# Patient Record
Sex: MALE | Race: WHITE | Employment: UNEMPLOYED | URBAN - METROPOLITAN AREA
[De-identification: names, ages, dates, MRNs, and addresses within clinical notes are randomized per-mention and may not be internally consistent; named-entity substitution may affect disease eponyms.]

---

## 2017-03-29 ENCOUNTER — HOSPITAL ENCOUNTER (OUTPATIENT)
Dept: LAB | Age: 52
Discharge: HOME | End: 2017-03-29
Payer: MEDICAID

## 2017-03-29 ENCOUNTER — OFFICE VISIT (OUTPATIENT)
Dept: HEMATOLOGY | Age: 52
End: 2017-03-29

## 2017-03-29 VITALS
BODY MASS INDEX: 29.93 KG/M2 | RESPIRATION RATE: 16 BRPM | DIASTOLIC BLOOD PRESSURE: 90 MMHG | HEART RATE: 61 BPM | OXYGEN SATURATION: 97 % | WEIGHT: 221 LBS | HEIGHT: 72 IN | TEMPERATURE: 96.9 F | SYSTOLIC BLOOD PRESSURE: 135 MMHG

## 2017-03-29 DIAGNOSIS — B18.2 CHRONIC HEPATITIS C WITHOUT HEPATIC COMA (HCC): ICD-10-CM

## 2017-03-29 DIAGNOSIS — B18.2 CHRONIC HEPATITIS C WITHOUT HEPATIC COMA (HCC): Primary | ICD-10-CM

## 2017-03-29 PROBLEM — D69.6 THROMBOCYTOPENIA (HCC): Status: ACTIVE | Noted: 2017-03-29

## 2017-03-29 LAB
ALBUMIN SERPL BCP-MCNC: 3.5 G/DL (ref 3.4–5)
ALBUMIN/GLOB SERPL: 0.9 {RATIO} (ref 0.8–1.7)
ALP SERPL-CCNC: 113 U/L (ref 45–117)
ALT SERPL-CCNC: 147 U/L (ref 16–61)
ANION GAP BLD CALC-SCNC: 5 MMOL/L (ref 3–18)
AST SERPL W P-5'-P-CCNC: 107 U/L (ref 15–37)
BASOPHILS # BLD AUTO: 0 K/UL (ref 0–0.06)
BASOPHILS # BLD: 1 % (ref 0–2)
BILIRUB DIRECT SERPL-MCNC: 0.1 MG/DL (ref 0–0.2)
BILIRUB SERPL-MCNC: 0.3 MG/DL (ref 0.2–1)
BUN SERPL-MCNC: 18 MG/DL (ref 7–18)
BUN/CREAT SERPL: 22 (ref 12–20)
CALCIUM SERPL-MCNC: 8.4 MG/DL (ref 8.5–10.1)
CHLORIDE SERPL-SCNC: 106 MMOL/L (ref 100–108)
CO2 SERPL-SCNC: 31 MMOL/L (ref 21–32)
CREAT SERPL-MCNC: 0.82 MG/DL (ref 0.6–1.3)
DIFFERENTIAL METHOD BLD: ABNORMAL
EOSINOPHIL # BLD: 0.1 K/UL (ref 0–0.4)
EOSINOPHIL NFR BLD: 2 % (ref 0–5)
ERYTHROCYTE [DISTWIDTH] IN BLOOD BY AUTOMATED COUNT: 13.9 % (ref 11.6–14.5)
GLOBULIN SER CALC-MCNC: 3.8 G/DL (ref 2–4)
GLUCOSE SERPL-MCNC: 89 MG/DL (ref 74–99)
HCT VFR BLD AUTO: 39 % (ref 36–48)
HGB BLD-MCNC: 12.8 G/DL (ref 13–16)
LYMPHOCYTES # BLD AUTO: 40 % (ref 21–52)
LYMPHOCYTES # BLD: 1.5 K/UL (ref 0.9–3.6)
MCH RBC QN AUTO: 31.2 PG (ref 24–34)
MCHC RBC AUTO-ENTMCNC: 32.8 G/DL (ref 31–37)
MCV RBC AUTO: 95.1 FL (ref 74–97)
MONOCYTES # BLD: 0.4 K/UL (ref 0.05–1.2)
MONOCYTES NFR BLD AUTO: 10 % (ref 3–10)
NEUTS SEG # BLD: 1.8 K/UL (ref 1.8–8)
NEUTS SEG NFR BLD AUTO: 47 % (ref 40–73)
PLATELET # BLD AUTO: 166 K/UL (ref 135–420)
PMV BLD AUTO: 11.2 FL (ref 9.2–11.8)
POTASSIUM SERPL-SCNC: 4.6 MMOL/L (ref 3.5–5.5)
PROT SERPL-MCNC: 7.3 G/DL (ref 6.4–8.2)
RBC # BLD AUTO: 4.1 M/UL (ref 4.7–5.5)
SODIUM SERPL-SCNC: 142 MMOL/L (ref 136–145)
WBC # BLD AUTO: 3.8 K/UL (ref 4.6–13.2)

## 2017-03-29 PROCEDURE — 80048 BASIC METABOLIC PNL TOTAL CA: CPT | Performed by: INTERNAL MEDICINE

## 2017-03-29 PROCEDURE — 87902 NFCT AGT GNTYP ALYS HEP C: CPT | Performed by: INTERNAL MEDICINE

## 2017-03-29 PROCEDURE — 86706 HEP B SURFACE ANTIBODY: CPT | Performed by: INTERNAL MEDICINE

## 2017-03-29 PROCEDURE — 87900 PHENOTYPE INFECT AGENT DRUG: CPT | Performed by: INTERNAL MEDICINE

## 2017-03-29 PROCEDURE — 87521 HEPATITIS C PROBE&RVRS TRNSC: CPT | Performed by: INTERNAL MEDICINE

## 2017-03-29 PROCEDURE — 87522 HEPATITIS C REVRS TRNSCRPJ: CPT | Performed by: INTERNAL MEDICINE

## 2017-03-29 PROCEDURE — 80076 HEPATIC FUNCTION PANEL: CPT | Performed by: INTERNAL MEDICINE

## 2017-03-29 PROCEDURE — 85025 COMPLETE CBC W/AUTO DIFF WBC: CPT | Performed by: INTERNAL MEDICINE

## 2017-03-29 PROCEDURE — 36415 COLL VENOUS BLD VENIPUNCTURE: CPT | Performed by: INTERNAL MEDICINE

## 2017-03-29 PROCEDURE — 86704 HEP B CORE ANTIBODY TOTAL: CPT | Performed by: INTERNAL MEDICINE

## 2017-03-29 PROCEDURE — 86708 HEPATITIS A ANTIBODY: CPT | Performed by: INTERNAL MEDICINE

## 2017-03-29 PROCEDURE — 87340 HEPATITIS B SURFACE AG IA: CPT | Performed by: INTERNAL MEDICINE

## 2017-03-29 RX ORDER — LISINOPRIL 20 MG/1
TABLET ORAL DAILY
COMMUNITY

## 2017-03-29 RX ORDER — ALBUTEROL SULFATE 90 UG/1
AEROSOL, METERED RESPIRATORY (INHALATION)
COMMUNITY

## 2017-03-29 RX ORDER — BUDESONIDE AND FORMOTEROL FUMARATE DIHYDRATE 160; 4.5 UG/1; UG/1
2 AEROSOL RESPIRATORY (INHALATION) 2 TIMES DAILY
COMMUNITY

## 2017-03-30 LAB
HAV AB SER QL IA: POSITIVE
HBV & HDV AB SER-IMP: NEGATIVE
HBV CORE AB SERPL QL IA: NEGATIVE
HBV SURFACE AB SERPL IA-ACNC: <3.1 MIU/ML
HBV SURFACE AG SER QL: <0.1 INDEX
HBV SURFACE AG SER QL: NEGATIVE
HEP BS AB COMMENT,HBSAC: ABNORMAL

## 2017-04-03 LAB
HCV RNA SERPL NAA+PROBE-ACNC: NORMAL IU/ML
HCV RNA SERPL NAA+PROBE-LOG IU: 6.64 LOG10 IU/ML
HCV RNA SERPL QL NAA+PROBE: POSITIVE
TEST INFORMATION:, 550031: NORMAL

## 2017-04-04 LAB
HCV GENTYP SERPL NAA+PROBE: NORMAL
PLEASE NOTE, 550474: NORMAL

## 2017-04-05 ENCOUNTER — HOSPITAL ENCOUNTER (OUTPATIENT)
Dept: ULTRASOUND IMAGING | Age: 52
Discharge: HOME | End: 2017-04-05
Attending: INTERNAL MEDICINE
Payer: MEDICAID

## 2017-04-05 DIAGNOSIS — B18.2 CHRONIC HEPATITIS C WITHOUT HEPATIC COMA (HCC): ICD-10-CM

## 2017-04-05 PROCEDURE — 0346T US ABD LTD W ELASTOGRAPHY: CPT

## 2017-04-10 LAB
HCV NS5 MUT DET ISLT GENOTYP: NORMAL
HCV RESIS PANELL ISLT GENOTYP: NORMAL
REF LAB TEST METHOD: NORMAL

## 2017-05-10 ENCOUNTER — OFFICE VISIT (OUTPATIENT)
Dept: HEMATOLOGY | Age: 52
End: 2017-05-10

## 2017-05-10 VITALS
SYSTOLIC BLOOD PRESSURE: 141 MMHG | HEIGHT: 72 IN | BODY MASS INDEX: 29.66 KG/M2 | WEIGHT: 219 LBS | DIASTOLIC BLOOD PRESSURE: 98 MMHG | TEMPERATURE: 97 F | RESPIRATION RATE: 12 BRPM | OXYGEN SATURATION: 97 % | HEART RATE: 78 BPM

## 2017-05-10 DIAGNOSIS — K74.60 CIRRHOSIS OF LIVER WITHOUT ASCITES, UNSPECIFIED HEPATIC CIRRHOSIS TYPE (HCC): Primary | ICD-10-CM

## 2017-05-10 RX ORDER — RIBAVIRIN 200 MG/1
600 CAPSULE ORAL 2 TIMES DAILY
Qty: 168 CAP | Refills: 3 | Status: SHIPPED | OUTPATIENT
Start: 2017-05-10 | End: 2017-05-17

## 2017-05-10 NOTE — MR AVS SNAPSHOT
Visit Information Date & Time Provider Department Dept. Phone Encounter #  
 5/10/2017  2:30 PM Sylvia Julien NP [facility] of [address] [place] [address] [id number] Follow-up Instructions Return in about 3 months (around 8/10/2017). Upcoming Health Maintenance Date Due Pneumococcal 19-64 Medium Risk (1 of 1 - PPSV23) 8/24/1984 DTaP/Tdap/Td series (1 - Tdap) 8/24/1986 FOBT Q 1 YEAR AGE 50-75 8/24/2015 INFLUENZA AGE 9 TO ADULT 8/1/2017 Allergies as of 5/10/2017  Review Complete On: 5/10/2017 By: Teresa Carlos Severity Noted Reaction Type Reactions Codeine  12/15/2012    Nausea and Vomiting Vicodin [Hydrocodone-acetaminophen]  12/15/2012    Other (comments) Heart Races Current Immunizations  Never Reviewed No immunizations on file. Not reviewed this visit You Were Diagnosed With   
  
 Codes Comments Cirrhosis of liver without ascites, unspecified hepatic cirrhosis type ([facility]    -  Primary ICD-10-CM: K74.60 ICD-9-CM: 571.5 Vitals BP Pulse Temp Resp Height(growth percentile) Weight(growth percentile) (!) 141/98 (BP 1 Location: Right arm, BP Patient Position: Sitting) 78 97 °F (36.1 °C) (Tympanic) 12 6' (1.829 m) 219 lb (99.3 kg) SpO2 BMI Smoking Status 97% 29.7 kg/m2 Current Every Day Smoker BMI and BSA Data Body Mass Index Body Surface Area  
 29.7 kg/m 2 2.25 m 2 Preferred Pharmacy Pharmacy Name Phone [facility] @ [address] [phone] Your Updated Medication List  
  
   
This list is accurate as of: 5/10/17  3:29 PM.  Always use your most recent med list.  
  
  
  
  
 citalopram 20 mg tablet Commonly known as:  Cristy Kamila Take 1 Tab by mouth daily. cyclobenzaprine 10 mg tablet Commonly known as:  FLEXERIL  
TAKE 1 TABLET BY MOUTH TWO TIMES A DAY AS NEEDED  
  
 elbasvir-grazoprevir  mg Tab Commonly known as:  Jerilyn Ronak Take 1 Tab by mouth daily for 112 days. Indications: Zep+RBV X 16 weeks  
  
 lisinopril 20 mg tablet Commonly known as:  Roxianne Daughters Take  by mouth daily. PROVENTIL HFA 90 mcg/actuation inhaler Generic drug:  albuterol Take  by inhalation. ribavirin 200 mg capsule Commonly known as:  REBETOL Take 3 Caps by mouth two (2) times a day for 112 days. Indications: CHRONIC HEPATITIS C - GENOTYPE 1, ZEP+RBV X 12 weeks. SYMBICORT 160-4.5 mcg/actuation HFA inhaler Generic drug:  budesonide-formoterol Take 2 Puffs by inhalation two (2) times a day. Prescriptions Sent to Pharmacy Refills  
 elbasvir-grazoprevir (ZEPATIER)  mg tab 3 Sig: Take 1 Tab by mouth daily for 112 days. Indications: Zep+RBV X 16 weeks Class: Normal  
 Pharmacy: [facility] @ [address] [place] [address] Ph #: [phone] Route: Oral  
 ribavirin (REBETOL) 200 mg capsule 3 Sig: Take 3 Caps by mouth two (2) times a day for 112 days. Indications: CHRONIC HEPATITIS C - GENOTYPE 1, ZEP+RBV X 12 weeks. Class: Normal  
 Pharmacy: [facility] @ [address] [facility] Ph #: [phone] Route: Oral  
  
Follow-up Instructions Return in about 3 months (around 8/10/2017). To-Do List   
 06/09/2017 GI:  EGD Introducing [facility]! Osman Reagan introduces [employer] patient portal. Now you can access parts of your medical record, email your doctor's office, and request medication refills online. 1. In your internet browser, go to [email or web address] 2. Click on the First Time User? Click Here link in the Sign In box. You will see the New Member Sign Up page. 3. Enter your [employer] Access Code exactly as it appears below. You will not need to use this code after youve completed the sign-up process.  If you do not sign up before the expiration date, you must request a new code. · [employer] Access Code: [email or web address] Expires: 6/27/2017  7:33 AM 
 
4. Enter the last four digits of your Social Security Number (xxxx) and Date of Birth (mm/dd/yyyy) as indicated and click Submit. You will be taken to the next sign-up page. 5. Create a [employer] ID. This will be your [employer] login ID and cannot be changed, so think of one that is secure and easy to remember. 6. Create a [employer] password. You can change your password at any time. 7. Enter your Password Reset Question and Answer. This can be used at a later time if you forget your password. 8. Enter your e-mail address. You will receive e-mail notification when new information is available in [address] 9. Click Sign Up. You can now view and download portions of your medical record. 10. Click the Download Summary menu link to download a portable copy of your medical information. If you have questions, please visit the Frequently Asked Questions section of the [employer] website. Remember, [employer] is NOT to be used for urgent needs. For medical emergencies, dial 911. Now available from your iPhone and Android! Please provide this summary of care documentation to your next provider. Your primary care clinician is listed as Kelly Perdomo. If you have any questions after today's visit, please call [phone].

## 2017-05-10 NOTE — PROGRESS NOTES
[address], MD, FACP, [facility]     April IMANI Montalvo PA-C Lucrecia Primmer, MD, [address] [facility] [place], MD Radha Dumont NP Rip Snuffer, NP        [address]     [address] [facility], [address] [facility], [address]     [phone]     FAX: [address] [facility], [address] [facility], [address]     [phone]     FAX: [phone]       Patient Care Team:  Ugo Bowen MD as PCP - General (Internal Medicine)      Problem List  Date Reviewed: 5/10/2017          Codes Class Noted    Thrombocytopenia ([facility] ICD-10-CM: D69.6  ICD-9-CM: 287.5  3/29/2017        Tobacco abuse ICD-10-CM: Z72.0  ICD-9-CM: 305.1  4/2/2015        Back pain ICD-10-CM: M54.9  ICD-9-CM: 724.5  1/29/2015        COPD (chronic obstructive pulmonary disease) ([facility] ICD-10-CM: J44.9  ICD-9-CM: 496  1/29/2015        Chronic hepatitis C ([facility] ICD-10-CM: B18.2  ICD-9-CM: 070.54  1/29/2015        Depression ICD-10-CM: F32.9  ICD-9-CM: 726  1/29/2015        HTN (hypertension) ICD-10-CM: I10  ICD-9-CM: 401.9  1/29/2015              Zion Curry returns to the [place] [address] today for education and management of chronic hepatitis C. The active problem list, all pertinent past medical history, medications, liver histology, endoscopic studies, radiologic findings and laboratory findings related to the liver disorder were reviewed with the patient. The patient is a 46 y.o.  male who was noted to have abnormalities in liver chemistries and subsequently tested positive for chronic HCV in 1990s. Risk factors for acquiring HCV are IV drug use in 1980s. There was no history of acute incteric hepatitis at the time of these risk factors. Imaging of the liver was recently performed and suggests cirrhosis.      An assessment of liver fibrosis with elastography was recently performed and suggests cirrhosis and fatty liver. The patient has never received treatment for chronic HCV. The most recent laboratory studies indicate that the liver transaminases are elevated, alkaline phosphatase is normal, tests of hepatic synthetic and metabolic function are normal, and the platelet count is normal.       The patient notes fatigue. The patient completes all daily activities without any functional limitations. The patient has not experienced  fevers, chills, shortness of breath, chest pain, pain in the right side over the liver, diffuse abdominal pain, nausea, vomiting, constipation, diarrhrea, dry eyes, dry mouth, arthralgias, myalgias, yellowing of the eyes or skin, itching, dark urine, problems concentrating, swelling of the abdomen, swelling of the lower extremities, hematemesis, or hematochezia. ALLERGIES  Allergies   Allergen Reactions    Codeine Nausea and Vomiting    Vicodin [Hydrocodone-Acetaminophen] Other (comments)     Heart Races       MEDICATIONS  Current Outpatient Prescriptions   Medication Sig    lisinopril (PRINIVIL, ZESTRIL) 20 mg tablet Take  by mouth daily.  budesonide-formoterol (SYMBICORT) 160-4.5 mcg/actuation HFA inhaler Take 2 Puffs by inhalation two (2) times a day.  albuterol (PROVENTIL HFA) 90 mcg/actuation inhaler Take  by inhalation.  cyclobenzaprine (FLEXERIL) 10 mg tablet TAKE 1 TABLET BY MOUTH TWO TIMES A DAY AS NEEDED    citalopram (CELEXA) 20 mg tablet Take 1 Tab by mouth daily. No current facility-administered medications for this visit. SYSTEM REVIEW NOT RELATED TO LIVER DISEASE OR REVIEWED ABOVE:  Constitution systems: Negative for fever, chills, weight gain, weight loss. Eyes: Negative for visual changes. ENT: Negative for sore throat, painful swallowing. Respiratory: Negative for cough, hemoptysis, SOB. Cardiology: Negative for chest pain, palpitations.   GI:  Negative for constipation or diarrhea. : Negative for urinary frequency, dysuria, hematuria, nocturia. Skin: Negative for rash. Hematology: Negative for easy bruising, blood clots. Musculo-skelatal: Negative for back pain, muscle pain, weakness. Neurologic: Negative for headaches, dizziness, vertigo, memory problems not related to HE. Psychology: Negative for anxiety, depression. FAMILY HISTORY:  The father  of MI. The mother is alive and healthy. There is no family history of liver disease. SOCIAL HISTORY:  The patient has never been . The patient has 1 child. The patient currently smokes 4 cigarettes daily. The patient has previously consumed alcohol socially never in excess. He currently consumes 2-6 alcohol beverages on weekends. The patient currently works full time on trees. He is not currently working. PHYSICAL EXAMINATION:  BP (!) 141/98 (BP 1 Location: Right arm, BP Patient Position: Sitting)  Pulse 78  Temp 97 °F (36.1 °C) (Tympanic)   Resp 12  Ht 6' (1.829 m)  Wt 219 lb (99.3 kg)  SpO2 97%  BMI 29.7 kg/m2  General: No acute distress. Eyes: Sclera anicteric. ENT: No oral lesions. Thyroid normal.  Nodes: No adenopathy. Skin: No spider angiomata. No jaundice. No palmar erythema. Respiratory: Lungs clear to auscultation. Cardiovascular: Regular heart rate. No murmurs. No JVD. Abdomen: Soft non-tender. Liver size normal to percussion/palpation. Spleen not palpable. No obvious ascites. Extremities: No edema. No muscle wasting. No gross arthritic changes. Neurologic: Alert and oriented. Cranial nerves grossly intact. No asterixis.     LABORATORY STUDIES:  [facility] of [address] [place] [address] 3/29/2017 2015   WBC 4.6 - 13.2 K/uL 3.8 (L) 4.2 (L)   ANC 1.8 - 8.0 K/UL 1.8 1.7 (L)   HGB 13.0 - 16.0 g/dL 12.8 (L) 12.6 (L)    - 420 K/uL 166 141   AST 15 - 37 U/L 107 (H) 95 (H)   ALT 16 - 61 U/L 147 (H) 137 (H)   Alk Phos 45 - 117 U/L 113 89   Bili, Total 0.2 - 1.0 MG/DL 0.3 0.6   Bili, Direct 0.0 - 0.2 MG/DL 0.1    Albumin 3.4 - 5.0 g/dL 3.5 3.7   BUN 7.0 - 18 MG/DL 18 16   Creat 0.6 - 1.3 MG/DL 0.82 0.92   Na 136 - 145 mmol/L 142 141   K 3.5 - 5.5 mmol/L 4.6 4.1   Cl 100 - 108 mmol/L 106 106   CO2 21 - 32 mmol/L 31 29   Glucose 74 - 99 mg/dL 89 127 (H)     SEROLOGIES:  Serologies Latest Ref Rng & Units 3/29/2017   Hep A Ab, Total NEGATIVE   Positive (A)   Hep B Surface Ag <1.00 Index <0.10   Hep B Surface Ag Interp NEG   NEGATIVE   Hep B Core Ab, Total NEGATIVE   NEGATIVE   Hep B Surface Ab >10.0 mIU/mL <3.10 (L)   Hep B Surface Ab Interp POS   NEGATIVE (A)   Hep C Genotype  1a   HCV RT-PCR, Quant IU/mL [id number]       LIVER HISTOLOGY:  04/2017. Shear wave elastography. E Median is 12.6 kPa, suggestive of cirrhosis, F4.  E Std is 1.7 kPa, suggests fatty liver. ENDOSCOPIC PROCEDURES:  Not available or performed    RADIOLOGY:  04/2017. Ultrasound of the liver, performed with elastography. Markedly echogenic echotexture with a subtle nodular contour. No focal mass. Suggestive of underlying cirrhosis. OTHER TESTING:  Not available or performed    ASSESSMENT AND PLAN:  Chronic HCV with cirrhosis. The most recent laboratory studies indicate that the liver transaminases are elevated, alkaline phosphatase is normal, tests of hepatic synthetic and metabolic function are normal, and the platelet count is normal.     Complications of cirrhosis were discussed in detail. We discussed thrombocytopenia, portal hypertension, varices, GI bleeding, peripheral edema, ascites, hepatic encephalopathy, and hepatocellular carcinoma. We discussed the need for follow ups on a regular basis, at 3 month intervals to monitor for complications. We discussed the need for every 6 month liver imaging studies. EGD was ordered today to assess for esophageal varices and GI blood lose. HCV. The patient has genotype 1A and is treatment naive.   He is cirrhotic. He has NS5A resistance. We discussed treatment options in detail. The patient's insurance carrier's preferred treatment regime is Zepatier, a combination medication utilizing elbasvir (an NS5A inhibitor) and grazoprevir (an NS3/4A protease inhibitor). [place] Loach will be used along with weight based ribavirin for 16 weeks. This was ordered today through [facility]. An HCV practice aggrement was signed. All side effects of both treatment medications was discussed in detail. I have explained to the patient that I have ordered the treatment medications through our specialty pharmacy and they will be delivered to his  home. He may begin taking the treatment medications as soon as they arrive. He is to call and make an appointment for treatment week #2 once he begins therapy. He voiced understanding of this plan. The patient was directed to continue all current medications at the current dosages. There are no contraindications for the patient to take any medications that are necessary for treatment of other medical issues. The patient was counseled regarding alcohol consumption. Vaccination for viral hepatitis A is not required. The patient has serologic evidence of prior exposure or vaccination with immunity. Vaccination for viral hepatitis B is recommended. The patient does not have serologic evidence of prior exposure or vaccination with immunity. Anemia may be secondary to low iron stores. Will check ferritin and iron panel. If iron deficient will supplement with oral iron and evaluate for GI blood loss. All of the above issues were discussed with the patient. All questions were answered. The patient expressed a clear understanding of the above. 30 minutes total time spent with this patient with more than 50% of this time spent counseling and coordinating care as described above. [address] [place] [address] in 3 months.   He will make a treatment week 2 appointment if he begins HCV treatment before then.      Kervin Pham NP   [facility] [place] of [address] [place] [address], [address] [facility], [address] [place] [address]   [phone]

## 2017-05-17 RX ORDER — LEDIPASVIR AND SOFOSBUVIR 90; 400 MG/1; MG/1
1 TABLET, FILM COATED ORAL DAILY
Qty: 28 TAB | Refills: 2 | Status: SHIPPED | OUTPATIENT
Start: 2017-05-17 | End: 2017-08-09

## 2017-05-25 ENCOUNTER — TELEPHONE (OUTPATIENT)
Dept: HEMATOLOGY | Age: 52
End: 2017-05-25

## 2017-05-25 NOTE — TELEPHONE ENCOUNTER
MrHomer Marie Jolly has received his Harvoni.  He stated that he was suppose to be getting zepatier and ribavirin

## 2017-07-10 ENCOUNTER — TELEPHONE (OUTPATIENT)
Dept: HEMATOLOGY | Age: 52
End: 2017-07-10

## 2017-07-10 DIAGNOSIS — B18.2 CHRONIC HEPATITIS C WITHOUT HEPATIC COMA (HCC): Primary | ICD-10-CM

## 2022-03-20 PROBLEM — D69.6 THROMBOCYTOPENIA (HCC): Status: ACTIVE | Noted: 2017-03-29

## 2022-06-23 ENCOUNTER — DOCUMENTATION ONLY (OUTPATIENT)
Dept: HEMATOLOGY | Age: 57
End: 2022-06-23

## 2023-06-29 ENCOUNTER — HOSPITAL ENCOUNTER (EMERGENCY)
Facility: HOSPITAL | Age: 58
Discharge: HOME | End: 2023-06-29
Attending: EMERGENCY MEDICINE
Payer: MEDICARE

## 2023-06-29 ENCOUNTER — APPOINTMENT (OUTPATIENT)
Facility: HOSPITAL | Age: 58
End: 2023-06-29
Payer: MEDICARE

## 2023-06-29 VITALS
BODY MASS INDEX: 33.18 KG/M2 | HEIGHT: 72 IN | SYSTOLIC BLOOD PRESSURE: 95 MMHG | WEIGHT: 245 LBS | DIASTOLIC BLOOD PRESSURE: 73 MMHG | OXYGEN SATURATION: 99 % | TEMPERATURE: 98.2 F | HEART RATE: 63 BPM | RESPIRATION RATE: 18 BRPM

## 2023-06-29 DIAGNOSIS — R55 SYNCOPE AND COLLAPSE: Primary | ICD-10-CM

## 2023-06-29 DIAGNOSIS — F10.929 ACUTE ALCOHOLIC INTOXICATION WITH COMPLICATION (HCC): ICD-10-CM

## 2023-06-29 DIAGNOSIS — S20.212A RIB CONTUSION, LEFT, INITIAL ENCOUNTER: ICD-10-CM

## 2023-06-29 DIAGNOSIS — F19.10 POLYSUBSTANCE ABUSE (HCC): ICD-10-CM

## 2023-06-29 LAB
ALBUMIN SERPL-MCNC: 3.8 G/DL (ref 3.4–5)
ALBUMIN/GLOB SERPL: 1.1 (ref 0.8–1.7)
ALP SERPL-CCNC: 84 U/L (ref 45–117)
ALT SERPL-CCNC: 28 U/L (ref 16–61)
AMMONIA PLAS-SCNC: 12 UMOL/L (ref 11–32)
AMPHET UR QL SCN: NEGATIVE
ANION GAP SERPL CALC-SCNC: 6 MMOL/L (ref 3–18)
APPEARANCE UR: CLEAR
AST SERPL-CCNC: 27 U/L (ref 10–38)
BACTERIA URNS QL MICRO: NEGATIVE /HPF
BARBITURATES UR QL SCN: NEGATIVE
BASOPHILS # BLD: 0 K/UL (ref 0–0.1)
BASOPHILS NFR BLD: 0 % (ref 0–2)
BENZODIAZ UR QL: NEGATIVE
BILIRUB SERPL-MCNC: 0.6 MG/DL (ref 0.2–1)
BILIRUB UR QL: NEGATIVE
BUN SERPL-MCNC: 13 MG/DL (ref 7–18)
BUN/CREAT SERPL: 14 (ref 12–20)
CALCIUM SERPL-MCNC: 8.5 MG/DL (ref 8.5–10.1)
CANNABINOIDS UR QL SCN: POSITIVE
CHLORIDE SERPL-SCNC: 101 MMOL/L (ref 100–111)
CO2 SERPL-SCNC: 26 MMOL/L (ref 21–32)
COCAINE UR QL SCN: POSITIVE
COLOR UR: YELLOW
CREAT SERPL-MCNC: 0.93 MG/DL (ref 0.6–1.3)
DIFFERENTIAL METHOD BLD: ABNORMAL
EKG ATRIAL RATE: 75 BPM
EKG DIAGNOSIS: NORMAL
EKG P AXIS: 62 DEGREES
EKG P-R INTERVAL: 174 MS
EKG Q-T INTERVAL: 380 MS
EKG QRS DURATION: 96 MS
EKG QTC CALCULATION (BAZETT): 424 MS
EKG R AXIS: 48 DEGREES
EKG T AXIS: 44 DEGREES
EKG VENTRICULAR RATE: 75 BPM
EOSINOPHIL # BLD: 0.1 K/UL (ref 0–0.4)
EOSINOPHIL NFR BLD: 1 % (ref 0–5)
EPITH CASTS URNS QL MICRO: NORMAL /LPF (ref 0–5)
ERYTHROCYTE [DISTWIDTH] IN BLOOD BY AUTOMATED COUNT: 13.2 % (ref 11.6–14.5)
ETHANOL SERPL-MCNC: 144 MG/DL (ref 0–3)
GLOBULIN SER CALC-MCNC: 3.5 G/DL (ref 2–4)
GLUCOSE SERPL-MCNC: 121 MG/DL (ref 74–99)
GLUCOSE UR STRIP.AUTO-MCNC: NEGATIVE MG/DL
HCT VFR BLD AUTO: 45 % (ref 36–48)
HGB BLD-MCNC: 15.4 G/DL (ref 13–16)
HGB UR QL STRIP: NEGATIVE
IMM GRANULOCYTES # BLD AUTO: 0 K/UL (ref 0–0.04)
IMM GRANULOCYTES NFR BLD AUTO: 0 % (ref 0–0.5)
KETONES UR QL STRIP.AUTO: NEGATIVE MG/DL
LACTATE SERPL-SCNC: 1.8 MMOL/L (ref 0.4–2)
LEUKOCYTE ESTERASE UR QL STRIP.AUTO: NEGATIVE
LYMPHOCYTES # BLD: 1.1 K/UL (ref 0.9–3.6)
LYMPHOCYTES NFR BLD: 11 % (ref 21–52)
Lab: ABNORMAL
MAGNESIUM SERPL-MCNC: 2.1 MG/DL (ref 1.6–2.6)
MCH RBC QN AUTO: 31.1 PG (ref 24–34)
MCHC RBC AUTO-ENTMCNC: 34.2 G/DL (ref 31–37)
MCV RBC AUTO: 90.9 FL (ref 78–100)
METHADONE UR QL: NEGATIVE
MONOCYTES # BLD: 0.5 K/UL (ref 0.05–1.2)
MONOCYTES NFR BLD: 5 % (ref 3–10)
NEUTS SEG # BLD: 7.9 K/UL (ref 1.8–8)
NEUTS SEG NFR BLD: 83 % (ref 40–73)
NITRITE UR QL STRIP.AUTO: NEGATIVE
NRBC # BLD: 0 K/UL (ref 0–0.01)
NRBC BLD-RTO: 0 PER 100 WBC
OPIATES UR QL: NEGATIVE
PCP UR QL: NEGATIVE
PH UR STRIP: 6 (ref 5–8)
PLATELET # BLD AUTO: 184 K/UL (ref 135–420)
PMV BLD AUTO: 10.2 FL (ref 9.2–11.8)
POTASSIUM SERPL-SCNC: 4 MMOL/L (ref 3.5–5.5)
PROT SERPL-MCNC: 7.3 G/DL (ref 6.4–8.2)
PROT UR STRIP-MCNC: ABNORMAL MG/DL
RBC # BLD AUTO: 4.95 M/UL (ref 4.35–5.65)
RBC #/AREA URNS HPF: NEGATIVE /HPF (ref 0–5)
SODIUM SERPL-SCNC: 133 MMOL/L (ref 136–145)
SP GR UR REFRACTOMETRY: 1.01 (ref 1–1.03)
TROPONIN I SERPL HS-MCNC: 6 NG/L (ref 0–78)
TROPONIN I SERPL HS-MCNC: 6 NG/L (ref 0–78)
UROBILINOGEN UR QL STRIP.AUTO: 1 EU/DL (ref 0.2–1)
WBC # BLD AUTO: 9.6 K/UL (ref 4.6–13.2)
WBC URNS QL MICRO: NEGATIVE /HPF (ref 0–5)

## 2023-06-29 PROCEDURE — 71045 X-RAY EXAM CHEST 1 VIEW: CPT

## 2023-06-29 PROCEDURE — 96361 HYDRATE IV INFUSION ADD-ON: CPT

## 2023-06-29 PROCEDURE — 83605 ASSAY OF LACTIC ACID: CPT

## 2023-06-29 PROCEDURE — 70450 CT HEAD/BRAIN W/O DYE: CPT

## 2023-06-29 PROCEDURE — 96360 HYDRATION IV INFUSION INIT: CPT

## 2023-06-29 PROCEDURE — 82077 ASSAY SPEC XCP UR&BREATH IA: CPT

## 2023-06-29 PROCEDURE — 71100 X-RAY EXAM RIBS UNI 2 VIEWS: CPT

## 2023-06-29 PROCEDURE — 6370000000 HC RX 637 (ALT 250 FOR IP): Performed by: EMERGENCY MEDICINE

## 2023-06-29 PROCEDURE — 80053 COMPREHEN METABOLIC PANEL: CPT

## 2023-06-29 PROCEDURE — 80307 DRUG TEST PRSMV CHEM ANLYZR: CPT

## 2023-06-29 PROCEDURE — 81001 URINALYSIS AUTO W/SCOPE: CPT

## 2023-06-29 PROCEDURE — 83735 ASSAY OF MAGNESIUM: CPT

## 2023-06-29 PROCEDURE — 82140 ASSAY OF AMMONIA: CPT

## 2023-06-29 PROCEDURE — 2580000003 HC RX 258: Performed by: EMERGENCY MEDICINE

## 2023-06-29 PROCEDURE — 85025 COMPLETE CBC W/AUTO DIFF WBC: CPT

## 2023-06-29 PROCEDURE — 93005 ELECTROCARDIOGRAM TRACING: CPT | Performed by: EMERGENCY MEDICINE

## 2023-06-29 PROCEDURE — 84484 ASSAY OF TROPONIN QUANT: CPT

## 2023-06-29 PROCEDURE — 99285 EMERGENCY DEPT VISIT HI MDM: CPT

## 2023-06-29 RX ORDER — IPRATROPIUM BROMIDE AND ALBUTEROL SULFATE 2.5; .5 MG/3ML; MG/3ML
1 SOLUTION RESPIRATORY (INHALATION)
Status: COMPLETED | OUTPATIENT
Start: 2023-06-29 | End: 2023-06-29

## 2023-06-29 RX ORDER — 0.9 % SODIUM CHLORIDE 0.9 %
1000 INTRAVENOUS SOLUTION INTRAVENOUS ONCE
Status: COMPLETED | OUTPATIENT
Start: 2023-06-29 | End: 2023-06-29

## 2023-06-29 RX ADMIN — SODIUM CHLORIDE 1000 ML: 900 INJECTION, SOLUTION INTRAVENOUS at 02:19

## 2023-06-29 RX ADMIN — IPRATROPIUM BROMIDE AND ALBUTEROL SULFATE 1 DOSE: .5; 3 SOLUTION RESPIRATORY (INHALATION) at 02:19

## 2023-06-29 ASSESSMENT — PAIN - FUNCTIONAL ASSESSMENT: PAIN_FUNCTIONAL_ASSESSMENT: NONE - DENIES PAIN

## 2025-06-14 ENCOUNTER — HOSPITAL ENCOUNTER (INPATIENT)
Facility: HOSPITAL | Age: 60
LOS: 38 days | Discharge: NURSING, REHAB OR LONG-TERM CARE | DRG: 368 | End: 2025-07-22
Attending: EMERGENCY MEDICINE | Admitting: INTERNAL MEDICINE
Payer: MEDICARE

## 2025-06-14 ENCOUNTER — APPOINTMENT (OUTPATIENT)
Facility: HOSPITAL | Age: 60
DRG: 368 | End: 2025-06-14
Payer: MEDICARE

## 2025-06-14 ENCOUNTER — APPOINTMENT (OUTPATIENT)
Facility: HOSPITAL | Age: 60
DRG: 368 | End: 2025-06-14
Attending: INTERNAL MEDICINE
Payer: MEDICARE

## 2025-06-14 DIAGNOSIS — I24.9 ACUTE CORONARY SYNDROME (HCC): ICD-10-CM

## 2025-06-14 DIAGNOSIS — B37.89 CANDIDA LARYNGITIS: ICD-10-CM

## 2025-06-14 DIAGNOSIS — J38.4 LARYNGEAL EDEMA: ICD-10-CM

## 2025-06-14 DIAGNOSIS — R94.31 ABNORMAL EKG: Primary | ICD-10-CM

## 2025-06-14 DIAGNOSIS — E87.1 HYPONATREMIA: ICD-10-CM

## 2025-06-14 PROBLEM — F19.10 POLYSUBSTANCE ABUSE (HCC): Status: ACTIVE | Noted: 2025-06-14

## 2025-06-14 PROBLEM — F10.10 ETOH ABUSE: Status: ACTIVE | Noted: 2025-06-14

## 2025-06-14 PROBLEM — J96.02 ACUTE RESPIRATORY FAILURE WITH HYPOXIA AND HYPERCAPNIA (HCC): Status: ACTIVE | Noted: 2025-06-14

## 2025-06-14 PROBLEM — J96.01 ACUTE RESPIRATORY FAILURE WITH HYPOXIA AND HYPERCAPNIA (HCC): Status: ACTIVE | Noted: 2025-06-14

## 2025-06-14 PROBLEM — J38.6: Status: ACTIVE | Noted: 2025-06-14

## 2025-06-14 PROBLEM — R06.1 STRIDOR: Status: ACTIVE | Noted: 2025-06-14

## 2025-06-14 PROBLEM — B37.81 CANDIDIASIS OF ESOPHAGUS (HCC): Status: ACTIVE | Noted: 2025-06-14

## 2025-06-14 LAB
ALBUMIN SERPL-MCNC: 3.5 G/DL (ref 3.4–5)
ALBUMIN/GLOB SERPL: 1.1 (ref 0.8–1.7)
ALP SERPL-CCNC: 75 U/L (ref 45–117)
ALT SERPL-CCNC: 52 U/L (ref 10–50)
AMPHET UR QL SCN: NEGATIVE
ANION GAP BLD CALC-SCNC: ABNORMAL MMOL/L (ref 10–20)
ANION GAP SERPL CALC-SCNC: 10 MMOL/L (ref 3–18)
ANION GAP SERPL CALC-SCNC: 10 MMOL/L (ref 3–18)
ANION GAP SERPL CALC-SCNC: 13 MMOL/L (ref 3–18)
ANION GAP SERPL CALC-SCNC: 15 MMOL/L (ref 3–18)
ANION GAP SERPL CALC-SCNC: 8 MMOL/L (ref 3–18)
ANION GAP SERPL CALC-SCNC: 9 MMOL/L (ref 3–18)
APPEARANCE UR: CLEAR
ARTERIAL PATENCY WRIST A: POSITIVE
AST SERPL-CCNC: 77 U/L (ref 10–38)
BACTERIA URNS QL MICRO: ABNORMAL /HPF
BARBITURATES UR QL SCN: NEGATIVE
BASE EXCESS BLD CALC-SCNC: 1.6 MMOL/L
BASOPHILS # BLD: 0.03 K/UL (ref 0–0.1)
BASOPHILS NFR BLD: 0.4 % (ref 0–2)
BDY SITE: ABNORMAL
BENZODIAZ UR QL: POSITIVE
BILIRUB SERPL-MCNC: 0.9 MG/DL (ref 0.2–1)
BILIRUB UR QL: NEGATIVE
BUN SERPL-MCNC: 10 MG/DL (ref 6–23)
BUN SERPL-MCNC: 10 MG/DL (ref 6–23)
BUN SERPL-MCNC: 8 MG/DL (ref 6–23)
BUN SERPL-MCNC: 8 MG/DL (ref 6–23)
BUN SERPL-MCNC: 9 MG/DL (ref 6–23)
BUN SERPL-MCNC: 9 MG/DL (ref 6–23)
BUN/CREAT SERPL: 14 (ref 12–20)
BUN/CREAT SERPL: 15 (ref 12–20)
BUN/CREAT SERPL: 15 (ref 12–20)
BUN/CREAT SERPL: 16 (ref 12–20)
CA-I BLD-MCNC: 1.15 MMOL/L (ref 1.15–1.33)
CA-I SERPL-SCNC: 1.12 MMOL/L (ref 1.12–1.32)
CALCIUM SERPL-MCNC: 8.5 MG/DL (ref 8.5–10.1)
CALCIUM SERPL-MCNC: 8.6 MG/DL (ref 8.5–10.1)
CALCIUM SERPL-MCNC: 8.8 MG/DL (ref 8.5–10.1)
CALCIUM SERPL-MCNC: 8.8 MG/DL (ref 8.5–10.1)
CALCIUM SERPL-MCNC: 8.9 MG/DL (ref 8.5–10.1)
CALCIUM SERPL-MCNC: 8.9 MG/DL (ref 8.5–10.1)
CANNABINOIDS UR QL SCN: POSITIVE
CHLORIDE BLD-SCNC: 77 MMOL/L (ref 98–107)
CHLORIDE SERPL-SCNC: 78 MMOL/L (ref 98–107)
CHLORIDE SERPL-SCNC: 78 MMOL/L (ref 98–107)
CHLORIDE SERPL-SCNC: 80 MMOL/L (ref 98–107)
CHLORIDE SERPL-SCNC: 86 MMOL/L (ref 98–107)
CHLORIDE SERPL-SCNC: 90 MMOL/L (ref 98–107)
CHLORIDE SERPL-SCNC: 92 MMOL/L (ref 98–107)
CO2 BLD-SCNC: 27 MMOL/L (ref 22–29)
CO2 SERPL-SCNC: 24 MMOL/L (ref 21–32)
CO2 SERPL-SCNC: 24 MMOL/L (ref 21–32)
CO2 SERPL-SCNC: 27 MMOL/L (ref 21–32)
CO2 SERPL-SCNC: 27 MMOL/L (ref 21–32)
CO2 SERPL-SCNC: 28 MMOL/L (ref 21–32)
CO2 SERPL-SCNC: 29 MMOL/L (ref 21–32)
COCAINE UR QL SCN: POSITIVE
COLOR UR: YELLOW
CREAT BLD-MCNC: 0.58 MG/DL (ref 0.6–1.3)
CREAT SERPL-MCNC: 0.55 MG/DL (ref 0.6–1.3)
CREAT SERPL-MCNC: 0.55 MG/DL (ref 0.6–1.3)
CREAT SERPL-MCNC: 0.56 MG/DL (ref 0.6–1.3)
CREAT SERPL-MCNC: 0.58 MG/DL (ref 0.6–1.3)
CREAT SERPL-MCNC: 0.6 MG/DL (ref 0.6–1.3)
CREAT SERPL-MCNC: 0.61 MG/DL (ref 0.6–1.3)
CREAT UR-MCNC: 65.9 MG/DL (ref 30–125)
DIFFERENTIAL METHOD BLD: ABNORMAL
ECHO AO ROOT DIAM: 3.2 CM
ECHO AO ROOT INDEX: 1.37 CM/M2
ECHO AV AREA PEAK VELOCITY: 3.5 CM2
ECHO AV AREA VTI: 3.8 CM2
ECHO AV AREA/BSA PEAK VELOCITY: 1.5 CM2/M2
ECHO AV AREA/BSA VTI: 1.6 CM2/M2
ECHO AV MEAN GRADIENT: 4 MMHG
ECHO AV MEAN VELOCITY: 1 M/S
ECHO AV PEAK GRADIENT: 7 MMHG
ECHO AV PEAK VELOCITY: 1.3 M/S
ECHO AV VELOCITY RATIO: 0.85
ECHO AV VTI: 24.8 CM
ECHO BSA: 2.39 M2
ECHO LA DIAMETER INDEX: 1.59 CM/M2
ECHO LA DIAMETER: 3.7 CM
ECHO LA TO AORTIC ROOT RATIO: 1.16
ECHO LA VOL A-L A2C: 50 ML (ref 18–58)
ECHO LA VOL A-L A4C: 56 ML (ref 18–58)
ECHO LA VOL BP: 52 ML (ref 18–58)
ECHO LA VOL MOD A2C: 47 ML (ref 18–58)
ECHO LA VOL MOD A4C: 52 ML (ref 18–58)
ECHO LA VOL/BSA BIPLANE: 22 ML/M2 (ref 16–34)
ECHO LA VOLUME AREA LENGTH: 56 ML
ECHO LA VOLUME INDEX A-L A2C: 21 ML/M2 (ref 16–34)
ECHO LA VOLUME INDEX A-L A4C: 24 ML/M2 (ref 16–34)
ECHO LA VOLUME INDEX AREA LENGTH: 24 ML/M2 (ref 16–34)
ECHO LA VOLUME INDEX MOD A2C: 20 ML/M2 (ref 16–34)
ECHO LA VOLUME INDEX MOD A4C: 22 ML/M2 (ref 16–34)
ECHO LV E' LATERAL VELOCITY: 11.18 CM/S
ECHO LV E' SEPTAL VELOCITY: 9.79 CM/S
ECHO LV EDV A2C: 64 ML
ECHO LV EDV A4C: 84 ML
ECHO LV EDV BP: 73 ML (ref 67–155)
ECHO LV EDV INDEX A4C: 36 ML/M2
ECHO LV EDV INDEX BP: 31 ML/M2
ECHO LV EDV NDEX A2C: 27 ML/M2
ECHO LV EF PHYSICIAN: 61 %
ECHO LV EJECTION FRACTION A2C: 59 %
ECHO LV EJECTION FRACTION A4C: 61 %
ECHO LV EJECTION FRACTION BIPLANE: 57 % (ref 55–100)
ECHO LV ESV A2C: 27 ML
ECHO LV ESV A4C: 33 ML
ECHO LV ESV BP: 31 ML (ref 22–58)
ECHO LV ESV INDEX A2C: 12 ML/M2
ECHO LV ESV INDEX A4C: 14 ML/M2
ECHO LV ESV INDEX BP: 13 ML/M2
ECHO LV FRACTIONAL SHORTENING: 33 % (ref 28–44)
ECHO LV INTERNAL DIMENSION DIASTOLE INDEX: 2.06 CM/M2
ECHO LV INTERNAL DIMENSION DIASTOLIC: 4.8 CM (ref 4.2–5.9)
ECHO LV INTERNAL DIMENSION SYSTOLIC INDEX: 1.37 CM/M2
ECHO LV INTERNAL DIMENSION SYSTOLIC: 3.2 CM
ECHO LV IVSD: 0.8 CM (ref 0.6–1)
ECHO LV MASS 2D: 126.7 G (ref 88–224)
ECHO LV MASS INDEX 2D: 54.4 G/M2 (ref 49–115)
ECHO LV POSTERIOR WALL DIASTOLIC: 0.8 CM (ref 0.6–1)
ECHO LV RELATIVE WALL THICKNESS RATIO: 0.33
ECHO LVOT AREA: 4.5 CM2
ECHO LVOT AV VTI INDEX: 0.86
ECHO LVOT DIAM: 2.4 CM
ECHO LVOT MEAN GRADIENT: 2 MMHG
ECHO LVOT PEAK GRADIENT: 4 MMHG
ECHO LVOT PEAK VELOCITY: 1.1 M/S
ECHO LVOT STROKE VOLUME INDEX: 41.3 ML/M2
ECHO LVOT SV: 96.3 ML
ECHO LVOT VTI: 21.3 CM
ECHO MV A VELOCITY: 0.84 M/S
ECHO MV E DECELERATION TIME (DT): 245.3 MS
ECHO MV E VELOCITY: 0.7 M/S
ECHO MV E/A RATIO: 0.83
ECHO MV E/E' LATERAL: 6.26
ECHO MV E/E' RATIO (AVERAGED): 6.71
ECHO MV E/E' SEPTAL: 7.15
ECHO RV FREE WALL PEAK S': 21.9 CM/S
ECHO RV TAPSE: 2.8 CM (ref 1.7–?)
EOSINOPHIL # BLD: 0.07 K/UL (ref 0–0.4)
EOSINOPHIL NFR BLD: 1 % (ref 0–5)
EPITH CASTS URNS QL MICRO: ABNORMAL /LPF (ref 0–5)
ERYTHROCYTE [DISTWIDTH] IN BLOOD BY AUTOMATED COUNT: 12.6 % (ref 11.6–14.5)
ETHANOL SERPL-MCNC: <11 MG/DL (ref 0–0.08)
FENTANYL: POSITIVE
FERRITIN SERPL-MCNC: 238 NG/ML (ref 13–400)
FIO2 ON VENT: 100 %
GAS FLOW.O2 O2 DELIVERY SYS: ABNORMAL
GLOBULIN SER CALC-MCNC: 3.2 G/DL (ref 2–4)
GLUCOSE BLD STRIP.AUTO-MCNC: 112 MG/DL (ref 70–110)
GLUCOSE BLD STRIP.AUTO-MCNC: 134 MG/DL (ref 70–110)
GLUCOSE BLD STRIP.AUTO-MCNC: 155 MG/DL (ref 70–110)
GLUCOSE BLD-MCNC: 115 MG/DL (ref 74–99)
GLUCOSE SERPL-MCNC: 108 MG/DL (ref 74–108)
GLUCOSE SERPL-MCNC: 113 MG/DL (ref 74–108)
GLUCOSE SERPL-MCNC: 114 MG/DL (ref 74–108)
GLUCOSE SERPL-MCNC: 123 MG/DL (ref 74–108)
GLUCOSE SERPL-MCNC: 143 MG/DL (ref 74–108)
GLUCOSE SERPL-MCNC: 93 MG/DL (ref 74–108)
GLUCOSE UR STRIP.AUTO-MCNC: NEGATIVE MG/DL
HCO3 BLD-SCNC: 28.3 MMOL/L (ref 21–28)
HCT VFR BLD AUTO: 33.4 % (ref 36–48)
HGB BLD-MCNC: 11.7 G/DL (ref 13–16)
HGB UR QL STRIP: ABNORMAL
IMM GRANULOCYTES # BLD AUTO: 0.03 K/UL (ref 0–0.04)
IMM GRANULOCYTES NFR BLD AUTO: 0.4 % (ref 0–0.5)
INR PPP: 1.1 (ref 0.9–1.1)
IRON SATN MFR SERPL: 21 %
IRON SERPL-MCNC: 59 UG/DL (ref 50–175)
KETONES UR QL STRIP.AUTO: NEGATIVE MG/DL
L PNEUMO AG UR QL: NEGATIVE
LACTATE BLD-SCNC: <0.4 MMOL/L (ref 0.4–2)
LACTATE SERPL-SCNC: 0.7 MMOL/L (ref 0.4–2)
LEUKOCYTE ESTERASE UR QL STRIP.AUTO: NEGATIVE
LYMPHOCYTES # BLD: 1.73 K/UL (ref 0.9–3.3)
LYMPHOCYTES NFR BLD: 23.5 % (ref 21–52)
Lab: ABNORMAL
MAGNESIUM SERPL-MCNC: 1.7 MG/DL (ref 1.6–2.6)
MAGNESIUM SERPL-MCNC: 2 MG/DL (ref 1.6–2.6)
MCH RBC QN AUTO: 29.5 PG (ref 24–34)
MCHC RBC AUTO-ENTMCNC: 35 G/DL (ref 31–37)
MCV RBC AUTO: 84.3 FL (ref 78–100)
METHADONE UR QL: NEGATIVE
MONOCYTES # BLD: 1.06 K/UL (ref 0.05–1.2)
MONOCYTES NFR BLD: 14.4 % (ref 3–10)
NEGATIVE CONTROL: NEGATIVE
NEUTS SEG # BLD: 4.44 K/UL (ref 1.8–8)
NEUTS SEG NFR BLD: 60.3 % (ref 40–73)
NITRITE UR QL STRIP.AUTO: NEGATIVE
NRBC # BLD: 0 K/UL (ref 0–0.01)
NRBC BLD-RTO: 0 PER 100 WBC
NT PRO BNP: 93 PG/ML (ref 36–900)
OPIATES UR QL: NEGATIVE
OSMOLALITY SERPL: 243 MOSM/KG H2O
OSMOLALITY UR: 85 MOSM/KG H2O
OXYCODONE UR QL SCN: NEGATIVE
PCO2 BLD: 53.2 MMHG (ref 35–48)
PEEP RESPIRATORY: 5
PH BLD: 7.33 (ref 7.35–7.45)
PH UR STRIP: 7 (ref 5–8)
PH, URINE: 5 (ref 4.6–8)
PHOSPHATE SERPL-MCNC: 4.1 MG/DL (ref 2.5–4.9)
PLATELET # BLD AUTO: 245 K/UL (ref 135–420)
PMV BLD AUTO: 9.6 FL (ref 9.2–11.8)
PO2 BLD: 477 MMHG (ref 83–108)
POSITIVE CONTROL: POSITIVE
POTASSIUM BLD-SCNC: 3.3 MMOL/L (ref 3.5–5.1)
POTASSIUM SERPL-SCNC: 3.3 MMOL/L (ref 3.5–5.5)
POTASSIUM SERPL-SCNC: 3.7 MMOL/L (ref 3.5–5.5)
POTASSIUM SERPL-SCNC: 3.8 MMOL/L (ref 3.5–5.5)
POTASSIUM SERPL-SCNC: 3.8 MMOL/L (ref 3.5–5.5)
POTASSIUM SERPL-SCNC: 4.1 MMOL/L (ref 3.5–5.5)
POTASSIUM SERPL-SCNC: 4.4 MMOL/L (ref 3.5–5.5)
PROCALCITONIN SERPL-MCNC: 0.03 NG/ML
PROT SERPL-MCNC: 6.7 G/DL (ref 6.4–8.2)
PROT UR STRIP-MCNC: NEGATIVE MG/DL
PROTHROMBIN TIME: 13.9 SEC (ref 11.9–14.9)
RBC # BLD AUTO: 3.96 M/UL (ref 4.35–5.65)
RBC #/AREA URNS HPF: ABNORMAL /HPF (ref 0–5)
RETICS/RBC NFR AUTO: 2.3 % (ref 0.5–2.5)
S PNEUM AG UR QL IA.RAPID: NEGATIVE
SAO2 % BLD: 100 % (ref 94–98)
SERVICE CMNT-IMP: ABNORMAL
SODIUM BLD-SCNC: 118 MMOL/L (ref 136–145)
SODIUM SERPL-SCNC: 115 MMOL/L (ref 136–145)
SODIUM SERPL-SCNC: 117 MMOL/L (ref 136–145)
SODIUM SERPL-SCNC: 118 MMOL/L (ref 136–145)
SODIUM SERPL-SCNC: 120 MMOL/L (ref 136–145)
SODIUM SERPL-SCNC: 123 MMOL/L (ref 136–145)
SODIUM SERPL-SCNC: 127 MMOL/L (ref 136–145)
SODIUM SERPL-SCNC: 130 MMOL/L (ref 136–145)
SODIUM UR-SCNC: 53 MMOL/L (ref 40–220)
SP GR UR REFRACTOMETRY: <1.005 (ref 1–1.03)
SPECIMEN SITE: ABNORMAL
TIBC SERPL-MCNC: 277 UG/DL (ref 250–450)
TROPONIN T SERPL HS-MCNC: 12.5 NG/L (ref 0–22)
UIBC SERPL-MCNC: 218 UG/DL (ref 112–347)
UROBILINOGEN UR QL STRIP.AUTO: 1 EU/DL (ref 0.2–1)
VENTILATION MODE VENT: ABNORMAL
VT SETTING VENT: 500
WBC # BLD AUTO: 7.4 K/UL (ref 4.6–13.2)
WBC URNS QL MICRO: ABNORMAL /HPF (ref 0–5)

## 2025-06-14 PROCEDURE — 83550 IRON BINDING TEST: CPT

## 2025-06-14 PROCEDURE — 6360000004 HC RX CONTRAST MEDICATION: Performed by: INTERNAL MEDICINE

## 2025-06-14 PROCEDURE — 96375 TX/PRO/DX INJ NEW DRUG ADDON: CPT

## 2025-06-14 PROCEDURE — 87305 ASPERGILLUS AG IA: CPT

## 2025-06-14 PROCEDURE — 87205 SMEAR GRAM STAIN: CPT

## 2025-06-14 PROCEDURE — 6360000002 HC RX W HCPCS: Performed by: EMERGENCY MEDICINE

## 2025-06-14 PROCEDURE — 51702 INSERT TEMP BLADDER CATH: CPT

## 2025-06-14 PROCEDURE — 71045 X-RAY EXAM CHEST 1 VIEW: CPT

## 2025-06-14 PROCEDURE — 82803 BLOOD GASES ANY COMBINATION: CPT

## 2025-06-14 PROCEDURE — 86361 T CELL ABSOLUTE COUNT: CPT

## 2025-06-14 PROCEDURE — 85610 PROTHROMBIN TIME: CPT

## 2025-06-14 PROCEDURE — 83930 ASSAY OF BLOOD OSMOLALITY: CPT

## 2025-06-14 PROCEDURE — 6360000002 HC RX W HCPCS: Performed by: INTERNAL MEDICINE

## 2025-06-14 PROCEDURE — 82962 GLUCOSE BLOOD TEST: CPT

## 2025-06-14 PROCEDURE — 86803 HEPATITIS C AB TEST: CPT

## 2025-06-14 PROCEDURE — 2580000003 HC RX 258: Performed by: INTERNAL MEDICINE

## 2025-06-14 PROCEDURE — 2500000003 HC RX 250 WO HCPCS: Performed by: INTERNAL MEDICINE

## 2025-06-14 PROCEDURE — 2700000000 HC OXYGEN THERAPY PER DAY

## 2025-06-14 PROCEDURE — 87522 HEPATITIS C REVRS TRNSCRPJ: CPT

## 2025-06-14 PROCEDURE — 87070 CULTURE OTHR SPECIMN AEROBIC: CPT

## 2025-06-14 PROCEDURE — 85045 AUTOMATED RETICULOCYTE COUNT: CPT

## 2025-06-14 PROCEDURE — 36556 INSERT NON-TUNNEL CV CATH: CPT

## 2025-06-14 PROCEDURE — 70360 X-RAY EXAM OF NECK: CPT

## 2025-06-14 PROCEDURE — 94640 AIRWAY INHALATION TREATMENT: CPT

## 2025-06-14 PROCEDURE — 82570 ASSAY OF URINE CREATININE: CPT

## 2025-06-14 PROCEDURE — 84300 ASSAY OF URINE SODIUM: CPT

## 2025-06-14 PROCEDURE — 82947 ASSAY GLUCOSE BLOOD QUANT: CPT

## 2025-06-14 PROCEDURE — 0BH17EZ INSERTION OF ENDOTRACHEAL AIRWAY INTO TRACHEA, VIA NATURAL OR ARTIFICIAL OPENING: ICD-10-PCS | Performed by: INTERNAL MEDICINE

## 2025-06-14 PROCEDURE — 82330 ASSAY OF CALCIUM: CPT

## 2025-06-14 PROCEDURE — 6370000000 HC RX 637 (ALT 250 FOR IP): Performed by: INTERNAL MEDICINE

## 2025-06-14 PROCEDURE — 84100 ASSAY OF PHOSPHORUS: CPT

## 2025-06-14 PROCEDURE — 70491 CT SOFT TISSUE NECK W/DYE: CPT

## 2025-06-14 PROCEDURE — 99292 CRITICAL CARE ADDL 30 MIN: CPT

## 2025-06-14 PROCEDURE — 2580000003 HC RX 258: Performed by: EMERGENCY MEDICINE

## 2025-06-14 PROCEDURE — 84295 ASSAY OF SERUM SODIUM: CPT

## 2025-06-14 PROCEDURE — 82607 VITAMIN B-12: CPT

## 2025-06-14 PROCEDURE — 80307 DRUG TEST PRSMV CHEM ANLYZR: CPT

## 2025-06-14 PROCEDURE — 94002 VENT MGMT INPAT INIT DAY: CPT

## 2025-06-14 PROCEDURE — 31500 INSERT EMERGENCY AIRWAY: CPT

## 2025-06-14 PROCEDURE — 84132 ASSAY OF SERUM POTASSIUM: CPT

## 2025-06-14 PROCEDURE — 87040 BLOOD CULTURE FOR BACTERIA: CPT

## 2025-06-14 PROCEDURE — 87449 NOS EACH ORGANISM AG IA: CPT

## 2025-06-14 PROCEDURE — 74177 CT ABD & PELVIS W/CONTRAST: CPT

## 2025-06-14 PROCEDURE — 87086 URINE CULTURE/COLONY COUNT: CPT

## 2025-06-14 PROCEDURE — 71260 CT THORAX DX C+: CPT

## 2025-06-14 PROCEDURE — 82077 ASSAY SPEC XCP UR&BREATH IA: CPT

## 2025-06-14 PROCEDURE — 85025 COMPLETE CBC W/AUTO DIFF WBC: CPT

## 2025-06-14 PROCEDURE — 87899 AGENT NOS ASSAY W/OPTIC: CPT

## 2025-06-14 PROCEDURE — 83880 ASSAY OF NATRIURETIC PEPTIDE: CPT

## 2025-06-14 PROCEDURE — 84484 ASSAY OF TROPONIN QUANT: CPT

## 2025-06-14 PROCEDURE — 93306 TTE W/DOPPLER COMPLETE: CPT | Performed by: INTERNAL MEDICINE

## 2025-06-14 PROCEDURE — 80053 COMPREHEN METABOLIC PANEL: CPT

## 2025-06-14 PROCEDURE — 83605 ASSAY OF LACTIC ACID: CPT

## 2025-06-14 PROCEDURE — 99291 CRITICAL CARE FIRST HOUR: CPT

## 2025-06-14 PROCEDURE — 36600 WITHDRAWAL OF ARTERIAL BLOOD: CPT

## 2025-06-14 PROCEDURE — 87389 HIV-1 AG W/HIV-1&-2 AB AG IA: CPT

## 2025-06-14 PROCEDURE — 2500000003 HC RX 250 WO HCPCS: Performed by: EMERGENCY MEDICINE

## 2025-06-14 PROCEDURE — 80048 BASIC METABOLIC PNL TOTAL CA: CPT

## 2025-06-14 PROCEDURE — 5A1955Z RESPIRATORY VENTILATION, GREATER THAN 96 CONSECUTIVE HOURS: ICD-10-PCS | Performed by: INTERNAL MEDICINE

## 2025-06-14 PROCEDURE — 93306 TTE W/DOPPLER COMPLETE: CPT

## 2025-06-14 PROCEDURE — 70450 CT HEAD/BRAIN W/O DYE: CPT

## 2025-06-14 PROCEDURE — 81001 URINALYSIS AUTO W/SCOPE: CPT

## 2025-06-14 PROCEDURE — 74018 RADEX ABDOMEN 1 VIEW: CPT

## 2025-06-14 PROCEDURE — 83735 ASSAY OF MAGNESIUM: CPT

## 2025-06-14 PROCEDURE — 84145 PROCALCITONIN (PCT): CPT

## 2025-06-14 PROCEDURE — 83540 ASSAY OF IRON: CPT

## 2025-06-14 PROCEDURE — 85014 HEMATOCRIT: CPT

## 2025-06-14 PROCEDURE — 96365 THER/PROPH/DIAG IV INF INIT: CPT

## 2025-06-14 PROCEDURE — 82728 ASSAY OF FERRITIN: CPT

## 2025-06-14 PROCEDURE — 83935 ASSAY OF URINE OSMOLALITY: CPT

## 2025-06-14 PROCEDURE — 2000000000 HC ICU R&B

## 2025-06-14 RX ORDER — ROCURONIUM BROMIDE 10 MG/ML
100 INJECTION, SOLUTION INTRAVENOUS
Status: COMPLETED | OUTPATIENT
Start: 2025-06-14 | End: 2025-06-14

## 2025-06-14 RX ORDER — DEXTROSE MONOHYDRATE 50 MG/ML
INJECTION, SOLUTION INTRAVENOUS CONTINUOUS
Status: DISCONTINUED | OUTPATIENT
Start: 2025-06-14 | End: 2025-06-16

## 2025-06-14 RX ORDER — GLUCAGON 1 MG/ML
1 KIT INJECTION PRN
Status: DISCONTINUED | OUTPATIENT
Start: 2025-06-14 | End: 2025-07-16

## 2025-06-14 RX ORDER — SODIUM CHLORIDE 9 MG/ML
INJECTION, SOLUTION INTRAVENOUS PRN
Status: DISCONTINUED | OUTPATIENT
Start: 2025-06-14 | End: 2025-07-16

## 2025-06-14 RX ORDER — FLUCONAZOLE 2 MG/ML
200 INJECTION, SOLUTION INTRAVENOUS EVERY 24 HOURS
Status: DISCONTINUED | OUTPATIENT
Start: 2025-06-14 | End: 2025-06-14

## 2025-06-14 RX ORDER — MIDAZOLAM HYDROCHLORIDE 2 MG/2ML
4 INJECTION, SOLUTION INTRAMUSCULAR; INTRAVENOUS
Status: COMPLETED | OUTPATIENT
Start: 2025-06-14 | End: 2025-06-14

## 2025-06-14 RX ORDER — SODIUM CHLORIDE 9 MG/ML
INJECTION, SOLUTION INTRAVENOUS CONTINUOUS
Status: DISCONTINUED | OUTPATIENT
Start: 2025-06-14 | End: 2025-06-15

## 2025-06-14 RX ORDER — ACETAMINOPHEN 325 MG/1
650 TABLET ORAL EVERY 6 HOURS PRN
Status: DISCONTINUED | OUTPATIENT
Start: 2025-06-14 | End: 2025-07-08

## 2025-06-14 RX ORDER — ONDANSETRON 2 MG/ML
4 INJECTION INTRAMUSCULAR; INTRAVENOUS EVERY 6 HOURS PRN
Status: DISCONTINUED | OUTPATIENT
Start: 2025-06-14 | End: 2025-07-22 | Stop reason: HOSPADM

## 2025-06-14 RX ORDER — POLYVINYL ALCOHOL 14 MG/ML
1 SOLUTION/ DROPS OPHTHALMIC EVERY 4 HOURS
Status: DISCONTINUED | OUTPATIENT
Start: 2025-06-14 | End: 2025-06-14

## 2025-06-14 RX ORDER — IPRATROPIUM BROMIDE AND ALBUTEROL SULFATE 2.5; .5 MG/3ML; MG/3ML
1 SOLUTION RESPIRATORY (INHALATION) 3 TIMES DAILY
Status: DISCONTINUED | OUTPATIENT
Start: 2025-06-14 | End: 2025-06-14

## 2025-06-14 RX ORDER — SODIUM CHLORIDE 0.9 % (FLUSH) 0.9 %
5-40 SYRINGE (ML) INJECTION PRN
Status: DISCONTINUED | OUTPATIENT
Start: 2025-06-14 | End: 2025-07-22 | Stop reason: HOSPADM

## 2025-06-14 RX ORDER — FLUCONAZOLE 2 MG/ML
400 INJECTION, SOLUTION INTRAVENOUS EVERY 24 HOURS
Status: DISCONTINUED | OUTPATIENT
Start: 2025-06-15 | End: 2025-06-16

## 2025-06-14 RX ORDER — MAGNESIUM SULFATE IN WATER 40 MG/ML
2000 INJECTION, SOLUTION INTRAVENOUS PRN
Status: DISCONTINUED | OUTPATIENT
Start: 2025-06-14 | End: 2025-07-16

## 2025-06-14 RX ORDER — POLYETHYLENE GLYCOL 3350 17 G/17G
17 POWDER, FOR SOLUTION ORAL DAILY
Status: DISCONTINUED | OUTPATIENT
Start: 2025-06-14 | End: 2025-06-15

## 2025-06-14 RX ORDER — ARFORMOTEROL TARTRATE 15 UG/2ML
15 SOLUTION RESPIRATORY (INHALATION)
Status: DISCONTINUED | OUTPATIENT
Start: 2025-06-14 | End: 2025-07-22 | Stop reason: HOSPADM

## 2025-06-14 RX ORDER — ETOMIDATE 2 MG/ML
20 INJECTION INTRAVENOUS
Status: COMPLETED | OUTPATIENT
Start: 2025-06-14 | End: 2025-06-14

## 2025-06-14 RX ORDER — FENTANYL CITRATE-0.9 % NACL/PF 10 MCG/ML
25-200 PLASTIC BAG, INJECTION (ML) INTRAVENOUS CONTINUOUS
Refills: 0 | Status: DISCONTINUED | OUTPATIENT
Start: 2025-06-14 | End: 2025-06-16

## 2025-06-14 RX ORDER — POLYVINYL ALCOHOL 14 MG/ML
1 SOLUTION/ DROPS OPHTHALMIC EVERY 4 HOURS
Status: DISCONTINUED | OUTPATIENT
Start: 2025-06-14 | End: 2025-06-14 | Stop reason: RX

## 2025-06-14 RX ORDER — MIDAZOLAM HYDROCHLORIDE 2 MG/2ML
2 INJECTION, SOLUTION INTRAMUSCULAR; INTRAVENOUS EVERY 4 HOURS PRN
Status: DISCONTINUED | OUTPATIENT
Start: 2025-06-14 | End: 2025-07-05

## 2025-06-14 RX ORDER — IOPAMIDOL 755 MG/ML
100 INJECTION, SOLUTION INTRAVASCULAR
Status: COMPLETED | OUTPATIENT
Start: 2025-06-14 | End: 2025-06-14

## 2025-06-14 RX ORDER — CHLORHEXIDINE GLUCONATE ORAL RINSE 1.2 MG/ML
15 SOLUTION DENTAL 2 TIMES DAILY
Status: DISCONTINUED | OUTPATIENT
Start: 2025-06-14 | End: 2025-06-14

## 2025-06-14 RX ORDER — POTASSIUM CHLORIDE 29.8 MG/ML
20 INJECTION INTRAVENOUS PRN
Status: DISCONTINUED | OUTPATIENT
Start: 2025-06-14 | End: 2025-07-01

## 2025-06-14 RX ORDER — ENOXAPARIN SODIUM 100 MG/ML
30 INJECTION SUBCUTANEOUS 2 TIMES DAILY
Status: DISCONTINUED | OUTPATIENT
Start: 2025-06-14 | End: 2025-07-07

## 2025-06-14 RX ORDER — FLUCONAZOLE 2 MG/ML
400 INJECTION, SOLUTION INTRAVENOUS ONCE
Status: DISCONTINUED | OUTPATIENT
Start: 2025-06-14 | End: 2025-06-14 | Stop reason: DRUGHIGH

## 2025-06-14 RX ORDER — POLYETHYLENE GLYCOL 3350 17 G/17G
17 POWDER, FOR SOLUTION ORAL DAILY PRN
Status: DISCONTINUED | OUTPATIENT
Start: 2025-06-14 | End: 2025-07-16

## 2025-06-14 RX ORDER — INSULIN LISPRO 100 [IU]/ML
0-8 INJECTION, SOLUTION INTRAVENOUS; SUBCUTANEOUS EVERY 6 HOURS
Status: DISCONTINUED | OUTPATIENT
Start: 2025-06-14 | End: 2025-07-09

## 2025-06-14 RX ORDER — DEXTROSE MONOHYDRATE 100 MG/ML
INJECTION, SOLUTION INTRAVENOUS CONTINUOUS PRN
Status: DISCONTINUED | OUTPATIENT
Start: 2025-06-14 | End: 2025-07-16

## 2025-06-14 RX ORDER — FOLIC ACID 1 MG/1
1 TABLET ORAL DAILY
Status: DISCONTINUED | OUTPATIENT
Start: 2025-06-14 | End: 2025-06-27

## 2025-06-14 RX ORDER — POTASSIUM CHLORIDE 7.45 MG/ML
10 INJECTION INTRAVENOUS PRN
Status: DISCONTINUED | OUTPATIENT
Start: 2025-06-14 | End: 2025-07-15 | Stop reason: SDUPTHER

## 2025-06-14 RX ORDER — FLUCONAZOLE 2 MG/ML
200 INJECTION, SOLUTION INTRAVENOUS
Status: COMPLETED | OUTPATIENT
Start: 2025-06-14 | End: 2025-06-14

## 2025-06-14 RX ORDER — CHLORHEXIDINE GLUCONATE ORAL RINSE 1.2 MG/ML
15 SOLUTION DENTAL 2 TIMES DAILY
Status: DISCONTINUED | OUTPATIENT
Start: 2025-06-14 | End: 2025-06-27

## 2025-06-14 RX ORDER — IPRATROPIUM BROMIDE AND ALBUTEROL SULFATE 2.5; .5 MG/3ML; MG/3ML
1 SOLUTION RESPIRATORY (INHALATION)
Status: DISCONTINUED | OUTPATIENT
Start: 2025-06-14 | End: 2025-06-27

## 2025-06-14 RX ORDER — ONDANSETRON 2 MG/ML
4 INJECTION INTRAMUSCULAR; INTRAVENOUS
Status: COMPLETED | OUTPATIENT
Start: 2025-06-14 | End: 2025-06-14

## 2025-06-14 RX ORDER — BISACODYL 5 MG/1
5 TABLET, DELAYED RELEASE ORAL DAILY
Status: DISCONTINUED | OUTPATIENT
Start: 2025-06-14 | End: 2025-06-15

## 2025-06-14 RX ORDER — MINERAL OIL AND WHITE PETROLATUM 150; 830 MG/G; MG/G
OINTMENT OPHTHALMIC EVERY 4 HOURS
Status: DISCONTINUED | OUTPATIENT
Start: 2025-06-14 | End: 2025-06-14 | Stop reason: SDUPTHER

## 2025-06-14 RX ORDER — NOREPINEPHRINE BITARTRATE 0.06 MG/ML
1-100 INJECTION, SOLUTION INTRAVENOUS CONTINUOUS
Status: DISCONTINUED | OUTPATIENT
Start: 2025-06-14 | End: 2025-06-17

## 2025-06-14 RX ORDER — ACETAMINOPHEN 650 MG/1
650 SUPPOSITORY RECTAL EVERY 6 HOURS PRN
Status: DISCONTINUED | OUTPATIENT
Start: 2025-06-14 | End: 2025-07-08

## 2025-06-14 RX ORDER — SODIUM CHLORIDE 0.9 % (FLUSH) 0.9 %
5-40 SYRINGE (ML) INJECTION EVERY 12 HOURS SCHEDULED
Status: DISCONTINUED | OUTPATIENT
Start: 2025-06-14 | End: 2025-07-16

## 2025-06-14 RX ORDER — INSULIN LISPRO 100 [IU]/ML
0-8 INJECTION, SOLUTION INTRAVENOUS; SUBCUTANEOUS
Status: DISCONTINUED | OUTPATIENT
Start: 2025-06-14 | End: 2025-06-14

## 2025-06-14 RX ORDER — BUDESONIDE 0.5 MG/2ML
0.5 INHALANT ORAL
Status: DISCONTINUED | OUTPATIENT
Start: 2025-06-14 | End: 2025-07-22 | Stop reason: HOSPADM

## 2025-06-14 RX ORDER — MIDAZOLAM HYDROCHLORIDE 1 MG/ML
1-10 INJECTION, SOLUTION INTRAVENOUS CONTINUOUS
Status: DISCONTINUED | OUTPATIENT
Start: 2025-06-14 | End: 2025-06-17

## 2025-06-14 RX ORDER — BISACODYL 10 MG
10 SUPPOSITORY, RECTAL RECTAL DAILY PRN
Status: DISCONTINUED | OUTPATIENT
Start: 2025-06-14 | End: 2025-06-29 | Stop reason: SDUPTHER

## 2025-06-14 RX ORDER — FLUCONAZOLE 2 MG/ML
200 INJECTION, SOLUTION INTRAVENOUS EVERY 24 HOURS
Status: DISCONTINUED | OUTPATIENT
Start: 2025-06-15 | End: 2025-06-14 | Stop reason: DRUGHIGH

## 2025-06-14 RX ORDER — GAUZE BANDAGE 2" X 2"
100 BANDAGE TOPICAL DAILY
Status: DISCONTINUED | OUTPATIENT
Start: 2025-06-14 | End: 2025-06-27

## 2025-06-14 RX ORDER — MINERAL OIL AND WHITE PETROLATUM 150; 830 MG/G; MG/G
OINTMENT OPHTHALMIC EVERY 4 HOURS
Status: DISCONTINUED | OUTPATIENT
Start: 2025-06-14 | End: 2025-07-07

## 2025-06-14 RX ADMIN — IPRATROPIUM BROMIDE AND ALBUTEROL SULFATE 1 DOSE: .5; 3 SOLUTION RESPIRATORY (INHALATION) at 16:17

## 2025-06-14 RX ADMIN — FLUCONAZOLE 200 MG: 2 INJECTION, SOLUTION INTRAVENOUS at 05:55

## 2025-06-14 RX ADMIN — ONDANSETRON 4 MG: 2 INJECTION, SOLUTION INTRAMUSCULAR; INTRAVENOUS at 02:32

## 2025-06-14 RX ADMIN — FENTANYL CITRATE 100 MCG/HR: 0.05 INJECTION, SOLUTION INTRAMUSCULAR; INTRAVENOUS at 14:19

## 2025-06-14 RX ADMIN — CEFTRIAXONE 1000 MG: 1 INJECTION, POWDER, FOR SOLUTION INTRAMUSCULAR; INTRAVENOUS at 15:00

## 2025-06-14 RX ADMIN — POTASSIUM CHLORIDE 20 MEQ: 29.8 INJECTION, SOLUTION INTRAVENOUS at 10:01

## 2025-06-14 RX ADMIN — BUDESONIDE 500 MCG: 0.5 INHALANT RESPIRATORY (INHALATION) at 12:27

## 2025-06-14 RX ADMIN — ARFORMOTEROL TARTRATE 15 MCG: 15 SOLUTION RESPIRATORY (INHALATION) at 12:27

## 2025-06-14 RX ADMIN — FAMOTIDINE 10 MG: 10 INJECTION, SOLUTION INTRAVENOUS at 09:31

## 2025-06-14 RX ADMIN — FLUCONAZOLE 200 MG: 2 INJECTION, SOLUTION INTRAVENOUS at 11:22

## 2025-06-14 RX ADMIN — MIDAZOLAM HYDROCHLORIDE 4 MG: 1 INJECTION, SOLUTION INTRAMUSCULAR; INTRAVENOUS at 04:40

## 2025-06-14 RX ADMIN — SODIUM CHLORIDE, PRESERVATIVE FREE 10 ML: 5 INJECTION INTRAVENOUS at 21:15

## 2025-06-14 RX ADMIN — FENTANYL CITRATE 50 MCG/HR: 0.05 INJECTION, SOLUTION INTRAMUSCULAR; INTRAVENOUS at 04:38

## 2025-06-14 RX ADMIN — IPRATROPIUM BROMIDE AND ALBUTEROL SULFATE 1 DOSE: .5; 3 SOLUTION RESPIRATORY (INHALATION) at 19:05

## 2025-06-14 RX ADMIN — MINERAL OIL, PETROLATUM: 425; 568 OINTMENT OPHTHALMIC at 21:19

## 2025-06-14 RX ADMIN — IPRATROPIUM BROMIDE AND ALBUTEROL SULFATE 1 DOSE: .5; 3 SOLUTION RESPIRATORY (INHALATION) at 12:27

## 2025-06-14 RX ADMIN — BUDESONIDE 500 MCG: 0.5 INHALANT RESPIRATORY (INHALATION) at 19:05

## 2025-06-14 RX ADMIN — MINERAL OIL, PETROLATUM: 425; 568 OINTMENT OPHTHALMIC at 09:32

## 2025-06-14 RX ADMIN — FLUCONAZOLE 200 MG: 2 INJECTION, SOLUTION INTRAVENOUS at 12:44

## 2025-06-14 RX ADMIN — FLUCONAZOLE 200 MG: 2 INJECTION, SOLUTION INTRAVENOUS at 10:16

## 2025-06-14 RX ADMIN — ROCURONIUM BROMIDE 100 MG: 10 INJECTION INTRAVENOUS at 04:31

## 2025-06-14 RX ADMIN — DEXTROSE: 5 SOLUTION INTRAVENOUS at 16:29

## 2025-06-14 RX ADMIN — MINERAL OIL, PETROLATUM: 425; 568 OINTMENT OPHTHALMIC at 16:31

## 2025-06-14 RX ADMIN — POTASSIUM CHLORIDE 20 MEQ: 29.8 INJECTION, SOLUTION INTRAVENOUS at 11:21

## 2025-06-14 RX ADMIN — IOPAMIDOL 100 ML: 755 INJECTION, SOLUTION INTRAVENOUS at 13:29

## 2025-06-14 RX ADMIN — SODIUM CHLORIDE: 0.9 INJECTION, SOLUTION INTRAVENOUS at 03:57

## 2025-06-14 RX ADMIN — ETOMIDATE 20 MG: 2 INJECTION, SOLUTION INTRAVENOUS at 04:31

## 2025-06-14 RX ADMIN — CHLORHEXIDINE GLUCONATE 15 ML: 1.2 RINSE ORAL at 14:00

## 2025-06-14 RX ADMIN — SODIUM CHLORIDE, PRESERVATIVE FREE 10 ML: 5 INJECTION INTRAVENOUS at 09:34

## 2025-06-14 RX ADMIN — ENOXAPARIN SODIUM 30 MG: 100 INJECTION SUBCUTANEOUS at 21:14

## 2025-06-14 RX ADMIN — CHLORHEXIDINE GLUCONATE ORAL RINSE 15 ML: 1.2 SOLUTION DENTAL at 09:31

## 2025-06-14 RX ADMIN — ENOXAPARIN SODIUM 30 MG: 100 INJECTION SUBCUTANEOUS at 09:31

## 2025-06-14 RX ADMIN — FAMOTIDINE 10 MG: 10 INJECTION, SOLUTION INTRAVENOUS at 21:12

## 2025-06-14 RX ADMIN — ARFORMOTEROL TARTRATE 15 MCG: 15 SOLUTION RESPIRATORY (INHALATION) at 19:05

## 2025-06-14 RX ADMIN — MIDAZOLAM HYDROCHLORIDE 4 MG: 1 INJECTION, SOLUTION INTRAMUSCULAR; INTRAVENOUS at 06:20

## 2025-06-14 RX ADMIN — CHLORHEXIDINE GLUCONATE 15 ML: 1.2 RINSE ORAL at 21:12

## 2025-06-14 RX ADMIN — SODIUM CHLORIDE 2 MG/HR: 900 INJECTION, SOLUTION INTRAVENOUS at 04:39

## 2025-06-14 RX ADMIN — WATER 20 MG: 1 INJECTION INTRAMUSCULAR; INTRAVENOUS; SUBCUTANEOUS at 15:00

## 2025-06-14 RX ADMIN — WATER 20 MG: 1 INJECTION INTRAMUSCULAR; INTRAVENOUS; SUBCUTANEOUS at 21:14

## 2025-06-14 ASSESSMENT — PULMONARY FUNCTION TESTS
PIF_VALUE: 19
PIF_VALUE: 20
PIF_VALUE: 18
PIF_VALUE: 29
PIF_VALUE: 22
PIF_VALUE: 20
PIF_VALUE: 20
PIF_VALUE: 19

## 2025-06-14 ASSESSMENT — LIFESTYLE VARIABLES
HOW MANY STANDARD DRINKS CONTAINING ALCOHOL DO YOU HAVE ON A TYPICAL DAY: 3 OR 4
HOW OFTEN DO YOU HAVE A DRINK CONTAINING ALCOHOL: MONTHLY OR LESS

## 2025-06-14 NOTE — CONSULTS
Consult Note  Consult requested by:Britney  Beny Knapp is a 59 y.o. male White (non-) who is being seen on consult for hyponatremia.  Chief Complaint   Patient presents with    Foreign Body     Patient states he had sandwich for lunch and since then he feels like it is stuck in his throat.  C/O SOB.     Admission diagnosis: Hyponatremia    HPI:  60 yo PMH HTN, COPD, hep C admitted for respiratory distress which reportedly started after choking on a sandwich at lunch. Pt seen by ENT, s/p flex fiberoptic nasolaryngoscopy found severe candidiasis. Has been having difficulty swallowing for a few months. Has been on prednisone for COPD. Pt emergently intubated.   Na 115 on admission-gently IV NS started and Na 118.   Sedated on vent   No past medical history on file.  No past surgical history on file.  Social History     Socioeconomic History    Marital status: Single     Spouse name: Not on file    Number of children: Not on file    Years of education: Not on file    Highest education level: Not on file   Occupational History    Not on file   Tobacco Use    Smoking status: Not on file    Smokeless tobacco: Not on file   Substance and Sexual Activity    Alcohol use: Not on file    Drug use: Not on file    Sexual activity: Not on file   Other Topics Concern    Not on file   Social History Narrative    Not on file     Social Drivers of Health     Financial Resource Strain: Not on file   Food Insecurity: Not on file   Transportation Needs: Not on file   Physical Activity: Not on file   Stress: Not on file   Social Connections: Not on file   Intimate Partner Violence: Unknown (7/23/2024)    Received from [facility]    Humiliation, Afraid, Rape, and Kick questionnaire     Fear of Current or Ex-Partner: Not on file     Emotionally Abused: Not on file     Physically Abused: No     Sexually Abused: Not on file   Housing Stability: Not on file       Family Hx:unknown hx kidney disease  No Known

## 2025-06-14 NOTE — CONSULTS
[facility]  Pulmonary, Critical Care, and Sleep Medicine    Name: Beny Knapp MRN: [id number]   : 1965 Hospital: [facility]    Date: 2025  Room: [address] [place] [address] Note                                              Consult requesting physician: Dr. Mark   Reason for Consult: Respiratory failure, hyponatremia, upper airway    IMPRESSION:     Acute respiratory failure with hypoxemia and hypercarbia   J96.01, J96.02  Stridor  Obstructed larynx  Polysubstance abuse candidiasis of esophagus   COPD  EtOH use  Hepatitis C    Active Hospital Problems    Diagnosis Date Noted    Hyponatremia [E87.1] 2025    Stridor [R06.1] 2025    Obstructed, larynx [J38.6] 2025    Acute respiratory failure with hypoxia and hypercapnia (HCC) [J96.01, J96.02] 2025    ETOH abuse [F10.10] 2025    Polysubstance abuse (HCC) [F19.10] 2025    Candidiasis of esophagus (HCC) [B37.81] 2025    Thrombocytopenia [D69.6] 2017    Tobacco abuse [Z72.0] 2015    HTN (hypertension) [I10] 2015    COPD (chronic obstructive pulmonary disease) (HCC) [J44.9] 2015    Chronic hepatitis C (HCC) [B18.2] 2015        Code status: Full Code       RECOMMENDATIONS:     Respiratory:   59-year-old male with COPD, smoker presents with acute upper airway distress, fullness in the laryngeal area, questionable foreign body,  Patient was intubated for airway protection and hypercarbic respiratory failure, hypoxemia was mild.  Endoscopy did not show any foreign body, but swollen airway and severe candidiasis  Patient is intubated  ABG 7.3 3/53/477/100.  AC /VC  14/460/40%/5  Ventilator settings adjust daily.   Bronchodilators including Brovana, Pulmicort, DuoNeb  Ear nose and throat scope will add steroids if swollen airway.  CT of the chest abdomen pelvis and neck pending.  CXR 2025  Tubes and lines as above. No pneumothorax. Increased

## 2025-06-14 NOTE — ED TRIAGE NOTES
Patient into ED via EMS.  Patient states he had sandwich for lunch and since then he feels like it is stuck in his throat.  C/O SOB.

## 2025-06-14 NOTE — PLAN OF CARE
Practitioner if interventions unsuccessful or patient reports new pain     Problem: Chronic Conditions and Co-morbidities  Goal: Patient's chronic conditions and co-morbidity symptoms are monitored and maintained or improved  Outcome: Progressing  Flowsheets (Taken 6/14/2025 0800)  Care Plan - Patient's Chronic Conditions and Co-Morbidity Symptoms are Monitored and Maintained or Improved:   Monitor and assess patient's chronic conditions and comorbid symptoms for stability, deterioration, or improvement   Collaborate with multidisciplinary team to address chronic and comorbid conditions and prevent exacerbation or deterioration   Update acute care plan with appropriate goals if chronic or comorbid symptoms are exacerbated and prevent overall improvement and discharge     Problem: Neurosensory - Adult  Goal: Achieves stable or improved neurological status  Outcome: Progressing  Flowsheets (Taken 6/14/2025 0800)  Achieves stable or improved neurological status:   Assess for and report changes in neurological status   Initiate measures to prevent increased intracranial pressure   Maintain blood pressure and fluid volume within ordered parameters to optimize cerebral perfusion and minimize risk of hemorrhage   Monitor temperature, glucose, and sodium. Initiate appropriate interventions as ordered     Problem: Respiratory - Adult  Goal: Achieves optimal ventilation and oxygenation  Outcome: Progressing  Flowsheets (Taken 6/14/2025 0800)  Achieves optimal ventilation and oxygenation:   Assess for changes in respiratory status   Assess for changes in mentation and behavior   Position to facilitate oxygenation and minimize respiratory effort   Oxygen supplementation based on oxygen saturation or arterial blood gases   Assess the need for suctioning and aspirate as needed   Assess and instruct to report shortness of breath or any respiratory difficulty   Respiratory therapy support as indicated   Encourage broncho-pulmonary  hygiene including cough, deep breathe, incentive spirometry     Problem: Cardiovascular - Adult  Goal: Maintains optimal cardiac output and hemodynamic stability  Outcome: Progressing  Flowsheets (Taken 6/14/2025 0800)  Maintains optimal cardiac output and hemodynamic stability:   Monitor blood pressure and heart rate   Monitor urine output and notify Licensed Independent Practitioner for values outside of normal range   Assess for signs of decreased cardiac output   Administer fluid and/or volume expanders as ordered     Problem: Skin/Tissue Integrity - Adult  Goal: Skin integrity remains intact  Outcome: Progressing  Flowsheets (Taken 6/14/2025 0800)  Skin Integrity Remains Intact:   Monitor for areas of redness and/or skin breakdown   Assess vascular access sites hourly   Every 4-6 hours minimum:  Change oxygen saturation probe site  Goal: Oral mucous membranes remain intact  Outcome: Progressing  Flowsheets (Taken 6/14/2025 0800)  Oral Mucous Membranes Remain Intact:   Assess oral mucosa and hygiene practices   Implement preventative oral hygiene regimen   Implement oral medicated treatments as ordered     Problem: Musculoskeletal - Adult  Goal: Return mobility to safest level of function  Outcome: Progressing  Flowsheets (Taken 6/14/2025 0800)  Return Mobility to Safest Level of Function: Assess patient stability and activity tolerance for standing, transferring and ambulating with or without assistive devices     Problem: Gastrointestinal - Adult  Goal: Maintains or returns to baseline bowel function  Outcome: Progressing  Flowsheets (Taken 6/14/2025 0800)  Maintains or returns to baseline bowel function:   Assess bowel function   Administer IV fluids as ordered to ensure adequate hydration   Administer ordered medications as needed  Goal: Maintains adequate nutritional intake  Outcome: Progressing  Flowsheets (Taken 6/14/2025 0800)  Maintains adequate nutritional intake:   Monitor percentage of each meal

## 2025-06-14 NOTE — CONSULTS
Otolaryngology head neck surgery    Asked to see this very pleasant 59-year-old male with acute difficulties with stridor  Patient relates that stridor began earlier today when eating a sandwich for lunch.  Patient states that he felt this may have been trapped in the nasopharynx and has had some difficulty breathing  Patient is a very poor historian states that he had similar difficulties in [place] whereby this was subsequently expelled  Frankly I do not feel that this is contributory to patient's history  Patient states that he has also had difficulties with breathing for which she was hospitalized for about 12 days  He is a very poor historian and his stridorous breathing makes it very difficult to obtain a history  In any event patient had a nasal trumpet placed without significant improvement    Past medical history significant for history of tobacco use history of alcohol intake hepatitis C depression thrombocytopenia hypertension back pain    Past surgical history not obtained    Allergies codeine    Physical exam reveals a very intermittently stridorous gentleman surprisingly in good spirits   Alert oriented x 3 however later in visit almost appears to have epileptic like short activity for 3 to 5 seconds  Intraoral exam reveals obvious thrush no obvious abnormality  Intranasal exam deviated nasal septum left side  Neck exam supple nontender no masses palpable    Flexible fiberoptic nasolaryngoscopy reveals significant thrush as well as petechia in the nasopharynx with the thrush extending incompletely encompassing the larynx making visualization very difficult.  There does appear to be some ball valving structure in the laryngeal inlet which I suspect may be polyposis however visualization exceedingly difficult due to the copious thrush fair amount of secretions noted as well airway impairment noted    Labs reviewed with a significant amount of hyponatremia noted at 117      Impression  1.Acute airway

## 2025-06-14 NOTE — ED NOTES
ED TO INPATIENT SBAR HANDOFF     Patient Name: Beny Knapp   Preferred Name: Beny  : 1965  59 y.o.             Family/Caregiver Present: no   Code Status Order: No Order  PO Status:[unfilled]  Telemetry Order:   C-SSRS: Risk of Suicide: No Risk  Sitter no   Restraints:    Sepsis Risk Score      Situation:  Chief Complaint   Patient presents with    Foreign Body     Patient states he had sandwich for lunch and since then he feels like it is stuck in his throat.  C/O SOB.     Brief Description of Patient's Condition: Patient has had candida laryngitis x 1 week.  Patient did not disclose this information when he first arrived.  Currently, candida visual noted in mouth, down throat past larynx and nasal with redness, swelling and irritation noted.  Patient currently intubated.    Mental Status: oriented, until intubated - medication induced sleep  Arrived from: Home  Abnormal labs:   Abnormal Labs Reviewed   CBC WITH AUTO DIFFERENTIAL - Abnormal; Notable for the following components:       Result Value    RBC 3.96 (*)     Hemoglobin 11.7 (*)     Hematocrit 33.4 (*)     Monocytes % 14.4 (*)     All other components within normal limits   COMPREHENSIVE METABOLIC PANEL - Abnormal; Notable for the following components:    Sodium 115 (*)     Chloride 78 (*)     ALT 52 (*)     AST 77 (*)     All other components within normal limits   BASIC METABOLIC PANEL - Abnormal; Notable for the following components:    Sodium 117 (*)     Chloride 78 (*)     Glucose 114 (*)     All other components within normal limits   POCT BLOOD GAS & ELECTROLYTES - Abnormal; Notable for the following components:    POC pH 7.33 (*)     POC pCO2 53.2 (*)     POC PO2 477 (*)     POC HCO3 28.3 (*)     POC O2  (*)     POC Sodium 118 (*)     POC Potassium 3.3 (*)     POC Glucose 115 (*)     POC Creatinine 0.58 (*)     POC Lactic Acid <0.40 (*)     POC Chloride 77 (*)     All other components within normal limits

## 2025-06-14 NOTE — ED PROVIDER NOTES
[facility] EMERGENCY DEPT  EMERGENCY DEPARTMENT ENCOUNTER    Patient Name: Beny Knapp  MRN: [id number]  YOB: 1965  Provider: Nick Garza MD  PCP: Emeli Duckworth MD   Time/Date of evaluation: 1:42 AM EDT on 6/14/25    History of Presenting Illness     Chief Complaint   Patient presents with    Foreign Body     Patient states he had sandwich for lunch and since then he feels like it is stuck in his throat.  C/O SOB.       History Provided by: EMS and Patient   History is limited by: Nothing    HISTORY (Narative):   Beny Knapp is a 59 y.o. male with a PMHX of hypertension, COPD, hepatitis C who presents to the emergency department (room 14 --> 1) by EMS C/O respiratory distress onset this afternoon. Associated sxs include foreign body sensation. Patient denies any other sxs or complaints.  Per EMS, patient ate a sandwich for lunch and since that time has had a foreign body sensation in the back of his nasopharynx.    Nursing Notes were all reviewed and agreed with or any disagreements were addressed in the HPI.    Past History     PAST MEDICAL HISTORY:  No past medical history on file.    PAST SURGICAL HISTORY:  No past surgical history on file.    FAMILY HISTORY:  No family history on file.    SOCIAL HISTORY:       MEDICATIONS:  Current Facility-Administered Medications   Medication Dose Route Frequency Provider Last Rate Last Admin    0.9 % sodium chloride infusion   IntraVENous Continuous Nick Garza MD 75 mL/hr at 06/14/25 0357 New Bag at 06/14/25 0357    midazolam (VERSED) 100mg/100mL in NS infusion  1-10 mg/hr IntraVENous Continuous Nick Garza MD 4 mL/hr at 06/14/25 0619 4 mg/hr at 06/14/25 0619    fentaNYL (SUBLIMAZE) 1,000 mcg in sodium chloride 0.9% 100 mL infusion   mcg/hr IntraVENous Continuous Nick Garza MD 7.5 mL/hr at 06/14/25 0618 75 mcg/hr at 06/14/25 0618    sodium chloride flush 0.9 % injection 5-40 mL  5-40 mL IntraVENous 2 times per  tests and considerations with the patient. They agree with the plan of admission.      ADDITIONAL CONSIDERATIONS:  None    SMOKING CESSATION:   None    Critical Care Time:     I have independently provided 125 minutes of non-concurrent critical care time involved in lab review, consultations with specialist, family decision-making, and documentation. This time does not include time spent on separately billable procedures.  During this entire length of time I was immediately available to the patient. This time is separate from any critical care time billed by other providers.     Critical Care:  The reason for providing this level of medical care for this critically ill patient was due a critical illness that impaired one or more vital organ systems such that there was a high probability of imminent or life threatening deterioration in the patients condition. This care involved high complexity decision making to assess, manipulate, and support vital system functions, to treat this degreee vital organ system failure and to prevent further life threatening deterioration of the patient’s condition.    Organ systems affected: Respiratory    Nick Garza MD    Diagnosis and Disposition     DISPOSITION Admitted 06/14/2025 07:19:34 AM         ADMISSION NOTE:    Critical Care Time: 125 minutes    Core Measures (for hospitalized patients):    1. Hospitalization Decision Time:  The decision to hospitalize the patient was made by Nick Garza MD @ at 3:06 AM on 6/14/2025    2. Aspirin: Aspirin was not given because the patient did not present with a stroke at the time of their Emergency Department evaluation    7:14 AM. Patient is being admitted to the hospital by Dr. Tan. The results of their tests and reasons for their admission have been discussed with them and/or available family. They convey agreement and understanding for the need to be admitted and for their admission diagnosis.      CONDITIONS ON

## 2025-06-14 NOTE — PROGRESS NOTES
Care manager has attempted to locate family - there is a mother listed in [place] and [facility] records but none of the listed phone numbers work.  Attempted several times to contact the  branch of [facility] detox and inpatient treatment center since he was there in Jan but unable to speak to anyone there. Patient unable to provide any info as he is intubated and sedated with fentanyl and versed.

## 2025-06-14 NOTE — ED NOTES
TRANSFER - OUT REPORT:    Verbal report given to Landy BARTHOLOMEW on Beny Knapp  being transferred to ICU for routine progression of patient care       Report consisted of patient's Situation, Background, Assessment and   Recommendations(SBAR).     Information from the following report(s) Nurse Handoff Report was reviewed with the receiving nurse.    [place] Fall Assessment:    Presents to emergency department  because of falls (Syncope, seizure, or loss of consciousness): No  Age > 70: No  Altered Mental Status, Intoxication with alcohol or substance confusion (Disorientation, impaired judgment, poor safety awaremess, or inability to follow instructions): No  Impaired Mobility: Ambulates or transfers with assistive devices or assistance; Unable to ambulate or transer.: No  Nursing Judgement: No          Lines:   CVC  06/14/25 Right Internal jugular (Active)       Peripheral IV 06/14/25 Distal;Right Cephalic (Active)       Peripheral IV 06/14/25 Distal;Left Cephalic (Active)        Opportunity for questions and clarification was provided.      Patient transported with:  Registered Nurse

## 2025-06-14 NOTE — H&P
History & Physical    Patient: Beny Knapp MRN: [id number]  CSN: [id number]    YOB: 1965  Age: 59 y.o.  Sex: male      DOA: 6/14/2025    Chief Complaint:   Chief Complaint   Patient presents with    Foreign Body     Patient states he had sandwich for lunch and since then he feels like it is stuck in his throat.  C/O SOB.       Active Hospital Problems    Diagnosis Date Noted    Hyponatremia [E87.1] 06/14/2025       HPI:     Beny Knapp is a 59 y.o.  male w/ PMH of COPD, ETOH-use and polysubstance abuse who was BIBA and presents with subjective difficulty breathing and swallowing with throat pain.    Mr. Knapp was seen in ED on 6/14 and presented via EMS for possible forign body (food) with bolus within region of velecula.  ENT saw pt and ED intubated for airway protection.     Social Hx: Per chart review pt is active smoker, hx of polysubstance abuse and THC use. Single and need to define surrogate.     FHx: unable to obtain.     In the ED: ENT was consulted and performed scope on pt with findings of extensive esophageal candidiasis. Nephrology was consulted for severe hyponatremia with sodium of 117 recommending NS at 75cc/hr. Intensivist was contacted as well d/t pt being intubated for airway protection. CXR, XR soft tissue neck were performed and CT c/a/p is ordered but pending at time of admission.       No past medical history on file.    No past surgical history on file.    No family history on file.    Social History     Socioeconomic History    Marital status: Single     Social Drivers of Health     Intimate Partner Violence: Unknown (7/23/2024)    Received from [facility]    Humiliation, Afraid, Rape, and Kick questionnaire     Physically Abused: No       Prior to Admission medications    Not on File       No Known Allergies    Review of Systems  [Uable d/t pt being intubated and sedated]      Physical Exam:     Physical Exam:  /68   Pulse 63   Temp 98.5

## 2025-06-14 NOTE — PROGRESS NOTES
TRANSFER - IN REPORT:    Verbal report received from Jojo BARTHOLOMEW on Beny Knapp  being received from ER    Report consisted of patient’s Situation, Background, Assessment and   Recommendations(SBAR).     Information from the following report(s) SBAR, Kardex, OR Summary, Intake/Output, MAR, and Recent Results was reviewed with the receiving nurse.    Opportunity for questions and clarification was provided.      Assessment will be completed upon patient’s arrival to unit and care assumed.

## 2025-06-14 NOTE — CONSULTS
Consult received from Dr Tan-- off for this weekend, ID coverage( Dr Muller for further calls during weekend)  Available chart reivwed- ED note/ENT and rencal consult. H and P pending    Came in to ED with f.body in throat complaint  Was in stridor during ENT exam, urgently intubated  Found with extensive oropharyngeal candidiasis  Much of his history unknown  Labs from 2023- showing cocaine/THC in drug screen- current pending    Blood cultures-Pending--need to rule out fungemia as well  FU drug screen  His WBC/ lymphocytes count normal.  Had history of steroid treatment, but not clear if he is on maintainace dose      Cont with fluconazole 800 mg X1 LD, followed by 400 daily. If no fungemia, can change to candidal esophagitis dose  Agree with checking HIV, will add HIV PCR, FU CD4 and other labs as already sent.    Dr Muller is covering over the weekend( [phone]) and Dr Perez on Monday () .  Please call their number OR via [employer] for questions/concerns until I resume care on Tuesday. Thanks.       Renee Stanford MD  [facility])  Office: [phone] -Option #8  Office fax:  [phone]

## 2025-06-15 ENCOUNTER — APPOINTMENT (OUTPATIENT)
Facility: HOSPITAL | Age: 60
DRG: 368 | End: 2025-06-15
Payer: MEDICARE

## 2025-06-15 LAB
ALBUMIN SERPL-MCNC: 2.9 G/DL (ref 3.4–5)
ALBUMIN/GLOB SERPL: 0.9 (ref 0.8–1.7)
ALP SERPL-CCNC: 69 U/L (ref 45–117)
ALT SERPL-CCNC: 44 U/L (ref 10–50)
ANION GAP SERPL CALC-SCNC: 10 MMOL/L (ref 3–18)
ANION GAP SERPL CALC-SCNC: 8 MMOL/L (ref 3–18)
ANION GAP SERPL CALC-SCNC: 9 MMOL/L (ref 3–18)
ANION GAP SERPL CALC-SCNC: 9 MMOL/L (ref 3–18)
AST SERPL-CCNC: 42 U/L (ref 10–38)
BACTERIA SPEC CULT: NORMAL
BASE EXCESS BLD CALC-SCNC: 6.3 MMOL/L
BDY SITE: ABNORMAL
BILIRUB SERPL-MCNC: 0.4 MG/DL (ref 0.2–1)
BUN SERPL-MCNC: 13 MG/DL (ref 6–23)
BUN SERPL-MCNC: 14 MG/DL (ref 6–23)
BUN SERPL-MCNC: 8 MG/DL (ref 6–23)
BUN SERPL-MCNC: 9 MG/DL (ref 6–23)
BUN/CREAT SERPL: 14 (ref 12–20)
BUN/CREAT SERPL: 16 (ref 12–20)
BUN/CREAT SERPL: 21 (ref 12–20)
BUN/CREAT SERPL: 22 (ref 12–20)
CALCIUM SERPL-MCNC: 9 MG/DL (ref 8.5–10.1)
CALCIUM SERPL-MCNC: 9.1 MG/DL (ref 8.5–10.1)
CALCIUM SERPL-MCNC: 9.2 MG/DL (ref 8.5–10.1)
CALCIUM SERPL-MCNC: 9.2 MG/DL (ref 8.5–10.1)
CHLORIDE SERPL-SCNC: 90 MMOL/L (ref 98–107)
CHLORIDE SERPL-SCNC: 91 MMOL/L (ref 98–107)
CO2 SERPL-SCNC: 28 MMOL/L (ref 21–32)
CO2 SERPL-SCNC: 28 MMOL/L (ref 21–32)
CO2 SERPL-SCNC: 29 MMOL/L (ref 21–32)
CO2 SERPL-SCNC: 29 MMOL/L (ref 21–32)
CREAT SERPL-MCNC: 0.55 MG/DL (ref 0.6–1.3)
CREAT SERPL-MCNC: 0.59 MG/DL (ref 0.6–1.3)
CREAT SERPL-MCNC: 0.6 MG/DL (ref 0.6–1.3)
CREAT SERPL-MCNC: 0.64 MG/DL (ref 0.6–1.3)
ERYTHROCYTE [DISTWIDTH] IN BLOOD BY AUTOMATED COUNT: 13.2 % (ref 11.6–14.5)
GAS FLOW.O2 O2 DELIVERY SYS: ABNORMAL
GAS FLOW.O2 SETTING OXYMISER: 14 BPM
GLOBULIN SER CALC-MCNC: 3.3 G/DL (ref 2–4)
GLUCOSE BLD STRIP.AUTO-MCNC: 163 MG/DL (ref 70–110)
GLUCOSE BLD STRIP.AUTO-MCNC: 185 MG/DL (ref 70–110)
GLUCOSE BLD STRIP.AUTO-MCNC: 195 MG/DL (ref 70–110)
GLUCOSE BLD STRIP.AUTO-MCNC: 197 MG/DL (ref 70–110)
GLUCOSE SERPL-MCNC: 153 MG/DL (ref 74–108)
GLUCOSE SERPL-MCNC: 156 MG/DL (ref 74–108)
GLUCOSE SERPL-MCNC: 162 MG/DL (ref 74–108)
GLUCOSE SERPL-MCNC: 172 MG/DL (ref 74–108)
HCO3 BLD-SCNC: 33.1 MMOL/L (ref 21–28)
HCT VFR BLD AUTO: 34.4 % (ref 36–48)
HGB BLD-MCNC: 11.6 G/DL (ref 13–16)
MAGNESIUM SERPL-MCNC: 2.3 MG/DL (ref 1.6–2.6)
MCH RBC QN AUTO: 29.5 PG (ref 24–34)
MCHC RBC AUTO-ENTMCNC: 33.7 G/DL (ref 31–37)
MCV RBC AUTO: 87.5 FL (ref 78–100)
NRBC # BLD: 0 K/UL (ref 0–0.01)
NRBC BLD-RTO: 0 PER 100 WBC
O2/TOTAL GAS SETTING VFR VENT: 40 %
PCO2 BLD: 57.6 MMHG (ref 35–48)
PEEP RESPIRATORY: 5 CMH2O
PH BLD: 7.37 (ref 7.35–7.45)
PHOSPHATE SERPL-MCNC: 4.4 MG/DL (ref 2.5–4.9)
PLATELET # BLD AUTO: 240 K/UL (ref 135–420)
PMV BLD AUTO: 9.7 FL (ref 9.2–11.8)
PO2 BLD: 68 MMHG (ref 83–108)
POTASSIUM SERPL-SCNC: 4.6 MMOL/L (ref 3.5–5.5)
POTASSIUM SERPL-SCNC: 4.8 MMOL/L (ref 3.5–5.5)
POTASSIUM SERPL-SCNC: 5 MMOL/L (ref 3.5–5.5)
POTASSIUM SERPL-SCNC: 5 MMOL/L (ref 3.5–5.5)
PROT SERPL-MCNC: 6.2 G/DL (ref 6.4–8.2)
RBC # BLD AUTO: 3.93 M/UL (ref 4.35–5.65)
SAO2 % BLD: 92 % (ref 92–97)
SERVICE CMNT-IMP: ABNORMAL
SERVICE CMNT-IMP: NORMAL
SODIUM SERPL-SCNC: 128 MMOL/L (ref 136–145)
SODIUM SERPL-SCNC: 129 MMOL/L (ref 136–145)
SPECIMEN TYPE: ABNORMAL
VENTILATION MODE VENT: ABNORMAL
VT SETTING VENT: 460 ML
WBC # BLD AUTO: 4.8 K/UL (ref 4.6–13.2)

## 2025-06-15 PROCEDURE — 2700000000 HC OXYGEN THERAPY PER DAY

## 2025-06-15 PROCEDURE — 2580000003 HC RX 258: Performed by: INTERNAL MEDICINE

## 2025-06-15 PROCEDURE — 80053 COMPREHEN METABOLIC PANEL: CPT

## 2025-06-15 PROCEDURE — 6370000000 HC RX 637 (ALT 250 FOR IP): Performed by: INTERNAL MEDICINE

## 2025-06-15 PROCEDURE — 2000000000 HC ICU R&B

## 2025-06-15 PROCEDURE — 6360000002 HC RX W HCPCS: Performed by: INTERNAL MEDICINE

## 2025-06-15 PROCEDURE — 2580000003 HC RX 258: Performed by: EMERGENCY MEDICINE

## 2025-06-15 PROCEDURE — 94003 VENT MGMT INPAT SUBQ DAY: CPT

## 2025-06-15 PROCEDURE — 36600 WITHDRAWAL OF ARTERIAL BLOOD: CPT

## 2025-06-15 PROCEDURE — 93005 ELECTROCARDIOGRAM TRACING: CPT | Performed by: INTERNAL MEDICINE

## 2025-06-15 PROCEDURE — 85027 COMPLETE CBC AUTOMATED: CPT

## 2025-06-15 PROCEDURE — 71045 X-RAY EXAM CHEST 1 VIEW: CPT

## 2025-06-15 PROCEDURE — 31500 INSERT EMERGENCY AIRWAY: CPT

## 2025-06-15 PROCEDURE — 84100 ASSAY OF PHOSPHORUS: CPT

## 2025-06-15 PROCEDURE — 2500000003 HC RX 250 WO HCPCS: Performed by: INTERNAL MEDICINE

## 2025-06-15 PROCEDURE — P9047 ALBUMIN (HUMAN), 25%, 50ML: HCPCS | Performed by: INTERNAL MEDICINE

## 2025-06-15 PROCEDURE — 82803 BLOOD GASES ANY COMBINATION: CPT

## 2025-06-15 PROCEDURE — 6360000002 HC RX W HCPCS: Performed by: EMERGENCY MEDICINE

## 2025-06-15 PROCEDURE — 83735 ASSAY OF MAGNESIUM: CPT

## 2025-06-15 PROCEDURE — 82962 GLUCOSE BLOOD TEST: CPT

## 2025-06-15 PROCEDURE — 80048 BASIC METABOLIC PNL TOTAL CA: CPT

## 2025-06-15 PROCEDURE — 94640 AIRWAY INHALATION TREATMENT: CPT

## 2025-06-15 PROCEDURE — 94667 MNPJ CHEST WALL 1ST: CPT

## 2025-06-15 RX ORDER — DEXMEDETOMIDINE HYDROCHLORIDE 4 UG/ML
.1-1.5 INJECTION, SOLUTION INTRAVENOUS CONTINUOUS
Status: DISCONTINUED | OUTPATIENT
Start: 2025-06-15 | End: 2025-06-16

## 2025-06-15 RX ORDER — POLYETHYLENE GLYCOL 3350 17 G/17G
17 POWDER, FOR SOLUTION ORAL DAILY
Status: DISCONTINUED | OUTPATIENT
Start: 2025-06-15 | End: 2025-07-16

## 2025-06-15 RX ORDER — SENNOSIDES 8.6 MG/1
1 TABLET ORAL 2 TIMES DAILY
Status: DISCONTINUED | OUTPATIENT
Start: 2025-06-15 | End: 2025-07-07

## 2025-06-15 RX ORDER — ALBUMIN (HUMAN) 12.5 G/50ML
12.5 SOLUTION INTRAVENOUS EVERY 6 HOURS
Status: COMPLETED | OUTPATIENT
Start: 2025-06-15 | End: 2025-06-15

## 2025-06-15 RX ADMIN — FLUCONAZOLE 400 MG: 2 INJECTION, SOLUTION INTRAVENOUS at 10:26

## 2025-06-15 RX ADMIN — DOXYCYCLINE 100 MG: 100 INJECTION, POWDER, LYOPHILIZED, FOR SOLUTION INTRAVENOUS at 13:07

## 2025-06-15 RX ADMIN — ALBUMIN (HUMAN) 12.5 G: 0.25 INJECTION, SOLUTION INTRAVENOUS at 14:38

## 2025-06-15 RX ADMIN — FENTANYL CITRATE 100 MCG/HR: 0.05 INJECTION, SOLUTION INTRAMUSCULAR; INTRAVENOUS at 00:00

## 2025-06-15 RX ADMIN — SENNOSIDES 8.6 MG: 8.6 TABLET, COATED ORAL at 09:51

## 2025-06-15 RX ADMIN — SODIUM CHLORIDE, PRESERVATIVE FREE 10 ML: 5 INJECTION INTRAVENOUS at 19:56

## 2025-06-15 RX ADMIN — INSULIN LISPRO 2 UNITS: 100 INJECTION, SOLUTION INTRAVENOUS; SUBCUTANEOUS at 18:19

## 2025-06-15 RX ADMIN — ENOXAPARIN SODIUM 30 MG: 100 INJECTION SUBCUTANEOUS at 10:52

## 2025-06-15 RX ADMIN — CHLORHEXIDINE GLUCONATE 15 ML: 1.2 RINSE ORAL at 10:55

## 2025-06-15 RX ADMIN — INSULIN LISPRO 2 UNITS: 100 INJECTION, SOLUTION INTRAVENOUS; SUBCUTANEOUS at 13:28

## 2025-06-15 RX ADMIN — MIDAZOLAM HYDROCHLORIDE 2 MG: 1 INJECTION, SOLUTION INTRAMUSCULAR; INTRAVENOUS at 12:28

## 2025-06-15 RX ADMIN — ALBUMIN (HUMAN) 12.5 G: 0.25 INJECTION, SOLUTION INTRAVENOUS at 19:56

## 2025-06-15 RX ADMIN — MINERAL OIL, PETROLATUM: 425; 568 OINTMENT OPHTHALMIC at 18:15

## 2025-06-15 RX ADMIN — DEXMEDETOMIDINE 0.6 MCG/KG/HR: 100 INJECTION, SOLUTION INTRAVENOUS at 22:03

## 2025-06-15 RX ADMIN — FAMOTIDINE 10 MG: 10 INJECTION, SOLUTION INTRAVENOUS at 19:56

## 2025-06-15 RX ADMIN — ARFORMOTEROL TARTRATE 15 MCG: 15 SOLUTION RESPIRATORY (INHALATION) at 07:47

## 2025-06-15 RX ADMIN — MIDAZOLAM HYDROCHLORIDE 2 MG: 1 INJECTION, SOLUTION INTRAMUSCULAR; INTRAVENOUS at 17:06

## 2025-06-15 RX ADMIN — ENOXAPARIN SODIUM 30 MG: 100 INJECTION SUBCUTANEOUS at 23:08

## 2025-06-15 RX ADMIN — BUDESONIDE 500 MCG: 0.5 INHALANT RESPIRATORY (INHALATION) at 07:47

## 2025-06-15 RX ADMIN — POLYETHYLENE GLYCOL 3350 17 G: 17 POWDER, FOR SOLUTION ORAL at 09:51

## 2025-06-15 RX ADMIN — DEXMEDETOMIDINE 0.6 MCG/KG/HR: 100 INJECTION, SOLUTION INTRAVENOUS at 15:14

## 2025-06-15 RX ADMIN — IPRATROPIUM BROMIDE AND ALBUTEROL SULFATE 1 DOSE: .5; 3 SOLUTION RESPIRATORY (INHALATION) at 19:18

## 2025-06-15 RX ADMIN — WATER 20 MG: 1 INJECTION INTRAMUSCULAR; INTRAVENOUS; SUBCUTANEOUS at 05:24

## 2025-06-15 RX ADMIN — WATER 40 MG: 1 INJECTION INTRAMUSCULAR; INTRAVENOUS; SUBCUTANEOUS at 14:40

## 2025-06-15 RX ADMIN — MINERAL OIL, PETROLATUM: 425; 568 OINTMENT OPHTHALMIC at 14:06

## 2025-06-15 RX ADMIN — MINERAL OIL, PETROLATUM: 425; 568 OINTMENT OPHTHALMIC at 00:08

## 2025-06-15 RX ADMIN — DEXTROSE: 5 SOLUTION INTRAVENOUS at 11:36

## 2025-06-15 RX ADMIN — FENTANYL CITRATE 75 MCG/HR: 0.05 INJECTION, SOLUTION INTRAMUSCULAR; INTRAVENOUS at 14:34

## 2025-06-15 RX ADMIN — IPRATROPIUM BROMIDE AND ALBUTEROL SULFATE 1 DOSE: .5; 3 SOLUTION RESPIRATORY (INHALATION) at 07:47

## 2025-06-15 RX ADMIN — SODIUM CHLORIDE 3 MG/HR: 900 INJECTION, SOLUTION INTRAVENOUS at 04:03

## 2025-06-15 RX ADMIN — BUDESONIDE 500 MCG: 0.5 INHALANT RESPIRATORY (INHALATION) at 19:18

## 2025-06-15 RX ADMIN — Medication 100 MG: at 09:51

## 2025-06-15 RX ADMIN — FOLIC ACID 1 MG: 1 TABLET ORAL at 09:51

## 2025-06-15 RX ADMIN — DEXMEDETOMIDINE 0.2 MCG/KG/HR: 100 INJECTION, SOLUTION INTRAVENOUS at 09:19

## 2025-06-15 RX ADMIN — SENNOSIDES 8.6 MG: 8.6 TABLET, COATED ORAL at 20:03

## 2025-06-15 RX ADMIN — ARFORMOTEROL TARTRATE 15 MCG: 15 SOLUTION RESPIRATORY (INHALATION) at 19:18

## 2025-06-15 RX ADMIN — MINERAL OIL, PETROLATUM: 425; 568 OINTMENT OPHTHALMIC at 05:20

## 2025-06-15 RX ADMIN — IPRATROPIUM BROMIDE AND ALBUTEROL SULFATE 1 DOSE: .5; 3 SOLUTION RESPIRATORY (INHALATION) at 14:43

## 2025-06-15 RX ADMIN — WATER 40 MG: 1 INJECTION INTRAMUSCULAR; INTRAVENOUS; SUBCUTANEOUS at 23:08

## 2025-06-15 RX ADMIN — MINERAL OIL, PETROLATUM: 425; 568 OINTMENT OPHTHALMIC at 19:57

## 2025-06-15 RX ADMIN — FAMOTIDINE 10 MG: 10 INJECTION, SOLUTION INTRAVENOUS at 10:54

## 2025-06-15 RX ADMIN — INSULIN LISPRO 2 UNITS: 100 INJECTION, SOLUTION INTRAVENOUS; SUBCUTANEOUS at 23:08

## 2025-06-15 RX ADMIN — CHLORHEXIDINE GLUCONATE 15 ML: 1.2 RINSE ORAL at 19:56

## 2025-06-15 RX ADMIN — SODIUM CHLORIDE, PRESERVATIVE FREE 10 ML: 5 INJECTION INTRAVENOUS at 10:56

## 2025-06-15 ASSESSMENT — PULMONARY FUNCTION TESTS
PIF_VALUE: 39
PIF_VALUE: 21
PIF_VALUE: 18
PIF_VALUE: 22
PIF_VALUE: 18
PIF_VALUE: 13
PIF_VALUE: 20
PIF_VALUE: 27

## 2025-06-15 NOTE — PROGRESS NOTES
[facility]  Pulmonary, Critical Care, and Sleep Medicine    Name: Beny Knapp MRN: [id number]   : 1965 Hospital: [facility]    Date: 6/15/2025  Room: [address] [place] [address] Note                                              Consult requesting physician: Dr. Mark   Reason for Consult: Respiratory failure, hyponatremia, upper airway    IMPRESSION:     Acute respiratory failure with hypoxemia and hypercarbia   J96.01, J96.02  Stridor  Obstructed larynx  Polysubstance abuse candidiasis of esophagus   COPD  EtOH use  Hepatitis C    Active Hospital Problems    Diagnosis Date Noted    Hyponatremia [E87.1] 2025    Stridor [R06.1] 2025    Obstructed, larynx [J38.6] 2025    Acute respiratory failure with hypoxia and hypercapnia (HCC) [J96.01, J96.02] 2025    ETOH abuse [F10.10] 2025    Polysubstance abuse (HCC) [F19.10] 2025    Candidiasis of esophagus (HCC) [B37.81] 2025    Thrombocytopenia [D69.6] 2017    Tobacco abuse [Z72.0] 2015    HTN (hypertension) [I10] 2015    COPD (chronic obstructive pulmonary disease) (HCC) [J44.9] 2015    Chronic hepatitis C (HCC) [B18.2] 2015        Code status: Full Code       RECOMMENDATIONS:     Respiratory:   59-year-old male with COPD, smoker presents with acute upper airway distress, fullness in the laryngeal area, questionable foreign body,  Patient was intubated for airway protection and hypercarbic respiratory failure, hypoxemia was mild.  Endoscopy did not show any foreign body, but swollen airway and severe candidiasis  Patient is intubated  AC 14/460/40/5  7.37/57/68/92  Ventilator settings adjust daily.   CXR 6/15/2025  Endotracheal tube terminates at the thoracic inlet. NG tube extends below the  hemidiaphragm. The right IJ line terminates in the SVC. There is no  pneumothorax. Small bilateral pleural effusions.  The lungs are clear. The visualized bones  Collection Time: 06/14/25  9:05 AM   Result Value Ref Range    Procalcitonin 0.03 ng/mL   Brain Natriuretic Peptide    Collection Time: 06/14/25  9:05 AM   Result Value Ref Range    NT Pro-BNP 93 36 - 900 PG/ML   Magnesium    Collection Time: 06/14/25  9:05 AM   Result Value Ref Range    Magnesium 2.0 1.6 - 2.6 mg/dL   Phosphorus    Collection Time: 06/14/25  9:05 AM   Result Value Ref Range    Phosphorus 4.1 2.5 - 4.9 MG/DL   Ferritin    Collection Time: 06/14/25  9:07 AM   Result Value Ref Range    Ferritin 238 13 - 400 NG/ML   Sodium    Collection Time: 06/14/25  9:07 AM   Result Value Ref Range    Sodium 120 (L) 136 - 145 mmol/L   Troponin    Collection Time: 06/14/25  9:07 AM   Result Value Ref Range    Troponin T 12.5 0.0 - 22.0 ng/L   Protime-INR    Collection Time: 06/14/25  9:07 AM   Result Value Ref Range    Protime 13.9 11.9 - 14.9 sec    INR 1.1 0.9 - 1.1     Basic Metabolic Panel    Collection Time: 06/14/25  1:42 PM   Result Value Ref Range    Sodium 123 (L) 136 - 145 mmol/L    Potassium 3.7 3.5 - 5.5 mmol/L    Chloride 86 (L) 98 - 107 mmol/L    CO2 27 21 - 32 mmol/L    Anion Gap 9 3 - 18 mmol/L    Glucose 93 74 - 108 mg/dL    BUN 9 6 - 23 MG/DL    Creatinine 0.56 (L) 0.60 - 1.30 MG/DL    BUN/Creatinine Ratio 15 12 - 20      Est, Glom Filt Rate >90 >60 ml/min/1.73m2    Calcium 8.5 8.5 - 10.1 MG/DL   POCT Glucose    Collection Time: 06/14/25  2:15 PM   Result Value Ref Range    POC Glucose 112 (H) 70 - 110 mg/dL   Echo (TTE) complete (PRN contrast/bubble/strain/3D)    Collection Time: 06/14/25  2:58 PM   Result Value Ref Range    Body Surface Area 2.39 m2    IVSd 0.8 0.6 - 1.0 cm    LVIDd 4.8 4.2 - 5.9 cm    LVIDs 3.2 cm    LVOT Diameter 2.4 cm    LVPWd 0.8 0.6 - 1.0 cm    EF BP 57 55 - 100 %    LV Ejection Fraction A2C 59 %    LV Ejection Fraction A4C 61 %    LV EDV A2C 64 mL    LV EDV A4C 84 mL    LV EDV BP 73 67 - 155 mL    LV ESV A2C 27 mL    LV ESV A4C 33 mL    LV ESV BP 31 22 - 58 mL    LVOT Peak

## 2025-06-15 NOTE — PROGRESS NOTES
Vent day 2. Tolerated wean trial this AM for about 45 minutes. No plan to extubated. Placed patient back on full support. NO cuff leak present. Patient attempt to self extubate this evening, nursing sedated patient. Patient SpO2 88-90% on 40%, increased FIO2 to 60% to maintain SpO2 of 92% or greater per SpO2 goal. Suctioning small to moderate white secretions from ETT. Strong cough when sedation is light.

## 2025-06-15 NOTE — PROGRESS NOTES
Otolaryngology head neck surgery    Patient seen and examined  Patient remains on vent  No airleak with deflation of ET tube cuff  CT scan obtained yesterday reviewed  Unfortunately CT scan will give only limited information due to the endotracheal tube which is passing through the laryngeal inlet which was the site of obstruction  At this point would continue with aggressive treatment of candidiasis and placed on steroids and IV antibiotic to reduce laryngeal edema for presumed infectious process  If swelling significantly diminishes may monitor cuff leak and extubate if good cuff leak noted  If no significant improvement from that standpoint, consideration may be given for tracheotomy  Unfortunately cannot give clear-cut etiology for laryngeal obstruction but I suspect that there was significant edematous changes of the larynx he as well as his superimposed candidiasis  Will follow

## 2025-06-15 NOTE — PLAN OF CARE
Problem: Safety - Medical Restraint  Goal: Remains free of injury from restraints (Restraint for Interference with Medical Device)  Description: INTERVENTIONS:  1. Determine that other, less restrictive measures have been tried or would not be effective before applying the restraint  2. Evaluate the patient's condition at the time of restraint application  3. Inform patient/family regarding the reason for restraint  4. Q2H: Monitor safety, psychosocial status, comfort, nutrition and hydration  6/14/2025 2133 by Hoda Diaz, RN  Outcome: Progressing  Flowsheets (Taken 6/14/2025 2000)  Remains free of injury from restraints (restraint for interference with medical device):   Determine that other, less restrictive measures have been tried or would not be effective before applying the restraint   Evaluate the patient's condition at the time of restraint application   Every 2 hours: Monitor safety, psychosocial status, comfort, nutrition and hydration     Problem: Discharge Planning  Goal: Discharge to home or other facility with appropriate resources  6/14/2025 2133 by Hoda Diaz RN  Outcome: Progressing  Flowsheets (Taken 6/14/2025 2000)  Discharge to home or other facility with appropriate resources:   Identify barriers to discharge with patient and caregiver   Arrange for needed discharge resources and transportation as appropriate   Identify discharge learning needs (meds, wound care, etc)   Refer to discharge planning if patient needs post-hospital services based on physician order or complex needs related to functional status, cognitive ability or social support system       Problem: Pain  Goal: Verbalizes/displays adequate comfort level or baseline comfort level  6/14/2025 2133 by Hoda Diaz, RN  Outcome: Progressing  Flowsheets (Taken 6/14/2025 1930)  Verbalizes/displays adequate comfort level or baseline comfort level:   Encourage patient to monitor pain and request

## 2025-06-15 NOTE — PROGRESS NOTES
Hospitalist Progress Note    Patient: Beny Knapp MRN: [id number]  CSN: [id number]    YOB: 1965  Age: 59 y.o.  Sex: male    DOA: 6/14/2025 LOS:  LOS: 1 day          Chief Complain :  Foreign body, shortness of breath.  59 y.o.  male w/ PMH of COPD, ETOH-use and polysubstance abuse who was BIBA and presents with subjective difficulty breathing and swallowing with throat pain. Brought in by EMS for possible forign body (food) with bolus within region of velecula. ENT saw pt and ED intubated for airway protection. ENT was consulted and performed scope on pt with findings of extensive esophageal candidiasis. Nephrology was consulted for severe hyponatremia with sodium of 117 recommending NS at 75cc/hr. Intensivist was contacted as well d/t pt being intubated, on vent for airway protection.   Subjective:   Patient orally intubated and sedated, per RN, he opens eyes and follows commands.    Assessment/Plan     Active Hospital Problems    Diagnosis     Hyponatremia [E87.1]     Stridor [R06.1]     Obstructed, larynx [J38.6]     Acute respiratory failure with hypoxia and hypercapnia (HCC) [J96.01, J96.02]     ETOH abuse [F10.10]     Polysubstance abuse (HCC) [F19.10]     Candidiasis of esophagus (HCC) [B37.81]     Thrombocytopenia [D69.6]     Tobacco abuse [Z72.0]     HTN (hypertension) [I10]     COPD (chronic obstructive pulmonary disease) (HCC) [J44.9]     Chronic hepatitis C (HCC) [B18.2]           CRITICAL CARE PLAN    Resp   Acute airway obstruction  Intubated to protect airway  See vent orders, VAP bundle. HOB>30 degrees.   Vent management and SBT per pulmonary  Question of foreign body, seen by ENT initially nasal

## 2025-06-15 NOTE — PLAN OF CARE
Problem: Safety - Medical Restraint  Goal: Remains free of injury from restraints (Restraint for Interference with Medical Device)  Description: INTERVENTIONS:  1. Determine that other, less restrictive measures have been tried or would not be effective before applying the restraint  2. Evaluate the patient's condition at the time of restraint application  3. Inform patient/family regarding the reason for restraint  4. Q2H: Monitor safety, psychosocial status, comfort, nutrition and hydration  Outcome: Progressing  Flowsheets (Taken 6/15/2025 0944)  Remains free of injury from restraints (restraint for interference with medical device):   Determine that other, less restrictive measures have been tried or would not be effective before applying the restraint   Inform patient/family regarding the reason for restraint   Evaluate the patient's condition at the time of restraint application   Every 2 hours: Monitor safety, psychosocial status, comfort, nutrition and hydration     Problem: Discharge Planning  Goal: Discharge to home or other facility with appropriate resources  Outcome: Progressing  Flowsheets (Taken 6/15/2025 0800)  Discharge to home or other facility with appropriate resources:   Identify barriers to discharge with patient and caregiver   Arrange for needed discharge resources and transportation as appropriate   Refer to discharge planning if patient needs post-hospital services based on physician order or complex needs related to functional status, cognitive ability or social support system   Identify discharge learning needs (meds, wound care, etc)     Problem: Pain  Goal: Verbalizes/displays adequate comfort level or baseline comfort level  Outcome: Progressing  Flowsheets (Taken 6/15/2025 0800)  Verbalizes/displays adequate comfort level or baseline comfort level:   Encourage patient to monitor pain and request assistance   Assess pain using appropriate pain scale   Administer analgesics based on  type and severity of pain and evaluate response   Implement non-pharmacological measures as appropriate and evaluate response   Consider cultural and social influences on pain and pain management   Notify Licensed Independent Practitioner if interventions unsuccessful or patient reports new pain     Problem: Chronic Conditions and Co-morbidities  Goal: Patient's chronic conditions and co-morbidity symptoms are monitored and maintained or improved  Outcome: Progressing  Flowsheets (Taken 6/15/2025 0800)  Care Plan - Patient's Chronic Conditions and Co-Morbidity Symptoms are Monitored and Maintained or Improved:   Monitor and assess patient's chronic conditions and comorbid symptoms for stability, deterioration, or improvement   Collaborate with multidisciplinary team to address chronic and comorbid conditions and prevent exacerbation or deterioration   Update acute care plan with appropriate goals if chronic or comorbid symptoms are exacerbated and prevent overall improvement and discharge     Problem: Neurosensory - Adult  Goal: Achieves stable or improved neurological status  Outcome: Progressing  Flowsheets (Taken 6/15/2025 0800)  Achieves stable or improved neurological status:   Assess for and report changes in neurological status   Initiate measures to prevent increased intracranial pressure   Maintain blood pressure and fluid volume within ordered parameters to optimize cerebral perfusion and minimize risk of hemorrhage   Monitor temperature, glucose, and sodium. Initiate appropriate interventions as ordered     Problem: Respiratory - Adult  Goal: Achieves optimal ventilation and oxygenation  Outcome: Progressing  Flowsheets (Taken 6/15/2025 0800)  Achieves optimal ventilation and oxygenation:   Assess for changes in respiratory status   Assess for changes in mentation and behavior   Position to facilitate oxygenation and minimize respiratory effort   Oxygen supplementation based on oxygen saturation or

## 2025-06-15 NOTE — PROGRESS NOTES
Nephrology Progress note    Subjective:     Beny Knapp is a 59 y.o. male with PMH HTN, COPD, hep C admitted for respiratory distress which reportedly started after choking on a sandwich at lunch. Pt seen by ENT, s/p flex fiberoptic nasolaryngoscopy found severe candidiasis. Has been having difficulty swallowing for a few months. Has been on prednisone for COPD. Pt emergently intubated.   Na 115 on admission-gently IV NS started and Na 118.   Sedated on vent   Na 129 this AM, has been started on D5W. >5L out yest, 683 last shift  Cr 0.5    Admit Date: 6/14/2025  [unfilled]  [unfilled]    Allergy:  No Known Allergies     Objective:     /65   Pulse 69   Temp 98.6 °F (37 °C) (Oral)   Resp 14   Ht 1.829 m (6')   Wt 110.6 kg (243 lb 13.3 oz)   SpO2 92%   BMI 33.07 kg/m²       Intake/Output Summary (Last 24 hours) at 6/15/2025 0938  Last data filed at 6/15/2025 0800  Gross per 24 hour   Intake 1934.2 ml   Output 4868 ml   Net -2933.8 ml       Physical Exam:       General: No acute distress   HENT: Atraumatic and normocephalic   Eyes: Normal conjunctiva   Neck: Supple    Cardiovascular: Normal S1 & S2, no m/r/g   Pulmonary/Chest Wall: Clear to auscultation bilaterally   Abdominal: Soft and non-tender   Musculoskeletal: no edema   Neurological: No focal deficits       Data Review:    @recentrenalfxn@  Lab Results   Component Value Date/Time    WBC 4.8 06/15/2025 03:07 AM    RBC 3.93 (L) 06/15/2025 03:07 AM    HCT 34.4 (L) 06/15/2025 03:07 AM    MCV 87.5 06/15/2025 03:07 AM    MCH 29.5 06/15/2025 03:07 AM    MCHC 33.7 06/15/2025 03:07 AM    RDW 13.2 06/15/2025 03:07 AM    MPV 9.7 06/15/2025 03:07 AM      Lab Results   Component Value Date    IRON 59 06/14/2025     Lab Results   Component Value Date    FERRITIN 238 06/14/2025     No results found for: \"PTHINT\"  Urinalysis  No results found for: \"SOURCEUR\"   [unfilled]   [unfilled]       Impression:     Hyponatremia-?prerenal- improving  Respiratory

## 2025-06-15 NOTE — CONSULTS
Please refer to full High Risk Nutrition Assessment/Note/Recs completed today 6/15/25.    Carlota Santacruz MS, RD  Clinical Dietitian  E: [email or web address]  O:  [phone]  C:  [phone]

## 2025-06-15 NOTE — CONSULTS
Comprehensive High Risk Nutrition Assessment Initial     Type and Reason for Visit:  Initial, Consult, Nutrition support (ICU vent)     Nutrition Recommendations/Plan:   Check A1c - if DM finish L of Jevity 1.5 then rec change to Diabetic / Glucerna 1.5  As hemodynamically stable cont TF Jevity 1.5 @ 20ml/hr + 2 ProSources BID for now  Defer FWF per MD Breaux to check Phos, Mg, K replete as needed  Cont to monitor Na levels improve slowly defer to MD  Saeid folate and thiamine and consider adding Centrum/certa-azucena w/min liq  Cont to monitor POC/NCP, wt trends, lytes, UOP, bowel fx, wound healing and adjust recs as needed      Malnutrition Assessment:  Malnutrition Status:  At risk for malnutrition (06/15/25 1441)       Nutrition Assessment:    58yo M in ICU resp distress, poss foreign body back of throat, esophageal laryngitis, s/p scope by ENT found ext esophageal candidiasis, severe hyponatremia Nephrology consulted, hypercarbia and hypoxemia, sedated on vent on levo; PMHx HTN, EtOH use, COPD, smoker, polysubstance abuse including cocaine, hep C per MD. sedated on vent with fentanyl versed, on levo. +OGT. Jevity 1.5 @ 20ml/hr + 2 ProSources BID ordered appears running and tolerating. good UOP.     Nutrition Related Findings:      Wound Type: None        Current Nutrition Intake & Therapies:    Average Meal Intake: NPO (vent +TF)  Average Supplements Intake: NPO  Diet NPO  ADULT TUBE FEEDING; Orogastric; Standard with Fiber; Continuous; 20; No; 30; Q 6 hours; Protein; 2 Doses; BID  Current Tube Feeding (TF) Orders:  See above     Anthropometric Measures:  Height: 182.9 cm (6' 0.01\")  Ideal Body Weight (IBW): 178 lbs (81 kg)    Admission Body Weight: 113 kg (249 lb 1.9 oz)  Current Body Weight: 111 kg (244 lb 11.4 oz), 137.5 % IBW. Weight Source: Bed scale  Current BMI (kg/m2): 33.2  Usual Body Weight: 98 kg (216 lb 0.8 oz)  % Weight Change (Calculated): 13.3  BMI Categories: Obese Class 1 (BMI 30.0-34.9)

## 2025-06-15 NOTE — CONSULTS
Comprehensive High Risk Nutrition Assessment Initial    Type and Reason for Visit:  Initial, Consult, Nutrition support (ICU vent)    Nutrition Recommendations/Plan:   Check A1c - if DM finish L of Jevity 1.5 then rec change to Diabetic / Glucerna 1.5  As hemodynamically stable cont TF Jevity 1.5 @ 20ml/hr + 2 ProSources BID for now  Defer FWF per MD Breaux to check Phos, Mg, K replete as needed  Cont to monitor Na levels improve slowly defer to MD  Saeid folate and thiamine and consider adding Centrum/certa-azucena w/min liq  Cont to monitor POC/NCP, wt trends, lytes, UOP, bowel fx, wound healing and adjust recs as needed     Malnutrition Assessment:  Malnutrition Status:  At risk for malnutrition (06/15/25 1441)      Nutrition Assessment:    60yo M in ICU resp distress, poss foreign body back of throat, esophageal laryngitis, s/p scope by ENT found ext esophageal candidiasis, severe hyponatremia Nephrology consulted, hypercarbia and hypoxemia, sedated on vent on levo; PMHx HTN, EtOH use, COPD, smoker, polysubstance abuse including cocaine, hep C per MD. sedated on vent with fentanyl versed, on levo. +OGT. Jevity 1.5 @ 20ml/hr + 2 ProSources BID ordered appears running and tolerating. good UOP.    Nutrition Related Findings:      Wound Type: None       Current Nutrition Intake & Therapies:    Average Meal Intake: NPO (vent +TF)  Average Supplements Intake: NPO  Diet NPO  ADULT TUBE FEEDING; Orogastric; Standard with Fiber; Continuous; 20; No; 30; Q 6 hours; Protein; 2 Doses; BID  Current Tube Feeding (TF) Orders:  See above    Anthropometric Measures:  Height: 182.9 cm (6' 0.01\")  Ideal Body Weight (IBW): 178 lbs (81 kg)    Admission Body Weight: 113 kg (249 lb 1.9 oz)  Current Body Weight: 111 kg (244 lb 11.4 oz), 137.5 % IBW. Weight Source: Bed scale  Current BMI (kg/m2): 33.2  Usual Body Weight: 98 kg (216 lb 0.8 oz)  % Weight Change (Calculated): 13.3  BMI Categories: Obese Class 1 (BMI 30.0-34.9)    Estimated Daily

## 2025-06-15 NOTE — PROGRESS NOTES
1341  Dr. Delgado notified of Na 128. Verbal telephone orders to decrease continuous D5 to 50 ml/hr. Per Dr. Delgado, RN to contact if Na goes above 130.

## 2025-06-16 ENCOUNTER — APPOINTMENT (OUTPATIENT)
Facility: HOSPITAL | Age: 60
DRG: 368 | End: 2025-06-16
Payer: MEDICARE

## 2025-06-16 LAB
ALBUMIN SERPL-MCNC: 3.4 G/DL (ref 3.4–5)
ALBUMIN/GLOB SERPL: 1 (ref 0.8–1.7)
ALP SERPL-CCNC: 66 U/L (ref 45–117)
ALT SERPL-CCNC: 42 U/L (ref 10–50)
ANION GAP SERPL CALC-SCNC: 11 MMOL/L (ref 3–18)
ANION GAP SERPL CALC-SCNC: 9 MMOL/L (ref 3–18)
AST SERPL-CCNC: 30 U/L (ref 10–38)
BASE EXCESS BLD CALC-SCNC: 6.1 MMOL/L
BASOPHILS # BLD: 0 K/UL (ref 0–0.1)
BASOPHILS NFR BLD: 0 % (ref 0–2)
BDY SITE: ABNORMAL
BILIRUB SERPL-MCNC: 0.3 MG/DL (ref 0.2–1)
BUN SERPL-MCNC: 17 MG/DL (ref 6–23)
BUN SERPL-MCNC: 18 MG/DL (ref 6–23)
BUN SERPL-MCNC: 20 MG/DL (ref 6–23)
BUN SERPL-MCNC: 22 MG/DL (ref 6–23)
BUN/CREAT SERPL: 24 (ref 12–20)
BUN/CREAT SERPL: 27 (ref 12–20)
BUN/CREAT SERPL: 30 (ref 12–20)
BUN/CREAT SERPL: 33 (ref 12–20)
CALCIUM SERPL-MCNC: 9.1 MG/DL (ref 8.5–10.1)
CALCIUM SERPL-MCNC: 9.1 MG/DL (ref 8.5–10.1)
CALCIUM SERPL-MCNC: 9.2 MG/DL (ref 8.5–10.1)
CALCIUM SERPL-MCNC: 9.5 MG/DL (ref 8.5–10.1)
CHLORIDE SERPL-SCNC: 90 MMOL/L (ref 98–107)
CHLORIDE SERPL-SCNC: 91 MMOL/L (ref 98–107)
CHLORIDE SERPL-SCNC: 92 MMOL/L (ref 98–107)
CHLORIDE SERPL-SCNC: 94 MMOL/L (ref 98–107)
CO2 SERPL-SCNC: 27 MMOL/L (ref 21–32)
CO2 SERPL-SCNC: 28 MMOL/L (ref 21–32)
CO2 SERPL-SCNC: 29 MMOL/L (ref 21–32)
CO2 SERPL-SCNC: 29 MMOL/L (ref 21–32)
CREAT SERPL-MCNC: 0.65 MG/DL (ref 0.6–1.3)
CREAT SERPL-MCNC: 0.66 MG/DL (ref 0.6–1.3)
CREAT SERPL-MCNC: 0.68 MG/DL (ref 0.6–1.3)
CREAT SERPL-MCNC: 0.69 MG/DL (ref 0.6–1.3)
DIFFERENTIAL METHOD BLD: ABNORMAL
EKG ATRIAL RATE: 69 BPM
EKG DIAGNOSIS: NORMAL
EKG P AXIS: 53 DEGREES
EKG P-R INTERVAL: 164 MS
EKG Q-T INTERVAL: 406 MS
EKG QRS DURATION: 92 MS
EKG QTC CALCULATION (BAZETT): 435 MS
EKG R AXIS: 36 DEGREES
EKG T AXIS: 59 DEGREES
EKG VENTRICULAR RATE: 69 BPM
EOSINOPHIL # BLD: 0 K/UL (ref 0–0.4)
EOSINOPHIL NFR BLD: 0 % (ref 0–5)
ERYTHROCYTE [DISTWIDTH] IN BLOOD BY AUTOMATED COUNT: 13.8 % (ref 11.6–14.5)
ERYTHROCYTE [DISTWIDTH] IN BLOOD BY AUTOMATED COUNT: 14 % (ref 11.6–14.5)
GAS FLOW.O2 O2 DELIVERY SYS: ABNORMAL
GAS FLOW.O2 SETTING OXYMISER: 14 BPM
GLOBULIN SER CALC-MCNC: 3.4 G/DL (ref 2–4)
GLUCOSE BLD STRIP.AUTO-MCNC: 172 MG/DL (ref 70–110)
GLUCOSE BLD STRIP.AUTO-MCNC: 182 MG/DL (ref 70–110)
GLUCOSE BLD STRIP.AUTO-MCNC: 193 MG/DL (ref 70–110)
GLUCOSE SERPL-MCNC: 170 MG/DL (ref 74–108)
GLUCOSE SERPL-MCNC: 172 MG/DL (ref 74–108)
GLUCOSE SERPL-MCNC: 177 MG/DL (ref 74–108)
GLUCOSE SERPL-MCNC: 186 MG/DL (ref 74–108)
HCO3 BLD-SCNC: 31.7 MMOL/L (ref 21–28)
HCT VFR BLD AUTO: 34.5 % (ref 36–48)
HCT VFR BLD AUTO: 34.5 % (ref 36–48)
HGB BLD-MCNC: 11.3 G/DL (ref 13–16)
HGB BLD-MCNC: 11.4 G/DL (ref 13–16)
HIV 1+2 AB+HIV1 P24 AG SERPL QL IA: NONREACTIVE
HIV 1/2 RESULT COMMENT: NORMAL
IMM GRANULOCYTES # BLD AUTO: 0.04 K/UL (ref 0–0.04)
IMM GRANULOCYTES NFR BLD AUTO: 0.4 % (ref 0–0.5)
KOH PREP SPEC: NORMAL
LYMPHOCYTES # BLD: 0.55 K/UL (ref 0.9–3.3)
LYMPHOCYTES NFR BLD: 5.8 % (ref 21–52)
MAGNESIUM SERPL-MCNC: 2.5 MG/DL (ref 1.6–2.6)
MCH RBC QN AUTO: 29.4 PG (ref 24–34)
MCH RBC QN AUTO: 30.2 PG (ref 24–34)
MCHC RBC AUTO-ENTMCNC: 32.8 G/DL (ref 31–37)
MCHC RBC AUTO-ENTMCNC: 33 G/DL (ref 31–37)
MCV RBC AUTO: 89.8 FL (ref 78–100)
MCV RBC AUTO: 91.5 FL (ref 78–100)
MONOCYTES # BLD: 0.55 K/UL (ref 0.05–1.2)
MONOCYTES NFR BLD: 5.8 % (ref 3–10)
NEUTS SEG # BLD: 8.29 K/UL (ref 1.8–8)
NEUTS SEG NFR BLD: 88 % (ref 40–73)
NRBC # BLD: 0 K/UL (ref 0–0.01)
NRBC # BLD: 0 K/UL (ref 0–0.01)
NRBC BLD-RTO: 0 PER 100 WBC
NRBC BLD-RTO: 0 PER 100 WBC
NT PRO BNP: 50 PG/ML (ref 36–900)
O2/TOTAL GAS SETTING VFR VENT: 85 %
PCO2 BLD: 49.1 MMHG (ref 35–48)
PEEP RESPIRATORY: 8 CMH2O
PH BLD: 7.42 (ref 7.35–7.45)
PHOSPHATE SERPL-MCNC: 4.4 MG/DL (ref 2.5–4.9)
PLATELET # BLD AUTO: 245 K/UL (ref 135–420)
PLATELET # BLD AUTO: 247 K/UL (ref 135–420)
PMV BLD AUTO: 9.9 FL (ref 9.2–11.8)
PMV BLD AUTO: 9.9 FL (ref 9.2–11.8)
PO2 BLD: 72 MMHG (ref 83–108)
POTASSIUM SERPL-SCNC: 5 MMOL/L (ref 3.5–5.5)
POTASSIUM SERPL-SCNC: 5.4 MMOL/L (ref 3.5–5.5)
POTASSIUM SERPL-SCNC: 5.4 MMOL/L (ref 3.5–5.5)
POTASSIUM SERPL-SCNC: 5.5 MMOL/L (ref 3.5–5.5)
PROT SERPL-MCNC: 6.8 G/DL (ref 6.4–8.2)
RBC # BLD AUTO: 3.77 M/UL (ref 4.35–5.65)
RBC # BLD AUTO: 3.84 M/UL (ref 4.35–5.65)
SAO2 % BLD: 94.3 % (ref 92–97)
SERVICE CMNT-IMP: ABNORMAL
SERVICE CMNT-IMP: NORMAL
SODIUM SERPL-SCNC: 128 MMOL/L (ref 136–145)
SODIUM SERPL-SCNC: 128 MMOL/L (ref 136–145)
SODIUM SERPL-SCNC: 130 MMOL/L (ref 136–145)
SODIUM SERPL-SCNC: 132 MMOL/L (ref 136–145)
SPECIMEN TYPE: ABNORMAL
VENTILATION MODE VENT: ABNORMAL
VT SETTING VENT: 460 ML
WBC # BLD AUTO: 8.8 K/UL (ref 4.6–13.2)
WBC # BLD AUTO: 9.4 K/UL (ref 4.6–13.2)

## 2025-06-16 PROCEDURE — 2500000003 HC RX 250 WO HCPCS: Performed by: INTERNAL MEDICINE

## 2025-06-16 PROCEDURE — 85027 COMPLETE CBC AUTOMATED: CPT

## 2025-06-16 PROCEDURE — 87070 CULTURE OTHR SPECIMN AEROBIC: CPT

## 2025-06-16 PROCEDURE — 2700000000 HC OXYGEN THERAPY PER DAY

## 2025-06-16 PROCEDURE — 80048 BASIC METABOLIC PNL TOTAL CA: CPT

## 2025-06-16 PROCEDURE — 6360000002 HC RX W HCPCS: Performed by: INTERNAL MEDICINE

## 2025-06-16 PROCEDURE — 94640 AIRWAY INHALATION TREATMENT: CPT

## 2025-06-16 PROCEDURE — 82803 BLOOD GASES ANY COMBINATION: CPT

## 2025-06-16 PROCEDURE — 51702 INSERT TEMP BLADDER CATH: CPT

## 2025-06-16 PROCEDURE — 6370000000 HC RX 637 (ALT 250 FOR IP): Performed by: INTERNAL MEDICINE

## 2025-06-16 PROCEDURE — 93010 ELECTROCARDIOGRAM REPORT: CPT | Performed by: INTERNAL MEDICINE

## 2025-06-16 PROCEDURE — 2580000003 HC RX 258: Performed by: INTERNAL MEDICINE

## 2025-06-16 PROCEDURE — 87102 FUNGUS ISOLATION CULTURE: CPT

## 2025-06-16 PROCEDURE — 71045 X-RAY EXAM CHEST 1 VIEW: CPT

## 2025-06-16 PROCEDURE — 94003 VENT MGMT INPAT SUBQ DAY: CPT

## 2025-06-16 PROCEDURE — 87077 CULTURE AEROBIC IDENTIFY: CPT

## 2025-06-16 PROCEDURE — 83735 ASSAY OF MAGNESIUM: CPT

## 2025-06-16 PROCEDURE — 80053 COMPREHEN METABOLIC PANEL: CPT

## 2025-06-16 PROCEDURE — 87210 SMEAR WET MOUNT SALINE/INK: CPT

## 2025-06-16 PROCEDURE — 84100 ASSAY OF PHOSPHORUS: CPT

## 2025-06-16 PROCEDURE — 6360000002 HC RX W HCPCS: Performed by: EMERGENCY MEDICINE

## 2025-06-16 PROCEDURE — 87186 SC STD MICRODIL/AGAR DIL: CPT

## 2025-06-16 PROCEDURE — 82962 GLUCOSE BLOOD TEST: CPT

## 2025-06-16 PROCEDURE — 83880 ASSAY OF NATRIURETIC PEPTIDE: CPT

## 2025-06-16 PROCEDURE — 36600 WITHDRAWAL OF ARTERIAL BLOOD: CPT

## 2025-06-16 PROCEDURE — 2000000000 HC ICU R&B

## 2025-06-16 PROCEDURE — 99223 1ST HOSP IP/OBS HIGH 75: CPT | Performed by: INTERNAL MEDICINE

## 2025-06-16 PROCEDURE — 85025 COMPLETE CBC W/AUTO DIFF WBC: CPT

## 2025-06-16 PROCEDURE — 87536 HIV-1 QUANT&REVRSE TRNSCRPJ: CPT

## 2025-06-16 PROCEDURE — 86361 T CELL ABSOLUTE COUNT: CPT

## 2025-06-16 PROCEDURE — 89220 SPUTUM SPECIMEN COLLECTION: CPT

## 2025-06-16 PROCEDURE — 87205 SMEAR GRAM STAIN: CPT

## 2025-06-16 PROCEDURE — 2580000003 HC RX 258: Performed by: EMERGENCY MEDICINE

## 2025-06-16 PROCEDURE — 87106 FUNGI IDENTIFICATION YEAST: CPT

## 2025-06-16 PROCEDURE — 74018 RADEX ABDOMEN 1 VIEW: CPT

## 2025-06-16 RX ORDER — FENTANYL CITRATE-0.9 % NACL/PF 10 MCG/ML
25-125 PLASTIC BAG, INJECTION (ML) INTRAVENOUS CONTINUOUS
Status: DISCONTINUED | OUTPATIENT
Start: 2025-06-16 | End: 2025-06-30

## 2025-06-16 RX ORDER — DEXMEDETOMIDINE HYDROCHLORIDE 4 UG/ML
.1-1.5 INJECTION, SOLUTION INTRAVENOUS CONTINUOUS
Status: DISCONTINUED | OUTPATIENT
Start: 2025-06-16 | End: 2025-06-30

## 2025-06-16 RX ADMIN — DEXTROSE: 5 SOLUTION INTRAVENOUS at 06:45

## 2025-06-16 RX ADMIN — SODIUM CHLORIDE 7 MG/HR: 900 INJECTION, SOLUTION INTRAVENOUS at 13:40

## 2025-06-16 RX ADMIN — MIDAZOLAM HYDROCHLORIDE 2 MG: 1 INJECTION, SOLUTION INTRAMUSCULAR; INTRAVENOUS at 02:48

## 2025-06-16 RX ADMIN — CHLORHEXIDINE GLUCONATE 15 ML: 1.2 RINSE ORAL at 20:37

## 2025-06-16 RX ADMIN — MINERAL OIL, PETROLATUM: 425; 568 OINTMENT OPHTHALMIC at 20:38

## 2025-06-16 RX ADMIN — AZITHROMYCIN MONOHYDRATE 500 MG: 500 INJECTION, POWDER, LYOPHILIZED, FOR SOLUTION INTRAVENOUS at 14:52

## 2025-06-16 RX ADMIN — BUDESONIDE 500 MCG: 0.5 INHALANT RESPIRATORY (INHALATION) at 20:13

## 2025-06-16 RX ADMIN — FLUCONAZOLE 400 MG: 2 INJECTION, SOLUTION INTRAVENOUS at 12:04

## 2025-06-16 RX ADMIN — WATER: 100 IRRIGANT IRRIGATION at 12:07

## 2025-06-16 RX ADMIN — DEXMEDETOMIDINE 0.8 MCG/KG/HR: 100 INJECTION, SOLUTION INTRAVENOUS at 04:16

## 2025-06-16 RX ADMIN — ENOXAPARIN SODIUM 30 MG: 100 INJECTION SUBCUTANEOUS at 23:32

## 2025-06-16 RX ADMIN — ARFORMOTEROL TARTRATE 15 MCG: 15 SOLUTION RESPIRATORY (INHALATION) at 20:13

## 2025-06-16 RX ADMIN — DEXMEDETOMIDINE 1 MCG/KG/HR: 100 INJECTION, SOLUTION INTRAVENOUS at 20:14

## 2025-06-16 RX ADMIN — BUDESONIDE 500 MCG: 0.5 INHALANT RESPIRATORY (INHALATION) at 07:20

## 2025-06-16 RX ADMIN — ENOXAPARIN SODIUM 30 MG: 100 INJECTION SUBCUTANEOUS at 12:02

## 2025-06-16 RX ADMIN — DOXYCYCLINE 100 MG: 100 INJECTION, POWDER, LYOPHILIZED, FOR SOLUTION INTRAVENOUS at 00:09

## 2025-06-16 RX ADMIN — DEXMEDETOMIDINE 1 MCG/KG/HR: 100 INJECTION, SOLUTION INTRAVENOUS at 23:58

## 2025-06-16 RX ADMIN — WATER 40 MG: 1 INJECTION INTRAMUSCULAR; INTRAVENOUS; SUBCUTANEOUS at 05:53

## 2025-06-16 RX ADMIN — ARFORMOTEROL TARTRATE 15 MCG: 15 SOLUTION RESPIRATORY (INHALATION) at 07:21

## 2025-06-16 RX ADMIN — IPRATROPIUM BROMIDE AND ALBUTEROL SULFATE 1 DOSE: .5; 3 SOLUTION RESPIRATORY (INHALATION) at 07:20

## 2025-06-16 RX ADMIN — WATER 40 MG: 1 INJECTION INTRAMUSCULAR; INTRAVENOUS; SUBCUTANEOUS at 15:39

## 2025-06-16 RX ADMIN — IPRATROPIUM BROMIDE AND ALBUTEROL SULFATE 1 DOSE: .5; 3 SOLUTION RESPIRATORY (INHALATION) at 20:13

## 2025-06-16 RX ADMIN — AMPICILLIN SODIUM AND SULBACTAM SODIUM 3000 MG: 2; 1 INJECTION, POWDER, FOR SOLUTION INTRAMUSCULAR; INTRAVENOUS at 08:49

## 2025-06-16 RX ADMIN — MINERAL OIL, PETROLATUM: 425; 568 OINTMENT OPHTHALMIC at 01:03

## 2025-06-16 RX ADMIN — SODIUM CHLORIDE, PRESERVATIVE FREE 10 ML: 5 INJECTION INTRAVENOUS at 20:38

## 2025-06-16 RX ADMIN — FENTANYL CITRATE 125 MCG/HR: 0.05 INJECTION, SOLUTION INTRAMUSCULAR; INTRAVENOUS at 17:42

## 2025-06-16 RX ADMIN — DEXMEDETOMIDINE 0.8 MCG/KG/HR: 100 INJECTION, SOLUTION INTRAVENOUS at 08:02

## 2025-06-16 RX ADMIN — INSULIN LISPRO 2 UNITS: 100 INJECTION, SOLUTION INTRAVENOUS; SUBCUTANEOUS at 12:15

## 2025-06-16 RX ADMIN — DEXMEDETOMIDINE 1 MCG/KG/HR: 100 INJECTION, SOLUTION INTRAVENOUS at 15:51

## 2025-06-16 RX ADMIN — FENTANYL CITRATE 100 MCG/HR: 0.05 INJECTION, SOLUTION INTRAMUSCULAR; INTRAVENOUS at 01:06

## 2025-06-16 RX ADMIN — FENTANYL CITRATE 125 MCG/HR: 0.05 INJECTION, SOLUTION INTRAMUSCULAR; INTRAVENOUS at 09:39

## 2025-06-16 RX ADMIN — WATER 40 MG: 1 INJECTION INTRAMUSCULAR; INTRAVENOUS; SUBCUTANEOUS at 22:04

## 2025-06-16 RX ADMIN — IPRATROPIUM BROMIDE AND ALBUTEROL SULFATE 1 DOSE: .5; 3 SOLUTION RESPIRATORY (INHALATION) at 14:49

## 2025-06-16 RX ADMIN — FOLIC ACID 1 MG: 1 TABLET ORAL at 08:45

## 2025-06-16 RX ADMIN — WATER 2000 MG: 1 INJECTION INTRAMUSCULAR; INTRAVENOUS; SUBCUTANEOUS at 14:47

## 2025-06-16 RX ADMIN — SENNOSIDES 8.6 MG: 8.6 TABLET, COATED ORAL at 20:38

## 2025-06-16 RX ADMIN — INSULIN LISPRO 2 UNITS: 100 INJECTION, SOLUTION INTRAVENOUS; SUBCUTANEOUS at 17:38

## 2025-06-16 RX ADMIN — FAMOTIDINE 10 MG: 10 INJECTION, SOLUTION INTRAVENOUS at 20:37

## 2025-06-16 RX ADMIN — SODIUM CHLORIDE, PRESERVATIVE FREE 10 ML: 5 INJECTION INTRAVENOUS at 13:40

## 2025-06-16 RX ADMIN — FAMOTIDINE 10 MG: 10 INJECTION, SOLUTION INTRAVENOUS at 08:45

## 2025-06-16 RX ADMIN — MINERAL OIL, PETROLATUM: 425; 568 OINTMENT OPHTHALMIC at 13:40

## 2025-06-16 RX ADMIN — CHLORHEXIDINE GLUCONATE 15 ML: 1.2 RINSE ORAL at 08:44

## 2025-06-16 RX ADMIN — POLYETHYLENE GLYCOL 3350 17 G: 17 POWDER, FOR SOLUTION ORAL at 08:44

## 2025-06-16 RX ADMIN — MICAFUNGIN SODIUM 150 MG: 50 INJECTION, POWDER, LYOPHILIZED, FOR SOLUTION INTRAVENOUS at 14:54

## 2025-06-16 RX ADMIN — SENNOSIDES 8.6 MG: 8.6 TABLET, COATED ORAL at 08:45

## 2025-06-16 RX ADMIN — MINERAL OIL, PETROLATUM: 425; 568 OINTMENT OPHTHALMIC at 05:46

## 2025-06-16 RX ADMIN — DEXMEDETOMIDINE 1 MCG/KG/HR: 100 INJECTION, SOLUTION INTRAVENOUS at 08:52

## 2025-06-16 RX ADMIN — Medication 100 MG: at 08:45

## 2025-06-16 RX ADMIN — DEXMEDETOMIDINE 1 MCG/KG/HR: 100 INJECTION, SOLUTION INTRAVENOUS at 12:10

## 2025-06-16 RX ADMIN — MINERAL OIL, PETROLATUM: 425; 568 OINTMENT OPHTHALMIC at 09:00

## 2025-06-16 RX ADMIN — DOXYCYCLINE 100 MG: 100 INJECTION, POWDER, LYOPHILIZED, FOR SOLUTION INTRAVENOUS at 13:44

## 2025-06-16 ASSESSMENT — PULMONARY FUNCTION TESTS
PIF_VALUE: 29
PIF_VALUE: 26
PIF_VALUE: 27
PIF_VALUE: 26
PIF_VALUE: 25
PIF_VALUE: 30

## 2025-06-16 NOTE — CONSULTS
Palliative Medicine Consult  [facility]: [phone] ([phone]  [facility]: [phone]  [facility]: [phone]     Patient Name: Beny Knapp  YOB: 1965    Date of Service: 6/16/2025  Provider: Charly Law MD  Hospital Day: 3  Admit Date: 6/14/2025  Referring Provider:      Jocelyne Richmond MD       Reason for Consult: goals of care discussion/ACP/symptoms management      SUMMARY:     Beny Knapp is a 59 y.o. with a past history of COPD, alcohol and polysubstance use disorder, chronic hepatitis C and hypertension, who was admitted on 6/14/2025 from home with a diagnosis of acute hypoxic and hypercapnic respiratory failure, laryngeal swelling.  Patient initially presented to emergency room with complaint of having respiratory distress and irritation in his throat.  Patient had stridor, intubated for airway protection and underwent laryngoscopy that showed severe candidiasis.  Patient was started on nebulizer treatment, IV steroid.  Patient was found to have hyponatremia being followed by nephrologist reportedly, patient has recurrent hospitalization at outside hospitals. Current medical issues leading to Palliative Medicine involvement include: To establish goals of care.    June 16, 2025: Patient was seen in presence of palliative care staff member and ICU staff.  Patient remains on FiO2 of 85% and PEEP of 10.  Patient remains on Precedex, fentanyl and Versed drips.  He also has bilateral soft wrist restraints without any wrist injury.  No family member at bedside currently     HISTORY:     History obtained from: Medical records    CHIEF COMPLAINT: Foreign body sensation in the throat    HPI/SUBJECTIVE:    The patient is:   [] Verbal and participatory  [x] Non-participatory due to:   Heavily sedated, currently intubated     PHYSICAL EXAM:     From RN flowsheet:  Wt Readings from Last 3 Encounters:   06/16/25 110.3 kg (243 lb 2.7 oz)

## 2025-06-16 NOTE — CONSULTS
[facility]  (A Division of [facility])    Consultation Note    Patient: Beny Knapp Age: 59 y.o. Sex: male    YOB: 1965 Admit Date: 6/14/2025 PCP: Emeli Duckworth MD   MRN: [id number]  CSN: [id number]       Date: June 16, 2025        Admitted to: [facility]     Reason for Referral: suspected candida esophagitis presenting as stridor    Current Antimicrobials:    Prior Antimicrobials:  micafungen 6/16  Ceftriazone 6/16  Azithro 6/16  Ampicillin  Doxy  Fluconazole 6/15       Assessment: Rec / Plan:   Esophagitis-? Candida  ? Underlying disease - reviewed Cts     No adenopathy/? PNA    Mri for better imaging or oropharynx-    ? Underlying mass vs all soft tissue swelling    Hiv 4th generation w/ An and pcr  Cd4  Fungitell  Culture - fungal of oral swab- for ID and ro resistance  Path review of smear    Micafungen 150 mg q 24 hrs for serious infection  If sensitive, can transition to fluconazole    anemia  Path to review blood smear   ATX vs pneumonia- @ base   per CT  Ceftriaxone/ azithro   Monitor culture results  Mrsa screen    Hyponatremia   ? Siadh   ? other    Transaminitis- resolved    Comorbidities:  hypertension tobacco and polysubstance abuse COPD chronic hepatitis         Microbiology:   Results       Procedure Component Value Units Date/Time    Culture, Respiratory [[id number]]     Order Status: Sent Specimen: Sputum, Suctioned     Culture, Respiratory [[id number]] Collected: 06/14/25 1342    Order Status: Completed Specimen: Sputum Induced Updated: 06/15/25 2137     Special Requests NO SPECIAL REQUESTS        Gram Stain       RARE EPITHELIAL CELLS SEEN            OCCASIONAL WBCS SEEN               FEW GRAM POS COCCI IN CLUSTERS                  RARE Gram positive cocci IN PAIRS AND CHAINS           Culture PENDING    Culture, Blood 1 [[id number]] Collected: 06/14/25 0902    Order Status: Completed Specimen: Blood

## 2025-06-16 NOTE — CARE COORDINATION
Writer completed a NOK search. Results are as follow:     Patients brother  Adolph Knapp  [address]  [place] [address]  [place]   [address]  [place] [address]  [phone]  Results obtained from  Gabriella Lew SR. [facility], LEGAL DEPARTMENT

## 2025-06-16 NOTE — PROGRESS NOTES
Nephrology Progress note    Beny Knapp  MRN: [id number]  : 1965    Admit Date: 2025      Impression:      Hyponatremia-?prerenal- improving  Acute Respiratory failure  Laryngeal candidiasis  Hypokalemia- replete  HTN  COPD  Chronic Hep C     Plan:      D/C IV D5W   Continue to monitor  Serum Na Q6H X 24hrs  I/Os       Subjective:     Beny Knapp is a 59 y.o. male  with PMH HTN, COPD, hep C admitted for respiratory distress which reportedly started after choking on a sandwich at lunch. Pt seen by ENT, s/p flex fiberoptic nasolaryngoscopy found severe candidiasis. Has been having difficulty swallowing for a few months. Has been on prednisone for COPD. Pt emergently intubated.   Na 115 on admission-gently IV NS started and Na 118.   Sedated on vent   Na 129 this AM, has been started on D5W. >5L out yest, 683 last shift  Cr 0.5      Remains on Mech Vent.  Serum Na 128 this morning. Peaked at 130 on  and received IV D5W  UOP 2,953ml      Principal Problem:    Hyponatremia  Active Problems:    HTN (hypertension)    Tobacco abuse    COPD (chronic obstructive pulmonary disease) (HCC)    Chronic hepatitis C (HCC)    Thrombocytopenia    Stridor    Obstructed, larynx    Acute respiratory failure with hypoxia and hypercapnia (HCC)    ETOH abuse    Polysubstance abuse (HCC)    Candidiasis of esophagus (HCC)  Resolved Problems:    * No resolved hospital problems. *    Current Facility-Administered Medications   Medication Dose Route Frequency    ampicillin-sulbactam (UNASYN) 3,000 mg in sodium chloride 0.9 % 100 mL IVPB (addEASE)  3,000 mg IntraVENous Q6H    lactulose enema   Rectal Once    dexmedeTOMIDine (PRECEDEX) 400 mcg in sodium chloride 0.9 % 100 mL infusion  0.1-1.5 mcg/kg/hr IntraVENous Continuous    fentaNYL (SUBLIMAZE) 1,000 mcg in sodium chloride 0.9% 100 mL infusion   mcg/hr IntraVENous Continuous    senna (SENOKOT) tablet 8.6 mg  1 tablet Oral BID    methylPREDNISolone sodium succ  solution 15 mL  15 mL Mouth/Throat BID    midazolam PF (VERSED) injection 2 mg  2 mg IntraVENous Q4H PRN    [Held by provider] norepinephrine (LEVOPHED) 16 mg in sodium chloride 0.9 % 250 mL infusion (premix)  1-100 mcg/min IntraVENous Continuous    sulfur hexafluoride microspheres (LUMASON) 60.7-25 MG injection 2 mL  2 mL IntraVENous ONCE PRN    glucose chewable tablet 16 g  4 tablet Oral PRN    dextrose bolus 10% 125 mL  125 mL IntraVENous PRN    Or    dextrose bolus 10% 250 mL  250 mL IntraVENous PRN    glucagon injection 1 mg  1 mg SubCUTAneous PRN    dextrose 10 % infusion   IntraVENous Continuous PRN    [Held by provider] dextrose 5 % solution   IntraVENous Continuous    insulin lispro (HUMALOG,ADMELOG) injection vial 0-8 Units  0-8 Units SubCUTAneous Q6H    ipratropium 0.5 mg-albuterol 2.5 mg (DUONEB) nebulizer solution 1 Dose  1 Dose Inhalation TID RT         Allergy:   No Known Allergies     Objective:     /81   Pulse 60   Temp 97.7 °F (36.5 °C) (Oral)   Resp 15   Ht 1.829 m (6' 0.01\")   Wt 110.3 kg (243 lb 2.7 oz)   SpO2 95%   BMI 32.97 kg/m²       Intake/Output Summary (Last 24 hours) at 6/16/2025 1030  Last data filed at 6/16/2025 0645  Gross per 24 hour   Intake 2953.54 ml   Output 1490 ml   Net 1463.54 ml       Physical Exam:       General: Intubated, on Vent   HENT: Atraumatic and normocephalic   Eyes: Sclera clear, normal conjunctiva   Neck: Supple, No JVD   Cardiovascular: Normal S1 & S2, RRR, no m/r/g   Pulmonary/Chest Wall: Clear to auscultation bilaterally   Abdominal: Soft and non-tender, Bowel sounds normal   Musculoskeletal: No edema   Neurological: Sedated on Vent     Data Review:  Lab Results   Component Value Date/Time     06/16/2025 05:45 AM    K 5.4 06/16/2025 05:45 AM    CL 90 06/16/2025 05:45 AM    CO2 27 06/16/2025 05:45 AM    BUN 18 06/16/2025 05:45 AM    CREATININE 0.68 06/16/2025 05:45 AM     Lab Results   Component Value Date/Time    WBC 8.8 06/16/2025 05:45 AM

## 2025-06-16 NOTE — CARE COORDINATION
This CM attempted to contact patients JACK Knapp. CM unable to reach brother at the numbers listed below. CM will continue to follow up.    [phone] - Phone number rings, unable to leave voicemail    [phone] - Not a working phone number

## 2025-06-16 NOTE — PROGRESS NOTES
Hospitalist Progress Note    Patient: Beny Knapp MRN: [id number]  CSN: [id number]    YOB: 1965  Age: 59 y.o.  Sex: male    DOA: 6/14/2025 LOS:  LOS: 2 days          Chief Complain :  Foreign body, shortness of breath.  59 y.o.  male w/ PMH of COPD, ETOH-use and polysubstance abuse who was BIBA and presents with subjective difficulty breathing and swallowing with throat pain. Brought in by EMS for possible forign body (food) with bolus within region of velecula. ENT saw pt and ED intubated for airway protection. ENT was consulted and performed scope on pt with findings of extensive esophageal candidiasis. Nephrology was consulted for severe hyponatremia with sodium of 117 recommending NS at 75cc/hr. Intensivist was contacted as well d/t pt being intubated, on vent for airway protection.   Subjective:   Patient orally intubated and sedated.    Assessment/Plan     Active Hospital Problems    Diagnosis     Hyponatremia [E87.1]     Stridor [R06.1]     Obstructed, larynx [J38.6]     Acute respiratory failure with hypoxia and hypercapnia (HCC) [J96.01, J96.02]     ETOH abuse [F10.10]     Polysubstance abuse (HCC) [F19.10]     Candidiasis of esophagus (HCC) [B37.81]     Thrombocytopenia [D69.6]     Tobacco abuse [Z72.0]     HTN (hypertension) [I10]     COPD (chronic obstructive pulmonary disease) (HCC) [J44.9]     Chronic hepatitis C (HCC) [B18.2]           CRITICAL CARE PLAN    Resp   Acute airway obstruction  Intubated to protect airway  See vent orders, VAP bundle. HOB>30 degrees.   Vent management and SBT per pulmonary  Question of foreign body, seen by ENT initially nasal trumpet placed without any significant

## 2025-06-16 NOTE — PLAN OF CARE
Problem: Safety - Medical Restraint  Goal: Remains free of injury from restraints (Restraint for Interference with Medical Device)  Description: INTERVENTIONS:  1. Determine that other, less restrictive measures have been tried or would not be effective before applying the restraint  2. Evaluate the patient's condition at the time of restraint application  3. Inform patient/family regarding the reason for restraint  4. Q2H: Monitor safety, psychosocial status, comfort, nutrition and hydration  6/15/2025 2118 by Hoda Diaz, RN  Outcome: Progressing  Flowsheets (Taken 6/15/2025 2000)  Remains free of injury from restraints (restraint for interference with medical device):   Determine that other, less restrictive measures have been tried or would not be effective before applying the restraint   Evaluate the patient's condition at the time of restraint application   Inform patient/family regarding the reason for restraint   Every 2 hours: Monitor safety, psychosocial status, comfort, nutrition and hydration       Problem: Discharge Planning  Goal: Discharge to home or other facility with appropriate resources  6/15/2025 2118 by Hoda Diaz, RN  Outcome: Progressing  Flowsheets (Taken 6/15/2025 1930)  Discharge to home or other facility with appropriate resources:   Identify barriers to discharge with patient and caregiver   Arrange for needed discharge resources and transportation as appropriate   Identify discharge learning needs (meds, wound care, etc)   Refer to discharge planning if patient needs post-hospital services based on physician order or complex needs related to functional status, cognitive ability or social support system       Problem: Pain  Goal: Verbalizes/displays adequate comfort level or baseline comfort level  6/15/2025 2118 by Hoda Diaz, RN  Outcome: Progressing  Flowsheets (Taken 6/15/2025 1930)  Verbalizes/displays adequate comfort level or baseline comfort

## 2025-06-16 NOTE — CARE COORDINATION
Patient discussed in ICU rounds today. Patient is currently intubated and sedated. NOK search has been initiated by SCOTT Lew. CM will continue to follow.

## 2025-06-16 NOTE — PROGRESS NOTES
Pt woke up extremely agitated, attempted to exit bed, and vomited small amount of thin yellow bile. Suctioned orally and via ET tube, pt maintained O2 sats. Sedation increased per MAR and tube feeds held until morning per night hospitalist Dr Tan.   Agree with plan as outlined above. Patient seen and examined at bedside. Patient history, laboratory data, and imaging personally reviewed.    Pt is a 59F with PMHx as above presenting to [facility] for hypoxemic respiratory failure with ARDS 2/2 COVID19 PNA further c/b superimposed MSSA cavitary PNA and ARF requiring HD. Pt with failure to wean from mechanical ventilation, s/p tracheostomy placement 12/18 and transfer to RCU 12/28.     Pt now with significantly improving encephalopathy, able to communicate effectively and answer questions/follow commands. Remains off all IV sedation since 12/24. CT Head (-) 12/12. EEG (-). No indication for further neurologic w/u at this time given clinical improvement. OOB to chair as tolerated. Moves all 4 extremities independently, but remains physically severely deconditioned and dependent of ADLs likely 2/2 critical illness polyneuropathy.     Pt with trach placement (CTSx, 12/18 Proximal 7XLT and then changed to 8Fr Bivona 12/31 for persistent air leak and downsized to #6 cuffed Portex 2/25), now with resolution of air leak and significant improvement in ventilator synchrony. Daily SBTs as tolerated. Pt tolerating trach collar QD, PSV qhs (pt with home NIV Bilevel 16/9 for chronic hypercapnia 2/2 OHS, but does not have AARON by PSG). Pt able to tolerate PMV most of the day. Will c/w airway clearance therapy and trach care as per RCU team. Will continue to wean as tolerated.     Pt initially p/w acute hypoxemic respiratory failure 2/2 COVID19 PNA but with hospital course c/b MSSA bacteremia with cavitary PNA s/p completion of Abx with Primaxin on 12/28 followed by course of Ceftazidime through 1/5 for Stenotrophomonas PNA followed by VAP 2/2 Stenotrophomonas and Pseudomonas 1/12 s/p completion of Flagyl+Ceftazidime+Vancomycin with short-term clinical improvement followed by recurrent of VAP 2/2 CRE Pseudomonas/Stenotrophomonas, s/p eradication therapy with IV+Nebulized Abx 1/28-2/6. Repeat CT Chest performed 12/4 showed significantly improved cavitary lesions from prior MSSA PNA without area requiring further source control. Hospital course then c/b CDiff colitis 2/1, now on PO Vancomycin with improvement in diarrhea but persistent loose stools. GI and ID recs appreciated, persistent diarrhea unlikely 2/2 CDiff at this time. Stool cx PCR NTD, loperamide and cholestyramine started 2/25. Will monitor stool output.    Pt with oropharyngeal dysphagia, IR guided PEG successfully placed 1/11. Pt tolerating PEG feeds. Pt also initially with ARF on CRRT, following by intermittent HD (last session 12/17) with renal recovery. NPH+HISS for glucose control. Wound consult and plastic sx recs appreciated.    Dispo pending medical optimization, likely ROJELIO. Agree with plan as outlined above. Patient seen and examined at bedside. Patient history, laboratory data, and imaging personally reviewed.    Pt is a 59F with PMHx as above presenting to [facility] for hypoxemic respiratory failure with ARDS 2/2 COVID19 PNA further c/b superimposed MSSA cavitary PNA and ARF requiring HD. Pt with failure to wean from mechanical ventilation, s/p tracheostomy placement 12/18 and transfer to RCU 12/28.     Pt now with significantly improving encephalopathy, able to communicate effectively and answer questions/follow commands. Remains off all IV sedation since 12/24. CT Head (-) 12/12. EEG (-). No indication for further neurologic w/u at this time given clinical improvement. OOB to chair as tolerated. Moves all 4 extremities independently, but remains physically severely deconditioned and dependent of ADLs likely 2/2 critical illness polyneuropathy.     Pt with trach placement (CTSx, 12/18 Proximal 7XLT and then changed to 8Fr Bivona 12/31 for persistent air leak and downsized to #6 cuffed Portex 2/25), now with resolution of air leak and significant improvement in ventilator synchrony. Daily SBTs as tolerated. Pt tolerating trach collar QD, still requiring PSV qhs (pt with home NIV Bilevel 16/9 for chronic hypercapnia 2/2 OHS, but does not have AARON by PSG). Pt able to tolerate PMV most of the day. Will c/w airway clearance therapy and trach care as per RCU team. Will continue to wean as tolerated.     Pt initially p/w acute hypoxemic respiratory failure 2/2 COVID19 PNA but with hospital course c/b MSSA bacteremia with cavitary PNA s/p completion of Abx with Primaxin on 12/28 followed by course of Ceftazidime through 1/5 for Stenotrophomonas PNA followed by VAP 2/2 Stenotrophomonas and Pseudomonas 1/12 s/p completion of Flagyl+Ceftazidime+Vancomycin with short-term clinical improvement followed by recurrent of VAP 2/2 CRE Pseudomonas/Stenotrophomonas, s/p eradication therapy with IV+Nebulized Abx 1/28-2/6. Repeat CT Chest performed 12/4 showed significantly improved cavitary lesions from prior MSSA PNA without area requiring further source control. Hospital course then c/b CDiff colitis 2/1, now on PO Vancomycin with improvement in diarrhea but persistent loose stools. GI and ID recs appreciated, persistent diarrhea unlikely 2/2 CDiff at this time. Stool cx PCR NTD, loperamide and cholestyramine started 2/25. Will monitor stool output.    Pt with oropharyngeal dysphagia, IR guided PEG successfully placed 1/11. Pt tolerating PEG feeds. Pt also initially with ARF on CRRT, following by intermittent HD (last session 12/17) with renal recovery. NPH+HISS for glucose control. Wound consult and plastic sx recs appreciated.    Dispo pending medical optimization, likely ROJELIO.

## 2025-06-16 NOTE — PROGRESS NOTES
met with nurse in ICU unit.     Nurse said the patient was intubated. He has a foreign body in his throat. He could be extubated in  few days. Staff members can't find any family members. Nurse thanked  for the visit.     provided presence and support staff member.     will provide follow-up care for patient, family, and staff as needed.    Spiritual Health History and Assessment/Progress Note  [facility]    Spiritual/Emotional Needs,  ,  ,      Name: Beny Knapp MRN: [id number]    Age: 59 y.o.     Sex: male   Language: English   Sabianist: Scientology   Hyponatremia     Date: 6/16/2025            Total Time Calculated: 5 min              Spiritual Assessment began in [facility] 1 INTENSIVE CARE            Encounter Overview/Reason: Spiritual/Emotional Needs  Service Provided For: Patient    Love, Belief, Meaning:   Patient identifies as spiritual, is connected with a love tradition or spiritual practice, and has beliefs or practices that help with coping during difficult times  Family/Friends No family/friends present      Importance and Influence:  Patient has spiritual/personal beliefs that influence decisions regarding their health  Family/Friends No family/friends present    Community:  Patient is connected with a spiritual community  Family/Friends No family/friends present    Assessment and Plan of Care:     Patient Interventions include: Other: Unable to assess.  Family/Friends Interventions include: No family/friends present    Patient Plan of Care: No spiritual needs identified for follow-up  Family/Friends Plan of Care: No family/friends present    Electronically signed by Chaplain Kevin on 6/16/2025 at 10:33 AM

## 2025-06-16 NOTE — PROGRESS NOTES
[facility]  Pulmonary, Critical Care, and Sleep Medicine    Name: Beny Knapp MRN: [id number]   : 1965 Hospital: [facility]    Date: 2025  Room: [address] [place] [address] Note                                              Consult requesting physician: Dr. Mark   Reason for Consult: Respiratory failure, hyponatremia, upper airway    IMPRESSION:     Acute respiratory failure with hypoxemia and hypercarbia   J96.01, J96.02  Stridor  Obstructed larynx  Polysubstance abuse candidiasis of esophagus   Aspiration pneumonia   COPD  EtOH use  Hepatitis C    Active Hospital Problems    Diagnosis Date Noted    Hyponatremia [E87.1] 2025    Stridor [R06.1] 2025    Obstructed, larynx [J38.6] 2025    Acute respiratory failure with hypoxia and hypercapnia (HCC) [J96.01, J96.02] 2025    ETOH abuse [F10.10] 2025    Polysubstance abuse (HCC) [F19.10] 2025    Candidiasis of esophagus (HCC) [B37.81] 2025    Thrombocytopenia [D69.6] 2017    Tobacco abuse [Z72.0] 2015    HTN (hypertension) [I10] 2015    COPD (chronic obstructive pulmonary disease) (HCC) [J44.9] 2015    Chronic hepatitis C (HCC) [B18.2] 2015        Code status: Full Code       RECOMMENDATIONS:     Respiratory:   59-year-old male with COPD, smoker presents with acute upper airway distress, fullness in the laryngeal area, questionable foreign body,  Patient was intubated for airway protection and hypercarbic and hypoxemic respiratory failure   Endoscopy did not show any foreign body, but swollen airway/larynx  and severe candidiasis  Patient is intubated and sedated     AC //85/8  Lower Fio2 2 increase PEEP to 10    Ventilator settings adjust daily.   CXR 2025  Line and tube in satisfactory position as noted.  Worsening right lower lung opacity.  CXR 6/15/2025  Endotracheal tube terminates at the thoracic inlet. NG tube extends below

## 2025-06-17 ENCOUNTER — APPOINTMENT (OUTPATIENT)
Facility: HOSPITAL | Age: 60
DRG: 368 | End: 2025-06-17
Payer: MEDICARE

## 2025-06-17 PROBLEM — F14.10 COCAINE ABUSE (HCC): Status: ACTIVE | Noted: 2025-06-17

## 2025-06-17 PROBLEM — J69.0 ASPIRATION PNEUMONIA (HCC): Status: ACTIVE | Noted: 2025-06-17

## 2025-06-17 LAB
ALBUMIN SERPL-MCNC: 3.2 G/DL (ref 3.4–5)
ALBUMIN/GLOB SERPL: 0.9 (ref 0.8–1.7)
ALP SERPL-CCNC: 65 U/L (ref 45–117)
ALT SERPL-CCNC: 38 U/L (ref 10–50)
ANION GAP BLD CALC-SCNC: ABNORMAL MMOL/L (ref 10–20)
ANION GAP SERPL CALC-SCNC: 10 MMOL/L (ref 3–18)
ANION GAP SERPL CALC-SCNC: 8 MMOL/L (ref 3–18)
APPEARANCE UR: ABNORMAL
ARTERIAL PATENCY WRIST A: NEGATIVE
AST SERPL-CCNC: 23 U/L (ref 10–38)
BACTERIA SPEC CULT: ABNORMAL
BACTERIA SPEC CULT: ABNORMAL
BACTERIA SPEC CULT: NORMAL
BACTERIA SPEC CULT: NORMAL
BACTERIA URNS QL MICRO: ABNORMAL /HPF
BASE EXCESS BLD CALC-SCNC: 7.3 MMOL/L
BASOPHILS # BLD AUTO: 0 X10E3/UL (ref 0–0.2)
BASOPHILS NFR BLD AUTO: 0 %
BDY SITE: ABNORMAL
BILIRUB SERPL-MCNC: 0.3 MG/DL (ref 0.2–1)
BILIRUB UR QL: NEGATIVE
BUN SERPL-MCNC: 22 MG/DL (ref 6–23)
BUN SERPL-MCNC: 22 MG/DL (ref 6–23)
BUN/CREAT SERPL: 33 (ref 12–20)
BUN/CREAT SERPL: 35 (ref 12–20)
CA-I BLD-MCNC: 1.25 MMOL/L (ref 1.15–1.33)
CALCIUM SERPL-MCNC: 9.1 MG/DL (ref 8.5–10.1)
CALCIUM SERPL-MCNC: 9.4 MG/DL (ref 8.5–10.1)
CD3+CD4+ CELLS # BLD: 132 /UL (ref 359–1519)
CD3+CD4+ CELLS NFR BLD: 33 % (ref 30.8–58.5)
CHLORIDE BLD-SCNC: 93 MMOL/L (ref 98–107)
CHLORIDE SERPL-SCNC: 93 MMOL/L (ref 98–107)
CHLORIDE SERPL-SCNC: 94 MMOL/L (ref 98–107)
CO2 BLD-SCNC: 33 MMOL/L (ref 22–29)
CO2 SERPL-SCNC: 28 MMOL/L (ref 21–32)
CO2 SERPL-SCNC: 31 MMOL/L (ref 21–32)
COLOR UR: YELLOW
CREAT BLD-MCNC: 0.64 MG/DL (ref 0.6–1.3)
CREAT SERPL-MCNC: 0.63 MG/DL (ref 0.6–1.3)
CREAT SERPL-MCNC: 0.66 MG/DL (ref 0.6–1.3)
EOSINOPHIL # BLD AUTO: 0 X10E3/UL (ref 0–0.4)
EOSINOPHIL NFR BLD AUTO: 0 %
EPITH CASTS URNS QL MICRO: ABNORMAL /LPF (ref 0–5)
ERYTHROCYTE [DISTWIDTH] IN BLOOD BY AUTOMATED COUNT: 14 % (ref 11.6–15.4)
ERYTHROCYTE [DISTWIDTH] IN BLOOD BY AUTOMATED COUNT: 14.3 % (ref 11.6–14.5)
FIO2 ON VENT: 80 %
GAS FLOW.O2 O2 DELIVERY SYS: ABNORMAL
GLOBULIN SER CALC-MCNC: 3.5 G/DL (ref 2–4)
GLUCOSE BLD STRIP.AUTO-MCNC: 156 MG/DL (ref 70–110)
GLUCOSE BLD STRIP.AUTO-MCNC: 162 MG/DL (ref 70–110)
GLUCOSE BLD STRIP.AUTO-MCNC: 165 MG/DL (ref 70–110)
GLUCOSE BLD STRIP.AUTO-MCNC: 169 MG/DL (ref 70–110)
GLUCOSE BLD STRIP.AUTO-MCNC: 170 MG/DL (ref 70–110)
GLUCOSE BLD-MCNC: 172 MG/DL (ref 74–99)
GLUCOSE SERPL-MCNC: 163 MG/DL (ref 74–108)
GLUCOSE SERPL-MCNC: 171 MG/DL (ref 74–108)
GLUCOSE UR STRIP.AUTO-MCNC: NEGATIVE MG/DL
GRAM STN SPEC: ABNORMAL
HCO3 BLD-SCNC: 34.2 MMOL/L (ref 21–28)
HCT VFR BLD AUTO: 35.8 % (ref 36–48)
HCT VFR BLD AUTO: 36.2 % (ref 37.5–51)
HCV IGG SERPL QL IA: REACTIVE S/CO RATIO
HCV RNA SERPL NAA+PROBE-ACNC: NORMAL IU/ML
HGB BLD-MCNC: 11.4 G/DL (ref 13–16)
HGB BLD-MCNC: 11.6 G/DL (ref 13–17.7)
HGB UR QL STRIP: ABNORMAL
HIV1 RNA # SERPL NAA+PROBE: <20 COPIES/ML
HIV1 RNA SERPL NAA+PROBE-LOG#: NORMAL LOG10COPY/ML
IMM GRANULOCYTES # BLD: 0 X10E3/UL (ref 0–0.1)
IMM GRANULOCYTES NFR BLD: 0 %
INTERPRETATION: NORMAL
INTERPRETATION: NORMAL
KETONES UR QL STRIP.AUTO: NEGATIVE MG/DL
LACTATE BLD-SCNC: 1.66 MMOL/L (ref 0.4–2)
LEUKOCYTE ESTERASE UR QL STRIP.AUTO: ABNORMAL
LYMPHOCYTES # BLD AUTO: 0.4 X10E3/UL (ref 0.7–3.1)
LYMPHOCYTES NFR BLD AUTO: 5 %
MAGNESIUM SERPL-MCNC: 2.5 MG/DL (ref 1.6–2.6)
MCH RBC QN AUTO: 29.2 PG (ref 24–34)
MCH RBC QN AUTO: 29.3 PG (ref 26.6–33)
MCHC RBC AUTO-ENTMCNC: 31.8 G/DL (ref 31–37)
MCHC RBC AUTO-ENTMCNC: 32 G/DL (ref 31.5–35.7)
MCV RBC AUTO: 91 FL (ref 79–97)
MCV RBC AUTO: 91.6 FL (ref 78–100)
MONOCYTES # BLD AUTO: 0.3 X10E3/UL (ref 0.1–0.9)
MONOCYTES NFR BLD AUTO: 4 %
NEUTROPHILS # BLD AUTO: 7.6 X10E3/UL (ref 1.4–7)
NEUTROPHILS NFR BLD AUTO: 91 %
NITRITE UR QL STRIP.AUTO: NEGATIVE
NRBC # BLD: 0 K/UL (ref 0–0.01)
NRBC BLD-RTO: 0 PER 100 WBC
PCO2 BLDV: 58.9 MMHG (ref 41–51)
PEEP RESPIRATORY: 10
PH BLDV: 7.37 (ref 7.32–7.42)
PH UR STRIP: 5.5 (ref 5–8)
PLATELET # BLD AUTO: 245 K/UL (ref 135–420)
PLATELET # BLD AUTO: 260 X10E3/UL (ref 150–450)
PMV BLD AUTO: 9.4 FL (ref 9.2–11.8)
PO2 BLDV: 75 MMHG (ref 25–40)
POTASSIUM BLD-SCNC: 4.9 MMOL/L (ref 3.5–5.1)
POTASSIUM SERPL-SCNC: 5.3 MMOL/L (ref 3.5–5.5)
POTASSIUM SERPL-SCNC: 5.4 MMOL/L (ref 3.5–5.5)
PROCALCITONIN SERPL-MCNC: <0.02 NG/ML
PROT SERPL-MCNC: 6.8 G/DL (ref 6.4–8.2)
PROT UR STRIP-MCNC: 30 MG/DL
RBC # BLD AUTO: 3.91 M/UL (ref 4.35–5.65)
RBC # BLD AUTO: 3.96 X10E6/UL (ref 4.14–5.8)
RBC #/AREA URNS HPF: ABNORMAL /HPF (ref 0–5)
REF LAB TEST REF RANGE: NORMAL
SAO2 % BLD: 94 % (ref 94–98)
SERVICE CMNT-IMP: ABNORMAL
SERVICE CMNT-IMP: ABNORMAL
SERVICE CMNT-IMP: NORMAL
SODIUM BLD-SCNC: 131 MMOL/L (ref 136–145)
SODIUM SERPL-SCNC: 131 MMOL/L (ref 136–145)
SODIUM SERPL-SCNC: 132 MMOL/L (ref 136–145)
SP GR UR REFRACTOMETRY: 1.03 (ref 1–1.03)
SPECIMEN SITE: ABNORMAL
SPERM URNS QL MICRO: ABNORMAL
UROBILINOGEN UR QL STRIP.AUTO: 1 EU/DL (ref 0.2–1)
VENTILATION MODE VENT: ABNORMAL
VIT B12 SERPL-MCNC: 443 PG/ML (ref 232–1245)
VT SETTING VENT: 460
WBC # BLD AUTO: 8 K/UL (ref 4.6–13.2)
WBC # BLD AUTO: 8.4 X10E3/UL (ref 3.4–10.8)
WBC URNS QL MICRO: ABNORMAL /HPF (ref 0–5)

## 2025-06-17 PROCEDURE — 94003 VENT MGMT INPAT SUBQ DAY: CPT

## 2025-06-17 PROCEDURE — 2580000003 HC RX 258: Performed by: INTERNAL MEDICINE

## 2025-06-17 PROCEDURE — 71045 X-RAY EXAM CHEST 1 VIEW: CPT

## 2025-06-17 PROCEDURE — 84295 ASSAY OF SERUM SODIUM: CPT

## 2025-06-17 PROCEDURE — 6360000002 HC RX W HCPCS: Performed by: INTERNAL MEDICINE

## 2025-06-17 PROCEDURE — 6370000000 HC RX 637 (ALT 250 FOR IP): Performed by: INTERNAL MEDICINE

## 2025-06-17 PROCEDURE — 2500000003 HC RX 250 WO HCPCS: Performed by: INTERNAL MEDICINE

## 2025-06-17 PROCEDURE — 83735 ASSAY OF MAGNESIUM: CPT

## 2025-06-17 PROCEDURE — 84145 PROCALCITONIN (PCT): CPT

## 2025-06-17 PROCEDURE — 82803 BLOOD GASES ANY COMBINATION: CPT

## 2025-06-17 PROCEDURE — 2000000000 HC ICU R&B

## 2025-06-17 PROCEDURE — 99231 SBSQ HOSP IP/OBS SF/LOW 25: CPT

## 2025-06-17 PROCEDURE — 85014 HEMATOCRIT: CPT

## 2025-06-17 PROCEDURE — 36600 WITHDRAWAL OF ARTERIAL BLOOD: CPT

## 2025-06-17 PROCEDURE — 82330 ASSAY OF CALCIUM: CPT

## 2025-06-17 PROCEDURE — 94640 AIRWAY INHALATION TREATMENT: CPT

## 2025-06-17 PROCEDURE — 85027 COMPLETE CBC AUTOMATED: CPT

## 2025-06-17 PROCEDURE — 83605 ASSAY OF LACTIC ACID: CPT

## 2025-06-17 PROCEDURE — 82962 GLUCOSE BLOOD TEST: CPT

## 2025-06-17 PROCEDURE — 82784 ASSAY IGA/IGD/IGG/IGM EACH: CPT

## 2025-06-17 PROCEDURE — 84132 ASSAY OF SERUM POTASSIUM: CPT

## 2025-06-17 PROCEDURE — 80053 COMPREHEN METABOLIC PANEL: CPT

## 2025-06-17 PROCEDURE — 82785 ASSAY OF IGE: CPT

## 2025-06-17 PROCEDURE — 81001 URINALYSIS AUTO W/SCOPE: CPT

## 2025-06-17 PROCEDURE — 82947 ASSAY GLUCOSE BLOOD QUANT: CPT

## 2025-06-17 PROCEDURE — 2700000000 HC OXYGEN THERAPY PER DAY

## 2025-06-17 RX ORDER — MIDAZOLAM HYDROCHLORIDE 1 MG/ML
1-10 INJECTION, SOLUTION INTRAVENOUS CONTINUOUS
Status: DISCONTINUED | OUTPATIENT
Start: 2025-06-17 | End: 2025-07-01

## 2025-06-17 RX ADMIN — AZITHROMYCIN MONOHYDRATE 500 MG: 500 INJECTION, POWDER, LYOPHILIZED, FOR SOLUTION INTRAVENOUS at 15:16

## 2025-06-17 RX ADMIN — FENTANYL CITRATE 125 MCG/HR: 0.05 INJECTION, SOLUTION INTRAMUSCULAR; INTRAVENOUS at 19:13

## 2025-06-17 RX ADMIN — SODIUM CHLORIDE 7 MG/HR: 900 INJECTION, SOLUTION INTRAVENOUS at 15:06

## 2025-06-17 RX ADMIN — FOLIC ACID 1 MG: 1 TABLET ORAL at 08:13

## 2025-06-17 RX ADMIN — POLYETHYLENE GLYCOL 3350 17 G: 17 POWDER, FOR SOLUTION ORAL at 08:13

## 2025-06-17 RX ADMIN — BUDESONIDE 500 MCG: 0.5 INHALANT RESPIRATORY (INHALATION) at 07:08

## 2025-06-17 RX ADMIN — IPRATROPIUM BROMIDE AND ALBUTEROL SULFATE 1 DOSE: .5; 3 SOLUTION RESPIRATORY (INHALATION) at 07:08

## 2025-06-17 RX ADMIN — CHLORHEXIDINE GLUCONATE 15 ML: 1.2 RINSE ORAL at 20:56

## 2025-06-17 RX ADMIN — FENTANYL CITRATE 125 MCG/HR: 0.05 INJECTION, SOLUTION INTRAMUSCULAR; INTRAVENOUS at 11:04

## 2025-06-17 RX ADMIN — MINERAL OIL, PETROLATUM: 425; 568 OINTMENT OPHTHALMIC at 08:14

## 2025-06-17 RX ADMIN — MINERAL OIL, PETROLATUM: 425; 568 OINTMENT OPHTHALMIC at 04:05

## 2025-06-17 RX ADMIN — WATER 2000 MG: 1 INJECTION INTRAMUSCULAR; INTRAVENOUS; SUBCUTANEOUS at 15:12

## 2025-06-17 RX ADMIN — FAMOTIDINE 10 MG: 10 INJECTION, SOLUTION INTRAVENOUS at 08:14

## 2025-06-17 RX ADMIN — IPRATROPIUM BROMIDE AND ALBUTEROL SULFATE 1 DOSE: .5; 3 SOLUTION RESPIRATORY (INHALATION) at 19:17

## 2025-06-17 RX ADMIN — FENTANYL CITRATE 125 MCG/HR: 0.05 INJECTION, SOLUTION INTRAMUSCULAR; INTRAVENOUS at 02:05

## 2025-06-17 RX ADMIN — SODIUM CHLORIDE 7 MG/HR: 900 INJECTION, SOLUTION INTRAVENOUS at 00:54

## 2025-06-17 RX ADMIN — CHLORHEXIDINE GLUCONATE 15 ML: 1.2 RINSE ORAL at 08:13

## 2025-06-17 RX ADMIN — IPRATROPIUM BROMIDE AND ALBUTEROL SULFATE 1 DOSE: .5; 3 SOLUTION RESPIRATORY (INHALATION) at 13:55

## 2025-06-17 RX ADMIN — DEXMEDETOMIDINE 1 MCG/KG/HR: 100 INJECTION, SOLUTION INTRAVENOUS at 12:29

## 2025-06-17 RX ADMIN — MINERAL OIL, PETROLATUM: 425; 568 OINTMENT OPHTHALMIC at 15:20

## 2025-06-17 RX ADMIN — MINERAL OIL, PETROLATUM: 425; 568 OINTMENT OPHTHALMIC at 20:57

## 2025-06-17 RX ADMIN — ARFORMOTEROL TARTRATE 15 MCG: 15 SOLUTION RESPIRATORY (INHALATION) at 07:08

## 2025-06-17 RX ADMIN — Medication 100 MG: at 08:13

## 2025-06-17 RX ADMIN — ENOXAPARIN SODIUM 30 MG: 100 INJECTION SUBCUTANEOUS at 23:46

## 2025-06-17 RX ADMIN — SODIUM CHLORIDE, PRESERVATIVE FREE 10 ML: 5 INJECTION INTRAVENOUS at 08:14

## 2025-06-17 RX ADMIN — WATER 40 MG: 1 INJECTION INTRAMUSCULAR; INTRAVENOUS; SUBCUTANEOUS at 06:22

## 2025-06-17 RX ADMIN — MIDAZOLAM HYDROCHLORIDE 2 MG: 1 INJECTION, SOLUTION INTRAMUSCULAR; INTRAVENOUS at 13:08

## 2025-06-17 RX ADMIN — ENOXAPARIN SODIUM 30 MG: 100 INJECTION SUBCUTANEOUS at 12:15

## 2025-06-17 RX ADMIN — SENNOSIDES 8.6 MG: 8.6 TABLET, COATED ORAL at 08:13

## 2025-06-17 RX ADMIN — SODIUM CHLORIDE, PRESERVATIVE FREE 10 ML: 5 INJECTION INTRAVENOUS at 21:16

## 2025-06-17 RX ADMIN — DEXMEDETOMIDINE 1 MCG/KG/HR: 100 INJECTION, SOLUTION INTRAVENOUS at 08:46

## 2025-06-17 RX ADMIN — MINERAL OIL, PETROLATUM: 425; 568 OINTMENT OPHTHALMIC at 18:22

## 2025-06-17 RX ADMIN — BUDESONIDE 500 MCG: 0.5 INHALANT RESPIRATORY (INHALATION) at 19:17

## 2025-06-17 RX ADMIN — MINERAL OIL, PETROLATUM: 425; 568 OINTMENT OPHTHALMIC at 00:16

## 2025-06-17 RX ADMIN — ARFORMOTEROL TARTRATE 15 MCG: 15 SOLUTION RESPIRATORY (INHALATION) at 19:17

## 2025-06-17 RX ADMIN — WATER 20 MG: 1 INJECTION INTRAMUSCULAR; INTRAVENOUS; SUBCUTANEOUS at 15:21

## 2025-06-17 RX ADMIN — MIDAZOLAM HYDROCHLORIDE 2 MG: 1 INJECTION, SOLUTION INTRAMUSCULAR; INTRAVENOUS at 18:32

## 2025-06-17 RX ADMIN — SENNOSIDES 8.6 MG: 8.6 TABLET, COATED ORAL at 20:57

## 2025-06-17 RX ADMIN — WATER 20 MG: 1 INJECTION INTRAMUSCULAR; INTRAVENOUS; SUBCUTANEOUS at 23:39

## 2025-06-17 RX ADMIN — FAMOTIDINE 10 MG: 10 INJECTION, SOLUTION INTRAVENOUS at 20:56

## 2025-06-17 RX ADMIN — MICAFUNGIN SODIUM 150 MG: 50 INJECTION, POWDER, LYOPHILIZED, FOR SOLUTION INTRAVENOUS at 10:25

## 2025-06-17 RX ADMIN — DEXMEDETOMIDINE 1 MCG/KG/HR: 100 INJECTION, SOLUTION INTRAVENOUS at 20:26

## 2025-06-17 RX ADMIN — DEXMEDETOMIDINE 1 MCG/KG/HR: 100 INJECTION, SOLUTION INTRAVENOUS at 16:30

## 2025-06-17 RX ADMIN — DEXMEDETOMIDINE 1 MCG/KG/HR: 100 INJECTION, SOLUTION INTRAVENOUS at 04:04

## 2025-06-17 ASSESSMENT — PULMONARY FUNCTION TESTS
PIF_VALUE: 22
PIF_VALUE: 25
PIF_VALUE: 24
PIF_VALUE: 22
PIF_VALUE: 23
PIF_VALUE: 25

## 2025-06-17 NOTE — CARE COORDINATION
This CM made a follow-up call to Mikala LABOY regarding the call to locate the patients brother Adolph. Mikala Dispatcher stated the patients brother has been  for over one year. CM will continue to follow.

## 2025-06-17 NOTE — PROGRESS NOTES
Nephrology Progress note    Beny Knapp  MRN: [id number]  : 1965    Admit Date: 2025    Subjective:     Beny Knapp is a 59 y.o. male  with PMH HTN, COPD, hep C admitted for respiratory distress which reportedly started after choking on a sandwich at lunch. Pt seen by ENT, s/p flex fiberoptic nasolaryngoscopy found severe candidiasis. Has been having difficulty swallowing for a few months. Has been on prednisone for COPD. Pt emergently intubated.   Na 115 on admission-gently IV NS started and Na 118.   Sedated on vent   Na 129 this AM, has been started on D5W. >5L out yest, 683 last shift  Cr 0.5      Remains on Mech Vent. S Na 128-132 for over 48hrs       Impression:      Hyponatremia-?prerenal- improved and stable 128-132  Acute Respiratory failure: on MV  Laryngeal candidiasis  Hypokalemia- repleted  HTN  COPD  Chronic Hep C     Plan:      Cont current care    Will sign off         Principal Problem:    Hyponatremia  Active Problems:    HTN (hypertension)    Tobacco abuse    COPD (chronic obstructive pulmonary disease) (HCC)    Chronic hepatitis C (HCC)    Thrombocytopenia    Stridor    Obstructed, larynx    Acute respiratory failure with hypoxia and hypercapnia (HCC)    ETOH abuse    Polysubstance abuse (HCC)    Candidiasis of esophagus (HCC)  Resolved Problems:    * No resolved hospital problems. *    Current Facility-Administered Medications   Medication Dose Route Frequency    dexmedeTOMIDine (PRECEDEX) 400 mcg in sodium chloride 0.9 % 100 mL infusion  0.1-1.5 mcg/kg/hr IntraVENous Continuous    fentaNYL (SUBLIMAZE) 1,000 mcg in sodium chloride 0.9% 100 mL infusion   mcg/hr IntraVENous Continuous    micafungin (MYCAMINE) 150 mg in sodium chloride 0.9 % 100 mL IVPB  150 mg IntraVENous Daily    azithromycin (ZITHROMAX) 500 mg in sodium chloride 0.9 % 250 mL (addEASE) IVPB  500 mg IntraVENous Q24H    cefTRIAXone (ROCEPHIN) 2,000 mg in sterile water 20 mL IV syringe  2,000 mg

## 2025-06-17 NOTE — PROGRESS NOTES
Comprehensive High Risk Nutrition Assessment Follow-Up    Type and Reason for Visit:  Reassess    Nutrition Recommendations/Plan:   Noted TF on hold monitor ability to re-start if cont w/elevated BG levels would change to Glucerna 1.5 @ 15ml/hr start trickle feeds vs need Vital 1.2 @ 15ml/hr as medically feasible   Defer FWF per MD  Cont to check Phos, Mg, K replete as needed  Cont to monitor Na levels improving slowly  Cont folate and thiamine and consider adding Centrum/certa-azucena w/min liq  Cont to monitor POC/NCP, wt trends, lytes, UOP, bowel fx, skin integrity/wound healing and adjust recs as needed     Malnutrition Assessment:  Malnutrition Status:  At risk for malnutrition (06/17/25 1100)      Nutrition Assessment:    58yo M remains in ICU sedated on vent w/precedex, vent ?fentanyl, no pressors; MD noted pt agitated last night, periods desaturation monitor for aspiration, TF hold, elevate bed, increase sedation for worsening hypoxemia vent synchrony, trying to find healthcare proxy per MD noted. Endoscopy no foreign body swollen airway/larynx severe candidiasis noted. h/o HTN, EtOH use, COPD, smoker, polysubstance abuse including cocaine, hep C.  OGT was on TF Jevity 1.5 @ 20ml/hr + 2 ProSources BID appeared tolerating. good UOP. wt trends up since admit. admit wt was up from 97-98kg July to Jan 2025 if correct. traumatic sternum wound per wound nurse.    Nutrition Related Findings:      Wound Type:  (traumatic sternum wound per wound nurse)       Current Nutrition Intake & Therapies:    Average Meal Intake: NPO (vent +TF)  Average Supplements Intake: NPO  Diet NPO  ADULT TUBE FEEDING; Orogastric; Standard with Fiber; Continuous; 20; No; 30; Q 6 hours; Protein; 2 Doses; BID    Anthropometric Measures:  Height: 182.9 cm (6' 0.01\")  Ideal Body Weight (IBW): 178 lbs (81 kg)    Admission Body Weight: 113 kg (249 lb 1.9 oz)  Current Body Weight: 117 kg (257 lb 15 oz), 137.5 % IBW. Weight Source: Bed scale  Current

## 2025-06-17 NOTE — CARE COORDINATION
This VALERIE contacted Mikala LABOY in an effort to locate patients JACK/Adolph Knapp (brother). VALERIE has been unable to reach the brother via telephone over the past two days. VALERIE spoke with Mikala LABOY Dispatch, Ray # [phone] - [phone]. They will send a  to the brothers last known address and ask him to contact VALERIE at [phone].

## 2025-06-17 NOTE — PROGRESS NOTES
Hospitalist Progress Note    Patient: Beny Knapp MRN: [id number]  CSN: [id number]    YOB: 1965  Age: 59 y.o.  Sex: male    DOA: 6/14/2025 LOS:  LOS: 3 days          Chief Complain :  Foreign body, shortness of breath.  59 y.o.  male w/ PMH of COPD, ETOH-use and polysubstance abuse who was BIBA and presents with subjective difficulty breathing and swallowing with throat pain. Brought in by EMS for possible forign body (food) with bolus within region of velecula. ENT saw pt and ED intubated for airway protection. ENT was consulted and performed scope on pt with findings of extensive esophageal candidiasis. Nephrology was consulted for severe hyponatremia with sodium of 117 recommending NS at 75cc/hr. Intensivist was contacted as well d/t pt being intubated, on vent for airway protection.   Subjective:   Patient orally intubated and sedated. He is now requiring 100% FiO2, PEEP of 10.    Assessment/Plan     Active Hospital Problems    Diagnosis     Aspiration pneumonia (HCC) [J69.0]     Cocaine abuse (HCC) [F14.10]     Hyponatremia [E87.1]     Stridor [R06.1]     Obstructed, larynx [J38.6]     Acute respiratory failure with hypoxia and hypercapnia (HCC) [J96.01, J96.02]     ETOH abuse [F10.10]     Polysubstance abuse (HCC) [F19.10]     Candidiasis of esophagus (HCC) [B37.81]     Tobacco abuse [Z72.0]     HTN (hypertension) [I10]     COPD (chronic obstructive pulmonary disease) (HCC) [J44.9]     Chronic hepatitis C (HCC) [B18.2]           CRITICAL CARE PLAN    Resp   Acute airway obstruction  Intubated to protect airway  See vent orders, VAP bundle. HOB>30 degrees.   Now requiring 100%FiO2, PEEP of 10  CXR with

## 2025-06-17 NOTE — PLAN OF CARE
Problem: Pain  Goal: Verbalizes/displays adequate comfort level or baseline comfort level  Outcome: Progressing  Flowsheets (Taken 6/17/2025 0159)  Verbalizes/displays adequate comfort level or baseline comfort level:   Assess pain using appropriate pain scale   Administer analgesics based on type and severity of pain and evaluate response   Implement non-pharmacological measures as appropriate and evaluate response   Notify Licensed Independent Practitioner if interventions unsuccessful or patient reports new pain     Problem: Respiratory - Adult  Goal: Achieves optimal ventilation and oxygenation  Outcome: Progressing  Flowsheets (Taken 6/16/2025 1950)  Achieves optimal ventilation and oxygenation:   Assess for changes in respiratory status   Position to facilitate oxygenation and minimize respiratory effort   Assess the need for suctioning and aspirate as needed   Respiratory therapy support as indicated     Problem: Metabolic/Fluid and Electrolytes - Adult  Goal: Electrolytes maintained within normal limits  Outcome: Progressing  Flowsheets (Taken 6/16/2025 1950)  Electrolytes maintained within normal limits: Monitor labs and assess patient for signs and symptoms of electrolyte imbalances     Problem: Skin/Tissue Integrity  Goal: Skin integrity remains intact  Description: 1.  Monitor for areas of redness and/or skin breakdown  2.  Assess vascular access sites hourly  3.  Every 4-6 hours minimum:  Change oxygen saturation probe site  4.  Every 4-6 hours:  If on nasal continuous positive airway pressure, respiratory therapy assess nares and determine need for appliance change or resting period  Outcome: Progressing  Flowsheets (Taken 6/16/2025 1950)  Skin Integrity Remains Intact:   Monitor for areas of redness and/or skin breakdown   Turn and reposition as indicated   Pressure redistribution bed/mattress (bed type)   Check visual cues for pain   Monitor skin under medical devices

## 2025-06-17 NOTE — PROGRESS NOTES
Otolaryngology head neck surgery    Events noted  Patient apparently now with significant respiratory failure.  Hyponatremia seems to be slowly improving patient however with right lower lung opacity  Uncertain whether patient may have aspirated from his significant upper airway obstruction  Patient however demonstrates small cuff leak  At this point do not feel patient is a candidate for extubation either from his pulmonary issues nor his upper airway issues  Will follow

## 2025-06-17 NOTE — PROGRESS NOTES
PALLIATIVE MEDICINE    NO AMD ON FILE      Rose Al (mom)  Attempted to call [phone] and [phone]. Neither number in service.   Case Management completed a Relative Search.   Adolph Knapp (brother) [phone], [phone].   Unable to leave voicemail for [phone]  Phone number not in service for [phone].    Case Management contacted [employer] LABOY in an effort to locate brother Adolph Knapp. A  will be sent to the brothers last known address and will ask brother to contact case management.     CODE STATUS: FULL CODE    Seen today in Rm 104, along with Jannet Maguire NP .   Patient lying in bed supine with head of bed raised.  Intubated (day 4) Settings: 75% FiO2/10 PEEP,   Sedated: Fentanyl, Versed, Precedex gtt.   Overnight aspiration event on tube feeding. Tube feedings on hold.       Palliative Medicine will continue to follow Beny VALDES Ra during his hospitalization.    Maura Kimball RN  Palliative Medicine  [phone]

## 2025-06-17 NOTE — WOUND CARE
Accidental Ingestion: Nontoxic (Adult)  You have been evaluated and treated for accidentally taking too much of a medicine or swallowing a chemical product. There is no sign of toxic effect at this time. It is not likely that any new symptoms will appear. To be safe, watch for symptoms during the next 24 hours (see below). The symptom will depend on what was swallowed.  Home care  · If liquid charcoal was given to neutralize what was swallowed, it will give a black color to the stools for 1 to 2 days. Usually, a laxative is given with charcoal. This speeds the removal of any toxins from the intestines. This may cause diarrhea for up to 24 hours.  · If you have been given charcoal but no laxative, you may become constipated. If this occurs, you may take an over-the-counter laxative.  Prevention  · Keep medicines, pesticides, and other household chemicals in their original containers.  · Clearly krystal all harmful products if a different bottle is used.  Note: In the future, if you or someone you know takes something possibly harmful, and you are not sure what to do, call the [employer] of Poison Control Centers. The phone number is [phone]. The phone line is staffed 24 hours a day. If you call, you will be connected to the poison control center closest to you.   Follow-up care  Follow up with your health care provider, or as advised.  Call [phone]  Contact your local emergency services right away if any of these occur.  · Trouble breathing or swallowing, wheezing  · Severe confusion  · Extreme drowsiness or trouble awakening  · Fainting or loss of consciousness  · Rapid heart rate   · Very slow heart rate  · Very low or very high blood pressure  · Vomiting blood, or large amounts of blood in stool  · Seizure  When to seek medical advice  Call your health care provider right away if any of these occur.  · Shakiness  · Fast breathing (over 25 breaths per minute) or slow breathing (less than 8 breaths per  Inpatient Wound Care Note:     Beny Knapp  MEDICAL RECORD NUMBER:  [id number]  AGE: 59 y.o.   GENDER: male  : 1965  TODAY'S DATE:  2025    [] Follow-up visit for   [x] New consult for scrapes on chest  Seen in  with assistance from none  Patient admitted from home    PAST MEDICAL HISTORY    No past medical history on file.     PAST SURGICAL HISTORY    No past surgical history on file.  SOCIAL HISTORY         ALLERGIES    No Known Allergies    MEDICATIONS    No current facility-administered medications on file prior to encounter.     No current outpatient medications on file prior to encounter.       Wt Readings from Last 3 Encounters:   25 117.1 kg (258 lb 2.5 oz)   23 111.1 kg (245 lb)        Lab Results   Component Value Date/Time    WBC 8.0 2025 06:21 AM    POCGLU 156 (H) 2025 06:16 AM    POCGLU 172 (H) 2025 05:14 AM    HGB 11.4 (L) 2025 06:21 AM    HCT 35.8 (L) 2025 06:21 AM     2025 06:21 AM       [x] No indication for wound culture      Diet: Diet NPO  ADULT TUBE FEEDING; Orogastric; Standard with Fiber; Continuous; 20; No; 30; Q 6 hours; Protein; 2 Doses; BID           Assessment:   Chauncey score: 14  Appropriately Communicative? No    Mobility: [] turn independently [] assists in repositioning   [x] unable to assist with mobility   patient repositioned to supine.    Continence:   []Continent  [x]Incontinentbladder and bowel  []Wearing briefs   [x]Rai  []External urinary device to suction.    Surface: [facility] mattress      Flowsheet:   Wound 25 Sternum Lower;Right (Active)   Wound Image   25 1002   Wound Etiology Traumatic 25 1002   Dressing Status Dry;Clean;Other (Comment) 25 1002   Wound Cleansed Not Cleansed 25 1002   Dressing/Treatment Open to air 25 1002   Offloading for Diabetic Foot Ulcers Offloading not required 25 1002   Wound Length (cm) 0.3 cm 25 1002   Wound Width (cm)  minute)  · Shortness of breath  · Fever of 100.4ºF (38ºC) or higher, or as directed by your healthcare provider  · Vomiting or diarrhea for more than 24 hours  · Abdominal pain  · Dizziness or weakness  © 6158-6387 [employer]. [address] [place] [address]. All rights reserved. This information is not intended as a substitute for professional medical care. Always follow your healthcare professional's instructions.

## 2025-06-17 NOTE — PLAN OF CARE
Problem: Safety - Medical Restraint  Goal: Remains free of injury from restraints (Restraint for Interference with Medical Device)  Description: INTERVENTIONS:  1. Determine that other, less restrictive measures have been tried or would not be effective before applying the restraint  2. Evaluate the patient's condition at the time of restraint application  3. Inform patient/family regarding the reason for restraint  4. Q2H: Monitor safety, psychosocial status, comfort, nutrition and hydration  6/17/2025 1404 by Landy Hernadez RN  Outcome: Progressing  Flowsheets  Taken 6/17/2025 1106 by Landy Hernadez RN  Remains free of injury from restraints (restraint for interference with medical device):   Determine that other, less restrictive measures have been tried or would not be effective before applying the restraint   Evaluate the patient's condition at the time of restraint application   Inform patient/family regarding the reason for restraint   Every 2 hours: Monitor safety, psychosocial status, comfort, nutrition and hydration  Taken 6/17/2025 0600 by Leidy Middleton RN  Remains free of injury from restraints (restraint for interference with medical device): Every 2 hours: Monitor safety, psychosocial status, comfort, nutrition and hydration  Taken 6/17/2025 0400 by Leidy Middleton RN  Remains free of injury from restraints (restraint for interference with medical device): Every 2 hours: Monitor safety, psychosocial status, comfort, nutrition and hydration  Taken 6/17/2025 0200 by eLidy Middleton RN  Remains free of injury from restraints (restraint for interference with medical device): Every 2 hours: Monitor safety, psychosocial status, comfort, nutrition and hydration  6/17/2025 0159 by Leidy Middleton RN  Outcome: Progressing  Flowsheets  Taken 6/17/2025 0159  Remains free of injury from restraints (restraint for interference with medical device):   Determine that other, less restrictive

## 2025-06-17 NOTE — PROGRESS NOTES
found for: \"IONCA\"  Invalid input(s): \"IONCA\"    Magnesium:   Recent Labs     06/15/25  0307 06/16/25  0545 06/17/25  0621   MG 2.3 2.5 2.5        Phosphorus:   Recent Labs     06/15/25  0635 06/16/25  0545   PHOS 4.4 4.4       Amylase, Lipase: No results found for: \"AMYLASE\", \"LIPASE\"  No results for input(s): \"AMYLASE\", \"LIPASE\" in the last 72 hours.    Lipase: No results found for: \"LIPASE\"  No results for input(s): \"LIPASE\" in the last 72 hours.    Ammonis:   Lab Results   Component Value Date/Time    AMMONIA 12 06/29/2023 02:11 AM      No results for input(s): \"AMMONIA\" in the last 72 hours.     Lactic Acid Lactic Acid, Plasma   Date Value Ref Range Status   06/29/2023 1.8 0.4 - 2.0 MMOL/L Final     POC Lactic Acid   Date Value Ref Range Status   06/17/2025 1.66 0.40 - 2.00 mmol/L Final   06/14/2025 <0.40 (L) 0.40 - 2.00 mmol/L Final       No results for input(s): \"LACACIDPL\" in the last 72 hours.     CBC w/Diff Recent Labs     06/16/25  0545 06/16/25  1047 06/16/25  1416 06/17/25  0621   WBC 8.8 8.4 9.4 8.0   RBC 3.84* 3.96* 3.77* 3.91*   HGB 11.3* 11.6* 11.4* 11.4*   HCT 34.5* 36.2* 34.5* 35.8*   MCV 89.8 91 91.5 91.6   MCH 29.4 29.3 30.2 29.2   MCHC 32.8 32.0 33.0 31.8   RDW 13.8 14.0 14.0 14.3    260 247 245   MPV 9.9  --  9.9 9.4         Cardiac Enzymes Troponin, High Sensitivity   Date/Time Value Ref Range Status   06/29/2023 04:16 AM 6 0 - 78 ng/L Final     Comment:     A HS troponin value change of (+ or -) 50% or more below the 99th percentile, in a 1/2/3 hr interval represents a significant change. Clinical correlation is recommended.  A HS troponin value change of (+ or -) 20% or above the 99th percentile, in a 1/2/3 hr interval represents a significant change. Clinical correlation is recommended.  99th Percentile:    Women:  0-54 ng/L                                                               Men:  0-78 ng/L     06/29/2023 02:11 AM 6 0 - 78 ng/L Final     Comment:     A HS troponin value  Electronically signed by MELITON GARRETT    XR CHEST 1 VIEW  Result Date: 6/14/2025  EXAM: XR CHEST 1 VIEW INDICATION: line placement COMPARISON: 6/14/2025 FINDINGS: A portable AP radiograph of the chest was obtained at 523 hours. Intubated. Endotracheal tube is approximately 6.3 cm above the justine. Right IJ catheter has been placed with the tip in the proximal SVC. There is no pneumothorax.. Bilateral pleural effusions and bibasilar volume loss with interval increase.. Mild pulmonary edema. Heart size is enlarged..  The bones and soft tissues are grossly within normal limits.     Tubes and lines as above. No pneumothorax. Increased bilateral effusions and bibasilar volume loss. Electronically signed by Eliseo Espinosa    XR CHEST PORTABLE  Result Date: 6/14/2025  EXAM: XR CHEST PORTABLE INDICATION: Chest Pain COMPARISON: 6/29/2023 FINDINGS: A portable AP radiograph of the chest was obtained at 244 hours. . Bibasilar scarring and atelectasis. Possible small left effusion.. Cardiomegaly..  The bones and soft tissues are grossly within normal limits.     Bibasilar scarring and atelectasis with a possible small left effusion. Electronically signed by Eliseo Espinosa    XR NECK SOFT TISSUE  Result Date: 6/14/2025  PROCEDURE: XR NECK SOFT TISSUE COMPARISON: None REASON FOR STUDY: foreign body sensation FINDINGS: 2 views of the neck soft tissues were obtained. The patient has a edentulous. Large bulky confluent anterior osteophytes are noted in the cervical spine. No radiopaque foreign body is present     No acute pathology Electronically signed by Eliseo Espinosa           Please note: Voice-recognition software may have been used to generate this report, which may have resulted in some phonetic-based errors in grammar and contents. Even though attempts were made to correct all the mistakes, some may have been missed, and remained in the body of the document.      Casey Car MD  6/17/2025

## 2025-06-17 NOTE — PROGRESS NOTES
[facility]  (A Division of [facility])  Renee Stanford MD   Office Tel:  [phone] Option #8                                                               Date of Admission: 6/14/2025       ID Consult for antimicrobial management suspected esophageal candidiasis   PCP: Emeli Duckworth MD    C/C:     Current Antimicrobials:    Prior Antimicrobials:  Zithro 6/16 to date  CAX 6/16 to date  Fluconazole 6/14-> Micafungin  150 mg daily        NA     Allergy to antibiotics- NA     Assessment:     Esophagitis- presumed candidal  Lung infiltrate-atelectasis Vs PNA on CT--resp cultures 6/14- normal kenia  Hyponatremia  Transaminitis- resolved    6/14 - blood cutlure X2: NGSF, resp culture- mixed GNR- normal resp kenia        -UA no pyuria-        -X-ray-bibasilar scarring/atelectasis, neck x-ray- no acute pathology        -CT CAP: Bibasilar atelectasis/pneumonia left greater than right.  Bullous emphysematous change.  Procal 0.03    6/16- KOH from mouth- no yeast         -6/16- respiratory culture- in progress    HIV 1/2- Non -reactive 6/14/25, HIV PCR -pending    Co-morbidities:    Hypertension  SIAD  COPD  Chronic hepatitis by history    Recommendation -- ID related:     Limited history prior to his admission    FU repeat resp culture- 6/16- normal kenia from admission  Repeat procal, check fungitell/  FU HIV PCR. Check Immunoglobulin level  Cont current antibiotics- CAX/Zithromax and micafungin. Keep ABX course brief  Steroid taper per ICU team  DW ICU RN/MD a  [facility] Calderon-- may need EGD and biopsy and culture from esophagitis         Subjective:     Patient seen and examined for follow-up in ICU   Intubated - non responsive- sedated withi three drugs, off pressor  Increasing FIO2 need  Afebrile  History from med staff  Currently on Steroid IV    Chart reviewed-notes/cultures/labs and imaging reports.  CXR--Persistent but improved bilateral lower lobe infiltrates.  for: \"CULT\"         Imaging:   All imaging reviewed from Admission to present as per radiology [facility] in [facility]    Radiology reports reviewed:    XR CHEST PORTABLE  Result Date: 6/17/2025  EXAM:  XR CHEST PORTABLE INDICATION: et tube Comparison to 6/16/2025. Portable exam obtained at 614 demonstrates persistent but improved bilateral lower lobe infiltrates. Small bilateral pleural effusions again noted. Stable life-support lines and tubes.     Persistent but improved bilateral lower lobe infiltrates. Persistent bilateral pleural effusions. Electronically signed by CARLOS SCHMIDT    XR ABDOMEN (KUB) (SINGLE AP VIEW)  Result Date: 6/16/2025  EXAM: XR ABDOMEN (KUB) (SINGLE AP VIEW) ACC#: [id number]  INDICATION: r/o ileus  COMPARISON: None. TECHNIQUE: Supine views of the abdomen. FINDINGS: Endotracheal tube tip is near the diaphragm in the midline, overlying the stomach. There are no significant dilated loops of bowel. No significant pathologic calcifications. No significant bony abnormalities. There are multilevel degenerative changes of the spine.     Nonspecific bowel gas patten. Electronically signed by ASHLIE Morton    XR CHEST PORTABLE  Result Date: 6/16/2025  EXAM:  XR CHEST PORTABLE INDICATION:   et tube COMPARISON: 6/15/2025 radiograph. TECHNIQUE: Portable frontal view radiograph of the chest was acquired. FINDINGS: Endotracheal tube projects 4.3 cm above the justine; orogastric tube descending below the diaphragm to terminate outside the field-of-view; and right jugular access central venous catheter projecting over the SVC, similar to prior study. External monitor leads project over the right lower chest and precordium/epigastric area. Lungs/Pleura: Stable bibasilar and has slightly increased right lower lung opacities. No pleural effusion or pneumothorax. Mediastinum: Cardiomediastinal silhouette is within normal limits. MSK: No acute osseous abnormalities. Upper abdomen: Unremarkable.     Line

## 2025-06-18 ENCOUNTER — APPOINTMENT (OUTPATIENT)
Facility: HOSPITAL | Age: 60
DRG: 368 | End: 2025-06-18
Payer: MEDICARE

## 2025-06-18 LAB
ALBUMIN SERPL-MCNC: 3 G/DL (ref 3.4–5)
ALBUMIN/GLOB SERPL: 0.9 (ref 0.8–1.7)
ALP SERPL-CCNC: 60 U/L (ref 45–117)
ALT SERPL-CCNC: 41 U/L (ref 10–50)
ANION GAP SERPL CALC-SCNC: 8 MMOL/L (ref 3–18)
AST SERPL-CCNC: 24 U/L (ref 10–38)
BASE EXCESS BLD CALC-SCNC: 12.1 MMOL/L
BDY SITE: ABNORMAL
BILIRUB SERPL-MCNC: 0.3 MG/DL (ref 0.2–1)
BUN SERPL-MCNC: 24 MG/DL (ref 6–23)
BUN/CREAT SERPL: 41 (ref 12–20)
CALCIUM SERPL-MCNC: 9 MG/DL (ref 8.5–10.1)
CHLORIDE SERPL-SCNC: 93 MMOL/L (ref 98–107)
CO2 SERPL-SCNC: 32 MMOL/L (ref 21–32)
CREAT SERPL-MCNC: 0.59 MG/DL (ref 0.6–1.3)
ERYTHROCYTE [DISTWIDTH] IN BLOOD BY AUTOMATED COUNT: 14.4 % (ref 11.6–14.5)
GALACTOMANNAN AG SPEC IA-ACNC: 0.03 INDEX (ref 0–0.49)
GAS FLOW.O2 O2 DELIVERY SYS: ABNORMAL
GAS FLOW.O2 SETTING OXYMISER: 14 BPM
GLOBULIN SER CALC-MCNC: 3.3 G/DL (ref 2–4)
GLUCOSE BLD STRIP.AUTO-MCNC: 148 MG/DL (ref 70–110)
GLUCOSE BLD STRIP.AUTO-MCNC: 152 MG/DL (ref 70–110)
GLUCOSE BLD STRIP.AUTO-MCNC: 169 MG/DL (ref 70–110)
GLUCOSE SERPL-MCNC: 159 MG/DL (ref 74–108)
HCO3 BLD-SCNC: 38.9 MMOL/L (ref 21–28)
HCT VFR BLD AUTO: 34.9 % (ref 36–48)
HGB BLD-MCNC: 11.2 G/DL (ref 13–16)
MAGNESIUM SERPL-MCNC: 2.4 MG/DL (ref 1.6–2.6)
MCH RBC QN AUTO: 29.6 PG (ref 24–34)
MCHC RBC AUTO-ENTMCNC: 32.1 G/DL (ref 31–37)
MCV RBC AUTO: 92.1 FL (ref 78–100)
NRBC # BLD: 0 K/UL (ref 0–0.01)
NRBC BLD-RTO: 0 PER 100 WBC
O2/TOTAL GAS SETTING VFR VENT: 100 %
PCO2 BLD: 60.6 MMHG (ref 35–48)
PEEP RESPIRATORY: 10 CMH2O
PERIPHERAL SMEAR, MD REVIEW: NORMAL
PH BLD: 7.42 (ref 7.35–7.45)
PLATELET # BLD AUTO: 259 K/UL (ref 135–420)
PMV BLD AUTO: 9.3 FL (ref 9.2–11.8)
PO2 BLD: 82 MMHG (ref 83–108)
POTASSIUM SERPL-SCNC: 5.3 MMOL/L (ref 3.5–5.5)
PROT SERPL-MCNC: 6.3 G/DL (ref 6.4–8.2)
RBC # BLD AUTO: 3.79 M/UL (ref 4.35–5.65)
SAO2 % BLD: 95.7 % (ref 92–97)
SERVICE CMNT-IMP: ABNORMAL
SODIUM SERPL-SCNC: 133 MMOL/L (ref 136–145)
SPECIMEN TYPE: ABNORMAL
VENTILATION MODE VENT: ABNORMAL
VT SETTING VENT: 460 ML
WBC # BLD AUTO: 5.2 K/UL (ref 4.6–13.2)

## 2025-06-18 PROCEDURE — 2580000003 HC RX 258: Performed by: INTERNAL MEDICINE

## 2025-06-18 PROCEDURE — 85027 COMPLETE CBC AUTOMATED: CPT

## 2025-06-18 PROCEDURE — 6360000002 HC RX W HCPCS: Performed by: INTERNAL MEDICINE

## 2025-06-18 PROCEDURE — 2500000003 HC RX 250 WO HCPCS: Performed by: INTERNAL MEDICINE

## 2025-06-18 PROCEDURE — 94003 VENT MGMT INPAT SUBQ DAY: CPT

## 2025-06-18 PROCEDURE — 36600 WITHDRAWAL OF ARTERIAL BLOOD: CPT

## 2025-06-18 PROCEDURE — 6370000000 HC RX 637 (ALT 250 FOR IP): Performed by: INTERNAL MEDICINE

## 2025-06-18 PROCEDURE — 82803 BLOOD GASES ANY COMBINATION: CPT

## 2025-06-18 PROCEDURE — 2700000000 HC OXYGEN THERAPY PER DAY

## 2025-06-18 PROCEDURE — 83735 ASSAY OF MAGNESIUM: CPT

## 2025-06-18 PROCEDURE — 2000000000 HC ICU R&B

## 2025-06-18 PROCEDURE — 71045 X-RAY EXAM CHEST 1 VIEW: CPT

## 2025-06-18 PROCEDURE — 80053 COMPREHEN METABOLIC PANEL: CPT

## 2025-06-18 PROCEDURE — 94640 AIRWAY INHALATION TREATMENT: CPT

## 2025-06-18 PROCEDURE — 82962 GLUCOSE BLOOD TEST: CPT

## 2025-06-18 RX ADMIN — MINERAL OIL, PETROLATUM: 425; 568 OINTMENT OPHTHALMIC at 05:07

## 2025-06-18 RX ADMIN — Medication 100 MG: at 08:36

## 2025-06-18 RX ADMIN — FAMOTIDINE 10 MG: 10 INJECTION, SOLUTION INTRAVENOUS at 08:43

## 2025-06-18 RX ADMIN — SODIUM CHLORIDE 8 MG/HR: 900 INJECTION, SOLUTION INTRAVENOUS at 03:26

## 2025-06-18 RX ADMIN — WATER 2000 MG: 1 INJECTION INTRAMUSCULAR; INTRAVENOUS; SUBCUTANEOUS at 16:45

## 2025-06-18 RX ADMIN — SODIUM CHLORIDE, PRESERVATIVE FREE 10 ML: 5 INJECTION INTRAVENOUS at 08:42

## 2025-06-18 RX ADMIN — IPRATROPIUM BROMIDE AND ALBUTEROL SULFATE 1 DOSE: .5; 3 SOLUTION RESPIRATORY (INHALATION) at 14:20

## 2025-06-18 RX ADMIN — DEXMEDETOMIDINE 1 MCG/KG/HR: 100 INJECTION, SOLUTION INTRAVENOUS at 21:34

## 2025-06-18 RX ADMIN — CHLORHEXIDINE GLUCONATE 15 ML: 1.2 RINSE ORAL at 21:50

## 2025-06-18 RX ADMIN — ENOXAPARIN SODIUM 30 MG: 100 INJECTION SUBCUTANEOUS at 13:14

## 2025-06-18 RX ADMIN — MINERAL OIL, PETROLATUM: 425; 568 OINTMENT OPHTHALMIC at 17:31

## 2025-06-18 RX ADMIN — DEXMEDETOMIDINE 1 MCG/KG/HR: 100 INJECTION, SOLUTION INTRAVENOUS at 16:14

## 2025-06-18 RX ADMIN — DEXMEDETOMIDINE 1 MCG/KG/HR: 100 INJECTION, SOLUTION INTRAVENOUS at 00:05

## 2025-06-18 RX ADMIN — IPRATROPIUM BROMIDE AND ALBUTEROL SULFATE 1 DOSE: .5; 3 SOLUTION RESPIRATORY (INHALATION) at 07:54

## 2025-06-18 RX ADMIN — FENTANYL CITRATE 125 MCG/HR: 0.05 INJECTION, SOLUTION INTRAMUSCULAR; INTRAVENOUS at 13:01

## 2025-06-18 RX ADMIN — ARFORMOTEROL TARTRATE 15 MCG: 15 SOLUTION RESPIRATORY (INHALATION) at 07:54

## 2025-06-18 RX ADMIN — SODIUM CHLORIDE, PRESERVATIVE FREE 10 ML: 5 INJECTION INTRAVENOUS at 21:54

## 2025-06-18 RX ADMIN — POLYETHYLENE GLYCOL 3350 17 G: 17 POWDER, FOR SOLUTION ORAL at 08:36

## 2025-06-18 RX ADMIN — WATER 20 MG: 1 INJECTION INTRAMUSCULAR; INTRAVENOUS; SUBCUTANEOUS at 05:47

## 2025-06-18 RX ADMIN — FAMOTIDINE 10 MG: 10 INJECTION, SOLUTION INTRAVENOUS at 21:45

## 2025-06-18 RX ADMIN — MINERAL OIL, PETROLATUM: 425; 568 OINTMENT OPHTHALMIC at 00:54

## 2025-06-18 RX ADMIN — FENTANYL CITRATE 125 MCG/HR: 0.05 INJECTION, SOLUTION INTRAMUSCULAR; INTRAVENOUS at 03:25

## 2025-06-18 RX ADMIN — SENNOSIDES 8.6 MG: 8.6 TABLET, COATED ORAL at 21:54

## 2025-06-18 RX ADMIN — WATER 20 MG: 1 INJECTION INTRAMUSCULAR; INTRAVENOUS; SUBCUTANEOUS at 16:46

## 2025-06-18 RX ADMIN — SODIUM CHLORIDE 8 MG/HR: 900 INJECTION, SOLUTION INTRAVENOUS at 17:50

## 2025-06-18 RX ADMIN — MINERAL OIL, PETROLATUM: 425; 568 OINTMENT OPHTHALMIC at 08:42

## 2025-06-18 RX ADMIN — SENNOSIDES 8.6 MG: 8.6 TABLET, COATED ORAL at 08:36

## 2025-06-18 RX ADMIN — BUDESONIDE 500 MCG: 0.5 INHALANT RESPIRATORY (INHALATION) at 07:54

## 2025-06-18 RX ADMIN — DEXMEDETOMIDINE 1 MCG/KG/HR: 100 INJECTION, SOLUTION INTRAVENOUS at 04:22

## 2025-06-18 RX ADMIN — DEXMEDETOMIDINE 1 MCG/KG/HR: 100 INJECTION, SOLUTION INTRAVENOUS at 00:39

## 2025-06-18 RX ADMIN — MINERAL OIL, PETROLATUM: 425; 568 OINTMENT OPHTHALMIC at 13:15

## 2025-06-18 RX ADMIN — MICAFUNGIN SODIUM 150 MG: 50 INJECTION, POWDER, LYOPHILIZED, FOR SOLUTION INTRAVENOUS at 10:27

## 2025-06-18 RX ADMIN — AZITHROMYCIN MONOHYDRATE 500 MG: 500 INJECTION, POWDER, LYOPHILIZED, FOR SOLUTION INTRAVENOUS at 17:31

## 2025-06-18 RX ADMIN — WATER 20 MG: 1 INJECTION INTRAMUSCULAR; INTRAVENOUS; SUBCUTANEOUS at 21:43

## 2025-06-18 RX ADMIN — MINERAL OIL, PETROLATUM: 425; 568 OINTMENT OPHTHALMIC at 21:50

## 2025-06-18 RX ADMIN — IPRATROPIUM BROMIDE AND ALBUTEROL SULFATE 1 DOSE: .5; 3 SOLUTION RESPIRATORY (INHALATION) at 19:26

## 2025-06-18 RX ADMIN — DEXMEDETOMIDINE 1 MCG/KG/HR: 100 INJECTION, SOLUTION INTRAVENOUS at 13:02

## 2025-06-18 RX ADMIN — FOLIC ACID 1 MG: 1 TABLET ORAL at 08:37

## 2025-06-18 RX ADMIN — CHLORHEXIDINE GLUCONATE 15 ML: 1.2 RINSE ORAL at 08:43

## 2025-06-18 RX ADMIN — BUDESONIDE 500 MCG: 0.5 INHALANT RESPIRATORY (INHALATION) at 19:28

## 2025-06-18 RX ADMIN — DEXMEDETOMIDINE 1 MCG/KG/HR: 100 INJECTION, SOLUTION INTRAVENOUS at 08:42

## 2025-06-18 RX ADMIN — ARFORMOTEROL TARTRATE 15 MCG: 15 SOLUTION RESPIRATORY (INHALATION) at 19:27

## 2025-06-18 RX ADMIN — FENTANYL CITRATE 125 MCG/HR: 0.05 INJECTION, SOLUTION INTRAMUSCULAR; INTRAVENOUS at 19:24

## 2025-06-18 ASSESSMENT — PULMONARY FUNCTION TESTS
PIF_VALUE: 19
PIF_VALUE: 22
PIF_VALUE: 19
PIF_VALUE: 22

## 2025-06-18 NOTE — PROGRESS NOTES
1 VIEW  Result Date: 6/14/2025  EXAM: XR CHEST 1 VIEW INDICATION: line placement COMPARISON: 6/14/2025 FINDINGS: A portable AP radiograph of the chest was obtained at 523 hours. Intubated. Endotracheal tube is approximately 6.3 cm above the justine. Right IJ catheter has been placed with the tip in the proximal SVC. There is no pneumothorax.. Bilateral pleural effusions and bibasilar volume loss with interval increase.. Mild pulmonary edema. Heart size is enlarged..  The bones and soft tissues are grossly within normal limits.     Tubes and lines as above. No pneumothorax. Increased bilateral effusions and bibasilar volume loss. Electronically signed by Eliseo Espinosa    XR CHEST PORTABLE  Result Date: 6/14/2025  EXAM: XR CHEST PORTABLE INDICATION: Chest Pain COMPARISON: 6/29/2023 FINDINGS: A portable AP radiograph of the chest was obtained at 244 hours. . Bibasilar scarring and atelectasis. Possible small left effusion.. Cardiomegaly..  The bones and soft tissues are grossly within normal limits.     Bibasilar scarring and atelectasis with a possible small left effusion. Electronically signed by Eliseo Espinosa    XR NECK SOFT TISSUE  Result Date: 6/14/2025  PROCEDURE: XR NECK SOFT TISSUE COMPARISON: None REASON FOR STUDY: foreign body sensation FINDINGS: 2 views of the neck soft tissues were obtained. The patient has a edentulous. Large bulky confluent anterior osteophytes are noted in the cervical spine. No radiopaque foreign body is present     No acute pathology Electronically signed by Eliseo Espinosa           Please note: Voice-recognition software may have been used to generate this report, which may have resulted in some phonetic-based errors in grammar and contents. Even though attempts were made to correct all the mistakes, some may have been missed, and remained in the body of the document.      Casey Car MD  6/18/2025

## 2025-06-18 NOTE — PROGRESS NOTES
met patient's nurse in the ICU.    Patient was intubated at the time of the visit. Nurse said patient was doing a little better. Medical staff members have not been able to contact patient's family. Nurse thanked  for the visit.     provided presence and support for staff.    Chaplains will provide follow-up care for patient, family, and staff as needed.    Spiritual Health History and Assessment/Progress Note  [facility]    Spiritual/Emotional Needs,  ,  ,      Name: Beny Knapp MRN: [id number]    Age: 59 y.o.     Sex: male   Language: English   Congregation: [employer]   Hyponatremia     Date: 6/18/2025            Total Time Calculated: 5 min              Spiritual Assessment began in [facility] 1 INTENSIVE CARE            Encounter Overview/Reason: Spiritual/Emotional Needs  Service Provided For: Patient    Love, Belief, Meaning:   Patient identifies as spiritual, is connected with a love tradition or spiritual practice, and has beliefs or practices that help with coping during difficult times  Family/Friends No family/friends present      Importance and Influence:  Patient unable to assess at this time  Family/Friends No family/friends present    Community:  Patient expresses feelings of isolation: disconnected from family/friends  Family/Friends No family/friends present    Assessment and Plan of Care:     Patient Interventions include: Other: Unable to assess.  Family/Friends Interventions include: No family/friends present    Patient Plan of Care: No spiritual needs identified for follow-up  Family/Friends Plan of Care: No family/friends present    Electronically signed by Chaplain Kevin on 6/18/2025 at 2:09 PM

## 2025-06-18 NOTE — PLAN OF CARE
Problem: Pain  Goal: Verbalizes/displays adequate comfort level or baseline comfort level  6/18/2025 0341 by Leidy Middleton RN  Outcome: Progressing  Flowsheets (Taken 6/18/2025 0341)  Verbalizes/displays adequate comfort level or baseline comfort level:   Assess pain using appropriate pain scale   Administer analgesics based on type and severity of pain and evaluate response   Implement non-pharmacological measures as appropriate and evaluate response   Notify Licensed Independent Practitioner if interventions unsuccessful or patient reports new pain  6/17/2025 1404 by Landy Hernadez RN  Outcome: Progressing  Flowsheets (Taken 6/17/2025 0800)  Verbalizes/displays adequate comfort level or baseline comfort level:   Assess pain using appropriate pain scale   Encourage patient to monitor pain and request assistance   Administer analgesics based on type and severity of pain and evaluate response   Implement non-pharmacological measures as appropriate and evaluate response   Consider cultural and social influences on pain and pain management   Notify Licensed Independent Practitioner if interventions unsuccessful or patient reports new pain     Problem: Respiratory - Adult  Goal: Achieves optimal ventilation and oxygenation  6/18/2025 0341 by Leidy Middleton RN  Outcome: Progressing  Flowsheets (Taken 6/17/2025 2000)  Achieves optimal ventilation and oxygenation:   Assess for changes in respiratory status   Position to facilitate oxygenation and minimize respiratory effort   Oxygen supplementation based on oxygen saturation or arterial blood gases   Assess the need for suctioning and aspirate as needed   Respiratory therapy support as indicated  6/17/2025 1404 by Landy Hernadez RN  Outcome: Progressing  Flowsheets (Taken 6/17/2025 0800)  Achieves optimal ventilation and oxygenation:   Assess for changes in respiratory status   Position to facilitate oxygenation and minimize respiratory effort   Assess

## 2025-06-18 NOTE — PROGRESS NOTES
Hospitalist Progress Note    Patient: Beny Knapp MRN: [id number]  CSN: [id number]    YOB: 1965  Age: 59 y.o.  Sex: male    DOA: 6/14/2025 LOS:  LOS: 4 days          Chief Complain :  Foreign body, shortness of breath.  59 y.o.  male w/ PMH of COPD, ETOH-use and polysubstance abuse who was BIBA and presents with subjective difficulty breathing and swallowing with throat pain. Brought in by EMS for possible forign body (food) with bolus within region of velecula. ENT saw pt and ED intubated for airway protection. ENT was consulted and performed scope on pt with findings of extensive esophageal candidiasis. Nephrology was consulted for severe hyponatremia with sodium of 117 recommending NS at 75cc/hr. Intensivist was contacted as well d/t pt being intubated, on vent for airway protection.   Subjective:   Patient orally intubated and sedated.     Assessment/Plan     Active Hospital Problems    Diagnosis     Aspiration pneumonia (HCC) [J69.0]     Cocaine abuse (HCC) [F14.10]     Hyponatremia [E87.1]     Stridor [R06.1]     Obstructed, larynx [J38.6]     Acute respiratory failure with hypoxia and hypercapnia (HCC) [J96.01, J96.02]     ETOH abuse [F10.10]     Polysubstance abuse (HCC) [F19.10]     Candidiasis of esophagus (HCC) [B37.81]     Tobacco abuse [Z72.0]     HTN (hypertension) [I10]     COPD (chronic obstructive pulmonary disease) (HCC) [J44.9]     Chronic hepatitis C (HCC) [B18.2]           CRITICAL CARE PLAN    Resp   Acute airway obstruction  Intubated to protect airway  See vent orders, VAP bundle. HOB>30 degrees.   Now requiring 85%FiO2, PEEP of 8.  CXR with persistent but improved bilateral lower lobe

## 2025-06-18 NOTE — PROGRESS NOTES
1357  Line change completed. 9 ml of fentanyl and 17 ml of precedex wasted with Milton BARTHOLOMEW.    4803  12 ml of versed wasted with Gladis BARTHOLOMEW.

## 2025-06-18 NOTE — PROGRESS NOTES
[facility]  (A Division of [facility])  Renee Stanford MD   Office Tel:  [phone] Option #8                                                               Date of Admission: 6/14/2025       ID Consult for antimicrobial management suspected esophageal candidiasis   PCP: Emeli Duckworth MD    C/C:     Current Antimicrobials:    Prior Antimicrobials:  Zithro 6/16 to date  CAX 6/16 to date  Fluconazole 6/14-> Micafungin  150 mg daily        NA     Allergy to antibiotics- NA     Assessment:     Esophagitis- presumed candidal  Lung infiltrate-atelectasis Vs PNA on CT--resp cultures 6/14- normal kenia  Acute hypoxic resp failure- Intubated on arrival. High FIO2/PEEP requirement  Hyponatremia  Transaminitis- resolved    6/14 - blood cutlure X2: NGSF, resp culture- mixed GNR- normal resp kenia        -UA no pyuria-        -X-ray-bibasilar scarring/atelectasis, neck x-ray- no acute pathology        -CT CAP: Bibasilar atelectasis/pneumonia left greater than right.  Bullous emphysematous change.  Procal 0.03    6/16- KOH from mouth- no yeast         -6/16- respiratory culture- in progress    HIV 1/2- Non -reactive 6/14/25, HIV PCR -<20    Co-morbidities:    Hypertension  SIAD  COPD  Chronic hepatitis by history    Recommendation -- ID related:         FU repeat resp culture- 6/16-until final  FU fungitell/ galactomannan, Immunoglobulin level  Cont current antibiotics- CAX/Zithromax and micafungin. Keep ABX course brief  Steroid taper per ICU team  DW ICU RN/MD a    [facility] ICU MD-Dr Alexander    Subjective:     Patient seen and examined for follow-up in ICU   Intubated - non responsive- sedated withi three drugs  Increasing FIO2 need--slowly coming down  Afebrile      Chart reviewed-notes/cultures/labs and imaging reports.         HPI per Dr Perez:     Beny Knapp is a 59 y.o. male White (non-) pmh hypertension tobacco and polysubstance abuse COPD chronic  > = values in this interval not displayed.      CBC w/Diff Recent Labs     06/16/25  1416 06/17/25  0621 06/18/25  0356   WBC 9.4 8.0 5.2   RBC 3.77* 3.91* 3.79*   HGB 11.4* 11.4* 11.2*   HCT 34.5* 35.8* 34.9*    245 259            No results found for: \"SDES\" No components found for: \"CULT\"         Imaging:   All imaging reviewed from Admission to present as per radiology interpretation in [facility]    Radiology reports reviewed:    XR CHEST PORTABLE  Result Date: 6/17/2025  EXAM:  XR CHEST PORTABLE INDICATION: et tube Comparison to 6/16/2025. Portable exam obtained at 614 demonstrates persistent but improved bilateral lower lobe infiltrates. Small bilateral pleural effusions again noted. Stable life-support lines and tubes.     Persistent but improved bilateral lower lobe infiltrates. Persistent bilateral pleural effusions. Electronically signed by CARLOS SCHMIDT    XR ABDOMEN (KUB) (SINGLE AP VIEW)  Result Date: 6/16/2025  EXAM: XR ABDOMEN (KUB) (SINGLE AP VIEW) ACC#: [id number]  INDICATION: r/o ileus  COMPARISON: None. TECHNIQUE: Supine views of the abdomen. FINDINGS: Endotracheal tube tip is near the diaphragm in the midline, overlying the stomach. There are no significant dilated loops of bowel. No significant pathologic calcifications. No significant bony abnormalities. There are multilevel degenerative changes of the spine.     Nonspecific bowel gas patten. Electronically signed by ASHLIE Morton    XR CHEST PORTABLE  Result Date: 6/16/2025  EXAM:  XR CHEST PORTABLE INDICATION:   et tube COMPARISON: 6/15/2025 radiograph. TECHNIQUE: Portable frontal view radiograph of the chest was acquired. FINDINGS: Endotracheal tube projects 4.3 cm above the justine; orogastric tube descending below the diaphragm to terminate outside the field-of-view; and right jugular access central venous catheter projecting over the SVC, similar to prior study. External monitor leads project over the right lower chest and

## 2025-06-19 ENCOUNTER — APPOINTMENT (OUTPATIENT)
Facility: HOSPITAL | Age: 60
DRG: 368 | End: 2025-06-19
Payer: MEDICARE

## 2025-06-19 LAB
ALBUMIN SERPL-MCNC: 2.9 G/DL (ref 3.4–5)
ALBUMIN/GLOB SERPL: 0.9 (ref 0.8–1.7)
ALP SERPL-CCNC: 59 U/L (ref 45–117)
ALT SERPL-CCNC: 53 U/L (ref 10–50)
ANION GAP SERPL CALC-SCNC: 8 MMOL/L (ref 3–18)
ARTERIAL PATENCY WRIST A: POSITIVE
AST SERPL-CCNC: 29 U/L (ref 10–38)
BASE EXCESS BLD CALC-SCNC: 13.1 MMOL/L
BDY SITE: ABNORMAL
BILIRUB SERPL-MCNC: 0.3 MG/DL (ref 0.2–1)
BUN SERPL-MCNC: 27 MG/DL (ref 6–23)
BUN/CREAT SERPL: 48 (ref 12–20)
CALCIUM SERPL-MCNC: 9 MG/DL (ref 8.5–10.1)
CHLORIDE SERPL-SCNC: 92 MMOL/L (ref 98–107)
CO2 SERPL-SCNC: 33 MMOL/L (ref 21–32)
CREAT SERPL-MCNC: 0.56 MG/DL (ref 0.6–1.3)
ERYTHROCYTE [DISTWIDTH] IN BLOOD BY AUTOMATED COUNT: 14.3 % (ref 11.6–14.5)
FUNGITELL INTERPRETATION: NORMAL
FUNGITELL: <31.25 PG/ML
GAS FLOW.O2 O2 DELIVERY SYS: ABNORMAL
GAS FLOW.O2 SETTING OXYMISER: 14 BPM
GLOBULIN SER CALC-MCNC: 3.1 G/DL (ref 2–4)
GLUCOSE BLD STRIP.AUTO-MCNC: 152 MG/DL (ref 70–110)
GLUCOSE BLD STRIP.AUTO-MCNC: 153 MG/DL (ref 70–110)
GLUCOSE BLD STRIP.AUTO-MCNC: 154 MG/DL (ref 70–110)
GLUCOSE BLD STRIP.AUTO-MCNC: 154 MG/DL (ref 70–110)
GLUCOSE SERPL-MCNC: 151 MG/DL (ref 74–108)
HCO3 BLD-SCNC: 39.5 MMOL/L (ref 21–28)
HCT VFR BLD AUTO: 35.5 % (ref 36–48)
HGB BLD-MCNC: 11.3 G/DL (ref 13–16)
IGA SERPL-MCNC: 446 MG/DL (ref 90–386)
IGE SERPL-ACNC: 284 IU/ML (ref 6–495)
IGG SERPL-MCNC: 854 MG/DL (ref 603–1613)
IGM SERPL-MCNC: 120 MG/DL (ref 20–172)
Lab: NORMAL
MAGNESIUM SERPL-MCNC: 2.4 MG/DL (ref 1.6–2.6)
MCH RBC QN AUTO: 29.3 PG (ref 24–34)
MCHC RBC AUTO-ENTMCNC: 31.8 G/DL (ref 31–37)
MCV RBC AUTO: 92 FL (ref 78–100)
NRBC # BLD: 0 K/UL (ref 0–0.01)
NRBC BLD-RTO: 0 PER 100 WBC
O2/TOTAL GAS SETTING VFR VENT: 70 %
PCO2 BLD: 59 MMHG (ref 35–48)
PEEP RESPIRATORY: 8 CMH2O
PH BLD: 7.43 (ref 7.35–7.45)
PLATELET # BLD AUTO: 236 K/UL (ref 135–420)
PMV BLD AUTO: 9.1 FL (ref 9.2–11.8)
PO2 BLD: 68 MMHG (ref 83–108)
POTASSIUM SERPL-SCNC: 5.1 MMOL/L (ref 3.5–5.5)
PROT SERPL-MCNC: 5.9 G/DL (ref 6.4–8.2)
RBC # BLD AUTO: 3.86 M/UL (ref 4.35–5.65)
REFERENCE VALUE: NORMAL
RESPIRATORY RATE, POC: 14 (ref 5–40)
RESULT: NEGATIVE
SAO2 % BLD: 93.2 % (ref 92–97)
SERVICE CMNT-IMP: ABNORMAL
SODIUM SERPL-SCNC: 133 MMOL/L (ref 136–145)
SPECIMEN TYPE: ABNORMAL
VENTILATION MODE VENT: ABNORMAL
VT SETTING VENT: 460 ML
WBC # BLD AUTO: 5.7 K/UL (ref 4.6–13.2)

## 2025-06-19 PROCEDURE — 2500000003 HC RX 250 WO HCPCS: Performed by: INTERNAL MEDICINE

## 2025-06-19 PROCEDURE — 80053 COMPREHEN METABOLIC PANEL: CPT

## 2025-06-19 PROCEDURE — 71045 X-RAY EXAM CHEST 1 VIEW: CPT

## 2025-06-19 PROCEDURE — 2580000003 HC RX 258: Performed by: INTERNAL MEDICINE

## 2025-06-19 PROCEDURE — 6370000000 HC RX 637 (ALT 250 FOR IP): Performed by: INTERNAL MEDICINE

## 2025-06-19 PROCEDURE — 36600 WITHDRAWAL OF ARTERIAL BLOOD: CPT

## 2025-06-19 PROCEDURE — 2000000000 HC ICU R&B

## 2025-06-19 PROCEDURE — 2700000000 HC OXYGEN THERAPY PER DAY

## 2025-06-19 PROCEDURE — 51702 INSERT TEMP BLADDER CATH: CPT

## 2025-06-19 PROCEDURE — 85027 COMPLETE CBC AUTOMATED: CPT

## 2025-06-19 PROCEDURE — 6360000002 HC RX W HCPCS: Performed by: INTERNAL MEDICINE

## 2025-06-19 PROCEDURE — 94640 AIRWAY INHALATION TREATMENT: CPT

## 2025-06-19 PROCEDURE — 82803 BLOOD GASES ANY COMBINATION: CPT

## 2025-06-19 PROCEDURE — 82962 GLUCOSE BLOOD TEST: CPT

## 2025-06-19 PROCEDURE — 94003 VENT MGMT INPAT SUBQ DAY: CPT

## 2025-06-19 PROCEDURE — 83735 ASSAY OF MAGNESIUM: CPT

## 2025-06-19 PROCEDURE — 99231 SBSQ HOSP IP/OBS SF/LOW 25: CPT

## 2025-06-19 RX ORDER — FUROSEMIDE 10 MG/ML
40 INJECTION INTRAMUSCULAR; INTRAVENOUS DAILY
Status: DISCONTINUED | OUTPATIENT
Start: 2025-06-19 | End: 2025-06-27

## 2025-06-19 RX ADMIN — SENNOSIDES 8.6 MG: 8.6 TABLET, COATED ORAL at 21:31

## 2025-06-19 RX ADMIN — FENTANYL CITRATE 125 MCG/HR: 0.05 INJECTION, SOLUTION INTRAMUSCULAR; INTRAVENOUS at 11:56

## 2025-06-19 RX ADMIN — MIDAZOLAM HYDROCHLORIDE 2 MG: 1 INJECTION, SOLUTION INTRAMUSCULAR; INTRAVENOUS at 15:27

## 2025-06-19 RX ADMIN — SODIUM CHLORIDE 8 MG/HR: 900 INJECTION, SOLUTION INTRAVENOUS at 03:30

## 2025-06-19 RX ADMIN — BUDESONIDE 500 MCG: 0.5 INHALANT RESPIRATORY (INHALATION) at 07:35

## 2025-06-19 RX ADMIN — SENNOSIDES 8.6 MG: 8.6 TABLET, COATED ORAL at 09:15

## 2025-06-19 RX ADMIN — FENTANYL CITRATE 125 MCG/HR: 0.05 INJECTION, SOLUTION INTRAMUSCULAR; INTRAVENOUS at 20:31

## 2025-06-19 RX ADMIN — IPRATROPIUM BROMIDE AND ALBUTEROL SULFATE 1 DOSE: .5; 3 SOLUTION RESPIRATORY (INHALATION) at 14:53

## 2025-06-19 RX ADMIN — ENOXAPARIN SODIUM 30 MG: 100 INJECTION SUBCUTANEOUS at 13:29

## 2025-06-19 RX ADMIN — SODIUM CHLORIDE 8 MG/HR: 900 INJECTION, SOLUTION INTRAVENOUS at 15:38

## 2025-06-19 RX ADMIN — DEXMEDETOMIDINE 1.2 MCG/KG/HR: 100 INJECTION, SOLUTION INTRAVENOUS at 22:10

## 2025-06-19 RX ADMIN — DEXMEDETOMIDINE 1 MCG/KG/HR: 100 INJECTION, SOLUTION INTRAVENOUS at 18:35

## 2025-06-19 RX ADMIN — DEXMEDETOMIDINE 1 MCG/KG/HR: 100 INJECTION, SOLUTION INTRAVENOUS at 05:39

## 2025-06-19 RX ADMIN — Medication 100 MG: at 09:15

## 2025-06-19 RX ADMIN — MINERAL OIL, PETROLATUM: 425; 568 OINTMENT OPHTHALMIC at 13:35

## 2025-06-19 RX ADMIN — SODIUM CHLORIDE, PRESERVATIVE FREE 10 ML: 5 INJECTION INTRAVENOUS at 09:14

## 2025-06-19 RX ADMIN — SODIUM CHLORIDE, PRESERVATIVE FREE 10 ML: 5 INJECTION INTRAVENOUS at 21:34

## 2025-06-19 RX ADMIN — FOLIC ACID 1 MG: 1 TABLET ORAL at 09:16

## 2025-06-19 RX ADMIN — WATER 2000 MG: 1 INJECTION INTRAMUSCULAR; INTRAVENOUS; SUBCUTANEOUS at 16:34

## 2025-06-19 RX ADMIN — IPRATROPIUM BROMIDE AND ALBUTEROL SULFATE 1 DOSE: .5; 3 SOLUTION RESPIRATORY (INHALATION) at 07:35

## 2025-06-19 RX ADMIN — WATER 20 MG: 1 INJECTION INTRAMUSCULAR; INTRAVENOUS; SUBCUTANEOUS at 21:35

## 2025-06-19 RX ADMIN — MINERAL OIL, PETROLATUM: 425; 568 OINTMENT OPHTHALMIC at 17:45

## 2025-06-19 RX ADMIN — DEXMEDETOMIDINE 1 MCG/KG/HR: 100 INJECTION, SOLUTION INTRAVENOUS at 14:13

## 2025-06-19 RX ADMIN — FAMOTIDINE 10 MG: 10 INJECTION, SOLUTION INTRAVENOUS at 09:16

## 2025-06-19 RX ADMIN — CHLORHEXIDINE GLUCONATE 15 ML: 1.2 RINSE ORAL at 21:35

## 2025-06-19 RX ADMIN — ARFORMOTEROL TARTRATE 15 MCG: 15 SOLUTION RESPIRATORY (INHALATION) at 07:35

## 2025-06-19 RX ADMIN — WATER 20 MG: 1 INJECTION INTRAMUSCULAR; INTRAVENOUS; SUBCUTANEOUS at 15:40

## 2025-06-19 RX ADMIN — MINERAL OIL, PETROLATUM: 425; 568 OINTMENT OPHTHALMIC at 00:55

## 2025-06-19 RX ADMIN — MIDAZOLAM HYDROCHLORIDE 2 MG: 1 INJECTION, SOLUTION INTRAMUSCULAR; INTRAVENOUS at 07:26

## 2025-06-19 RX ADMIN — MINERAL OIL, PETROLATUM: 425; 568 OINTMENT OPHTHALMIC at 21:34

## 2025-06-19 RX ADMIN — ARFORMOTEROL TARTRATE 15 MCG: 15 SOLUTION RESPIRATORY (INHALATION) at 20:17

## 2025-06-19 RX ADMIN — WATER 20 MG: 1 INJECTION INTRAMUSCULAR; INTRAVENOUS; SUBCUTANEOUS at 05:55

## 2025-06-19 RX ADMIN — FENTANYL CITRATE 125 MCG/HR: 0.05 INJECTION, SOLUTION INTRAMUSCULAR; INTRAVENOUS at 03:30

## 2025-06-19 RX ADMIN — IPRATROPIUM BROMIDE AND ALBUTEROL SULFATE 1 DOSE: .5; 3 SOLUTION RESPIRATORY (INHALATION) at 20:16

## 2025-06-19 RX ADMIN — MINERAL OIL, PETROLATUM: 425; 568 OINTMENT OPHTHALMIC at 05:57

## 2025-06-19 RX ADMIN — BUDESONIDE 500 MCG: 0.5 INHALANT RESPIRATORY (INHALATION) at 20:18

## 2025-06-19 RX ADMIN — AZITHROMYCIN MONOHYDRATE 500 MG: 500 INJECTION, POWDER, LYOPHILIZED, FOR SOLUTION INTRAVENOUS at 16:49

## 2025-06-19 RX ADMIN — DEXMEDETOMIDINE 1 MCG/KG/HR: 100 INJECTION, SOLUTION INTRAVENOUS at 09:45

## 2025-06-19 RX ADMIN — SODIUM CHLORIDE, PRESERVATIVE FREE 40 MG: 5 INJECTION INTRAVENOUS at 13:29

## 2025-06-19 RX ADMIN — MINERAL OIL, PETROLATUM: 425; 568 OINTMENT OPHTHALMIC at 09:44

## 2025-06-19 RX ADMIN — ENOXAPARIN SODIUM 30 MG: 100 INJECTION SUBCUTANEOUS at 00:58

## 2025-06-19 RX ADMIN — CHLORHEXIDINE GLUCONATE 15 ML: 1.2 RINSE ORAL at 09:15

## 2025-06-19 RX ADMIN — FUROSEMIDE 40 MG: 10 INJECTION, SOLUTION INTRAMUSCULAR; INTRAVENOUS at 21:22

## 2025-06-19 RX ADMIN — POLYETHYLENE GLYCOL 3350 17 G: 17 POWDER, FOR SOLUTION ORAL at 09:17

## 2025-06-19 RX ADMIN — DEXMEDETOMIDINE 1 MCG/KG/HR: 100 INJECTION, SOLUTION INTRAVENOUS at 01:13

## 2025-06-19 RX ADMIN — MICAFUNGIN SODIUM 150 MG: 50 INJECTION, POWDER, LYOPHILIZED, FOR SOLUTION INTRAVENOUS at 09:37

## 2025-06-19 ASSESSMENT — PULMONARY FUNCTION TESTS
PIF_VALUE: 17
PIF_VALUE: 19
PIF_VALUE: 20
PIF_VALUE: 17
PIF_VALUE: 21
PIF_VALUE: 19

## 2025-06-19 ASSESSMENT — PAIN SCALES - GENERAL
PAINLEVEL_OUTOF10: 0

## 2025-06-19 NOTE — PLAN OF CARE
Problem: Safety - Medical Restraint  Goal: Remains free of injury from restraints (Restraint for Interference with Medical Device)  Description: INTERVENTIONS:  1. Determine that other, less restrictive measures have been tried or would not be effective before applying the restraint  2. Evaluate the patient's condition at the time of restraint application  3. Inform patient/family regarding the reason for restraint  4. Q2H: Monitor safety, psychosocial status, comfort, nutrition and hydration  6/18/2025 2102 by Landy Hernadez RN  Outcome: Progressing  Flowsheets (Taken 6/18/2025 1048)  Remains free of injury from restraints (restraint for interference with medical device):   Determine that other, less restrictive measures have been tried or would not be effective before applying the restraint   Evaluate the patient's condition at the time of restraint application   Inform patient/family regarding the reason for restraint   Every 2 hours: Monitor safety, psychosocial status, comfort, nutrition and hydration  6/18/2025 2102 by Landy Hernadez RN  Outcome: Progressing  Flowsheets (Taken 6/18/2025 1048)  Remains free of injury from restraints (restraint for interference with medical device):   Determine that other, less restrictive measures have been tried or would not be effective before applying the restraint   Evaluate the patient's condition at the time of restraint application   Inform patient/family regarding the reason for restraint   Every 2 hours: Monitor safety, psychosocial status, comfort, nutrition and hydration     Problem: Discharge Planning  Goal: Discharge to home or other facility with appropriate resources  6/18/2025 2102 by Landy Hernadez RN  Outcome: Progressing  Flowsheets (Taken 6/18/2025 0800)  Discharge to home or other facility with appropriate resources:   Identify barriers to discharge with patient and caregiver   Arrange for needed discharge resources and transportation as

## 2025-06-19 NOTE — PROGRESS NOTES
Hospitalist Progress Note    Patient: Beny Knapp MRN: [id number]  CSN: [id number]    YOB: 1965  Age: 59 y.o.  Sex: male    DOA: 6/14/2025 LOS:  LOS: 5 days          Chief Complain :  Foreign body, shortness of breath.  59 y.o.  male w/ PMH of COPD, ETOH-use and polysubstance abuse who was BIBA and presents with subjective difficulty breathing and swallowing with throat pain. Brought in by EMS for possible forign body (food) with bolus within region of velecula. ENT saw pt and ED intubated for airway protection. ENT was consulted and performed scope on pt with findings of extensive esophageal candidiasis. Nephrology was consulted for severe hyponatremia with sodium of 117 recommending NS at 75cc/hr. Intensivist was contacted as well d/t pt being intubated, on vent for airway protection.   Subjective:   Patient orally intubated and sedated.     Assessment/Plan     Active Hospital Problems    Diagnosis     Aspiration pneumonia (HCC) [J69.0]     Cocaine abuse (HCC) [F14.10]     Hyponatremia [E87.1]     Stridor [R06.1]     Obstructed, larynx [J38.6]     Acute respiratory failure with hypoxia and hypercapnia (HCC) [J96.01, J96.02]     ETOH abuse [F10.10]     Polysubstance abuse (HCC) [F19.10]     Candidiasis of esophagus (HCC) [B37.81]     Tobacco abuse [Z72.0]     HTN (hypertension) [I10]     COPD (chronic obstructive pulmonary disease) (HCC) [J44.9]     Chronic hepatitis C (HCC) [B18.2]           CRITICAL CARE PLAN    Resp   Acute airway obstruction  Intubated to protect airway  See vent orders, VAP bundle. HOB>30 degrees.   Now requiring 60 %FiO2, PEEP of 6.  CXR with persistent but improved bilateral lower lobe

## 2025-06-19 NOTE — PROGRESS NOTES
Palliative Medicine Progress Note  [facility]: [phone]     Patient Name: Beny Knapp  YOB: 1965    Date of Service: 2025  Provider: OCTAVIO Gasca CNP  Admit Date: 2025  Referring Provider:  Jocelyne Richmond MD    Reason for Consult: goals of care       HISTORY OF PRESENT ILLNESS:     Beny Knapp is a 59 y.o. with a past history of COPD on chronic/frequent steroid therapy, HTN, HCV cirrhosis (infection treated and HCV quant negative), tobacco use disorder (1/3-1 PPD), bipolar 1 disorder, and alcohol and cocaine use disorder recently at [facility] in 2025 who was admitted on 2025 when he presented to ED with sensation of foreign body in the throat with stridor. Found to have severe candidiasis and required intubation. Patient was also found to have hyponatremia. He remains in ICU on ventilator with fluctuating oxygen requirements.    Psychosocial: Patient has no emergency contacts on file. A relative search is underway with Case Management to identify surrogate decision-makers and verify personal history. Phone numbers for a mother, Rose Al, have not been working. Welfare check for a brother in [place], [place] determined he is . Previous medical records consistently state patient has never been . One medical record states he has one child, but lists no name or contact information.    Functional: Patient's baseline functional status was presumably independent with ADLs and IADLs, but with significant psychosocial morbidity. He is currently intubated/sedated and RASS -4/-5.     PHYSICAL EXAM:     From RN flowsheet:  Wt Readings from Last 3 Encounters:   25 115.3 kg (254 lb 3.1 oz)   23 111.1 kg (245 lb)     BP (!) 158/86   Pulse 61   Temp 98.7 °F (37.1 °C) (Oral)   Resp 17   Ht 1.829 m (6' 0.01\")   Wt 115.3 kg (254 lb 3.1 oz)   SpO2 95%   BMI 34.47 kg/m²     Constitutional: Intubated and sedated on

## 2025-06-19 NOTE — PROGRESS NOTES
Palliative Medicine Progress Note  [facility]: [phone]     Patient Name: Beny Knapp  YOB: 1965    Date of Service: 2025  Provider: OCTAVIO Gasca CNP  Admit Date: 2025  Referring Provider:  Jocelyne Richmond MD    Reason for Consult: goals of care       HISTORY OF PRESENT ILLNESS:     Beny Knapp is a 59 y.o. with a past history of COPD on chronic/frequent steroid therapy, HTN, HCV cirrhosis (infection treated and HCV quant negative), tobacco use disorder (1/3- PPD), bipolar 1 disorder, and alcohol and cocaine use disorder recently at [facility] in 2025 who was admitted on 2025 when he presented to ED with sensation of foreign body in the throat with stridor. Found to have severe candidiasis and required intubation. Patient was also found to have hyponatremia. He remains in ICU on ventilator with fluctuating oxygen requirements.    Psychosocial: Patient has no emergency contacts on file. A relative search is underway with Case Management to identify surrogate decision-makers and verify personal history. Phone numbers for a mother, Rose Al, have not been working. Welfare check for a brother in [place], [place] determined he is . Previous medical records consistently state patient has never been . One medical record states he has one child, but lists no name or contact information.    Functional: Patient's baseline functional status was presumably independent with ADLs and IADLs, but with significant psychosocial morbidity. He is currently intubated/sedated and RASS -4/-5.     PHYSICAL EXAM:     From RN flowsheet:  Wt Readings from Last 3 Encounters:   25 115.3 kg (254 lb 3.1 oz)   23 111.1 kg (245 lb)     BP (!) 158/86   Pulse 61   Temp 98.7 °F (37.1 °C) (Oral)   Resp 17   Ht 1.829 m (6' 0.01\")   Wt 115.3 kg (254 lb 3.1 oz)   SpO2 95%   BMI 34.47 kg/m²     Constitutional: Intubated and sedated on

## 2025-06-19 NOTE — PROGRESS NOTES
[facility]  (A Division of [facility])  Renee Stanford MD   Office Tel:  [phone] Option #8                                                               Date of Admission: 6/14/2025       ID Consult for antimicrobial management suspected esophageal candidiasis   PCP: Emeli Duckworth MD    C/C:     Current Antimicrobials:    Prior Antimicrobials:  Zithro 6/16 to date  CAX 6/16 to date  Fluconazole 6/14-> Micafungin  150 mg daily   #5     NA     Allergy to antibiotics- NA     Assessment:     Critically ill patient with:    Esophagitis- presumed candidal--Underlying immunodeficiency?  Fungitell < 31, galactomannan normal( 0.03)= 6/14  Stridor/ candidal esophagitis/intubated- 10/2024 in [facility] as well- new info from his PCP    Lung infiltrate-atelectasis Vs PNA on CT--resp cultures 6/14- normal kenia  Acute hypoxic resp failure- Intubated on arrival. High FIO2/PEEP requirement  Hyponatremia  Transaminitis- resolved    6/14 - blood cutlure X2: NGSF, resp culture- mixed GNR- normal resp kenia        -UA no pyuria, urine cx-Neg        -X-ray-bibasilar scarring/atelectasis, neck x-ray- no acute pathology        -CT CAP: Bibasilar atelectasis/pneumonia left greater than right.  Bullous emphysematous change.  Procal 0.03    6/16- KOH from mouth- no yeast         -6/16- respiratory culture- in progress    HIV 1/2- Non -reactive 6/14/25, HIV PCR -<20    Co-morbidities:    Hypertension  SIAD  COPD  Chronic hepatitis C- treated with Charissa- Dr Guzman    Recommendation -- ID related:     Immunoglobulin level IgG, M, E-normal.     FU repeat resp culture- 6/16-until final  FU fungitell/ galactomannan, Immunoglobulin level-normal. Will repeat Fungitell tomorrow  Cont current antibiotics- CAX/Zithromax and micafungin. Keep ABX course brief  Steroid taper per ICU team  DW ICU RN/MD a    DW ICU MD-Dr Catherine Monroe- patient PCP-- no known of recurrent infection  Status: Completed Specimen: Blood Updated: 06/19/25 0757     Special Requests --        RIGHT  HAND       Culture NO GROWTH 5 DAYS       Culture, Blood 2 [[id number]] Collected: 06/14/25 0859    Order Status: Completed Specimen: Blood Updated: 06/19/25 0757     Special Requests --        NO SPECIAL REQUESTS  LHAND       Culture NO GROWTH 5 DAYS       Culture, Urine [[id number]] Collected: 06/14/25 0836    Order Status: Completed Specimen: Urine, clean catch Updated: 06/15/25 1702     Special Requests NO SPECIAL REQUESTS        Culture No growth (<1,000 CFU/ML)              Labs: Results:   Chemistry Recent Labs     06/17/25 0621 06/18/25 0356 06/19/25 0458   * 133* 133*   K 5.4 5.3 5.1   CL 93* 93* 92*   CO2 31 32 33*   BUN 22 24* 27*   GLOB 3.5 3.3 3.1      CBC w/Diff Recent Labs     06/17/25 0621 06/18/25 0356 06/19/25 0458   WBC 8.0 5.2 5.7   RBC 3.91* 3.79* 3.86*   HGB 11.4* 11.2* 11.3*   HCT 35.8* 34.9* 35.5*    259 236            No results found for: \"SDES\" No components found for: \"CULT\"         Imaging:   All imaging reviewed from Admission to present as per radiology interpretation in [facility]    Radiology reports reviewed:    XR CHEST PORTABLE  Result Date: 6/17/2025  EXAM:  XR CHEST PORTABLE INDICATION: et tube Comparison to 6/16/2025. Portable exam obtained at 614 demonstrates persistent but improved bilateral lower lobe infiltrates. Small bilateral pleural effusions again noted. Stable life-support lines and tubes.     Persistent but improved bilateral lower lobe infiltrates. Persistent bilateral pleural effusions. Electronically signed by CARLOS SCHMIDT    XR ABDOMEN (KUB) (SINGLE AP VIEW)  Result Date: 6/16/2025  EXAM: XR ABDOMEN (KUB) (SINGLE AP VIEW) ACC#: [id number]  INDICATION: r/o ileus  COMPARISON: None. TECHNIQUE: Supine views of the abdomen. FINDINGS: Endotracheal tube tip is near the diaphragm in the midline, overlying the stomach. There are no significant dilated

## 2025-06-19 NOTE — PROGRESS NOTES
bleeding or hematoma      Data:       Recent Results (from the past 24 hours)   POCT Glucose    Collection Time: 06/18/25  1:14 PM   Result Value Ref Range    POC Glucose 152 (H) 70 - 110 mg/dL   POCT Glucose    Collection Time: 06/18/25  6:22 PM   Result Value Ref Range    POC Glucose 148 (H) 70 - 110 mg/dL   POCT Glucose    Collection Time: 06/19/25 12:57 AM   Result Value Ref Range    POC Glucose 154 (H) 70 - 110 mg/dL   POC G3: BLOOD GASES INCLUDES CALC. TCO2, HCO3, BASE EXCESS, SO2    Collection Time: 06/19/25  4:17 AM   Result Value Ref Range    DEVICE ADULT VENT      FIO2 70 %    POC pH 7.43 7.35 - 7.45      POC pCO2 59.0 (H) 35.0 - 48.0 MMHG    POC PO2 68 (L) 83 - 108 MMHG    POC HCO3 39.5 (H) 21 - 28 MMOL/L    POC O2 SAT 93.2 92 - 97 %    Base Excess 13.1 mmol/L    Mode BILEVEL      POC TIDAL VOLUME 460 ml    Sed Rate 14 bpm    POC PEEP 8 cmH2O    POC Orlando's Test Positive      Respiratory Rate 14      Site RIGHT RADIAL      Specimen type: ARTERIAL      Performed by: Liu PARTIDA    CBC    Collection Time: 06/19/25  4:58 AM   Result Value Ref Range    WBC 5.7 4.6 - 13.2 K/uL    RBC 3.86 (L) 4.35 - 5.65 M/uL    Hemoglobin 11.3 (L) 13.0 - 16.0 g/dL    Hematocrit 35.5 (L) 36.0 - 48.0 %    MCV 92.0 78.0 - 100.0 FL    MCH 29.3 24.0 - 34.0 PG    MCHC 31.8 31.0 - 37.0 g/dL    RDW 14.3 11.6 - 14.5 %    Platelets 236 135 - 420 K/uL    MPV 9.1 (L) 9.2 - 11.8 FL    Nucleated RBCs 0.0 0  WBC    nRBC 0.00 0.00 - 0.01 K/uL   Magnesium    Collection Time: 06/19/25  4:58 AM   Result Value Ref Range    Magnesium 2.4 1.6 - 2.6 mg/dL   Comprehensive Metabolic Panel    Collection Time: 06/19/25  4:58 AM   Result Value Ref Range    Sodium 133 (L) 136 - 145 mmol/L    Potassium 5.1 3.5 - 5.5 mmol/L    Chloride 92 (L) 98 - 107 mmol/L    CO2 33 (H) 21 - 32 mmol/L    Anion Gap 8 3 - 18 mmol/L    Glucose 151 (H) 74 - 108 mg/dL    BUN 27 (H) 6 - 23 MG/DL    Creatinine 0.56 (L) 0.60 - 1.30 MG/DL    BUN/Creatinine Ratio 48  POS COCCI IN CLUSTERS                  RARE Gram positive cocci IN PAIRS AND CHAINS           Culture       HEAVY Mixed Gram Negative Rods                  HEAVY NORMAL RESPIRATORY FRAN          Culture, Blood 1 [[id number]] Collected: 06/14/25 0902    Order Status: Completed Specimen: Blood Updated: 06/19/25 0757     Special Requests --        RIGHT  HAND       Culture NO GROWTH 5 DAYS       Culture, Blood 2 [[id number]] Collected: 06/14/25 0859    Order Status: Completed Specimen: Blood Updated: 06/19/25 0757     Special Requests --        NO SPECIAL REQUESTS  LHAND       Culture NO GROWTH 5 DAYS       Culture, Urine [[id number]] Collected: 06/14/25 0836    Order Status: Completed Specimen: Urine, clean catch Updated: 06/15/25 1702     Special Requests NO SPECIAL REQUESTS        Culture No growth (<1,000 CFU/ML)                Imaging studies    XR CHEST PORTABLE  Result Date: 6/19/2025  EXAM: XR CHEST PORTABLE INDICATION: et tube COMPARISON: 6/18/2025 FINDINGS: A portable AP radiograph of the chest was obtained at 512 hours. Endotracheal tube is 5.2 cm above the justine. Nasogastric tube courses in the stomach. Right IJ central venous catheter.. Cardiac monitoring leads. Left effusion and bibasilar volume loss unchanged. Cardiomegaly..  The bones and soft tissues are grossly within normal limits.     Left effusion and bibasilar volume loss unchanged. Electronically signed by Eliseo Espinosa    XR CHEST PORTABLE  Result Date: 6/18/2025  EXAM: XR CHEST PORTABLE INDICATION: et tube COMPARISON: 6/17/2025 FINDINGS: A portable AP radiograph of the chest was obtained at 434 hours. Endotracheal tube is 8 cm above the justine. Nasogastric tube courses in the stomach. Right IJ catheter is noted.. Small left effusion. Improved aeration of the lung bases.. Large..  The bones and soft tissues are grossly within normal limits.     Endotracheal tube is 8 cm above the justine. Left effusion. There is improved aeration in the lung

## 2025-06-19 NOTE — PLAN OF CARE
Problem: Safety - Medical Restraint  Goal: Remains free of injury from restraints (Restraint for Interference with Medical Device)  Description: INTERVENTIONS:  1. Determine that other, less restrictive measures have been tried or would not be effective before applying the restraint  2. Evaluate the patient's condition at the time of restraint application  3. Inform patient/family regarding the reason for restraint  4. Q2H: Monitor safety, psychosocial status, comfort, nutrition and hydration  Outcome: Progressing  Flowsheets  Taken 6/19/2025 1600  Remains free of injury from restraints (restraint for interference with medical device):   Determine that other, less restrictive measures have been tried or would not be effective before applying the restraint   Evaluate the patient's condition at the time of restraint application   Inform patient/family regarding the reason for restraint   Every 2 hours: Monitor safety, psychosocial status, comfort, nutrition and hydration  Taken 6/19/2025 1400  Remains free of injury from restraints (restraint for interference with medical device):   Determine that other, less restrictive measures have been tried or would not be effective before applying the restraint   Evaluate the patient's condition at the time of restraint application   Inform patient/family regarding the reason for restraint   Every 2 hours: Monitor safety, psychosocial status, comfort, nutrition and hydration  Taken 6/19/2025 1200  Remains free of injury from restraints (restraint for interference with medical device):   Determine that other, less restrictive measures have been tried or would not be effective before applying the restraint   Evaluate the patient's condition at the time of restraint application   Inform patient/family regarding the reason for restraint   Every 2 hours: Monitor safety, psychosocial status, comfort, nutrition and hydration  Taken 6/19/2025 1144  Remains free of injury from  restraints (restraint for interference with medical device):   Determine that other, less restrictive measures have been tried or would not be effective before applying the restraint   Evaluate the patient's condition at the time of restraint application   Inform patient/family regarding the reason for restraint   Every 2 hours: Monitor safety, psychosocial status, comfort, nutrition and hydration     Problem: Discharge Planning  Goal: Discharge to home or other facility with appropriate resources  Outcome: Progressing  Flowsheets (Taken 6/19/2025 0800)  Discharge to home or other facility with appropriate resources:   Identify barriers to discharge with patient and caregiver   Arrange for needed discharge resources and transportation as appropriate   Identify discharge learning needs (meds, wound care, etc)     Problem: Pain  Goal: Verbalizes/displays adequate comfort level or baseline comfort level  Outcome: Progressing  Flowsheets (Taken 6/19/2025 0800)  Verbalizes/displays adequate comfort level or baseline comfort level:   Encourage patient to monitor pain and request assistance   Assess pain using appropriate pain scale   Administer analgesics based on type and severity of pain and evaluate response     Problem: Chronic Conditions and Co-morbidities  Goal: Patient's chronic conditions and co-morbidity symptoms are monitored and maintained or improved  Outcome: Progressing  Flowsheets (Taken 6/19/2025 0800)  Care Plan - Patient's Chronic Conditions and Co-Morbidity Symptoms are Monitored and Maintained or Improved:   Monitor and assess patient's chronic conditions and comorbid symptoms for stability, deterioration, or improvement   Update acute care plan with appropriate goals if chronic or comorbid symptoms are exacerbated and prevent overall improvement and discharge   Collaborate with multidisciplinary team to address chronic and comorbid conditions and prevent exacerbation or deterioration     Problem:

## 2025-06-20 ENCOUNTER — APPOINTMENT (OUTPATIENT)
Facility: HOSPITAL | Age: 60
DRG: 368 | End: 2025-06-20
Payer: MEDICARE

## 2025-06-20 LAB
ANION GAP SERPL CALC-SCNC: 10 MMOL/L (ref 3–18)
ARTERIAL PATENCY WRIST A: POSITIVE
BACTERIA SPEC CULT: ABNORMAL
BACTERIA SPEC CULT: NORMAL
BACTERIA SPEC CULT: NORMAL
BASE EXCESS BLD CALC-SCNC: 13.2 MMOL/L
BASOPHILS # BLD: 0 K/UL (ref 0–0.1)
BASOPHILS NFR BLD: 0 % (ref 0–2)
BDY SITE: ABNORMAL
BUN SERPL-MCNC: 29 MG/DL (ref 6–23)
BUN/CREAT SERPL: 47 (ref 12–20)
CALCIUM SERPL-MCNC: 9.1 MG/DL (ref 8.5–10.1)
CHLORIDE SERPL-SCNC: 90 MMOL/L (ref 98–107)
CO2 SERPL-SCNC: 33 MMOL/L (ref 21–32)
CREAT SERPL-MCNC: 0.6 MG/DL (ref 0.6–1.3)
DIFFERENTIAL METHOD BLD: ABNORMAL
EOSINOPHIL # BLD: 0 K/UL (ref 0–0.4)
EOSINOPHIL NFR BLD: 0 % (ref 0–5)
ERYTHROCYTE [DISTWIDTH] IN BLOOD BY AUTOMATED COUNT: 13.9 % (ref 11.6–14.5)
GAS FLOW.O2 O2 DELIVERY SYS: ABNORMAL
GAS FLOW.O2 SETTING OXYMISER: 14 BPM
GLUCOSE BLD STRIP.AUTO-MCNC: 148 MG/DL (ref 70–110)
GLUCOSE BLD STRIP.AUTO-MCNC: 149 MG/DL (ref 70–110)
GLUCOSE BLD STRIP.AUTO-MCNC: 151 MG/DL (ref 70–110)
GLUCOSE BLD STRIP.AUTO-MCNC: 152 MG/DL (ref 70–110)
GLUCOSE BLD STRIP.AUTO-MCNC: 156 MG/DL (ref 70–110)
GLUCOSE SERPL-MCNC: 142 MG/DL (ref 74–108)
GRAM STN SPEC: ABNORMAL
HCO3 BLD-SCNC: 39.7 MMOL/L (ref 21–28)
HCT VFR BLD AUTO: 36 % (ref 36–48)
HGB BLD-MCNC: 12.2 G/DL (ref 13–16)
IMM GRANULOCYTES # BLD AUTO: 0.04 K/UL (ref 0–0.04)
IMM GRANULOCYTES NFR BLD AUTO: 0.5 % (ref 0–0.5)
LYMPHOCYTES # BLD: 0.69 K/UL (ref 0.9–3.3)
LYMPHOCYTES NFR BLD: 9 % (ref 21–52)
MAGNESIUM SERPL-MCNC: 2.4 MG/DL (ref 1.6–2.6)
MCH RBC QN AUTO: 29.5 PG (ref 24–34)
MCHC RBC AUTO-ENTMCNC: 33.9 G/DL (ref 31–37)
MCV RBC AUTO: 87.2 FL (ref 78–100)
MONOCYTES # BLD: 0.66 K/UL (ref 0.05–1.2)
MONOCYTES NFR BLD: 8.6 % (ref 3–10)
NEUTS SEG # BLD: 6.3 K/UL (ref 1.8–8)
NEUTS SEG NFR BLD: 81.9 % (ref 40–73)
NRBC # BLD: 0 K/UL (ref 0–0.01)
NRBC BLD-RTO: 0 PER 100 WBC
O2/TOTAL GAS SETTING VFR VENT: 100 %
PCO2 BLD: 57.7 MMHG (ref 35–48)
PEEP RESPIRATORY: 5 CMH2O
PH BLD: 7.45 (ref 7.35–7.45)
PHOSPHATE SERPL-MCNC: 4.9 MG/DL (ref 2.5–4.9)
PLATELET # BLD AUTO: 227 K/UL (ref 135–420)
PMV BLD AUTO: 9.4 FL (ref 9.2–11.8)
PO2 BLD: 73 MMHG (ref 83–108)
POTASSIUM SERPL-SCNC: 4.7 MMOL/L (ref 3.5–5.5)
RBC # BLD AUTO: 4.13 M/UL (ref 4.35–5.65)
RESPIRATORY RATE, POC: 14 (ref 5–40)
SAO2 % BLD: 94.5 % (ref 92–97)
SERVICE CMNT-IMP: ABNORMAL
SERVICE CMNT-IMP: ABNORMAL
SERVICE CMNT-IMP: NORMAL
SERVICE CMNT-IMP: NORMAL
SODIUM SERPL-SCNC: 132 MMOL/L (ref 136–145)
SPECIMEN TYPE: ABNORMAL
VENTILATION MODE VENT: ABNORMAL
VT SETTING VENT: 460 ML
WBC # BLD AUTO: 7.7 K/UL (ref 4.6–13.2)

## 2025-06-20 PROCEDURE — 82803 BLOOD GASES ANY COMBINATION: CPT

## 2025-06-20 PROCEDURE — 2700000000 HC OXYGEN THERAPY PER DAY

## 2025-06-20 PROCEDURE — 84100 ASSAY OF PHOSPHORUS: CPT

## 2025-06-20 PROCEDURE — 6370000000 HC RX 637 (ALT 250 FOR IP): Performed by: INTERNAL MEDICINE

## 2025-06-20 PROCEDURE — 2500000003 HC RX 250 WO HCPCS: Performed by: INTERNAL MEDICINE

## 2025-06-20 PROCEDURE — 2580000003 HC RX 258: Performed by: INTERNAL MEDICINE

## 2025-06-20 PROCEDURE — 99231 SBSQ HOSP IP/OBS SF/LOW 25: CPT

## 2025-06-20 PROCEDURE — 6360000002 HC RX W HCPCS: Performed by: INTERNAL MEDICINE

## 2025-06-20 PROCEDURE — 36600 WITHDRAWAL OF ARTERIAL BLOOD: CPT

## 2025-06-20 PROCEDURE — 82962 GLUCOSE BLOOD TEST: CPT

## 2025-06-20 PROCEDURE — 2000000000 HC ICU R&B

## 2025-06-20 PROCEDURE — 71275 CT ANGIOGRAPHY CHEST: CPT

## 2025-06-20 PROCEDURE — 85025 COMPLETE CBC W/AUTO DIFF WBC: CPT

## 2025-06-20 PROCEDURE — 83735 ASSAY OF MAGNESIUM: CPT

## 2025-06-20 PROCEDURE — 94640 AIRWAY INHALATION TREATMENT: CPT

## 2025-06-20 PROCEDURE — 94003 VENT MGMT INPAT SUBQ DAY: CPT

## 2025-06-20 PROCEDURE — 70450 CT HEAD/BRAIN W/O DYE: CPT

## 2025-06-20 PROCEDURE — 6360000004 HC RX CONTRAST MEDICATION: Performed by: HOSPITALIST

## 2025-06-20 PROCEDURE — 80048 BASIC METABOLIC PNL TOTAL CA: CPT

## 2025-06-20 PROCEDURE — 51702 INSERT TEMP BLADDER CATH: CPT

## 2025-06-20 PROCEDURE — 71045 X-RAY EXAM CHEST 1 VIEW: CPT

## 2025-06-20 RX ORDER — IOPAMIDOL 755 MG/ML
75 INJECTION, SOLUTION INTRAVASCULAR
Status: COMPLETED | OUTPATIENT
Start: 2025-06-20 | End: 2025-06-20

## 2025-06-20 RX ADMIN — IPRATROPIUM BROMIDE AND ALBUTEROL SULFATE 1 DOSE: .5; 3 SOLUTION RESPIRATORY (INHALATION) at 20:37

## 2025-06-20 RX ADMIN — WATER 20 MG: 1 INJECTION INTRAMUSCULAR; INTRAVENOUS; SUBCUTANEOUS at 06:11

## 2025-06-20 RX ADMIN — ARFORMOTEROL TARTRATE 15 MCG: 15 SOLUTION RESPIRATORY (INHALATION) at 09:11

## 2025-06-20 RX ADMIN — DEXMEDETOMIDINE 1.2 MCG/KG/HR: 100 INJECTION, SOLUTION INTRAVENOUS at 16:49

## 2025-06-20 RX ADMIN — ENOXAPARIN SODIUM 30 MG: 100 INJECTION SUBCUTANEOUS at 13:48

## 2025-06-20 RX ADMIN — MICAFUNGIN SODIUM 150 MG: 50 INJECTION, POWDER, LYOPHILIZED, FOR SOLUTION INTRAVENOUS at 09:08

## 2025-06-20 RX ADMIN — SODIUM CHLORIDE 10 MG/HR: 900 INJECTION, SOLUTION INTRAVENOUS at 12:49

## 2025-06-20 RX ADMIN — WATER 20 MG: 1 INJECTION INTRAMUSCULAR; INTRAVENOUS; SUBCUTANEOUS at 23:40

## 2025-06-20 RX ADMIN — Medication 100 MG: at 08:13

## 2025-06-20 RX ADMIN — FENTANYL CITRATE 125 MCG/HR: 0.05 INJECTION, SOLUTION INTRAMUSCULAR; INTRAVENOUS at 21:23

## 2025-06-20 RX ADMIN — ARFORMOTEROL TARTRATE 15 MCG: 15 SOLUTION RESPIRATORY (INHALATION) at 20:38

## 2025-06-20 RX ADMIN — SODIUM CHLORIDE 10 MG/HR: 900 INJECTION, SOLUTION INTRAVENOUS at 03:41

## 2025-06-20 RX ADMIN — CEFEPIME 2000 MG: 2 INJECTION, POWDER, FOR SOLUTION INTRAVENOUS at 13:48

## 2025-06-20 RX ADMIN — SENNOSIDES 8.6 MG: 8.6 TABLET, COATED ORAL at 08:13

## 2025-06-20 RX ADMIN — ENOXAPARIN SODIUM 30 MG: 100 INJECTION SUBCUTANEOUS at 01:11

## 2025-06-20 RX ADMIN — MINERAL OIL, PETROLATUM: 425; 568 OINTMENT OPHTHALMIC at 06:12

## 2025-06-20 RX ADMIN — FENTANYL CITRATE 125 MCG/HR: 0.05 INJECTION, SOLUTION INTRAMUSCULAR; INTRAVENOUS at 12:49

## 2025-06-20 RX ADMIN — FENTANYL CITRATE 125 MCG/HR: 0.05 INJECTION, SOLUTION INTRAMUSCULAR; INTRAVENOUS at 04:34

## 2025-06-20 RX ADMIN — MINERAL OIL, PETROLATUM: 425; 568 OINTMENT OPHTHALMIC at 21:08

## 2025-06-20 RX ADMIN — WATER 20 MG: 1 INJECTION INTRAMUSCULAR; INTRAVENOUS; SUBCUTANEOUS at 15:49

## 2025-06-20 RX ADMIN — DEXMEDETOMIDINE 1.2 MCG/KG/HR: 100 INJECTION, SOLUTION INTRAVENOUS at 05:10

## 2025-06-20 RX ADMIN — MINERAL OIL, PETROLATUM: 425; 568 OINTMENT OPHTHALMIC at 14:31

## 2025-06-20 RX ADMIN — SODIUM CHLORIDE, PRESERVATIVE FREE 40 MG: 5 INJECTION INTRAVENOUS at 08:14

## 2025-06-20 RX ADMIN — SENNOSIDES 8.6 MG: 8.6 TABLET, COATED ORAL at 21:32

## 2025-06-20 RX ADMIN — MINERAL OIL, PETROLATUM: 425; 568 OINTMENT OPHTHALMIC at 01:22

## 2025-06-20 RX ADMIN — POLYETHYLENE GLYCOL 3350 17 G: 17 POWDER, FOR SOLUTION ORAL at 08:14

## 2025-06-20 RX ADMIN — AZITHROMYCIN MONOHYDRATE 500 MG: 500 INJECTION, POWDER, LYOPHILIZED, FOR SOLUTION INTRAVENOUS at 18:08

## 2025-06-20 RX ADMIN — BUDESONIDE 500 MCG: 0.5 INHALANT RESPIRATORY (INHALATION) at 09:11

## 2025-06-20 RX ADMIN — FOLIC ACID 1 MG: 1 TABLET ORAL at 08:13

## 2025-06-20 RX ADMIN — FUROSEMIDE 40 MG: 10 INJECTION, SOLUTION INTRAMUSCULAR; INTRAVENOUS at 08:13

## 2025-06-20 RX ADMIN — SODIUM CHLORIDE 10 MG/HR: 900 INJECTION, SOLUTION INTRAVENOUS at 23:11

## 2025-06-20 RX ADMIN — IPRATROPIUM BROMIDE AND ALBUTEROL SULFATE 1 DOSE: .5; 3 SOLUTION RESPIRATORY (INHALATION) at 09:11

## 2025-06-20 RX ADMIN — CHLORHEXIDINE GLUCONATE 15 ML: 1.2 RINSE ORAL at 08:13

## 2025-06-20 RX ADMIN — SODIUM CHLORIDE, PRESERVATIVE FREE 10 ML: 5 INJECTION INTRAVENOUS at 21:07

## 2025-06-20 RX ADMIN — BUDESONIDE 500 MCG: 0.5 INHALANT RESPIRATORY (INHALATION) at 20:39

## 2025-06-20 RX ADMIN — CHLORHEXIDINE GLUCONATE 15 ML: 1.2 RINSE ORAL at 19:48

## 2025-06-20 RX ADMIN — IOPAMIDOL 75 ML: 755 INJECTION, SOLUTION INTRAVENOUS at 13:12

## 2025-06-20 RX ADMIN — DEXMEDETOMIDINE 1.2 MCG/KG/HR: 100 INJECTION, SOLUTION INTRAVENOUS at 09:22

## 2025-06-20 RX ADMIN — MINERAL OIL, PETROLATUM: 425; 568 OINTMENT OPHTHALMIC at 08:36

## 2025-06-20 RX ADMIN — MINERAL OIL, PETROLATUM: 425; 568 OINTMENT OPHTHALMIC at 17:28

## 2025-06-20 RX ADMIN — DEXMEDETOMIDINE 1.2 MCG/KG/HR: 100 INJECTION, SOLUTION INTRAVENOUS at 21:07

## 2025-06-20 RX ADMIN — DEXMEDETOMIDINE 1.2 MCG/KG/HR: 100 INJECTION, SOLUTION INTRAVENOUS at 01:34

## 2025-06-20 RX ADMIN — DEXMEDETOMIDINE 1.2 MCG/KG/HR: 100 INJECTION, SOLUTION INTRAVENOUS at 12:38

## 2025-06-20 RX ADMIN — CEFEPIME 2000 MG: 2 INJECTION, POWDER, FOR SOLUTION INTRAVENOUS at 21:34

## 2025-06-20 RX ADMIN — IPRATROPIUM BROMIDE AND ALBUTEROL SULFATE 1 DOSE: .5; 3 SOLUTION RESPIRATORY (INHALATION) at 13:48

## 2025-06-20 RX ADMIN — MIDAZOLAM HYDROCHLORIDE 2 MG: 1 INJECTION, SOLUTION INTRAMUSCULAR; INTRAVENOUS at 09:17

## 2025-06-20 RX ADMIN — SODIUM CHLORIDE, PRESERVATIVE FREE 10 ML: 5 INJECTION INTRAVENOUS at 08:14

## 2025-06-20 ASSESSMENT — PULMONARY FUNCTION TESTS
PIF_VALUE: 19
PIF_VALUE: 20
PIF_VALUE: 21
PIF_VALUE: 21
PIF_VALUE: 19

## 2025-06-20 ASSESSMENT — PAIN SCALES - GENERAL
PAINLEVEL_OUTOF10: 0

## 2025-06-20 NOTE — PLAN OF CARE
Problem: Safety - Medical Restraint  Goal: Remains free of injury from restraints (Restraint for Interference with Medical Device)  Description: INTERVENTIONS:  1. Determine that other, less restrictive measures have been tried or would not be effective before applying the restraint  2. Evaluate the patient's condition at the time of restraint application  3. Inform patient/family regarding the reason for restraint  4. Q2H: Monitor safety, psychosocial status, comfort, nutrition and hydration  6/20/2025 0746 by Kris Barraza RN  Outcome: Progressing  6/19/2025 2321 by Abhinav Feliciano RN  Outcome: Progressing  Flowsheets  Taken 6/19/2025 2000 by Abhinav Feliciano RN  Remains free of injury from restraints (restraint for interference with medical device):   Determine that other, less restrictive measures have been tried or would not be effective before applying the restraint   Evaluate the patient's condition at the time of restraint application   Every 2 hours: Monitor safety, psychosocial status, comfort, nutrition and hydration   Inform patient/family regarding the reason for restraint  Taken 6/19/2025 1800 by Kris Barraza RN  Remains free of injury from restraints (restraint for interference with medical device):   Determine that other, less restrictive measures have been tried or would not be effective before applying the restraint   Evaluate the patient's condition at the time of restraint application   Every 2 hours: Monitor safety, psychosocial status, comfort, nutrition and hydration   Inform patient/family regarding the reason for restraint     Problem: Discharge Planning  Goal: Discharge to home or other facility with appropriate resources  6/20/2025 0746 by Kris Barraza RN  Outcome: Progressing  6/19/2025 2321 by Abhinav Feliciano RN  Outcome: Progressing  Flowsheets (Taken 6/19/2025 2000)  Discharge to home or other facility with appropriate resources:   Identify barriers to discharge with

## 2025-06-20 NOTE — PROGRESS NOTES
Hospitalist Progress Note    Patient: Beny Knapp MRN: [id number]  CSN: [id number]    YOB: 1965  Age: 59 y.o.  Sex: male    DOA: 6/14/2025 LOS:  LOS: 6 days          Chief Complain :  Foreign body, shortness of breath.  59 y.o.  male w/ PMH of COPD, ETOH-use and polysubstance abuse who was BIBA and presents with subjective difficulty breathing and swallowing with throat pain. Brought in by EMS for possible forign body (food) with bolus within region of velecula. ENT saw pt and ED intubated for airway protection. ENT was consulted and performed scope on pt with findings of extensive esophageal candidiasis. Nephrology was consulted for severe hyponatremia with sodium of 117 recommending NS at 75cc/hr. Intensivist was contacted as well d/t pt being intubated, on vent for airway protection.   Subjective:   Patient orally intubated and sedated.     Assessment/Plan     Active Hospital Problems    Diagnosis     Aspiration pneumonia (HCC) [J69.0]     Cocaine abuse (HCC) [F14.10]     Hyponatremia [E87.1]     Stridor [R06.1]     Obstructed, larynx [J38.6]     Acute respiratory failure with hypoxia and hypercapnia (HCC) [J96.01, J96.02]     ETOH abuse [F10.10]     Polysubstance abuse (HCC) [F19.10]     Candidiasis of esophagus (HCC) [B37.81]     Tobacco abuse [Z72.0]     HTN (hypertension) [I10]     COPD (chronic obstructive pulmonary disease) (HCC) [J44.9]     Chronic hepatitis C (HCC) [B18.2]           CRITICAL CARE PLAN    Resp   Acute airway obstruction  Intubated to protect airway  See vent orders, VAP bundle. HOB>30 degrees.   Now requiring 85%FiO2, PEEP of 6.  CXR with persistent but improved bilateral lower lobe

## 2025-06-20 NOTE — PROGRESS NOTES
[facility]  Pulmonary, Critical Care, and Sleep Medicine    Name: Beny Knapp MRN: [id number]   : 1965 Hospital: [facility]    Date: 2025  Room: [address] [place] [address] Note                                              Consult requesting physician: Dr. Mark   Reason for Consult: Respiratory failure, hyponatremia, upper airway    IMPRESSION:     Acute respiratory failure with hypoxemia and hypercarbia   J96.01, J96.02  Stridor  Obstructed larynx  Polysubstance abuse candidiasis of esophagus   Aspiration pneumonia   COPD/emphysema  EtOH use  Cocaine abuse  Hepatitis C  Hyponatremia      Active Hospital Problems    Diagnosis Date Noted    Aspiration pneumonia (HCC) [J69.0] 2025    Cocaine abuse (HCC) [F14.10] 2025    Hyponatremia [E87.1] 2025    Stridor [R06.1] 2025    Obstructed, larynx [J38.6] 2025    Acute respiratory failure with hypoxia and hypercapnia (HCC) [J96.01, J96.02] 2025    ETOH abuse [F10.10] 2025    Polysubstance abuse (HCC) [F19.10] 2025    Candidiasis of esophagus (HCC) [B37.81] 2025    Tobacco abuse [Z72.0] 2015    HTN (hypertension) [I10] 2015    COPD (chronic obstructive pulmonary disease) (HCC) [J44.9] 2015    Chronic hepatitis C (HCC) [B18.2] 2015        Code status: Full Code       RECOMMENDATIONS:     Respiratory: 59-year-old male with COPD, smoker presents with acute upper airway distress, fullness in the laryngeal area, questionable foreign body. Patient was intubated  for airway protection and hypercarbic and hypoxemic respiratory failure, with ENT at bedside.  Prior to intubation, fiberoptic nasolaryngoscopy endoscopy did not show any foreign body, but swollen airway/larynx  and severe candidiasis obstructing larynx.  History of aspirations with tube feeding earlier this admission.  VCV mode of ventilation-FiO2 85%, PEEP 6.  Tolerate O2 sats  opacities. No pleural effusion or pneumothorax. Mediastinum: Cardiomediastinal silhouette is within normal limits. MSK: No acute osseous abnormalities. Upper abdomen: Unremarkable.     Line and tube in satisfactory position as noted. Worsening right lower lung opacity. Electronically signed by KAYLEIGH FRIAS    XR CHEST 1 VIEW  Result Date: 6/15/2025  EXAM: XR CHEST 1 VIEW INDICATION: swelling COMPARISON: 6/15/2025 at 1016 hours FINDINGS: A portable AP radiograph of the chest was obtained at 1333 hours hours. The patient is on a cardiac monitor. There are small bilateral pleural effusions.. The cardiac and mediastinal contours and pulmonary vascularity are otherwise normal.  The bones and soft tissues are grossly within normal limits. The NG tube extends into the stomach. Endotracheal tube terminates at the thoracic inlet. The right IJ line tip overlies the SVC.     Small bilateral pleural effusions. Lines and tubes as described.. Electronically signed by Sheridan Biswas    XR CHEST PORTABLE  Result Date: 6/15/2025  EXAM:  XR CHEST PORTABLE INDICATION: et tube COMPARISON: Prior day TECHNIQUE: portable chest AP view at 1013 hours FINDINGS: The cardiac silhouette is within normal limits. The pulmonary vasculature is within normal limits. Endotracheal tube terminates at the thoracic inlet. NG tube extends below the hemidiaphragm. The right IJ line terminates in the SVC. There is no pneumothorax. Small bilateral pleural effusions. The lungs are clear. The visualized bones and upper abdomen are age-appropriate.     Satisfactory endotracheal tube position. Electronically signed by Sheridan Biswas    Echo (TTE) complete (PRN contrast/bubble/strain/3D)  Result Date: 6/14/2025    Image quality is fair.   Left Ventricle: Normal left ventricular systolic function with a visually estimated EF of 55 - 60%. EF by 2D Simpsons Biplane is 57%. Left ventricle size is normal. Normal wall thickness. Normal wall motion. Normal diastolic

## 2025-06-20 NOTE — CARE COORDINATION
After requesting an advanced search, this CM attempted to contact [facility] at the following phone numbers:    [phone] (Not in service)  [phone] (Not in service)  [phone] (Straight to voicemail, not set up)  [phone] (not in service)  [phone] (several voicemails left at this number since patient admission - no calls returned)    Phone call placed to [employer] for an attempt to make contact with patients mother, Rose Al, at last known address: [address] [place] [address] [address], [place]. [employer] stated that address is not a residence and comes back to a local business. They attempted to search Ms. Al in their database and could not locate any records. CM will continue to follow for next steps.

## 2025-06-20 NOTE — WOUND CARE
ICU Rounding  Wound care nurse rounded on patient for skin issues.  Patient has a Chauncey score of 11.  Does patient have any pressure injury?no per visual exam with primary nurse PUMA Ponce     Skin Care & Pressure Relief Recommendations  Minimize layers of linen  Pads under patient to optimize support surface  Turn/reposition approximately every 2 hours  Use pillow wedges to maintain positioning but do not put pillow directly over wounds  Manage incontinence   Promote continence; Skin Protective lotion/cream to buttocks and sacrum daily and as needed with incontinence care  Offload heels pillows    Consult wound care if any wounds noted during admission.  Wound Care nurse will continue to follow during ICU admission.

## 2025-06-20 NOTE — PROGRESS NOTES
[facility]  (A Division of [facility])  Renee Stanford MD   Office Tel:  [phone] Option #8                                                               Date of Admission: 6/14/2025       ID Consult for antimicrobial management suspected esophageal candidiasis   PCP: Emeli Duckworth MD    C/C: stridor/SOB    Current Antimicrobials:    Prior Antimicrobials:  Zithro 6/16 to date  CAX 6/16 to date  Fluconazole 6/14-> Micafungin  150 mg daily   #6     NA     Allergy to antibiotics- NA     Assessment:     Critically ill patient with:    Esophagitis- presumed candidal--Underlying immunodeficiency?  Fungitell < 31, galactomannan normal( 0.03)= 6/14  Stridor/ candidal esophagitis/intubated- 10/2024 in [facility] as well- new info from his PCP    Lung infiltrate-atelectasis Vs PNA on CT--resp cultures 6/14- normal kenia  Acute hypoxic resp failure- Intubated on arrival. High FIO2/PEEP requirement-- copd Vs noscomial infection?  Hyponatremia  Transaminitis- resolved    6/14 - blood cutlure X2: NGSF, resp culture- mixed GNR- normal resp kenia        -UA no pyuria, urine cx-Neg        -X-ray-bibasilar scarring/atelectasis, neck x-ray- no acute pathology        -CT CAP: Bibasilar atelectasis/pneumonia left greater than right.  Bullous emphysematous change.  Procal 0.03    6/16- KOH from mouth- no yeast         -6/16- respiratory culture- Klebsiella and Serratia growth    Concern for immunodeficiency  -HIV 1/2- Non -reactive 6/14/25, HIV PCR -<20  -CD4 120  -Immunoglobulins normal    Co-morbidities:    Hypertension  SIAD  COPD  Chronic hepatitis C- treated with Roderick Guzman    Recommendation -- ID related:     Immunoglobulin level IgG, M, E-normal.     Start cefepime IV X7 days- for coverage of Serratia/Kleb  FU repeat resp culture- 6/16--growth of Serratia/Kleb-- ( Serratia can have ampC resistance with 3rd generation cephalosporins)  FU repeat Fungitell  thrush       Lungs:   non-labored, bilaterally clear   Heart:  RRR, s1 and s2; no murmurs    Abdomen:  soft, non-distended, active bowel sounds. Non-tender   Genitourinary: Rai- inp lace   Extremities:   no clubbing, cyanosis; no joint effusions   Neurologic:  Intubated   Psychiatric:  Intubated     Results       Procedure Component Value Units Date/Time    Culture, Respiratory [[id number]]  (Abnormal) Collected: 06/16/25 2051    Order Status: Completed Specimen: Sputum Updated: 06/18/25 0836     Special Requests NO SPECIAL REQUESTS        Gram Stain 1+ WBCS SEEN               OCCASIONAL Gram negative rods                  RARE Gram positive cocci IN CLUSTERS            RARE APPARENT YEAST        Culture       MODERATE Gram negative rods ISOLATION IN PROGRESS                  NO NORMAL RESPIRATORY FRAN ISOLATED SO FAR          DAVID, Other Sources [[id number]] Collected: 06/16/25 1542    Order Status: Completed Specimen: Mouth Updated: 06/16/25 2252     Special Requests INNER CHEEK     DAVID Prep NO YEAST SEEN       Culture, Fungus [[id number]] Collected: 06/16/25 1416    Order Status: Sent Specimen: Mouth Updated: 06/16/25 2334    DAVID, Other Sources [[id number]] Collected: 06/16/25 1416    Order Status: Canceled Specimen: Mouth     Culture, MRSA, Screening [[id number]] Collected: 06/16/25 1416    Order Status: Completed Specimen: Nares Updated: 06/17/25 2318     Special Requests NO SPECIAL REQUESTS        Culture MRSA NOT PRESENT               Screening of patient nares for MRSA is for surveillance purposes and, if positive, to facilitate isolation considerations in high risk settings. It is not intended for automatic decolonization interventions per se as regimens are not sufficiently effective to warrant routine use.      Culture, Respiratory [[id number]]  (Abnormal) Collected: 06/14/25 1342    Order Status: Completed Specimen: Sputum Induced Updated: 06/17/25 1150     Special Requests NO SPECIAL REQUESTS

## 2025-06-20 NOTE — PROGRESS NOTES
Palliative Medicine    CODE STATUS: FULL CODE    AMD Status: none on file. Care management department continuing search for closest living relative     1030 Seen today in room 104 along with Jannet Maguire NP.  Lying supine with head of bed elevated. Intubated/ventilated/sedated. Intubated 6/14/2025 FiO2 85% PEEP 5. Fentanyl, versed, and precedex gtts infusing. Per intensivist, pt not yet stable for sedation vacation or SBTs.     Care management team has been unsuccessful locating a family member. May need to consider guardianship as he is not moving toward medical extubation.     ADDENDUM: communicated with CM team who said they have possible information about locating his mother.    Disposition plan: to be determined based on response to medical treatment, medical recommendations,  and patient/family decisions    Palliative Medicine will continue to follow Beny Knapp during his hospitalization and support them as they make healthcare decisions and define goals of care.    Sherley Frias RN, MSN  Palliative Medicine  P: [phone]

## 2025-06-20 NOTE — PROGRESS NOTES
Palliative Medicine Progress Note  [facility]: [phone]     Patient Name: Beny Knapp  YOB: 1965    Date of Service: 2025  Provider: OCTAVIO Gasca CNP  Admit Date: 2025  Referring Provider:  Jocelyne Richmond MD    Reason for Consult: goals of care       HISTORY OF PRESENT ILLNESS:     Beny Knapp is a 59 y.o. with a past history of COPD on chronic/frequent steroid therapy, HTN, HCV cirrhosis (infection treated and HCV quant negative), tobacco use disorder (1/3- PPD), bipolar 1 disorder, and alcohol and cocaine use disorder [facility]25 who was admitted on 2025 when he presented to ED with sensation of foreign body in the throat with stridor. Found to have severe candidiasis and required intubation. Patient was also found to have hyponatremia. He remains in ICU on ventilator with fluctuating oxygen requirements.    Psychosocial: Patient has no emergency contacts on file. A relative search is underway with Case Management to identify surrogate decision-makers and verify personal history. Phone numbers for a mother, Rose Al, have not been working. Welfare check for a brother in [place], [place] determined he is . Previous medical records consistently state patient has never been . One medical record states he has one child, but lists no name or contact information.    Functional: Patient's baseline functional status was presumably independent with ADLs and IADLs, but with significant psychosocial morbidity. He is currently intubated/sedated and RASS -4/-5.     PHYSICAL EXAM:     From RN flowsheet:  Wt Readings from Last 3 Encounters:   25 111.9 kg (246 lb 11.1 oz)   23 111.1 kg (245 lb)     /83   Pulse 61   Temp 98.8 °F (37.1 °C) (Oral)   Resp 17   Ht 1.829 m (6' 0.01\")   Wt 111.9 kg (246 lb 11.1 oz)   SpO2 93%   BMI 33.45 kg/m²     Constitutional: Intubated and sedated on

## 2025-06-21 ENCOUNTER — APPOINTMENT (OUTPATIENT)
Facility: HOSPITAL | Age: 60
DRG: 368 | End: 2025-06-21
Payer: MEDICARE

## 2025-06-21 LAB
ALBUMIN SERPL-MCNC: 3 G/DL (ref 3.4–5)
ALBUMIN/GLOB SERPL: 0.9 (ref 0.8–1.7)
ALP SERPL-CCNC: 60 U/L (ref 45–117)
ALT SERPL-CCNC: 79 U/L (ref 10–50)
ANION GAP SERPL CALC-SCNC: 9 MMOL/L (ref 3–18)
ARTERIAL PATENCY WRIST A: POSITIVE
AST SERPL-CCNC: 36 U/L (ref 10–38)
BASE EXCESS BLD CALC-SCNC: 8.8 MMOL/L
BASOPHILS # BLD: 0 K/UL (ref 0–0.1)
BASOPHILS NFR BLD: 0 % (ref 0–2)
BDY SITE: ABNORMAL
BILIRUB SERPL-MCNC: 0.5 MG/DL (ref 0.2–1)
BUN SERPL-MCNC: 31 MG/DL (ref 6–23)
BUN/CREAT SERPL: 56 (ref 12–20)
CALCIUM SERPL-MCNC: 8.9 MG/DL (ref 8.5–10.1)
CHLORIDE SERPL-SCNC: 95 MMOL/L (ref 98–107)
CO2 SERPL-SCNC: 31 MMOL/L (ref 21–32)
CREAT SERPL-MCNC: 0.56 MG/DL (ref 0.6–1.3)
DIFFERENTIAL METHOD BLD: ABNORMAL
EOSINOPHIL # BLD: 0 K/UL (ref 0–0.4)
EOSINOPHIL NFR BLD: 0 % (ref 0–5)
ERYTHROCYTE [DISTWIDTH] IN BLOOD BY AUTOMATED COUNT: 14.1 % (ref 11.6–14.5)
GAS FLOW.O2 O2 DELIVERY SYS: ABNORMAL
GAS FLOW.O2 SETTING OXYMISER: 14 BPM
GLOBULIN SER CALC-MCNC: 3.3 G/DL (ref 2–4)
GLUCOSE BLD STRIP.AUTO-MCNC: 155 MG/DL (ref 70–110)
GLUCOSE BLD STRIP.AUTO-MCNC: 156 MG/DL (ref 70–110)
GLUCOSE BLD STRIP.AUTO-MCNC: 156 MG/DL (ref 70–110)
GLUCOSE BLD STRIP.AUTO-MCNC: 169 MG/DL (ref 70–110)
GLUCOSE SERPL-MCNC: 155 MG/DL (ref 74–108)
HCO3 BLD-SCNC: 35.3 MMOL/L (ref 21–28)
HCT VFR BLD AUTO: 37 % (ref 36–48)
HGB BLD-MCNC: 12 G/DL (ref 13–16)
IMM GRANULOCYTES # BLD AUTO: 0.04 K/UL (ref 0–0.04)
IMM GRANULOCYTES NFR BLD AUTO: 0.7 % (ref 0–0.5)
LYMPHOCYTES # BLD: 0.49 K/UL (ref 0.9–3.3)
LYMPHOCYTES NFR BLD: 8.4 % (ref 21–52)
MAGNESIUM SERPL-MCNC: 2.5 MG/DL (ref 1.6–2.6)
MCH RBC QN AUTO: 29 PG (ref 24–34)
MCHC RBC AUTO-ENTMCNC: 32.4 G/DL (ref 31–37)
MCV RBC AUTO: 89.4 FL (ref 78–100)
MONOCYTES # BLD: 0.46 K/UL (ref 0.05–1.2)
MONOCYTES NFR BLD: 7.9 % (ref 3–10)
NEUTS SEG # BLD: 4.83 K/UL (ref 1.8–8)
NEUTS SEG NFR BLD: 83 % (ref 40–73)
NRBC # BLD: 0 K/UL (ref 0–0.01)
NRBC BLD-RTO: 0 PER 100 WBC
O2/TOTAL GAS SETTING VFR VENT: 90 %
PCO2 BLD: 56.2 MMHG (ref 35–48)
PEEP RESPIRATORY: 6 CMH2O
PH BLD: 7.41 (ref 7.35–7.45)
PHOSPHATE SERPL-MCNC: 4.2 MG/DL (ref 2.5–4.9)
PLATELET # BLD AUTO: 195 K/UL (ref 135–420)
PMV BLD AUTO: 8.7 FL (ref 9.2–11.8)
PO2 BLD: 64 MMHG (ref 83–108)
POTASSIUM SERPL-SCNC: 4.9 MMOL/L (ref 3.5–5.5)
PROT SERPL-MCNC: 6.2 G/DL (ref 6.4–8.2)
RBC # BLD AUTO: 4.14 M/UL (ref 4.35–5.65)
RESPIRATORY RATE, POC: 14 (ref 5–40)
SAO2 % BLD: 91.4 % (ref 92–97)
SERVICE CMNT-IMP: ABNORMAL
SODIUM SERPL-SCNC: 135 MMOL/L (ref 136–145)
SPECIMEN TYPE: ABNORMAL
VENTILATION MODE VENT: ABNORMAL
VT SETTING VENT: 460 ML
WBC # BLD AUTO: 5.8 K/UL (ref 4.6–13.2)

## 2025-06-21 PROCEDURE — 82962 GLUCOSE BLOOD TEST: CPT

## 2025-06-21 PROCEDURE — 36600 WITHDRAWAL OF ARTERIAL BLOOD: CPT

## 2025-06-21 PROCEDURE — 6370000000 HC RX 637 (ALT 250 FOR IP): Performed by: INTERNAL MEDICINE

## 2025-06-21 PROCEDURE — 2500000003 HC RX 250 WO HCPCS: Performed by: INTERNAL MEDICINE

## 2025-06-21 PROCEDURE — 80053 COMPREHEN METABOLIC PANEL: CPT

## 2025-06-21 PROCEDURE — 2580000003 HC RX 258: Performed by: INTERNAL MEDICINE

## 2025-06-21 PROCEDURE — 2700000000 HC OXYGEN THERAPY PER DAY

## 2025-06-21 PROCEDURE — 2000000000 HC ICU R&B

## 2025-06-21 PROCEDURE — 94003 VENT MGMT INPAT SUBQ DAY: CPT

## 2025-06-21 PROCEDURE — 85025 COMPLETE CBC W/AUTO DIFF WBC: CPT

## 2025-06-21 PROCEDURE — 6360000002 HC RX W HCPCS: Performed by: INTERNAL MEDICINE

## 2025-06-21 PROCEDURE — 82803 BLOOD GASES ANY COMBINATION: CPT

## 2025-06-21 PROCEDURE — 84100 ASSAY OF PHOSPHORUS: CPT

## 2025-06-21 PROCEDURE — 94668 MNPJ CHEST WALL SBSQ: CPT

## 2025-06-21 PROCEDURE — 94669 MECHANICAL CHEST WALL OSCILL: CPT

## 2025-06-21 PROCEDURE — 83735 ASSAY OF MAGNESIUM: CPT

## 2025-06-21 PROCEDURE — 71045 X-RAY EXAM CHEST 1 VIEW: CPT

## 2025-06-21 PROCEDURE — 51702 INSERT TEMP BLADDER CATH: CPT

## 2025-06-21 PROCEDURE — 94640 AIRWAY INHALATION TREATMENT: CPT

## 2025-06-21 RX ORDER — ACETYLCYSTEINE 100 MG/ML
4 SOLUTION ORAL; RESPIRATORY (INHALATION)
Status: DISCONTINUED | OUTPATIENT
Start: 2025-06-21 | End: 2025-06-24

## 2025-06-21 RX ORDER — AMLODIPINE BESYLATE 5 MG/1
10 TABLET ORAL DAILY
Status: DISCONTINUED | OUTPATIENT
Start: 2025-06-21 | End: 2025-07-12

## 2025-06-21 RX ORDER — ALBUTEROL SULFATE 0.83 MG/ML
2.5 SOLUTION RESPIRATORY (INHALATION) EVERY 4 HOURS PRN
Status: DISCONTINUED | OUTPATIENT
Start: 2025-06-21 | End: 2025-07-22 | Stop reason: HOSPADM

## 2025-06-21 RX ADMIN — Medication 100 MG: at 08:14

## 2025-06-21 RX ADMIN — ARFORMOTEROL TARTRATE 15 MCG: 15 SOLUTION RESPIRATORY (INHALATION) at 20:29

## 2025-06-21 RX ADMIN — MINERAL OIL, PETROLATUM: 425; 568 OINTMENT OPHTHALMIC at 05:13

## 2025-06-21 RX ADMIN — SODIUM CHLORIDE, PRESERVATIVE FREE 10 ML: 5 INJECTION INTRAVENOUS at 20:01

## 2025-06-21 RX ADMIN — DEXMEDETOMIDINE 1.2 MCG/KG/HR: 100 INJECTION, SOLUTION INTRAVENOUS at 01:10

## 2025-06-21 RX ADMIN — DEXMEDETOMIDINE 1.2 MCG/KG/HR: 100 INJECTION, SOLUTION INTRAVENOUS at 13:25

## 2025-06-21 RX ADMIN — SODIUM CHLORIDE 9 MG/HR: 900 INJECTION, SOLUTION INTRAVENOUS at 09:23

## 2025-06-21 RX ADMIN — CHLORHEXIDINE GLUCONATE 15 ML: 1.2 RINSE ORAL at 08:14

## 2025-06-21 RX ADMIN — DEXMEDETOMIDINE 1.2 MCG/KG/HR: 100 INJECTION, SOLUTION INTRAVENOUS at 21:43

## 2025-06-21 RX ADMIN — BUDESONIDE 500 MCG: 0.5 INHALANT RESPIRATORY (INHALATION) at 20:29

## 2025-06-21 RX ADMIN — CEFEPIME 2000 MG: 2 INJECTION, POWDER, FOR SOLUTION INTRAVENOUS at 12:54

## 2025-06-21 RX ADMIN — CHLORHEXIDINE GLUCONATE 15 ML: 1.2 RINSE ORAL at 20:23

## 2025-06-21 RX ADMIN — SENNOSIDES 8.6 MG: 8.6 TABLET, COATED ORAL at 20:33

## 2025-06-21 RX ADMIN — DEXMEDETOMIDINE 1.2 MCG/KG/HR: 100 INJECTION, SOLUTION INTRAVENOUS at 09:16

## 2025-06-21 RX ADMIN — ALBUTEROL SULFATE 2.5 MG: 2.5 SOLUTION RESPIRATORY (INHALATION) at 10:21

## 2025-06-21 RX ADMIN — WATER 20 MG: 1 INJECTION INTRAMUSCULAR; INTRAVENOUS; SUBCUTANEOUS at 16:11

## 2025-06-21 RX ADMIN — FUROSEMIDE 40 MG: 10 INJECTION, SOLUTION INTRAMUSCULAR; INTRAVENOUS at 08:14

## 2025-06-21 RX ADMIN — CEFEPIME 2000 MG: 2 INJECTION, POWDER, FOR SOLUTION INTRAVENOUS at 05:20

## 2025-06-21 RX ADMIN — SENNOSIDES 8.6 MG: 8.6 TABLET, COATED ORAL at 08:14

## 2025-06-21 RX ADMIN — ACETYLCYSTEINE 400 MG: 100 INHALANT RESPIRATORY (INHALATION) at 10:21

## 2025-06-21 RX ADMIN — MICAFUNGIN SODIUM 150 MG: 50 INJECTION, POWDER, LYOPHILIZED, FOR SOLUTION INTRAVENOUS at 09:47

## 2025-06-21 RX ADMIN — AMLODIPINE BESYLATE 10 MG: 5 TABLET ORAL at 10:02

## 2025-06-21 RX ADMIN — DEXMEDETOMIDINE 1.2 MCG/KG/HR: 100 INJECTION, SOLUTION INTRAVENOUS at 17:33

## 2025-06-21 RX ADMIN — MINERAL OIL, PETROLATUM: 425; 568 OINTMENT OPHTHALMIC at 20:24

## 2025-06-21 RX ADMIN — CEFEPIME 2000 MG: 2 INJECTION, POWDER, FOR SOLUTION INTRAVENOUS at 20:37

## 2025-06-21 RX ADMIN — FENTANYL CITRATE 125 MCG/HR: 0.05 INJECTION, SOLUTION INTRAMUSCULAR; INTRAVENOUS at 05:28

## 2025-06-21 RX ADMIN — IPRATROPIUM BROMIDE AND ALBUTEROL SULFATE 1 DOSE: .5; 3 SOLUTION RESPIRATORY (INHALATION) at 07:40

## 2025-06-21 RX ADMIN — FOLIC ACID 1 MG: 1 TABLET ORAL at 08:14

## 2025-06-21 RX ADMIN — BUDESONIDE 500 MCG: 0.5 INHALANT RESPIRATORY (INHALATION) at 07:40

## 2025-06-21 RX ADMIN — MINERAL OIL, PETROLATUM: 425; 568 OINTMENT OPHTHALMIC at 08:15

## 2025-06-21 RX ADMIN — MINERAL OIL, PETROLATUM: 425; 568 OINTMENT OPHTHALMIC at 01:10

## 2025-06-21 RX ADMIN — MINERAL OIL, PETROLATUM: 425; 568 OINTMENT OPHTHALMIC at 17:45

## 2025-06-21 RX ADMIN — MINERAL OIL, PETROLATUM: 425; 568 OINTMENT OPHTHALMIC at 13:14

## 2025-06-21 RX ADMIN — FENTANYL CITRATE 125 MCG/HR: 0.05 INJECTION, SOLUTION INTRAMUSCULAR; INTRAVENOUS at 13:29

## 2025-06-21 RX ADMIN — ENOXAPARIN SODIUM 30 MG: 100 INJECTION SUBCUTANEOUS at 12:52

## 2025-06-21 RX ADMIN — IPRATROPIUM BROMIDE AND ALBUTEROL SULFATE 1 DOSE: .5; 3 SOLUTION RESPIRATORY (INHALATION) at 20:09

## 2025-06-21 RX ADMIN — SODIUM CHLORIDE, PRESERVATIVE FREE 40 MG: 5 INJECTION INTRAVENOUS at 08:14

## 2025-06-21 RX ADMIN — DEXMEDETOMIDINE 1.2 MCG/KG/HR: 100 INJECTION, SOLUTION INTRAVENOUS at 05:13

## 2025-06-21 RX ADMIN — IPRATROPIUM BROMIDE AND ALBUTEROL SULFATE 1 DOSE: .5; 3 SOLUTION RESPIRATORY (INHALATION) at 14:42

## 2025-06-21 RX ADMIN — SODIUM CHLORIDE, PRESERVATIVE FREE 10 ML: 5 INJECTION INTRAVENOUS at 08:29

## 2025-06-21 RX ADMIN — POLYETHYLENE GLYCOL 3350 17 G: 17 POWDER, FOR SOLUTION ORAL at 08:14

## 2025-06-21 RX ADMIN — ENOXAPARIN SODIUM 30 MG: 100 INJECTION SUBCUTANEOUS at 01:07

## 2025-06-21 RX ADMIN — ACETYLCYSTEINE 400 MG: 100 INHALANT RESPIRATORY (INHALATION) at 20:09

## 2025-06-21 RX ADMIN — ACETYLCYSTEINE 400 MG: 100 INHALANT RESPIRATORY (INHALATION) at 14:42

## 2025-06-21 RX ADMIN — ARFORMOTEROL TARTRATE 15 MCG: 15 SOLUTION RESPIRATORY (INHALATION) at 07:40

## 2025-06-21 RX ADMIN — FENTANYL CITRATE 125 MCG/HR: 0.05 INJECTION, SOLUTION INTRAMUSCULAR; INTRAVENOUS at 21:42

## 2025-06-21 RX ADMIN — WATER 20 MG: 1 INJECTION INTRAMUSCULAR; INTRAVENOUS; SUBCUTANEOUS at 08:14

## 2025-06-21 ASSESSMENT — PULMONARY FUNCTION TESTS
PIF_VALUE: 19
PIF_VALUE: 28
PIF_VALUE: 19
PIF_VALUE: 20
PIF_VALUE: 22
PIF_VALUE: 21
PIF_VALUE: 22

## 2025-06-21 ASSESSMENT — PAIN SCALES - GENERAL
PAINLEVEL_OUTOF10: 0

## 2025-06-21 NOTE — PROGRESS NOTES
Hospitalist Progress Note    Patient: Beny Knapp MRN: [id number]  CSN: [id number]    YOB: 1965  Age: 59 y.o.  Sex: male    DOA: 6/14/2025 LOS:  LOS: 7 days          Chief Complain :  Foreign body, shortness of breath.  59 y.o.  male w/ PMH of COPD, ETOH-use and polysubstance abuse who was BIBA and presents with subjective difficulty breathing and swallowing with throat pain. Brought in by EMS for possible forign body (food) with bolus within region of velecula. ENT saw pt and ED intubated for airway protection. ENT was consulted and performed scope on pt with findings of extensive esophageal candidiasis. Nephrology was consulted for severe hyponatremia with sodium of 117 recommending NS at 75cc/hr. Intensivist was contacted as well d/t pt being intubated, on vent for airway protection.   Subjective:   Patient orally intubated and sedated.  His oxygen requirements are fluctuating.  It had improved and FiO2 was decreased however his FiO2 is now requiring to be increased now at 90%.  Trying to keep oxygen saturations between 90 to 95%.    Assessment/Plan     Active Hospital Problems    Diagnosis     Aspiration pneumonia (HCC) [J69.0]     Cocaine abuse (HCC) [F14.10]     Hyponatremia [E87.1]     Stridor [R06.1]     Obstructed, larynx [J38.6]     Acute respiratory failure with hypoxia and hypercapnia (HCC) [J96.01, J96.02]     ETOH abuse [F10.10]     Polysubstance abuse (HCC) [F19.10]     Candidiasis of esophagus (HCC) [B37.81]     Tobacco abuse [Z72.0]     HTN (hypertension) [I10]     COPD (chronic obstructive pulmonary disease) (HCC) [J44.9]     Chronic hepatitis C (HCC) [B18.2]           CRITICAL CARE PLAN    Resp

## 2025-06-21 NOTE — PROGRESS NOTES
[[id number]] Collected: 06/16/25 1542    Order Status: Completed Specimen: Mouth Updated: 06/16/25 2252     Special Requests INNER CHEEK     DAVID Prep NO YEAST SEEN       Culture, Fungus [[id number]] Collected: 06/16/25 1416    Order Status: Sent Specimen: Mouth Updated: 06/16/25 2334    DAVID, Other Sources [[id number]] Collected: 06/16/25 1416    Order Status: Canceled Specimen: Mouth     Culture, MRSA, Screening [[id number]] Collected: 06/16/25 1416    Order Status: Completed Specimen: Nares Updated: 06/17/25 2318     Special Requests NO SPECIAL REQUESTS        Culture MRSA NOT PRESENT               Screening of patient nares for MRSA is for surveillance purposes and, if positive, to facilitate isolation considerations in high risk settings. It is not intended for automatic decolonization interventions per se as regimens are not sufficiently effective to warrant routine use.      Culture, Respiratory [[id number]]  (Abnormal) Collected: 06/14/25 1342    Order Status: Completed Specimen: Sputum Induced Updated: 06/17/25 1150     Special Requests NO SPECIAL REQUESTS        Gram Stain       RARE EPITHELIAL CELLS SEEN            OCCASIONAL WBCS SEEN               FEW GRAM POS COCCI IN CLUSTERS                  RARE Gram positive cocci IN PAIRS AND CHAINS           Culture       HEAVY Mixed Gram Negative Rods                  HEAVY NORMAL RESPIRATORY FRAN          Culture, Blood 1 [[id number]] Collected: 06/14/25 0902    Order Status: Completed Specimen: Blood Updated: 06/20/25 0807     Special Requests --        RIGHT  HAND       Culture NO GROWTH 6 DAYS       Culture, Blood 2 [[id number]] Collected: 06/14/25 0859    Order Status: Completed Specimen: Blood Updated: 06/20/25 0807     Special Requests --        NO SPECIAL REQUESTS  LHAND       Culture NO GROWTH 6 DAYS       Culture, Urine [[id number]] Collected: 06/14/25 0836    Order Status: Completed Specimen: Urine, clean catch Updated: 06/15/25 1702     Special

## 2025-06-21 NOTE — PLAN OF CARE
status, including labs, urine output, blood pressure (other measures as available)  6/20/2025 2205 by Abhinav Feliciano RN  Outcome: Progressing  Flowsheets (Taken 6/20/2025 2000)  Hemodynamic stability and optimal renal function maintained:   Monitor labs and assess for signs and symptoms of volume excess or deficit   Monitor intake, output and patient weight   Monitor urine specific gravity, serum osmolarity and serum sodium as indicated or ordered   Monitor response to interventions for patient's volume status, including labs, urine output, blood pressure (other measures as available)   Encourage oral intake as appropriate   Instruct patient on fluid and nutrition restrictions as appropriate  Goal: Glucose maintained within prescribed range  6/21/2025 1123 by Kris Barraza, RN  Outcome: Progressing  Flowsheets (Taken 6/21/2025 0800)  Glucose maintained within prescribed range:   Monitor blood glucose as ordered   Assess for signs and symptoms of hyperglycemia and hypoglycemia   Administer ordered medications to maintain glucose within target range   Assess barriers to adequate nutritional intake and initiate nutrition consult as needed  6/20/2025 2205 by Abhinav Feliciano RN  Outcome: Progressing  Flowsheets (Taken 6/20/2025 2000)  Glucose maintained within prescribed range:   Monitor blood glucose as ordered   Assess for signs and symptoms of hyperglycemia and hypoglycemia   Administer ordered medications to maintain glucose within target range   Assess barriers to adequate nutritional intake and initiate nutrition consult as needed   Instruct patient on self management of diabetes and initiate consult as needed     Problem: Safety - Adult  Goal: Free from fall injury  6/21/2025 1123 by Kris Barraza, RN  Outcome: Progressing  6/20/2025 2205 by Abhinav Feliciano RN  Outcome: Progressing     Problem: Skin/Tissue Integrity  Goal: Skin integrity remains intact  Description: 1.  Monitor for areas of redness and/or  skin breakdown  2.  Assess vascular access sites hourly  3.  Every 4-6 hours minimum:  Change oxygen saturation probe site  4.  Every 4-6 hours:  If on nasal continuous positive airway pressure, respiratory therapy assess nares and determine need for appliance change or resting period  6/21/2025 1123 by Kris Barraza RN  Outcome: Progressing  Flowsheets (Taken 6/21/2025 0800)  Skin Integrity Remains Intact:   Monitor for areas of redness and/or skin breakdown   Assess vascular access sites hourly   Every 4-6 hours minimum:  Change oxygen saturation probe site   Every 4-6 hours:  If on nasal continuous positive airway pressure, assess nares and determine need for appliance change or resting period  6/20/2025 2205 by Abhinav Feliciano RN  Outcome: Progressing  Flowsheets  Taken 6/20/2025 2204  Skin Integrity Remains Intact:   Monitor for areas of redness and/or skin breakdown   Assess vascular access sites hourly   Every 4-6 hours minimum:  Change oxygen saturation probe site   Every 4-6 hours:  If on nasal continuous positive airway pressure, assess nares and determine need for appliance change or resting period   Turn and reposition as indicated   Assess need for specialty bed   Positioning devices   Pressure redistribution bed/mattress (bed type)   Check visual cues for pain   Monitor skin under medical devices  Taken 6/20/2025 2000  Skin Integrity Remains Intact:   Monitor for areas of redness and/or skin breakdown   Every 4-6 hours minimum:  Change oxygen saturation probe site   Assess vascular access sites hourly   Assess need for specialty bed   Positioning devices   Pressure redistribution bed/mattress (bed type)     Problem: ABCDS Injury Assessment  Goal: Absence of physical injury  6/21/2025 1123 by Kris Barraza, RN  Outcome: Progressing  6/20/2025 2205 by Abhinav Feliciano RN  Outcome: Progressing

## 2025-06-21 NOTE — PROGRESS NOTES
Otolaryngology head neck surgery  Patient seen and examined  Chart reviewed  Patient with significant pulmonary issues ongoing at this point which prevents any potential consideration of extubation  Suspect the patient may need tracheotomy at some point  At this point however pulmonary situation is so significant do not feel that tracheotomy is safe  We will follow distally  Bon Mcgovern

## 2025-06-22 ENCOUNTER — APPOINTMENT (OUTPATIENT)
Facility: HOSPITAL | Age: 60
DRG: 368 | End: 2025-06-22
Payer: MEDICARE

## 2025-06-22 LAB
ANION GAP SERPL CALC-SCNC: 9 MMOL/L (ref 3–18)
ARTERIAL PATENCY WRIST A: POSITIVE
BASE EXCESS BLD CALC-SCNC: 5.8 MMOL/L
BASE EXCESS BLD CALC-SCNC: 7 MMOL/L
BASE EXCESS BLD CALC-SCNC: 8.7 MMOL/L
BASOPHILS # BLD: 0 K/UL (ref 0–0.1)
BASOPHILS NFR BLD: 0 % (ref 0–2)
BDY SITE: ABNORMAL
BUN SERPL-MCNC: 31 MG/DL (ref 6–23)
BUN/CREAT SERPL: 69 (ref 12–20)
CALCIUM SERPL-MCNC: 9.1 MG/DL (ref 8.5–10.1)
CHLORIDE SERPL-SCNC: 95 MMOL/L (ref 98–107)
CO2 SERPL-SCNC: 31 MMOL/L (ref 21–32)
CREAT SERPL-MCNC: 0.45 MG/DL (ref 0.6–1.3)
DIFFERENTIAL METHOD BLD: ABNORMAL
EOSINOPHIL # BLD: 0.01 K/UL (ref 0–0.4)
EOSINOPHIL NFR BLD: 0.1 % (ref 0–5)
ERYTHROCYTE [DISTWIDTH] IN BLOOD BY AUTOMATED COUNT: 13.7 % (ref 11.6–14.5)
GAS FLOW.O2 O2 DELIVERY SYS: ABNORMAL
GAS FLOW.O2 SETTING OXYMISER: 12 BPM
GAS FLOW.O2 SETTING OXYMISER: 14 BPM
GAS FLOW.O2 SETTING OXYMISER: 18 BPM
GLUCOSE BLD STRIP.AUTO-MCNC: 136 MG/DL (ref 70–110)
GLUCOSE BLD STRIP.AUTO-MCNC: 138 MG/DL (ref 70–110)
GLUCOSE BLD STRIP.AUTO-MCNC: 138 MG/DL (ref 70–110)
GLUCOSE SERPL-MCNC: 152 MG/DL (ref 74–108)
HCO3 BLD-SCNC: 29.7 MMOL/L (ref 21–28)
HCO3 BLD-SCNC: 30.9 MMOL/L (ref 21–28)
HCO3 BLD-SCNC: 33.7 MMOL/L (ref 21–28)
HCT VFR BLD AUTO: 36.9 % (ref 36–48)
HGB BLD-MCNC: 12.3 G/DL (ref 13–16)
IMM GRANULOCYTES # BLD AUTO: 0.04 K/UL (ref 0–0.04)
IMM GRANULOCYTES NFR BLD AUTO: 0.5 % (ref 0–0.5)
LYMPHOCYTES # BLD: 0.52 K/UL (ref 0.9–3.3)
LYMPHOCYTES NFR BLD: 6.9 % (ref 21–52)
MAGNESIUM SERPL-MCNC: 2.4 MG/DL (ref 1.6–2.6)
MCH RBC QN AUTO: 29.4 PG (ref 24–34)
MCHC RBC AUTO-ENTMCNC: 33.3 G/DL (ref 31–37)
MCV RBC AUTO: 88.1 FL (ref 78–100)
MONOCYTES # BLD: 0.47 K/UL (ref 0.05–1.2)
MONOCYTES NFR BLD: 6.3 % (ref 3–10)
NEUTS SEG # BLD: 6.46 K/UL (ref 1.8–8)
NEUTS SEG NFR BLD: 86.2 % (ref 40–73)
NRBC # BLD: 0 K/UL (ref 0–0.01)
NRBC BLD-RTO: 0 PER 100 WBC
O2/TOTAL GAS SETTING VFR VENT: 85 %
O2/TOTAL GAS SETTING VFR VENT: 90 %
O2/TOTAL GAS SETTING VFR VENT: 90 %
PCO2 BLD: 39.5 MMHG (ref 35–48)
PCO2 BLD: 40.4 MMHG (ref 35–48)
PCO2 BLD: 47.2 MMHG (ref 35–48)
PEEP RESPIRATORY: 10 CMH2O
PEEP RESPIRATORY: 6 CMH2O
PH BLD: 7.46 (ref 7.35–7.45)
PH BLD: 7.48 (ref 7.35–7.45)
PH BLD: 7.49 (ref 7.35–7.45)
PHOSPHATE SERPL-MCNC: 3.4 MG/DL (ref 2.5–4.9)
PLATELET # BLD AUTO: 178 K/UL (ref 135–420)
PMV BLD AUTO: 9.2 FL (ref 9.2–11.8)
PO2 BLD: 50 MMHG (ref 83–108)
PO2 BLD: 58 MMHG (ref 83–108)
PO2 BLD: 61 MMHG (ref 83–108)
POTASSIUM SERPL-SCNC: 4.6 MMOL/L (ref 3.5–5.5)
PRESSURE SUPPORT SETTING VENT: 15 CMH2O
RBC # BLD AUTO: 4.19 M/UL (ref 4.35–5.65)
RESPIRATORY RATE, POC: 12 (ref 5–40)
RESPIRATORY RATE, POC: 14 (ref 5–40)
SAO2 % BLD: 87.9 % (ref 92–97)
SAO2 % BLD: 90.9 % (ref 92–97)
SAO2 % BLD: 92.6 % (ref 92–97)
SERVICE CMNT-IMP: ABNORMAL
SODIUM SERPL-SCNC: 135 MMOL/L (ref 136–145)
SPECIMEN TYPE: ABNORMAL
VENTILATION MODE VENT: ABNORMAL
VT SETTING VENT: 520 ML
VT SETTING VENT: 550 ML
WBC # BLD AUTO: 7.5 K/UL (ref 4.6–13.2)

## 2025-06-22 PROCEDURE — 6370000000 HC RX 637 (ALT 250 FOR IP): Performed by: INTERNAL MEDICINE

## 2025-06-22 PROCEDURE — 36600 WITHDRAWAL OF ARTERIAL BLOOD: CPT

## 2025-06-22 PROCEDURE — 85025 COMPLETE CBC W/AUTO DIFF WBC: CPT

## 2025-06-22 PROCEDURE — 6360000002 HC RX W HCPCS: Performed by: INTERNAL MEDICINE

## 2025-06-22 PROCEDURE — 2580000003 HC RX 258: Performed by: INTERNAL MEDICINE

## 2025-06-22 PROCEDURE — 94640 AIRWAY INHALATION TREATMENT: CPT

## 2025-06-22 PROCEDURE — 80048 BASIC METABOLIC PNL TOTAL CA: CPT

## 2025-06-22 PROCEDURE — 82962 GLUCOSE BLOOD TEST: CPT

## 2025-06-22 PROCEDURE — 84100 ASSAY OF PHOSPHORUS: CPT

## 2025-06-22 PROCEDURE — 2000000000 HC ICU R&B

## 2025-06-22 PROCEDURE — 71045 X-RAY EXAM CHEST 1 VIEW: CPT

## 2025-06-22 PROCEDURE — 94003 VENT MGMT INPAT SUBQ DAY: CPT

## 2025-06-22 PROCEDURE — 82803 BLOOD GASES ANY COMBINATION: CPT

## 2025-06-22 PROCEDURE — 94669 MECHANICAL CHEST WALL OSCILL: CPT

## 2025-06-22 PROCEDURE — 2500000003 HC RX 250 WO HCPCS: Performed by: INTERNAL MEDICINE

## 2025-06-22 PROCEDURE — 2700000000 HC OXYGEN THERAPY PER DAY

## 2025-06-22 PROCEDURE — 83735 ASSAY OF MAGNESIUM: CPT

## 2025-06-22 PROCEDURE — 51702 INSERT TEMP BLADDER CATH: CPT

## 2025-06-22 RX ADMIN — DEXMEDETOMIDINE 1 MCG/KG/HR: 100 INJECTION, SOLUTION INTRAVENOUS at 20:48

## 2025-06-22 RX ADMIN — CHLORHEXIDINE GLUCONATE 15 ML: 1.2 RINSE ORAL at 08:34

## 2025-06-22 RX ADMIN — SODIUM CHLORIDE, PRESERVATIVE FREE 10 ML: 5 INJECTION INTRAVENOUS at 11:44

## 2025-06-22 RX ADMIN — CHLORHEXIDINE GLUCONATE 15 ML: 1.2 RINSE ORAL at 20:21

## 2025-06-22 RX ADMIN — FOLIC ACID 1 MG: 1 TABLET ORAL at 08:34

## 2025-06-22 RX ADMIN — SENNOSIDES 8.6 MG: 8.6 TABLET, COATED ORAL at 20:21

## 2025-06-22 RX ADMIN — CEFEPIME 2000 MG: 2 INJECTION, POWDER, FOR SOLUTION INTRAVENOUS at 14:05

## 2025-06-22 RX ADMIN — WATER 20 MG: 1 INJECTION INTRAMUSCULAR; INTRAVENOUS; SUBCUTANEOUS at 22:49

## 2025-06-22 RX ADMIN — FENTANYL CITRATE 125 MCG/HR: 0.05 INJECTION, SOLUTION INTRAMUSCULAR; INTRAVENOUS at 21:55

## 2025-06-22 RX ADMIN — POLYETHYLENE GLYCOL 3350 17 G: 17 POWDER, FOR SOLUTION ORAL at 08:34

## 2025-06-22 RX ADMIN — CEFEPIME 2000 MG: 2 INJECTION, POWDER, FOR SOLUTION INTRAVENOUS at 20:20

## 2025-06-22 RX ADMIN — IPRATROPIUM BROMIDE AND ALBUTEROL SULFATE 1 DOSE: .5; 3 SOLUTION RESPIRATORY (INHALATION) at 15:17

## 2025-06-22 RX ADMIN — SODIUM CHLORIDE 4 MG/HR: 900 INJECTION, SOLUTION INTRAVENOUS at 01:43

## 2025-06-22 RX ADMIN — DEXMEDETOMIDINE 0.8 MCG/KG/HR: 100 INJECTION, SOLUTION INTRAVENOUS at 14:23

## 2025-06-22 RX ADMIN — MINERAL OIL, PETROLATUM: 425; 568 OINTMENT OPHTHALMIC at 00:35

## 2025-06-22 RX ADMIN — BUDESONIDE 500 MCG: 0.5 INHALANT RESPIRATORY (INHALATION) at 20:44

## 2025-06-22 RX ADMIN — WATER 20 MG: 1 INJECTION INTRAMUSCULAR; INTRAVENOUS; SUBCUTANEOUS at 06:16

## 2025-06-22 RX ADMIN — IPRATROPIUM BROMIDE AND ALBUTEROL SULFATE 1 DOSE: .5; 3 SOLUTION RESPIRATORY (INHALATION) at 21:30

## 2025-06-22 RX ADMIN — ARFORMOTEROL TARTRATE 15 MCG: 15 SOLUTION RESPIRATORY (INHALATION) at 09:01

## 2025-06-22 RX ADMIN — BUDESONIDE 500 MCG: 0.5 INHALANT RESPIRATORY (INHALATION) at 09:01

## 2025-06-22 RX ADMIN — MIDAZOLAM HYDROCHLORIDE 2 MG: 1 INJECTION, SOLUTION INTRAMUSCULAR; INTRAVENOUS at 10:57

## 2025-06-22 RX ADMIN — FENTANYL CITRATE 125 MCG/HR: 0.05 INJECTION, SOLUTION INTRAMUSCULAR; INTRAVENOUS at 14:15

## 2025-06-22 RX ADMIN — IPRATROPIUM BROMIDE AND ALBUTEROL SULFATE 1 DOSE: .5; 3 SOLUTION RESPIRATORY (INHALATION) at 09:01

## 2025-06-22 RX ADMIN — ENOXAPARIN SODIUM 30 MG: 100 INJECTION SUBCUTANEOUS at 01:15

## 2025-06-22 RX ADMIN — FUROSEMIDE 40 MG: 10 INJECTION, SOLUTION INTRAMUSCULAR; INTRAVENOUS at 08:34

## 2025-06-22 RX ADMIN — ENOXAPARIN SODIUM 30 MG: 100 INJECTION SUBCUTANEOUS at 14:05

## 2025-06-22 RX ADMIN — FENTANYL CITRATE 125 MCG/HR: 0.05 INJECTION, SOLUTION INTRAMUSCULAR; INTRAVENOUS at 06:13

## 2025-06-22 RX ADMIN — Medication 100 MG: at 08:34

## 2025-06-22 RX ADMIN — MINERAL OIL, PETROLATUM: 425; 568 OINTMENT OPHTHALMIC at 20:23

## 2025-06-22 RX ADMIN — MIDAZOLAM HYDROCHLORIDE 2 MG: 1 INJECTION, SOLUTION INTRAMUSCULAR; INTRAVENOUS at 22:49

## 2025-06-22 RX ADMIN — ACETYLCYSTEINE 400 MG: 100 INHALANT RESPIRATORY (INHALATION) at 09:01

## 2025-06-22 RX ADMIN — WATER 20 MG: 1 INJECTION INTRAMUSCULAR; INTRAVENOUS; SUBCUTANEOUS at 00:34

## 2025-06-22 RX ADMIN — MICAFUNGIN SODIUM 150 MG: 50 INJECTION, POWDER, LYOPHILIZED, FOR SOLUTION INTRAVENOUS at 10:23

## 2025-06-22 RX ADMIN — MIDAZOLAM HYDROCHLORIDE 2 MG: 1 INJECTION, SOLUTION INTRAMUSCULAR; INTRAVENOUS at 18:07

## 2025-06-22 RX ADMIN — MINERAL OIL, PETROLATUM: 425; 568 OINTMENT OPHTHALMIC at 10:23

## 2025-06-22 RX ADMIN — ACETYLCYSTEINE 400 MG: 100 INHALANT RESPIRATORY (INHALATION) at 15:17

## 2025-06-22 RX ADMIN — SODIUM CHLORIDE, PRESERVATIVE FREE 40 MG: 5 INJECTION INTRAVENOUS at 08:33

## 2025-06-22 RX ADMIN — DEXMEDETOMIDINE 1 MCG/KG/HR: 100 INJECTION, SOLUTION INTRAVENOUS at 18:35

## 2025-06-22 RX ADMIN — SENNOSIDES 8.6 MG: 8.6 TABLET, COATED ORAL at 08:34

## 2025-06-22 RX ADMIN — CEFEPIME 2000 MG: 2 INJECTION, POWDER, FOR SOLUTION INTRAVENOUS at 05:41

## 2025-06-22 RX ADMIN — DEXMEDETOMIDINE 0.8 MCG/KG/HR: 100 INJECTION, SOLUTION INTRAVENOUS at 02:45

## 2025-06-22 RX ADMIN — ARFORMOTEROL TARTRATE 15 MCG: 15 SOLUTION RESPIRATORY (INHALATION) at 20:44

## 2025-06-22 RX ADMIN — AMLODIPINE BESYLATE 10 MG: 5 TABLET ORAL at 08:34

## 2025-06-22 RX ADMIN — MINERAL OIL, PETROLATUM: 425; 568 OINTMENT OPHTHALMIC at 17:33

## 2025-06-22 RX ADMIN — ACETYLCYSTEINE 400 MG: 100 INHALANT RESPIRATORY (INHALATION) at 21:30

## 2025-06-22 RX ADMIN — SODIUM CHLORIDE, PRESERVATIVE FREE 10 ML: 5 INJECTION INTRAVENOUS at 20:24

## 2025-06-22 RX ADMIN — WATER 20 MG: 1 INJECTION INTRAMUSCULAR; INTRAVENOUS; SUBCUTANEOUS at 15:18

## 2025-06-22 RX ADMIN — DEXMEDETOMIDINE 0.8 MCG/KG/HR: 100 INJECTION, SOLUTION INTRAVENOUS at 08:38

## 2025-06-22 RX ADMIN — MINERAL OIL, PETROLATUM: 425; 568 OINTMENT OPHTHALMIC at 05:41

## 2025-06-22 ASSESSMENT — PULMONARY FUNCTION TESTS
PIF_VALUE: 27
PIF_VALUE: 18
PIF_VALUE: 19
PIF_VALUE: 24
PIF_VALUE: 30

## 2025-06-22 ASSESSMENT — PAIN SCALES - GENERAL
PAINLEVEL_OUTOF10: 0

## 2025-06-22 NOTE — PROGRESS NOTES
(SOLU-MEDROL) 20 mg in sterile water 0.5 mL injection  20 mg IntraVENous Q8H Casey Car MD   20 mg at 06/22/25 0616    dexmedeTOMIDine (PRECEDEX) 400 mcg in sodium chloride 0.9 % 100 mL infusion  0.1-1.5 mcg/kg/hr IntraVENous Continuous Jocelyne Richmond MD 22.1 mL/hr at 06/22/25 0838 0.8 mcg/kg/hr at 06/22/25 0838    fentaNYL (SUBLIMAZE) 1,000 mcg in sodium chloride 0.9% 100 mL infusion   mcg/hr IntraVENous Continuous Jocelyne Richmond MD 12.5 mL/hr at 06/22/25 0613 125 mcg/hr at 06/22/25 0613    micafungin (MYCAMINE) 150 mg in sodium chloride 0.9 % 100 mL IVPB  150 mg IntraVENous Daily Imelda Perez  mL/hr at 06/22/25 1023 150 mg at 06/22/25 1023    senna (SENOKOT) tablet 8.6 mg  1 tablet Oral BID Jocelyne Richmond MD   8.6 mg at 06/22/25 0834    polyethylene glycol (GLYCOLAX) packet 17 g  17 g Oral Daily Jocelyne Richmond MD   17 g at 06/22/25 0834    sodium chloride flush 0.9 % injection 5-40 mL  5-40 mL IntraVENous 2 times per day Macho Tan, DO   10 mL at 06/21/25 2001    sodium chloride flush 0.9 % injection 5-40 mL  5-40 mL IntraVENous PRN Macho Tan,         0.9 % sodium chloride infusion   IntraVENous PRN Macho Tan,         potassium chloride 20 mEq/50 mL IVPB (Central Line)  20 mEq IntraVENous PRN Macho Tan,  50 mL/hr at 06/14/25 1121 20 mEq at 06/14/25 1121    Or    potassium chloride 10 mEq/100 mL IVPB (Peripheral Line)  10 mEq IntraVENous PRN Macho Tan,         magnesium sulfate 2000 mg in 50 mL IVPB premix  2,000 mg IntraVENous PRN Macho Tan DO        enoxaparin Sodium (LOVENOX) injection 30 mg  30 mg SubCUTAneous BID Macho Tan DO   30 mg at 06/22/25 0115    polyethylene glycol (GLYCOLAX) packet 17 g  17 g Oral Daily PRN Macho Tan DO        acetaminophen (TYLENOL) tablet 650 mg  650 mg Oral Q6H PRN Macho Tan DO        Or    acetaminophen (TYLENOL) suppository 650 mg  650 mg Rectal  Q6H PRN Macho Tan,         sodium phosphate 15 mmol in sodium chloride 0.9 % 250 mL IVPB  15 mmol IntraVENous PRN Macho Tan,         bisacodyl (DULCOLAX) suppository 10 mg  10 mg Rectal Daily PRN Macho Tan,         ondansetron (ZOFRAN) injection 4 mg  4 mg IntraVENous Q6H PRN Macho Tan,         lubrifresh P.M. (artificial tears) ophthalmic ointment   Both Eyes Q4H Macho Tan,    Given at 06/22/25 1023    potassium chloride 20 mEq/50 mL IVPB (Central Line)  20 mEq IntraVENous PRN Macho Tan,         arformoterol tartrate (BROVANA) nebulizer solution 15 mcg  15 mcg Nebulization BID RT Jocelyne Richmond MD   15 mcg at 06/22/25 0901    budesonide (PULMICORT) nebulizer suspension 500 mcg  0.5 mg Nebulization BID RT Jocelyne Richmond MD   500 mcg at 06/22/25 0901    thiamine mononitrate tablet 100 mg  100 mg Oral Daily Jocelyne Richmond MD   100 mg at 06/22/25 0834    folic acid (FOLVITE) tablet 1 mg  1 mg Oral Daily Jocelyne Richmond MD   1 mg at 06/22/25 0834    chlorhexidine (PERIDEX) 0.12 % solution 15 mL  15 mL Mouth/Throat BID Jocelyne Richmond MD   15 mL at 06/22/25 0834    midazolam PF (VERSED) injection 2 mg  2 mg IntraVENous Q4H PRN Jocelyne Richmond MD   2 mg at 06/22/25 1057    sulfur hexafluoride microspheres (LUMASON) 60.7-25 MG injection 2 mL  2 mL IntraVENous ONCE PRN Александр Pennington MD        glucose chewable tablet 16 g  4 tablet Oral PRN Jocelyne Richmond MD        dextrose bolus 10% 125 mL  125 mL IntraVENous PRN Jocelyne Richmond MD        Or    dextrose bolus 10% 250 mL  250 mL IntraVENous PRN Jocelyne Richmond MD        glucagon injection 1 mg  1 mg SubCUTAneous PRN Jocelyne Richmond MD        dextrose 10 % infusion   IntraVENous Continuous PRN Jocelyne Richmond MD        insulin lispro (HUMALOG,ADMELOG) injection vial 0-8 Units  0-8 Units SubCUTAneous Q6H Jocelyne Richmond MD   2 Units at 06/16/25 9700

## 2025-06-22 NOTE — PROGRESS NOTES
Patient placed on APRV Mode per Dr Car.  P High 28  P Low    0  T High 4.4  T Low  0.6  PS       15  Rate    12  FIO2    90%  VT avg 500ml  O2 Sats range from 85 to 94%

## 2025-06-22 NOTE — PLAN OF CARE
Problem: Safety - Medical Restraint  Goal: Remains free of injury from restraints (Restraint for Interference with Medical Device)  Description: INTERVENTIONS:  1. Determine that other, less restrictive measures have been tried or would not be effective before applying the restraint  2. Evaluate the patient's condition at the time of restraint application  3. Inform patient/family regarding the reason for restraint  4. Q2H: Monitor safety, psychosocial status, comfort, nutrition and hydration  6/22/2025 1007 by Ashley Martinez RN  Outcome: Progressing  Flowsheets  Taken 6/22/2025 0600 by Karen Barajas RN  Remains free of injury from restraints (restraint for interference with medical device):   Determine that other, less restrictive measures have been tried or would not be effective before applying the restraint   Evaluate the patient's condition at the time of restraint application   Inform patient/family regarding the reason for restraint   Every 2 hours: Monitor safety, psychosocial status, comfort, nutrition and hydration  Taken 6/22/2025 0400 by Karen Barajas RN  Remains free of injury from restraints (restraint for interference with medical device):   Determine that other, less restrictive measures have been tried or would not be effective before applying the restraint   Evaluate the patient's condition at the time of restraint application   Inform patient/family regarding the reason for restraint   Every 2 hours: Monitor safety, psychosocial status, comfort, nutrition and hydration  Taken 6/22/2025 0200 by Karen Barajas RN  Remains free of injury from restraints (restraint for interference with medical device):   Determine that other, less restrictive measures have been tried or would not be effective before applying the restraint   Evaluate the patient's condition at the time of restraint application   Inform patient/family regarding the reason for restraint   Every 2 hours: Monitor safety,

## 2025-06-22 NOTE — PROGRESS NOTES
Hospitalist Progress Note    Patient: Beny Knapp MRN: [id number]  CSN: [id number]    YOB: 1965  Age: 59 y.o.  Sex: male    DOA: 6/14/2025 LOS:  LOS: 8 days          Chief Complain :  Foreign body, shortness of breath.  59 y.o.  male w/ PMH of COPD, ETOH-use and polysubstance abuse who was BIBA and presents with subjective difficulty breathing and swallowing with throat pain. Brought in by EMS for possible forign body (food) with bolus within region of velecula. ENT saw pt and ED intubated for airway protection. ENT was consulted and performed scope on pt with findings of extensive esophageal candidiasis. Nephrology was consulted for severe hyponatremia with sodium of 117 recommending NS at 75cc/hr. Intensivist was contacted as well d/t pt being intubated, on vent for airway protection.   Subjective:   Patient orally intubated and sedated.  His oxygen requirements are fluctuating.  It had improved and FiO2 was decreased however his FiO2 is now requiring to be increased now at 90%.  Trying to keep oxygen saturations between 90 to 95%.    Assessment/Plan     Active Hospital Problems    Diagnosis     Aspiration pneumonia (HCC) [J69.0]     Cocaine abuse (HCC) [F14.10]     Hyponatremia [E87.1]     Stridor [R06.1]     Obstructed, larynx [J38.6]     Acute respiratory failure with hypoxia and hypercapnia (HCC) [J96.01, J96.02]     ETOH abuse [F10.10]     Polysubstance abuse (HCC) [F19.10]     Candidiasis of esophagus (HCC) [B37.81]     Tobacco abuse [Z72.0]     HTN (hypertension) [I10]     COPD (chronic obstructive pulmonary disease) (HCC) [J44.9]     Chronic hepatitis C (HCC) [B18.2]           CRITICAL CARE PLAN    Resp

## 2025-06-23 ENCOUNTER — APPOINTMENT (OUTPATIENT)
Facility: HOSPITAL | Age: 60
DRG: 368 | End: 2025-06-23
Payer: MEDICARE

## 2025-06-23 LAB
ANION GAP SERPL CALC-SCNC: 10 MMOL/L (ref 3–18)
ARTERIAL PATENCY WRIST A: POSITIVE
ARTERIAL PATENCY WRIST A: POSITIVE
BASE EXCESS BLD CALC-SCNC: 4.8 MMOL/L
BASE EXCESS BLD CALC-SCNC: 6.6 MMOL/L
BDY SITE: ABNORMAL
BDY SITE: ABNORMAL
BODY TEMPERATURE: 100.4
BUN SERPL-MCNC: 49 MG/DL (ref 6–23)
BUN/CREAT SERPL: 66 (ref 12–20)
CALCIUM SERPL-MCNC: 9.3 MG/DL (ref 8.5–10.1)
CHLORIDE SERPL-SCNC: 96 MMOL/L (ref 98–107)
CO2 SERPL-SCNC: 28 MMOL/L (ref 21–32)
CREAT SERPL-MCNC: 0.75 MG/DL (ref 0.6–1.3)
ERYTHROCYTE [DISTWIDTH] IN BLOOD BY AUTOMATED COUNT: 14.1 % (ref 11.6–14.5)
GAS FLOW.O2 O2 DELIVERY SYS: ABNORMAL
GAS FLOW.O2 O2 DELIVERY SYS: ABNORMAL
GAS FLOW.O2 SETTING OXYMISER: 12 BPM
GAS FLOW.O2 SETTING OXYMISER: 12 BPM
GLUCOSE BLD STRIP.AUTO-MCNC: 145 MG/DL (ref 70–110)
GLUCOSE BLD STRIP.AUTO-MCNC: 166 MG/DL (ref 70–110)
GLUCOSE BLD STRIP.AUTO-MCNC: 170 MG/DL (ref 70–110)
GLUCOSE BLD STRIP.AUTO-MCNC: 170 MG/DL (ref 70–110)
GLUCOSE BLD STRIP.AUTO-MCNC: 171 MG/DL (ref 70–110)
GLUCOSE SERPL-MCNC: 151 MG/DL (ref 74–108)
HCO3 BLD-SCNC: 31 MMOL/L (ref 21–28)
HCO3 BLD-SCNC: 32.4 MMOL/L (ref 21–28)
HCT VFR BLD AUTO: 38.2 % (ref 36–48)
HGB BLD-MCNC: 12.5 G/DL (ref 13–16)
IPAP/PIP/HIGH PEEP: 25
IPAP/PIP/HIGH PEEP: 25
MAGNESIUM SERPL-MCNC: 2.6 MG/DL (ref 1.6–2.6)
MCH RBC QN AUTO: 29.4 PG (ref 24–34)
MCHC RBC AUTO-ENTMCNC: 32.7 G/DL (ref 31–37)
MCV RBC AUTO: 89.9 FL (ref 78–100)
NRBC # BLD: 0 K/UL (ref 0–0.01)
NRBC BLD-RTO: 0 PER 100 WBC
O2/TOTAL GAS SETTING VFR VENT: 100 %
O2/TOTAL GAS SETTING VFR VENT: 100 %
PCO2 BLD: 49.8 MMHG (ref 35–48)
PCO2 BLD: 53.6 MMHG (ref 35–48)
PEEP RESPIRATORY: 8 CMH2O
PEEP RESPIRATORY: 8 CMH2O
PH BLD: 7.38 (ref 7.35–7.45)
PH BLD: 7.42 (ref 7.35–7.45)
PHOSPHATE SERPL-MCNC: 4.5 MG/DL (ref 2.5–4.9)
PLATELET # BLD AUTO: 213 K/UL (ref 135–420)
PMV BLD AUTO: 9.7 FL (ref 9.2–11.8)
PO2 BLD: 121 MMHG (ref 83–108)
PO2 BLD: 122 MMHG (ref 83–108)
POTASSIUM SERPL-SCNC: 4.7 MMOL/L (ref 3.5–5.5)
PRESSURE SUPPORT SETTING VENT: 0 CMH2O
RBC # BLD AUTO: 4.25 M/UL (ref 4.35–5.65)
RESPIRATORY RATE, POC: 17 (ref 5–40)
SAO2 % BLD: 98.4 % (ref 92–97)
SAO2 % BLD: 98.7 % (ref 92–97)
SERVICE CMNT-IMP: ABNORMAL
SERVICE CMNT-IMP: ABNORMAL
SODIUM SERPL-SCNC: 134 MMOL/L (ref 136–145)
SPECIMEN TYPE: ABNORMAL
SPECIMEN TYPE: ABNORMAL
VENTILATION MODE VENT: ABNORMAL
VENTILATION MODE VENT: ABNORMAL
WBC # BLD AUTO: 13.4 K/UL (ref 4.6–13.2)

## 2025-06-23 PROCEDURE — 85027 COMPLETE CBC AUTOMATED: CPT

## 2025-06-23 PROCEDURE — 6370000000 HC RX 637 (ALT 250 FOR IP): Performed by: INTERNAL MEDICINE

## 2025-06-23 PROCEDURE — 6360000002 HC RX W HCPCS: Performed by: INTERNAL MEDICINE

## 2025-06-23 PROCEDURE — 2500000003 HC RX 250 WO HCPCS: Performed by: INTERNAL MEDICINE

## 2025-06-23 PROCEDURE — 2000000000 HC ICU R&B

## 2025-06-23 PROCEDURE — 94003 VENT MGMT INPAT SUBQ DAY: CPT

## 2025-06-23 PROCEDURE — 2580000003 HC RX 258: Performed by: INTERNAL MEDICINE

## 2025-06-23 PROCEDURE — 71045 X-RAY EXAM CHEST 1 VIEW: CPT

## 2025-06-23 PROCEDURE — 36556 INSERT NON-TUNNEL CV CATH: CPT

## 2025-06-23 PROCEDURE — 94640 AIRWAY INHALATION TREATMENT: CPT

## 2025-06-23 PROCEDURE — 82962 GLUCOSE BLOOD TEST: CPT

## 2025-06-23 PROCEDURE — 83735 ASSAY OF MAGNESIUM: CPT

## 2025-06-23 PROCEDURE — 2700000000 HC OXYGEN THERAPY PER DAY

## 2025-06-23 PROCEDURE — 80048 BASIC METABOLIC PNL TOTAL CA: CPT

## 2025-06-23 PROCEDURE — 94669 MECHANICAL CHEST WALL OSCILL: CPT

## 2025-06-23 PROCEDURE — 84100 ASSAY OF PHOSPHORUS: CPT

## 2025-06-23 PROCEDURE — 36600 WITHDRAWAL OF ARTERIAL BLOOD: CPT

## 2025-06-23 PROCEDURE — 82803 BLOOD GASES ANY COMBINATION: CPT

## 2025-06-23 RX ADMIN — ACETYLCYSTEINE 400 MG: 100 INHALANT RESPIRATORY (INHALATION) at 20:43

## 2025-06-23 RX ADMIN — MINERAL OIL, PETROLATUM: 425; 568 OINTMENT OPHTHALMIC at 23:53

## 2025-06-23 RX ADMIN — DEXMEDETOMIDINE 1 MCG/KG/HR: 100 INJECTION, SOLUTION INTRAVENOUS at 22:15

## 2025-06-23 RX ADMIN — AMLODIPINE BESYLATE 10 MG: 5 TABLET ORAL at 08:54

## 2025-06-23 RX ADMIN — WATER 20 MG: 1 INJECTION INTRAMUSCULAR; INTRAVENOUS; SUBCUTANEOUS at 22:10

## 2025-06-23 RX ADMIN — MINERAL OIL, PETROLATUM: 425; 568 OINTMENT OPHTHALMIC at 21:55

## 2025-06-23 RX ADMIN — ENOXAPARIN SODIUM 30 MG: 100 INJECTION SUBCUTANEOUS at 01:46

## 2025-06-23 RX ADMIN — SENNOSIDES 8.6 MG: 8.6 TABLET, COATED ORAL at 21:52

## 2025-06-23 RX ADMIN — ACETYLCYSTEINE 400 MG: 100 INHALANT RESPIRATORY (INHALATION) at 14:25

## 2025-06-23 RX ADMIN — ARFORMOTEROL TARTRATE 15 MCG: 15 SOLUTION RESPIRATORY (INHALATION) at 20:59

## 2025-06-23 RX ADMIN — FENTANYL CITRATE 125 MCG/HR: 0.05 INJECTION, SOLUTION INTRAMUSCULAR; INTRAVENOUS at 13:57

## 2025-06-23 RX ADMIN — FENTANYL CITRATE 125 MCG/HR: 0.05 INJECTION, SOLUTION INTRAMUSCULAR; INTRAVENOUS at 22:20

## 2025-06-23 RX ADMIN — CEFEPIME 2000 MG: 2 INJECTION, POWDER, FOR SOLUTION INTRAVENOUS at 05:35

## 2025-06-23 RX ADMIN — ARFORMOTEROL TARTRATE 15 MCG: 15 SOLUTION RESPIRATORY (INHALATION) at 08:02

## 2025-06-23 RX ADMIN — DEXMEDETOMIDINE 1 MCG/KG/HR: 100 INJECTION, SOLUTION INTRAVENOUS at 03:20

## 2025-06-23 RX ADMIN — MINERAL OIL, PETROLATUM: 425; 568 OINTMENT OPHTHALMIC at 06:05

## 2025-06-23 RX ADMIN — IPRATROPIUM BROMIDE AND ALBUTEROL SULFATE 1 DOSE: .5; 3 SOLUTION RESPIRATORY (INHALATION) at 08:02

## 2025-06-23 RX ADMIN — POLYETHYLENE GLYCOL 3350 17 G: 17 POWDER, FOR SOLUTION ORAL at 08:53

## 2025-06-23 RX ADMIN — BUDESONIDE 500 MCG: 0.5 INHALANT RESPIRATORY (INHALATION) at 08:02

## 2025-06-23 RX ADMIN — BUDESONIDE 500 MCG: 0.5 INHALANT RESPIRATORY (INHALATION) at 20:59

## 2025-06-23 RX ADMIN — SODIUM CHLORIDE, PRESERVATIVE FREE 10 ML: 5 INJECTION INTRAVENOUS at 21:52

## 2025-06-23 RX ADMIN — IPRATROPIUM BROMIDE AND ALBUTEROL SULFATE 1 DOSE: .5; 3 SOLUTION RESPIRATORY (INHALATION) at 20:43

## 2025-06-23 RX ADMIN — CEFEPIME 2000 MG: 2 INJECTION, POWDER, FOR SOLUTION INTRAVENOUS at 21:55

## 2025-06-23 RX ADMIN — FENTANYL CITRATE 125 MCG/HR: 0.05 INJECTION, SOLUTION INTRAMUSCULAR; INTRAVENOUS at 05:58

## 2025-06-23 RX ADMIN — MIDAZOLAM HYDROCHLORIDE 2 MG: 1 INJECTION, SOLUTION INTRAMUSCULAR; INTRAVENOUS at 18:09

## 2025-06-23 RX ADMIN — ENOXAPARIN SODIUM 30 MG: 100 INJECTION SUBCUTANEOUS at 13:12

## 2025-06-23 RX ADMIN — FOLIC ACID 1 MG: 1 TABLET ORAL at 08:54

## 2025-06-23 RX ADMIN — SODIUM CHLORIDE 5 MG/HR: 900 INJECTION, SOLUTION INTRAVENOUS at 04:27

## 2025-06-23 RX ADMIN — CHLORHEXIDINE GLUCONATE 15 ML: 1.2 RINSE ORAL at 08:54

## 2025-06-23 RX ADMIN — WATER 20 MG: 1 INJECTION INTRAMUSCULAR; INTRAVENOUS; SUBCUTANEOUS at 08:54

## 2025-06-23 RX ADMIN — DEXMEDETOMIDINE 1 MCG/KG/HR: 100 INJECTION, SOLUTION INTRAVENOUS at 10:45

## 2025-06-23 RX ADMIN — SODIUM CHLORIDE, PRESERVATIVE FREE 10 ML: 5 INJECTION INTRAVENOUS at 08:59

## 2025-06-23 RX ADMIN — MICAFUNGIN SODIUM 150 MG: 50 INJECTION, POWDER, LYOPHILIZED, FOR SOLUTION INTRAVENOUS at 10:44

## 2025-06-23 RX ADMIN — IPRATROPIUM BROMIDE AND ALBUTEROL SULFATE 1 DOSE: .5; 3 SOLUTION RESPIRATORY (INHALATION) at 14:25

## 2025-06-23 RX ADMIN — DEXMEDETOMIDINE 1 MCG/KG/HR: 100 INJECTION, SOLUTION INTRAVENOUS at 14:25

## 2025-06-23 RX ADMIN — SODIUM CHLORIDE, PRESERVATIVE FREE 40 MG: 5 INJECTION INTRAVENOUS at 08:57

## 2025-06-23 RX ADMIN — CEFEPIME 2000 MG: 2 INJECTION, POWDER, FOR SOLUTION INTRAVENOUS at 13:17

## 2025-06-23 RX ADMIN — WATER 20 MG: 1 INJECTION INTRAMUSCULAR; INTRAVENOUS; SUBCUTANEOUS at 16:07

## 2025-06-23 RX ADMIN — DEXMEDETOMIDINE 1 MCG/KG/HR: 100 INJECTION, SOLUTION INTRAVENOUS at 07:15

## 2025-06-23 RX ADMIN — SENNOSIDES 8.6 MG: 8.6 TABLET, COATED ORAL at 08:57

## 2025-06-23 RX ADMIN — ACETYLCYSTEINE 400 MG: 100 INHALANT RESPIRATORY (INHALATION) at 08:02

## 2025-06-23 RX ADMIN — ACETAMINOPHEN 650 MG: 325 TABLET ORAL at 01:52

## 2025-06-23 RX ADMIN — Medication 100 MG: at 08:54

## 2025-06-23 RX ADMIN — MINERAL OIL, PETROLATUM: 425; 568 OINTMENT OPHTHALMIC at 13:18

## 2025-06-23 RX ADMIN — MINERAL OIL, PETROLATUM: 425; 568 OINTMENT OPHTHALMIC at 17:17

## 2025-06-23 RX ADMIN — MINERAL OIL, PETROLATUM: 425; 568 OINTMENT OPHTHALMIC at 08:58

## 2025-06-23 RX ADMIN — FUROSEMIDE 40 MG: 10 INJECTION, SOLUTION INTRAMUSCULAR; INTRAVENOUS at 08:54

## 2025-06-23 RX ADMIN — POLYETHYLENE GLYCOL 3350 17 G: 17 POWDER, FOR SOLUTION ORAL at 13:18

## 2025-06-23 RX ADMIN — MINERAL OIL, PETROLATUM: 425; 568 OINTMENT OPHTHALMIC at 01:46

## 2025-06-23 RX ADMIN — CHLORHEXIDINE GLUCONATE 15 ML: 1.2 RINSE ORAL at 21:51

## 2025-06-23 RX ADMIN — DEXMEDETOMIDINE 1 MCG/KG/HR: 100 INJECTION, SOLUTION INTRAVENOUS at 18:04

## 2025-06-23 ASSESSMENT — PAIN SCALES - GENERAL
PAINLEVEL_OUTOF10: 0

## 2025-06-23 ASSESSMENT — PULMONARY FUNCTION TESTS
PIF_VALUE: 27
PIF_VALUE: 27
PIF_VALUE: 28
PIF_VALUE: 31
PIF_VALUE: 27
PIF_VALUE: 27

## 2025-06-23 NOTE — PROGRESS NOTES
Comprehensive High Risk Nutrition Assessment Follow-Up    Type and Reason for Visit:  Reassess    Nutrition Recommendations/Plan:   Cont TF with standard / Jevity 1.5 @ 10ml/hr adv 10 ml/hr Q6-8hrs to 50ml/hr goal rate + 1 ProSource daily via OGT as tr provides 1860kcal, 92g pro, 912ml FW  Monitor BG levels on steroids need to change to Glucerna 1.5 @ 50ml/hr + 1 ProSource provides 1860kcal, 115g pro, 910ml FW  Defer free water to MD given diuresing; if w/o IVF suggest ~145ml Q4hrs  Will likely only receive ~80% TF r/t frequent interruptions  Cont to check Phos, Mg, and K and replete as needed  Check A1c  Cont folate and thiamine and consider add liq centrum/certa-azucena w/min as well via   Cont to monitor POC, wt trends, renal fx, lytes, UOP, bowel fx, skin integrity/wound healing and adjust recs as needed     Malnutrition Assessment:  Malnutrition Status:  Insufficient data (06/23/25 1149)      Nutrition Assessment:    60yo M remains in ICU sedated on vent w/precedex, fentanyl, versed, no pressors, severe candidiasis; h/o HTN, EtOH use, COPD, smoker, polysubstance abuse including cocaine, hep C noted. NPO and trickle TF was on hold now back on trickle feeds past 3 days noted tr TF at low rate, overall inadequate nutrition since admit. variable wt trends since admit up/down likely fluid; relatively stable wt compared to admit wt. diuresing w/lasix. good UOP.  on admit wt was up from 97-98kg July to Jan 2025 if correct. no PI wound nurse. pt on sedated on vent doesn't appear with overt signs of malnutrition on observation however fluid maybe masking wt loss. Trying to reach NOK noted.    Nutrition Related Findings:      Wound Type: None       Current Nutrition Intake & Therapies:    Average Meal Intake: NPO (vent +TF)  Average Supplements Intake: NPO  Diet NPO  ADULT TUBE FEEDING; Orogastric; Standard with Fiber; Continuous; 10; No; 100; Q 4 hours  Current Tube Feeding (TF) Orders:  See above    Anthropometric  Measures:  Height: 182.9 cm (6' 0.01\")  Ideal Body Weight (IBW): 178 lbs (81 kg)    Admission Body Weight: 113 kg (249 lb 1.9 oz)  Current Body Weight: 112 kg (246 lb 14.6 oz), 137.5 % IBW. Weight Source: Bed scale  Current BMI (kg/m2): 33.5  Usual Body Weight: 98 kg (216 lb 0.8 oz)  % Weight Change (Calculated): 13.3  BMI Categories: Obese Class 1 (BMI 30.0-34.9)    Estimated Daily Nutrient Needs:  Energy Requirements Based On: Kcal/kg  Weight Used for Energy Requirements: Current  Energy (kcal/day): 1700  Weight Used for Protein Requirements: Ideal  Protein (g/day): 120  Method Used for Fluid Requirements: 1 ml/kcal  Fluid (ml/day): 1700    Nutrition Diagnosis:   Inadequate protein-energy intake related to cognitive or neurological impairment, impaired respiratory function, increase demand for energy/nutrients, catabolic illness, cardiac dysfunction (polysubstance abuse) as evidenced by intubation, nutrition support - enteral nutrition, intake 0-25%, intake 26-50%, lab values, localized or generalized fluid accumulation (+THC, benzo, cocaine on admit, off/on trickle feeds since admit)    Nutrition Interventions:   Food and/or Nutrient Delivery: Continue Current Tube Feeding, Modify Tube Feeding, Vitamin Supplement, Mineral Supplement  Nutrition Education/Counseling: Education/Counseling not indicated  Coordination of Nutrition Care: Continue to monitor while inpatient    Goals:  Goals: Meet at least 75% of estimated needs (prevent wt loss and skin breakdown)  Type of Goal: Continue current goal  Previous Goal Met: No Progress toward Goal(s)    Nutrition Monitoring and Evaluation:   Behavioral-Environmental Outcomes: Readiness for Change  Food/Nutrient Intake Outcomes: Enteral Nutrition Intake/Tolerance, Vitamin/Mineral Intake  Physical Signs/Symptoms Outcomes: Biochemical Data, Fluid Status or Edema, Nutrition Focused Physical Findings, Skin, Weight, GI Status    Discharge Planning:    Too soon to determine

## 2025-06-23 NOTE — CARE COORDINATION
6/23 - Received a voicemail, over the weekend, from the owner of [phone]. The owner of the phone does not know the patient.

## 2025-06-23 NOTE — WOUND CARE
ICU Rounding  Wound care nurse rounded on patient for skin issues.  Patient has a Chauncey score of 15.  Does patient have any pressure injury?no per primary nurse  PUMA Cabrera     Skin Care & Pressure Relief Recommendations  Minimize layers of linen  Pads under patient to optimize support surface  Turn/reposition approximately every 2 hours  Use pillow wedges to maintain positioning but do not put pillow directly over wounds  Manage incontinence   Promote continence; Skin Protective lotion/cream to buttocks and sacrum daily and as needed with incontinence care  Offload heels pillows    Consult wound care if any wounds noted during admission.  Wound Care nurse will continue to follow during ICU admission.

## 2025-06-23 NOTE — PROGRESS NOTES
Hospitalist Progress Note    Patient: Beny Knapp MRN: [id number]  CSN: [id number]    YOB: 1965  Age: 59 y.o.  Sex: male    DOA: 6/14/2025 LOS:  LOS: 9 days          Chief Complain :  Foreign body, shortness of breath.  59 y.o.  male w/ PMH of COPD, ETOH-use and polysubstance abuse who was BIBA and presents with subjective difficulty breathing and swallowing with throat pain. Brought in by EMS for possible forign body (food) with bolus within region of velecula. ENT saw pt and ED intubated for airway protection. ENT was consulted and performed scope on pt with findings of extensive esophageal candidiasis. Nephrology was consulted for severe hyponatremia with sodium of 117 recommending NS at 75cc/hr. Intensivist was contacted as well d/t pt being intubated, on vent for airway protection.   Subjective:   Patient orally intubated and sedated.  His oxygen requirements are fluctuating.  It had improved and FiO2 was decreased however his FiO2 is now requiring to be increased now at 90%.  Trying to keep oxygen saturations between 90 to 95%.    Assessment/Plan     Active Hospital Problems    Diagnosis     Aspiration pneumonia (HCC) [J69.0]     Cocaine abuse (HCC) [F14.10]     Hyponatremia [E87.1]     Stridor [R06.1]     Obstructed, larynx [J38.6]     Acute respiratory failure with hypoxia and hypercapnia (HCC) [J96.01, J96.02]     ETOH abuse [F10.10]     Polysubstance abuse (HCC) [F19.10]     Candidiasis of esophagus (HCC) [B37.81]     Tobacco abuse [Z72.0]     HTN (hypertension) [I10]     COPD (chronic obstructive pulmonary disease) (HCC) [J44.9]     Chronic hepatitis C (HCC) [B18.2]       6/23  Imrpoved ventilation on

## 2025-06-23 NOTE — PLAN OF CARE
Problem: Safety - Medical Restraint  Goal: Remains free of injury from restraints (Restraint for Interference with Medical Device)  Description: INTERVENTIONS:  1. Determine that other, less restrictive measures have been tried or would not be effective before applying the restraint  2. Evaluate the patient's condition at the time of restraint application  3. Inform patient/family regarding the reason for restraint  4. Q2H: Monitor safety, psychosocial status, comfort, nutrition and hydration  6/22/2025 2349 by Candace Kidd RN  Outcome: Progressing  Flowsheets  Taken 6/22/2025 2000 by Candace Kidd RN  Remains free of injury from restraints (restraint for interference with medical device):   Determine that other, less restrictive measures have been tried or would not be effective before applying the restraint   Evaluate the patient's condition at the time of restraint application   Inform patient/family regarding the reason for restraint   Every 2 hours: Monitor safety, psychosocial status, comfort, nutrition and hydration  Taken 6/22/2025 1600 by Ashley Martinez RN  Remains free of injury from restraints (restraint for interference with medical device): Determine that other, less restrictive measures have been tried or would not be effective before applying the restraint  Taken 6/22/2025 1400 by Ashley Martinez RN  Remains free of injury from restraints (restraint for interference with medical device): Determine that other, less restrictive measures have been tried or would not be effective before applying the restraint  Taken 6/22/2025 1200 by Ashley Martinez RN  Remains free of injury from restraints (restraint for interference with medical device): Determine that other, less restrictive measures have been tried or would not be effective before applying the restraint  Taken 6/22/2025 1112 by Ashley Martinez RN  Remains free of injury from restraints (restraint for interference with medical  interventions unsuccessful or patient reports new pain     Problem: Chronic Conditions and Co-morbidities  Goal: Patient's chronic conditions and co-morbidity symptoms are monitored and maintained or improved  6/22/2025 2349 by Cnadace Kidd RN  Outcome: Progressing  Flowsheets (Taken 6/22/2025 1930)  Care Plan - Patient's Chronic Conditions and Co-Morbidity Symptoms are Monitored and Maintained or Improved:   Monitor and assess patient's chronic conditions and comorbid symptoms for stability, deterioration, or improvement   Collaborate with multidisciplinary team to address chronic and comorbid conditions and prevent exacerbation or deterioration   Update acute care plan with appropriate goals if chronic or comorbid symptoms are exacerbated and prevent overall improvement and discharge  6/22/2025 1007 by Ashley Martinez RN  Outcome: Progressing  Flowsheets (Taken 6/22/2025 0800)  Care Plan - Patient's Chronic Conditions and Co-Morbidity Symptoms are Monitored and Maintained or Improved: Monitor and assess patient's chronic conditions and comorbid symptoms for stability, deterioration, or improvement     Problem: Neurosensory - Adult  Goal: Achieves stable or improved neurological status  6/22/2025 2349 by Candace Kidd RN  Outcome: Progressing  Flowsheets (Taken 6/22/2025 1930)  Achieves stable or improved neurological status:   Assess for and report changes in neurological status   Monitor temperature, glucose, and sodium. Initiate appropriate interventions as ordered   Initiate measures to prevent increased intracranial pressure   Maintain blood pressure and fluid volume within ordered parameters to optimize cerebral perfusion and minimize risk of hemorrhage  6/22/2025 1007 by Ashley Martinez RN  Outcome: Progressing  Flowsheets (Taken 6/22/2025 0800)  Achieves stable or improved neurological status: Assess for and report changes in neurological status     Problem: Respiratory - Adult  Goal:

## 2025-06-23 NOTE — PLAN OF CARE
Problem: Safety - Medical Restraint  Goal: Remains free of injury from restraints (Restraint for Interference with Medical Device)  Description: INTERVENTIONS:  1. Determine that other, less restrictive measures have been tried or would not be effective before applying the restraint  2. Evaluate the patient's condition at the time of restraint application  3. Inform patient/family regarding the reason for restraint  4. Q2H: Monitor safety, psychosocial status, comfort, nutrition and hydration  Outcome: Progressing  Flowsheets (Taken 6/23/2025 1109)  Remains free of injury from restraints (restraint for interference with medical device):   Evaluate the patient's condition at the time of restraint application   Determine that other, less restrictive measures have been tried or would not be effective before applying the restraint   Inform patient/family regarding the reason for restraint   Every 2 hours: Monitor safety, psychosocial status, comfort, nutrition and hydration     Problem: Discharge Planning  Goal: Discharge to home or other facility with appropriate resources  Outcome: Progressing     Problem: Pain  Goal: Verbalizes/displays adequate comfort level or baseline comfort level  Outcome: Progressing  Flowsheets  Taken 6/23/2025 1200 by Kedar Theodore RN  Verbalizes/displays adequate comfort level or baseline comfort level: Encourage patient to monitor pain and request assistance  Taken 6/23/2025 0800 by Kedar Theodore RN  Verbalizes/displays adequate comfort level or baseline comfort level:   Encourage patient to monitor pain and request assistance   Administer analgesics based on type and severity of pain and evaluate response   Assess pain using appropriate pain scale   Implement non-pharmacological measures as appropriate and evaluate response   Consider cultural and social influences on pain and pain management   Notify Licensed Independent Practitioner if interventions unsuccessful or patient

## 2025-06-23 NOTE — CARE COORDINATION
Patient discussed in IDR rounds. Patient currently intubated and sedated. All attempts to locate NOK have been unsuccessful. Guardianship process initiated.

## 2025-06-23 NOTE — PROGRESS NOTES
Patient on APRV Mode per Dr Car.   Changes made per ABG results.  P High 25  P Low    8  T High 4.4  T Low  0.6  PS          0  Rate      12  FIO2    100%  VT avg 420ml  O2 Sats range from 94 to 97%

## 2025-06-23 NOTE — PROGRESS NOTES
PALLIATIVE MEDICINE    AMD STATUS: NO AMD ON FILE    CODE STATUS: FULL CODE    Seen today in Rm 104. Patient lying in bed supine with head of bed raised.  Intubated. FiO2 ranging from 85%-100%. Sedated: Precedex, Fentanyl, Versed gtt.     Case Management continues effort to find legal next of kin. Several attempts to contact mother and brother. Case Management starting the guardianship process.     Palliative medicine will  continue to follow Beny VALDES Ra  during his admission.     Maura Kimball RN  Palliative Medicine  [phone]

## 2025-06-23 NOTE — CARE COORDINATION
This CM made another attempt to reach pateints MARIA LUISA at [phone]. This phone number continues to ring and then eventually disconnects the call, unable to leave voicemail, no known address connected to this phone number.

## 2025-06-23 NOTE — PROGRESS NOTES
[facility]  Pulmonary, Critical Care, and Sleep Medicine    Name: Beny Knapp MRN: [id number]   : 1965 Hospital: [facility]    Date: 2025  Room: [address] [place] [address] Note                                              Consult requesting physician: Dr. Mark   Reason for Consult: Respiratory failure, hyponatremia, upper airway    IMPRESSION:     Acute respiratory failure with hypoxemia and hypercarbia   J96.01, J96.02  Stridor  Obstructed larynx  Polysubstance abuse   Candidiasis of esophagus   Aspiration pneumonia   COPD/emphysema  EtOH use  Cocaine abuse  Hepatitis C  Hyponatremia      Active Hospital Problems    Diagnosis Date Noted    Aspiration pneumonia (HCC) [J69.0] 2025    Cocaine abuse (HCC) [F14.10] 2025    Hyponatremia [E87.1] 2025    Stridor [R06.1] 2025    Obstructed, larynx [J38.6] 2025    Acute respiratory failure with hypoxia and hypercapnia (HCC) [J96.01, J96.02] 2025    ETOH abuse [F10.10] 2025    Polysubstance abuse (HCC) [F19.10] 2025    Candidiasis of esophagus (HCC) [B37.81] 2025    Tobacco abuse [Z72.0] 2015    HTN (hypertension) [I10] 2015    COPD (chronic obstructive pulmonary disease) (HCC) [J44.9] 2015    Chronic hepatitis C (HCC) [B18.2] 2015        Code status: Full Code       RECOMMENDATIONS:     Respiratory: 59-year-old male with COPD, smoker presents with acute upper airway distress, fullness in the laryngeal area, questionable foreign body. Patient was intubated 25 for airway protection and hypercarbic and hypoxemic respiratory failure, with ENT at bedside.  Prior to intubation, fiberoptic nasolaryngoscopy endoscopy did not show any foreign body, but swollen airway/larynx and severe candidiasis obstructing larynx.  History of aspirations with tube feeding earlier this admission.  CT chest on admission 25 reviewed images and          Please note: Voice-recognition software may have been used to generate this report, which may have resulted in some phonetic-based errors in grammar and contents. Even though attempts were made to correct all the mistakes, some may have been missed, and remained in the body of the document.      Bharat Law MD  6/23/2025

## 2025-06-24 ENCOUNTER — APPOINTMENT (OUTPATIENT)
Facility: HOSPITAL | Age: 60
DRG: 368 | End: 2025-06-24
Payer: MEDICARE

## 2025-06-24 LAB
ALBUMIN SERPL-MCNC: 2.8 G/DL (ref 3.4–5)
ALBUMIN/GLOB SERPL: 0.8 (ref 0.8–1.7)
ALP SERPL-CCNC: 59 U/L (ref 45–117)
ALT SERPL-CCNC: 113 U/L (ref 10–50)
ANION GAP SERPL CALC-SCNC: 9 MMOL/L (ref 3–18)
ARTERIAL PATENCY WRIST A: POSITIVE
AST SERPL-CCNC: 35 U/L (ref 10–38)
BASE EXCESS BLD CALC-SCNC: 5.4 MMOL/L
BDY SITE: ABNORMAL
BILIRUB DIRECT SERPL-MCNC: 0.4 MG/DL (ref 0–0.2)
BILIRUB SERPL-MCNC: 0.7 MG/DL (ref 0.2–1)
BUN SERPL-MCNC: 51 MG/DL (ref 6–23)
BUN/CREAT SERPL: 82 (ref 12–20)
CALCIUM SERPL-MCNC: 9.3 MG/DL (ref 8.5–10.1)
CHLORIDE SERPL-SCNC: 98 MMOL/L (ref 98–107)
CO2 SERPL-SCNC: 28 MMOL/L (ref 21–32)
CREAT SERPL-MCNC: 0.62 MG/DL (ref 0.6–1.3)
ERYTHROCYTE [DISTWIDTH] IN BLOOD BY AUTOMATED COUNT: 14.2 % (ref 11.6–14.5)
GAS FLOW.O2 O2 DELIVERY SYS: ABNORMAL
GAS FLOW.O2 SETTING OXYMISER: 12 BPM
GLOBULIN SER CALC-MCNC: 3.6 G/DL (ref 2–4)
GLUCOSE BLD STRIP.AUTO-MCNC: 174 MG/DL (ref 70–110)
GLUCOSE BLD STRIP.AUTO-MCNC: 176 MG/DL (ref 70–110)
GLUCOSE BLD STRIP.AUTO-MCNC: 177 MG/DL (ref 70–110)
GLUCOSE BLD STRIP.AUTO-MCNC: 178 MG/DL (ref 70–110)
GLUCOSE SERPL-MCNC: 181 MG/DL (ref 74–108)
HCO3 BLD-SCNC: 30.7 MMOL/L (ref 21–28)
HCT VFR BLD AUTO: 37.7 % (ref 36–48)
HGB BLD-MCNC: 12.4 G/DL (ref 13–16)
IPAP/PIP/HIGH PEEP: 25
MCH RBC QN AUTO: 29.5 PG (ref 24–34)
MCHC RBC AUTO-ENTMCNC: 32.9 G/DL (ref 31–37)
MCV RBC AUTO: 89.8 FL (ref 78–100)
NRBC # BLD: 0 K/UL (ref 0–0.01)
NRBC BLD-RTO: 0 PER 100 WBC
O2/TOTAL GAS SETTING VFR VENT: 60 %
PCO2 BLD: 46.4 MMHG (ref 35–48)
PEEP RESPIRATORY: 8 CMH2O
PH BLD: 7.43 (ref 7.35–7.45)
PLATELET # BLD AUTO: 179 K/UL (ref 135–420)
PMV BLD AUTO: 9.9 FL (ref 9.2–11.8)
PO2 BLD: 108 MMHG (ref 83–108)
POTASSIUM SERPL-SCNC: 4.8 MMOL/L (ref 3.5–5.5)
PROT SERPL-MCNC: 6.4 G/DL (ref 6.4–8.2)
RBC # BLD AUTO: 4.2 M/UL (ref 4.35–5.65)
RESPIRATORY RATE, POC: 12 (ref 5–40)
SAO2 % BLD: 98.3 % (ref 92–97)
SERVICE CMNT-IMP: ABNORMAL
SODIUM SERPL-SCNC: 136 MMOL/L (ref 136–145)
SPECIMEN TYPE: ABNORMAL
VENTILATION MODE VENT: ABNORMAL
WBC # BLD AUTO: 12.1 K/UL (ref 4.6–13.2)

## 2025-06-24 PROCEDURE — 2580000003 HC RX 258: Performed by: INTERNAL MEDICINE

## 2025-06-24 PROCEDURE — 94640 AIRWAY INHALATION TREATMENT: CPT

## 2025-06-24 PROCEDURE — 6370000000 HC RX 637 (ALT 250 FOR IP): Performed by: INTERNAL MEDICINE

## 2025-06-24 PROCEDURE — 6360000002 HC RX W HCPCS: Performed by: INTERNAL MEDICINE

## 2025-06-24 PROCEDURE — 71045 X-RAY EXAM CHEST 1 VIEW: CPT

## 2025-06-24 PROCEDURE — 2000000000 HC ICU R&B

## 2025-06-24 PROCEDURE — 74018 RADEX ABDOMEN 1 VIEW: CPT

## 2025-06-24 PROCEDURE — 36556 INSERT NON-TUNNEL CV CATH: CPT

## 2025-06-24 PROCEDURE — 51702 INSERT TEMP BLADDER CATH: CPT

## 2025-06-24 PROCEDURE — 94003 VENT MGMT INPAT SUBQ DAY: CPT

## 2025-06-24 PROCEDURE — 2700000000 HC OXYGEN THERAPY PER DAY

## 2025-06-24 PROCEDURE — 82962 GLUCOSE BLOOD TEST: CPT

## 2025-06-24 PROCEDURE — 80076 HEPATIC FUNCTION PANEL: CPT

## 2025-06-24 PROCEDURE — 36600 WITHDRAWAL OF ARTERIAL BLOOD: CPT

## 2025-06-24 PROCEDURE — 85027 COMPLETE CBC AUTOMATED: CPT

## 2025-06-24 PROCEDURE — 2500000003 HC RX 250 WO HCPCS: Performed by: INTERNAL MEDICINE

## 2025-06-24 PROCEDURE — 80048 BASIC METABOLIC PNL TOTAL CA: CPT

## 2025-06-24 PROCEDURE — 82803 BLOOD GASES ANY COMBINATION: CPT

## 2025-06-24 RX ORDER — DOCUSATE SODIUM 50 MG/5ML
100 LIQUID ORAL 2 TIMES DAILY
Status: COMPLETED | OUTPATIENT
Start: 2025-06-24 | End: 2025-06-25

## 2025-06-24 RX ADMIN — MINERAL OIL, PETROLATUM: 425; 568 OINTMENT OPHTHALMIC at 09:45

## 2025-06-24 RX ADMIN — CEFEPIME 2000 MG: 2 INJECTION, POWDER, FOR SOLUTION INTRAVENOUS at 13:02

## 2025-06-24 RX ADMIN — SENNOSIDES 8.6 MG: 8.6 TABLET, COATED ORAL at 21:33

## 2025-06-24 RX ADMIN — CEFEPIME 2000 MG: 2 INJECTION, POWDER, FOR SOLUTION INTRAVENOUS at 06:12

## 2025-06-24 RX ADMIN — BUDESONIDE 500 MCG: 0.5 INHALANT RESPIRATORY (INHALATION) at 20:22

## 2025-06-24 RX ADMIN — DOCUSATE SODIUM 100 MG: 50 LIQUID ORAL at 13:17

## 2025-06-24 RX ADMIN — DEXMEDETOMIDINE 1.2 MCG/KG/HR: 100 INJECTION, SOLUTION INTRAVENOUS at 14:10

## 2025-06-24 RX ADMIN — DEXMEDETOMIDINE 1.2 MCG/KG/HR: 100 INJECTION, SOLUTION INTRAVENOUS at 17:32

## 2025-06-24 RX ADMIN — IPRATROPIUM BROMIDE AND ALBUTEROL SULFATE 1 DOSE: .5; 3 SOLUTION RESPIRATORY (INHALATION) at 14:43

## 2025-06-24 RX ADMIN — DEXMEDETOMIDINE 1 MCG/KG/HR: 100 INJECTION, SOLUTION INTRAVENOUS at 06:44

## 2025-06-24 RX ADMIN — ENOXAPARIN SODIUM 30 MG: 100 INJECTION SUBCUTANEOUS at 01:57

## 2025-06-24 RX ADMIN — IPRATROPIUM BROMIDE AND ALBUTEROL SULFATE 1 DOSE: .5; 3 SOLUTION RESPIRATORY (INHALATION) at 08:14

## 2025-06-24 RX ADMIN — ACETYLCYSTEINE 400 MG: 100 INHALANT RESPIRATORY (INHALATION) at 08:15

## 2025-06-24 RX ADMIN — MICAFUNGIN SODIUM 150 MG: 50 INJECTION, POWDER, LYOPHILIZED, FOR SOLUTION INTRAVENOUS at 10:44

## 2025-06-24 RX ADMIN — ARFORMOTEROL TARTRATE 15 MCG: 15 SOLUTION RESPIRATORY (INHALATION) at 08:15

## 2025-06-24 RX ADMIN — FENTANYL CITRATE 125 MCG/HR: 0.05 INJECTION, SOLUTION INTRAMUSCULAR; INTRAVENOUS at 23:40

## 2025-06-24 RX ADMIN — FOLIC ACID 1 MG: 1 TABLET ORAL at 08:49

## 2025-06-24 RX ADMIN — SODIUM CHLORIDE 5 MG/HR: 900 INJECTION, SOLUTION INTRAVENOUS at 20:15

## 2025-06-24 RX ADMIN — CHLORHEXIDINE GLUCONATE 15 ML: 1.2 RINSE ORAL at 08:50

## 2025-06-24 RX ADMIN — FENTANYL CITRATE 125 MCG/HR: 0.05 INJECTION, SOLUTION INTRAMUSCULAR; INTRAVENOUS at 15:13

## 2025-06-24 RX ADMIN — SODIUM CHLORIDE, PRESERVATIVE FREE 10 ML: 5 INJECTION INTRAVENOUS at 09:10

## 2025-06-24 RX ADMIN — Medication 100 MG: at 08:48

## 2025-06-24 RX ADMIN — DEXMEDETOMIDINE 1 MCG/KG/HR: 100 INJECTION, SOLUTION INTRAVENOUS at 02:20

## 2025-06-24 RX ADMIN — ARFORMOTEROL TARTRATE 15 MCG: 15 SOLUTION RESPIRATORY (INHALATION) at 20:22

## 2025-06-24 RX ADMIN — WATER 20 MG: 1 INJECTION INTRAMUSCULAR; INTRAVENOUS; SUBCUTANEOUS at 15:57

## 2025-06-24 RX ADMIN — FENTANYL CITRATE 125 MCG/HR: 0.05 INJECTION, SOLUTION INTRAMUSCULAR; INTRAVENOUS at 06:47

## 2025-06-24 RX ADMIN — SODIUM CHLORIDE 5 MG/HR: 900 INJECTION, SOLUTION INTRAVENOUS at 00:01

## 2025-06-24 RX ADMIN — CHLORHEXIDINE GLUCONATE 15 ML: 1.2 RINSE ORAL at 21:32

## 2025-06-24 RX ADMIN — POLYETHYLENE GLYCOL 3350 17 G: 17 POWDER, FOR SOLUTION ORAL at 08:50

## 2025-06-24 RX ADMIN — FUROSEMIDE 40 MG: 10 INJECTION, SOLUTION INTRAMUSCULAR; INTRAVENOUS at 08:50

## 2025-06-24 RX ADMIN — CEFEPIME 2000 MG: 2 INJECTION, POWDER, FOR SOLUTION INTRAVENOUS at 21:30

## 2025-06-24 RX ADMIN — ENOXAPARIN SODIUM 30 MG: 100 INJECTION SUBCUTANEOUS at 13:02

## 2025-06-24 RX ADMIN — WATER 20 MG: 1 INJECTION INTRAMUSCULAR; INTRAVENOUS; SUBCUTANEOUS at 23:19

## 2025-06-24 RX ADMIN — DEXMEDETOMIDINE 1 MCG/KG/HR: 100 INJECTION, SOLUTION INTRAVENOUS at 10:41

## 2025-06-24 RX ADMIN — DEXMEDETOMIDINE 1.2 MCG/KG/HR: 100 INJECTION, SOLUTION INTRAVENOUS at 21:05

## 2025-06-24 RX ADMIN — MINERAL OIL, PETROLATUM: 425; 568 OINTMENT OPHTHALMIC at 14:11

## 2025-06-24 RX ADMIN — DOCUSATE SODIUM 100 MG: 50 LIQUID ORAL at 21:33

## 2025-06-24 RX ADMIN — BUDESONIDE 500 MCG: 0.5 INHALANT RESPIRATORY (INHALATION) at 08:15

## 2025-06-24 RX ADMIN — SODIUM CHLORIDE, PRESERVATIVE FREE 40 MG: 5 INJECTION INTRAVENOUS at 08:50

## 2025-06-24 RX ADMIN — MIDAZOLAM HYDROCHLORIDE 2 MG: 1 INJECTION, SOLUTION INTRAMUSCULAR; INTRAVENOUS at 16:06

## 2025-06-24 RX ADMIN — WATER 20 MG: 1 INJECTION INTRAMUSCULAR; INTRAVENOUS; SUBCUTANEOUS at 09:11

## 2025-06-24 RX ADMIN — MINERAL OIL, PETROLATUM: 425; 568 OINTMENT OPHTHALMIC at 21:32

## 2025-06-24 RX ADMIN — SODIUM CHLORIDE, PRESERVATIVE FREE 10 ML: 5 INJECTION INTRAVENOUS at 21:38

## 2025-06-24 RX ADMIN — IPRATROPIUM BROMIDE AND ALBUTEROL SULFATE 1 DOSE: .5; 3 SOLUTION RESPIRATORY (INHALATION) at 20:22

## 2025-06-24 RX ADMIN — SENNOSIDES 8.6 MG: 8.6 TABLET, COATED ORAL at 08:48

## 2025-06-24 RX ADMIN — AMLODIPINE BESYLATE 10 MG: 5 TABLET ORAL at 09:49

## 2025-06-24 RX ADMIN — MINERAL OIL, PETROLATUM: 425; 568 OINTMENT OPHTHALMIC at 17:29

## 2025-06-24 RX ADMIN — MINERAL OIL, PETROLATUM: 425; 568 OINTMENT OPHTHALMIC at 06:10

## 2025-06-24 ASSESSMENT — PULMONARY FUNCTION TESTS
PIF_VALUE: 27

## 2025-06-24 ASSESSMENT — PAIN SCALES - GENERAL
PAINLEVEL_OUTOF10: 0

## 2025-06-24 NOTE — PROGRESS NOTES
ceFAZolin >=64 ug/mL Resistant      cefepime <=1 ug/mL Sensitive      cefOXitin 16 ug/mL Resistant      cefTAZidime <=1 ug/mL Sensitive      cefTRIAXone <=1 ug/mL Sensitive      ciprofloxacin <=0.25 ug/mL Sensitive      gentamicin <=1 ug/mL Sensitive      levofloxacin <=0.12 ug/mL Sensitive      meropenem <=0.25 ug/mL Sensitive      tobramycin 4 ug/mL Sensitive                       Susceptibility        Klebsiella pneumoniae      BACTERIAL SUSCEPTIBILITY PANEL RAISA      amikacin <=2 ug/mL Sensitive      ampicillin >=32 ug/mL Resistant      ampicillin-sulbactam 4 ug/mL Sensitive      ceFAZolin <=4 ug/mL Sensitive      cefepime <=1 ug/mL Sensitive      cefOXitin <=4 ug/mL Sensitive      cefTAZidime <=1 ug/mL Sensitive      cefTRIAXone <=1 ug/mL Sensitive      ciprofloxacin <=0.25 ug/mL Sensitive      gentamicin <=1 ug/mL Sensitive      levofloxacin <=0.12 ug/mL Sensitive      meropenem <=0.25 ug/mL Sensitive      piperacillin-tazobactam <=4 ug/mL Sensitive      tobramycin <=1 ug/mL Sensitive      trimethoprim-sulfamethoxazole <=20 ug/mL Sensitive                           DAVID, Other Sources [[id number]] Collected: 06/16/25 1542    Order Status: Completed Specimen: Mouth Updated: 06/16/25 2252     Special Requests INNER CHEEK     DAVID Prep NO YEAST SEEN       Culture, Fungus [[id number]]  (Abnormal) Collected: 06/16/25 1416    Order Status: Completed Specimen: Mouth Updated: 06/23/25 1346     Special Requests INNER CHEEK     Culture HEAVY Candida albicans               CULTURE WILL BE HELD FOR 4 WEEKS. IF THERE IS ADDITIONAL FUNGAL GROWTH, A NEW REPORT WILL FOLLOW.          DAVID, Other Sources [[id number]] Collected: 06/16/25 1416    Order Status: Canceled Specimen: Mouth     Culture, MRSA, Screening [[id number]] Collected: 06/16/25 1416    Order Status: Completed Specimen: Nares Updated: 06/17/25 2318     Special Requests NO SPECIAL REQUESTS        Culture MRSA NOT PRESENT               Screening of patient nares for  INDICATION: Acute hypoxic respiratory failure, basilar infiltrates, ET tube position COMPARISON: 6/23/2025. TECHNIQUE: Portable frontal semiupright radiograph/s of the chest. FINDINGS: The lungs are clear. The radiograph is rotated to the left, obscuring the central airways. An NG tube extends below the diaphragm.     Clear lungs. Electronically signed by Julio Alegria    XR CHEST PORTABLE  Result Date: 6/23/2025  INDICATION: Ventilated patient COMPARISON: 6/22/2025 FINDINGS: Single AP portable view of the chest demonstrates no change in position of the lines and tubes. The cardiomediastinal silhouette is unchanged. Improved depth of inspiration with mild left basilar atelectasis and small pleural effusion. No pulmonary edema or pneumothorax.     Improved depth of inspiration with mild left basilar atelectasis and small pleural effusion. Electronically signed by Dami Solo    XR CHEST PORTABLE  Result Date: 6/22/2025  EXAM: XR CHEST PORTABLE INDICATION: et tube COMPARISON: 640 FINDINGS: A portable AP radiograph of the chest was obtained at 536 hours. Cardiac monitoring leads. Tracheostomy 6.3 cm of the justine.. Improved aeration in the left lung base.. Small left effusion. Bibasilar atelectasis...  The bones and soft tissues are grossly within normal limits.     Improved aeration in the left lung base with persistent small left effusion and bibasilar atelectasis. Electronically signed by Eliseo Espinosa    XR CHEST PORTABLE  Result Date: 6/21/2025  EXAM:  XR CHEST PORTABLE INDICATION: et tube COMPARISON: Chest radiograph 6/20/2025, CTA chest 6/20/2025 TECHNIQUE: Upright portable chest AP view FINDINGS: Stable support apparatus. Cardiomediastinal silhouette is stably enlarged. Stable moderate edema pattern. Stable bilateral perihilar and infrahilar consolidation/collapse. Stable small to moderate left pleural effusion. No definite pneumothorax.     Stable exam. Electronically signed by UMAIR SHIRLEY    CTA CHEST W

## 2025-06-24 NOTE — PROGRESS NOTES
Hospitalist Progress Note    Patient: Beny Knapp MRN: [id number]  CSN: [id number]    YOB: 1965  Age: 59 y.o.  Sex: male    DOA: 6/14/2025 LOS:  LOS: 10 days          Chief Complain :  Foreign body, shortness of breath.  59 y.o.  male w/ PMH of COPD, ETOH-use and polysubstance abuse who was BIBA and presents with subjective difficulty breathing and swallowing with throat pain. Brought in by EMS for possible forign body (food) with bolus within region of velecula. ENT saw pt and ED intubated for airway protection. ENT was consulted and performed scope on pt with findings of extensive esophageal candidiasis. Nephrology was consulted for severe hyponatremia with sodium of 117 recommending NS at 75cc/hr. Intensivist was contacted as well d/t pt being intubated, on vent for airway protection.   Subjective:   Patient orally intubated and sedated.  His oxygen requirements are fluctuating.  It had improved and FiO2 was decreased however his FiO2 is now requiring to be increased now at 90%.  Trying to keep oxygen saturations between 90 to 95%.    Assessment/Plan     Active Hospital Problems    Diagnosis     Aspiration pneumonia (HCC) [J69.0]     Cocaine abuse (HCC) [F14.10]     Hyponatremia [E87.1]     Stridor [R06.1]     Obstructed, larynx [J38.6]     Acute respiratory failure with hypoxia and hypercapnia (HCC) [J96.01, J96.02]     ETOH abuse [F10.10]     Polysubstance abuse (HCC) [F19.10]     Candidiasis of esophagus (HCC) [B37.81]     Tobacco abuse [Z72.0]     HTN (hypertension) [I10]     COPD (chronic obstructive pulmonary disease) (HCC) [J44.9]     Chronic hepatitis C (HCC) [B18.2]       6/23  Imrpoved ventilation on

## 2025-06-24 NOTE — WOUND CARE
ICU Rounding  Wound care nurse rounded on patient for skin issues.  Patient has a Chauncey score of 15.  Does patient have any pressure injury?no per primary nurse PUMA Ponce     Skin Care & Pressure Relief Recommendations  Minimize layers of linen  Pads under patient to optimize support surface  Turn/reposition approximately every 2 hours  Use purple wedge to maintain positioning but do not put pillow or wedge directly over wounds  Manage incontinence   Promote continence; Skin Protective lotion/cream to buttocks and sacrum daily and as needed with incontinence care  Offload heels pillows    Consult wound care if any wounds noted during admission.  Wound Care nurse will continue to follow during ICU admission.

## 2025-06-24 NOTE — PLAN OF CARE
Problem: Safety - Medical Restraint  Goal: Remains free of injury from restraints (Restraint for Interference with Medical Device)  Description: INTERVENTIONS:  1. Determine that other, less restrictive measures have been tried or would not be effective before applying the restraint  2. Evaluate the patient's condition at the time of restraint application  3. Inform patient/family regarding the reason for restraint  4. Q2H: Monitor safety, psychosocial status, comfort, nutrition and hydration  Outcome: Progressing  Flowsheets  Taken 6/24/2025 1200  Remains free of injury from restraints (restraint for interference with medical device):   Determine that other, less restrictive measures have been tried or would not be effective before applying the restraint   Evaluate the patient's condition at the time of restraint application   Inform patient/family regarding the reason for restraint   Every 2 hours: Monitor safety, psychosocial status, comfort, nutrition and hydration  Taken 6/24/2025 1000  Remains free of injury from restraints (restraint for interference with medical device):   Determine that other, less restrictive measures have been tried or would not be effective before applying the restraint   Evaluate the patient's condition at the time of restraint application   Inform patient/family regarding the reason for restraint   Every 2 hours: Monitor safety, psychosocial status, comfort, nutrition and hydration  Taken 6/24/2025 0800  Remains free of injury from restraints (restraint for interference with medical device):   Determine that other, less restrictive measures have been tried or would not be effective before applying the restraint   Evaluate the patient's condition at the time of restraint application   Inform patient/family regarding the reason for restraint   Every 2 hours: Monitor safety, psychosocial status, comfort, nutrition and hydration     Problem: Discharge Planning  Goal: Discharge to home or

## 2025-06-24 NOTE — INTERDISCIPLINARY ROUNDS
Rounds completed with Dr. Law    TF residuals 140 mL- Do not advance TF. Will reassess residuals tomorrow.     KUB- Start colace    Stop mucinex    Guardianship initiated by VALERIE

## 2025-06-25 ENCOUNTER — APPOINTMENT (OUTPATIENT)
Facility: HOSPITAL | Age: 60
DRG: 368 | End: 2025-06-25
Payer: MEDICARE

## 2025-06-25 LAB
ALBUMIN SERPL-MCNC: 2.8 G/DL (ref 3.4–5)
ALBUMIN/GLOB SERPL: 0.8 (ref 0.8–1.7)
ALP SERPL-CCNC: 52 U/L (ref 45–117)
ALT SERPL-CCNC: 115 U/L (ref 10–50)
ANION GAP SERPL CALC-SCNC: 10 MMOL/L (ref 3–18)
ARTERIAL PATENCY WRIST A: POSITIVE
ARTERIAL PATENCY WRIST A: POSITIVE
AST SERPL-CCNC: 41 U/L (ref 10–38)
BASE EXCESS BLD CALC-SCNC: 5.9 MMOL/L
BASE EXCESS BLD CALC-SCNC: 8.9 MMOL/L
BDY SITE: ABNORMAL
BDY SITE: ABNORMAL
BILIRUB DIRECT SERPL-MCNC: 0.4 MG/DL (ref 0–0.2)
BILIRUB SERPL-MCNC: 0.7 MG/DL (ref 0.2–1)
BUN SERPL-MCNC: 47 MG/DL (ref 6–23)
BUN/CREAT SERPL: 92 (ref 12–20)
CALCIUM SERPL-MCNC: 9.2 MG/DL (ref 8.5–10.1)
CHLORIDE SERPL-SCNC: 101 MMOL/L (ref 98–107)
CO2 SERPL-SCNC: 28 MMOL/L (ref 21–32)
CREAT SERPL-MCNC: 0.51 MG/DL (ref 0.6–1.3)
ERYTHROCYTE [DISTWIDTH] IN BLOOD BY AUTOMATED COUNT: 14 % (ref 11.6–14.5)
GAS FLOW.O2 O2 DELIVERY SYS: ABNORMAL
GAS FLOW.O2 O2 DELIVERY SYS: ABNORMAL
GAS FLOW.O2 SETTING OXYMISER: 12 BPM
GAS FLOW.O2 SETTING OXYMISER: 14 BPM
GLOBULIN SER CALC-MCNC: 3.4 G/DL (ref 2–4)
GLUCOSE BLD STRIP.AUTO-MCNC: 131 MG/DL (ref 70–110)
GLUCOSE BLD STRIP.AUTO-MCNC: 160 MG/DL (ref 70–110)
GLUCOSE BLD STRIP.AUTO-MCNC: 173 MG/DL (ref 70–110)
GLUCOSE BLD STRIP.AUTO-MCNC: 189 MG/DL (ref 70–110)
GLUCOSE SERPL-MCNC: 174 MG/DL (ref 74–108)
HCO3 BLD-SCNC: 29.9 MMOL/L (ref 21–28)
HCO3 BLD-SCNC: 33.4 MMOL/L (ref 21–28)
HCT VFR BLD AUTO: 35.9 % (ref 36–48)
HGB BLD-MCNC: 11.8 G/DL (ref 13–16)
IPAP/PIP/HIGH PEEP: 25
MCH RBC QN AUTO: 29.3 PG (ref 24–34)
MCHC RBC AUTO-ENTMCNC: 32.9 G/DL (ref 31–37)
MCV RBC AUTO: 89.1 FL (ref 78–100)
NRBC # BLD: 0 K/UL (ref 0–0.01)
NRBC BLD-RTO: 0 PER 100 WBC
O2/TOTAL GAS SETTING VFR VENT: 45 %
O2/TOTAL GAS SETTING VFR VENT: 45 %
PCO2 BLD: 39.8 MMHG (ref 35–48)
PCO2 BLD: 44.2 MMHG (ref 35–48)
PEEP RESPIRATORY: 6 CMH2O
PEEP RESPIRATORY: 7 CMH2O
PH BLD: 7.48 (ref 7.35–7.45)
PH BLD: 7.49 (ref 7.35–7.45)
PLATELET # BLD AUTO: 177 K/UL (ref 135–420)
PMV BLD AUTO: 10.1 FL (ref 9.2–11.8)
PO2 BLD: 72 MMHG (ref 83–108)
PO2 BLD: 81 MMHG (ref 83–108)
POTASSIUM SERPL-SCNC: 4.2 MMOL/L (ref 3.5–5.5)
PROT SERPL-MCNC: 6.1 G/DL (ref 6.4–8.2)
RBC # BLD AUTO: 4.03 M/UL (ref 4.35–5.65)
SAO2 % BLD: 95.4 % (ref 92–97)
SAO2 % BLD: 96.7 % (ref 92–97)
SERVICE CMNT-IMP: ABNORMAL
SERVICE CMNT-IMP: ABNORMAL
SODIUM SERPL-SCNC: 139 MMOL/L (ref 136–145)
SPECIMEN TYPE: ABNORMAL
SPECIMEN TYPE: ABNORMAL
VENTILATION MODE VENT: ABNORMAL
VENTILATION MODE VENT: ABNORMAL
VT SETTING VENT: 520 ML
WBC # BLD AUTO: 8.9 K/UL (ref 4.6–13.2)

## 2025-06-25 PROCEDURE — 82962 GLUCOSE BLOOD TEST: CPT

## 2025-06-25 PROCEDURE — 76705 ECHO EXAM OF ABDOMEN: CPT

## 2025-06-25 PROCEDURE — 6360000002 HC RX W HCPCS: Performed by: INTERNAL MEDICINE

## 2025-06-25 PROCEDURE — 6370000000 HC RX 637 (ALT 250 FOR IP): Performed by: INTERNAL MEDICINE

## 2025-06-25 PROCEDURE — 80048 BASIC METABOLIC PNL TOTAL CA: CPT

## 2025-06-25 PROCEDURE — 87449 NOS EACH ORGANISM AG IA: CPT

## 2025-06-25 PROCEDURE — 2580000003 HC RX 258: Performed by: INTERNAL MEDICINE

## 2025-06-25 PROCEDURE — 2500000003 HC RX 250 WO HCPCS: Performed by: INTERNAL MEDICINE

## 2025-06-25 PROCEDURE — 94640 AIRWAY INHALATION TREATMENT: CPT

## 2025-06-25 PROCEDURE — 80076 HEPATIC FUNCTION PANEL: CPT

## 2025-06-25 PROCEDURE — 36600 WITHDRAWAL OF ARTERIAL BLOOD: CPT

## 2025-06-25 PROCEDURE — 71045 X-RAY EXAM CHEST 1 VIEW: CPT

## 2025-06-25 PROCEDURE — 94003 VENT MGMT INPAT SUBQ DAY: CPT

## 2025-06-25 PROCEDURE — 85027 COMPLETE CBC AUTOMATED: CPT

## 2025-06-25 PROCEDURE — 82803 BLOOD GASES ANY COMBINATION: CPT

## 2025-06-25 PROCEDURE — 2000000000 HC ICU R&B

## 2025-06-25 RX ORDER — FLUCONAZOLE 2 MG/ML
400 INJECTION, SOLUTION INTRAVENOUS EVERY 24 HOURS
Status: DISCONTINUED | OUTPATIENT
Start: 2025-06-25 | End: 2025-06-25

## 2025-06-25 RX ORDER — FLUCONAZOLE 2 MG/ML
400 INJECTION, SOLUTION INTRAVENOUS EVERY 24 HOURS
Status: DISCONTINUED | OUTPATIENT
Start: 2025-06-26 | End: 2025-07-03

## 2025-06-25 RX ADMIN — IPRATROPIUM BROMIDE AND ALBUTEROL SULFATE 1 DOSE: .5; 3 SOLUTION RESPIRATORY (INHALATION) at 08:12

## 2025-06-25 RX ADMIN — ENOXAPARIN SODIUM 30 MG: 100 INJECTION SUBCUTANEOUS at 01:04

## 2025-06-25 RX ADMIN — MINERAL OIL, PETROLATUM: 425; 568 OINTMENT OPHTHALMIC at 13:25

## 2025-06-25 RX ADMIN — FENTANYL CITRATE 125 MCG/HR: 0.05 INJECTION, SOLUTION INTRAMUSCULAR; INTRAVENOUS at 07:31

## 2025-06-25 RX ADMIN — MINERAL OIL, PETROLATUM: 425; 568 OINTMENT OPHTHALMIC at 20:52

## 2025-06-25 RX ADMIN — SODIUM CHLORIDE, PRESERVATIVE FREE 10 ML: 5 INJECTION INTRAVENOUS at 09:37

## 2025-06-25 RX ADMIN — DEXMEDETOMIDINE 1.2 MCG/KG/HR: 100 INJECTION, SOLUTION INTRAVENOUS at 00:35

## 2025-06-25 RX ADMIN — CEFEPIME 2000 MG: 2 INJECTION, POWDER, FOR SOLUTION INTRAVENOUS at 04:51

## 2025-06-25 RX ADMIN — DEXMEDETOMIDINE 1.2 MCG/KG/HR: 100 INJECTION, SOLUTION INTRAVENOUS at 07:18

## 2025-06-25 RX ADMIN — BUDESONIDE 500 MCG: 0.5 INHALANT RESPIRATORY (INHALATION) at 19:45

## 2025-06-25 RX ADMIN — SENNOSIDES 8.6 MG: 8.6 TABLET, COATED ORAL at 20:46

## 2025-06-25 RX ADMIN — MINERAL OIL, PETROLATUM: 425; 568 OINTMENT OPHTHALMIC at 17:25

## 2025-06-25 RX ADMIN — SODIUM CHLORIDE, PRESERVATIVE FREE 10 ML: 5 INJECTION INTRAVENOUS at 20:52

## 2025-06-25 RX ADMIN — IPRATROPIUM BROMIDE AND ALBUTEROL SULFATE 1 DOSE: .5; 3 SOLUTION RESPIRATORY (INHALATION) at 19:45

## 2025-06-25 RX ADMIN — DEXMEDETOMIDINE 0.8 MCG/KG/HR: 100 INJECTION, SOLUTION INTRAVENOUS at 11:14

## 2025-06-25 RX ADMIN — WATER 20 MG: 1 INJECTION INTRAMUSCULAR; INTRAVENOUS; SUBCUTANEOUS at 05:57

## 2025-06-25 RX ADMIN — SODIUM CHLORIDE, PRESERVATIVE FREE 40 MG: 5 INJECTION INTRAVENOUS at 09:07

## 2025-06-25 RX ADMIN — MINERAL OIL, PETROLATUM: 425; 568 OINTMENT OPHTHALMIC at 05:58

## 2025-06-25 RX ADMIN — DOCUSATE SODIUM 100 MG: 50 LIQUID ORAL at 20:46

## 2025-06-25 RX ADMIN — IPRATROPIUM BROMIDE AND ALBUTEROL SULFATE 1 DOSE: .5; 3 SOLUTION RESPIRATORY (INHALATION) at 14:48

## 2025-06-25 RX ADMIN — WATER 20 MG: 1 INJECTION INTRAMUSCULAR; INTRAVENOUS; SUBCUTANEOUS at 17:25

## 2025-06-25 RX ADMIN — ENOXAPARIN SODIUM 30 MG: 100 INJECTION SUBCUTANEOUS at 13:27

## 2025-06-25 RX ADMIN — DEXMEDETOMIDINE 1.2 MCG/KG/HR: 100 INJECTION, SOLUTION INTRAVENOUS at 22:13

## 2025-06-25 RX ADMIN — WATER 20 MG: 1 INJECTION INTRAMUSCULAR; INTRAVENOUS; SUBCUTANEOUS at 23:44

## 2025-06-25 RX ADMIN — DEXMEDETOMIDINE 1.2 MCG/KG/HR: 100 INJECTION, SOLUTION INTRAVENOUS at 03:56

## 2025-06-25 RX ADMIN — Medication 100 MG: at 09:07

## 2025-06-25 RX ADMIN — MIDAZOLAM HYDROCHLORIDE 2 MG: 1 INJECTION, SOLUTION INTRAMUSCULAR; INTRAVENOUS at 18:55

## 2025-06-25 RX ADMIN — ARFORMOTEROL TARTRATE 15 MCG: 15 SOLUTION RESPIRATORY (INHALATION) at 08:12

## 2025-06-25 RX ADMIN — AMLODIPINE BESYLATE 10 MG: 5 TABLET ORAL at 09:04

## 2025-06-25 RX ADMIN — DEXMEDETOMIDINE 1 MCG/KG/HR: 100 INJECTION, SOLUTION INTRAVENOUS at 14:59

## 2025-06-25 RX ADMIN — FENTANYL CITRATE 125 MCG/HR: 0.05 INJECTION, SOLUTION INTRAMUSCULAR; INTRAVENOUS at 16:42

## 2025-06-25 RX ADMIN — CEFEPIME 2000 MG: 2 INJECTION, POWDER, FOR SOLUTION INTRAVENOUS at 20:46

## 2025-06-25 RX ADMIN — BUDESONIDE 500 MCG: 0.5 INHALANT RESPIRATORY (INHALATION) at 08:12

## 2025-06-25 RX ADMIN — CHLORHEXIDINE GLUCONATE 15 ML: 1.2 RINSE ORAL at 09:03

## 2025-06-25 RX ADMIN — SENNOSIDES 8.6 MG: 8.6 TABLET, COATED ORAL at 09:07

## 2025-06-25 RX ADMIN — POLYETHYLENE GLYCOL 3350 17 G: 17 POWDER, FOR SOLUTION ORAL at 09:08

## 2025-06-25 RX ADMIN — FOLIC ACID 1 MG: 1 TABLET ORAL at 09:08

## 2025-06-25 RX ADMIN — CHLORHEXIDINE GLUCONATE 15 ML: 1.2 RINSE ORAL at 20:52

## 2025-06-25 RX ADMIN — SODIUM CHLORIDE 5 MG/HR: 900 INJECTION, SOLUTION INTRAVENOUS at 16:39

## 2025-06-25 RX ADMIN — MIDAZOLAM HYDROCHLORIDE 2 MG: 1 INJECTION, SOLUTION INTRAMUSCULAR; INTRAVENOUS at 14:55

## 2025-06-25 RX ADMIN — MICAFUNGIN SODIUM 150 MG: 50 INJECTION, POWDER, LYOPHILIZED, FOR SOLUTION INTRAVENOUS at 09:36

## 2025-06-25 RX ADMIN — MINERAL OIL, PETROLATUM: 425; 568 OINTMENT OPHTHALMIC at 01:05

## 2025-06-25 RX ADMIN — FUROSEMIDE 40 MG: 10 INJECTION, SOLUTION INTRAMUSCULAR; INTRAVENOUS at 09:07

## 2025-06-25 RX ADMIN — DEXMEDETOMIDINE 1.2 MCG/KG/HR: 100 INJECTION, SOLUTION INTRAVENOUS at 18:43

## 2025-06-25 RX ADMIN — CEFEPIME 2000 MG: 2 INJECTION, POWDER, FOR SOLUTION INTRAVENOUS at 13:25

## 2025-06-25 RX ADMIN — DOCUSATE SODIUM 100 MG: 50 LIQUID ORAL at 09:03

## 2025-06-25 RX ADMIN — MINERAL OIL, PETROLATUM: 425; 568 OINTMENT OPHTHALMIC at 09:36

## 2025-06-25 RX ADMIN — ARFORMOTEROL TARTRATE 15 MCG: 15 SOLUTION RESPIRATORY (INHALATION) at 19:45

## 2025-06-25 RX ADMIN — INSULIN LISPRO 2 UNITS: 100 INJECTION, SOLUTION INTRAVENOUS; SUBCUTANEOUS at 05:56

## 2025-06-25 ASSESSMENT — PULMONARY FUNCTION TESTS
PIF_VALUE: 278
PIF_VALUE: 26
PIF_VALUE: 27
PIF_VALUE: 21
PIF_VALUE: 25
PIF_VALUE: 21
PIF_VALUE: 26

## 2025-06-25 ASSESSMENT — PAIN SCALES - GENERAL
PAINLEVEL_OUTOF10: 0

## 2025-06-25 NOTE — PROGRESS NOTES
[facility]  (A Division of [facility])  Renee Stanford MD   Office Tel:  [phone] Option #8                                                               Date of Admission: 6/14/2025       ID FU for antimicrobial management suspected esophageal candidiasis   PCP: Emeli Duckworth MD    C/C: stridor/SOB    Current Antimicrobials:    Prior Antimicrobials:    Cefepime 6/20 to date #5  Fluconazole 6/14-> Micafungin  150 mg daily   #10     Zithro 6/16 to 20  CAX 6/16 to 20     Allergy to antibiotics- NA     Assessment:     Critically ill patient with:    Acute resp failure/ARDS- requiring high oxygen support-- Improving vent support      CXR infiltrate clear 6/24-- vent setting improving with abx treatment. Concern for PCP low at this time.     Infection with Serratia/Klebsiella -resp cultures- 6/16     Esophagitis- presumed candidal--Underlying immunodeficiency?  Fungitell < 31, galactomannan normal( 0.03)= 6/14--makes systemci fungemia/PCP less likley-> repeat testing pending  Stridor/ candidal esophagitis/intubated- 10/2024 in [facility] as well- new info from his Primary MD    COPD    Transaminitis- recurrning, mild elevation    6/14 - blood cutlure X2: NGSF, resp culture- mixed GNR- normal resp kenia        -UA no pyuria, urine cx-Neg        -X-ray-bibasilar scarring/atelectasis, neck x-ray- no acute pathology        -CT CAP: Bibasilar atelectasis/pneumonia left greater than right.  Bullous emphysematous change.  Procal 0.03    6/16- KOH from mouth- no yeast, culture in progress         - respiratory culture- Klebsiella and Serratia growth--SS to cefepime, fungal culture- Candida albicans         =MRSA screen negative    Concern for immunodeficiency  -HIV 1/2- Non -reactive 6/14/25, HIV PCR -<20  -CD4 120  -Immunoglobulins normal    Co-morbidities:  Hyponatremia/SIADH-improved  Hypertension  COPD  Chronic hepatitis C- treated with Harvoni-

## 2025-06-25 NOTE — WOUND CARE
ICU Rounding  Wound care nurse rounded on patient for skin issues.  Patient has a Chauncey score of 14.  Does patient have any pressure injury?no per primary nurse PUMA Thacker     Skin Care & Pressure Relief Recommendations  Minimize layers of linen  Pads under patient to optimize support surface  Turn/reposition approximately every 2 hours  Use pillow wedges to maintain positioning but do not put pillow directly over wounds  Manage incontinence   Promote continence; Skin Protective lotion/cream to buttocks and sacrum daily and as needed with incontinence care  Offload heels pillows    Consult wound care if any wounds noted during admission.  Wound Care nurse will continue to follow during ICU admission.

## 2025-06-25 NOTE — PROGRESS NOTES
met with patient's nurse in ICU.    PAL Care team member was at bedside at the time of the visit.    Nurse said they have been trying to find family members. It has been determined that he has not family. Nurse said the next step is to acquire a guardianship. Patient is doing some better. He can follow commands and they have lowered his oxygen levels. Nurse thanked  for the support.     provided presence and support for staff.    Chaplains will provide follow-up care for patient as needed.    Spiritual Health History and Assessment/Progress Note  [facility]    Palliative Care,  ,  ,      Name: Beny Knapp MRN: [id number]    Age: 59 y.o.     Sex: male   Language: English   Orthodox: Orthodoxy   Acute respiratory failure with hypoxia and hypercapnia (HCC)     Date: 6/25/2025            Total Time Calculated: 10 min              Spiritual Assessment continued in [facility] INTENSIVE CARE        Referral/Consult From: Palliative Care   Encounter Overview/Reason: Palliative Care  Service Provided For: Patient    Love, Belief, Meaning:   Patient is connected with a love tradition or spiritual practice  Family/Friends No family/friends present      Importance and Influence:  Patient unable to assess at this time  Family/Friends No family/friends present    Community:  Patient expresses feelings of isolation: Other: Patient has no family.  Family/Friends No family/friends present    Assessment and Plan of Care:     Patient Interventions include: Other: Unable to assess.  Family/Friends Interventions include: No family/friends present    Patient Plan of Care: No spiritual needs identified for follow-up  Family/Friends Plan of Care: No family/friends present    Electronically signed by Chaplain Kevin on 6/25/2025 at 3:14 PM

## 2025-06-25 NOTE — PROGRESS NOTES
[facility]  Pulmonary, Critical Care, and Sleep Medicine    Name: Beny Knapp MRN: [id number]   : 1965 Hospital: [facility]    Date: 2025  Room: [address] [place] [address] Note                                              Consult requesting physician: Dr. Mark   Reason for Consult: Respiratory failure, hyponatremia, upper airway    IMPRESSION:     Acute respiratory failure with hypoxemia and hypercarbia   J96.01, J96.02  Stridor  Obstructed larynx  Polysubstance abuse   Candidiasis of esophagus   Aspiration pneumonia   COPD/emphysema  EtOH use  Cocaine abuse  Hepatitis C  Hyponatremia      Active Hospital Problems    Diagnosis Date Noted    Aspiration pneumonia (HCC) [J69.0] 2025    Cocaine abuse (HCC) [F14.10] 2025    Hyponatremia [E87.1] 2025    Stridor [R06.1] 2025    Obstructed, larynx [J38.6] 2025    Acute respiratory failure with hypoxia and hypercapnia (HCC) [J96.01, J96.02] 2025    ETOH abuse [F10.10] 2025    Polysubstance abuse (HCC) [F19.10] 2025    Candidiasis of esophagus (HCC) [B37.81] 2025    Tobacco abuse [Z72.0] 2015    HTN (hypertension) [I10] 2015    COPD (chronic obstructive pulmonary disease) (HCC) [J44.9] 2015    Chronic hepatitis C (HCC) [B18.2] 2015        Code status: Full Code       RECOMMENDATIONS:     Respiratory: 59-year-old male with COPD, smoker presents with acute upper airway distress, fullness in the laryngeal area, questionable foreign body. Patient was intubated 25 for airway protection and hypercarbic and hypoxemic respiratory failure, with ENT at bedside.  Prior to intubation, fiberoptic nasolaryngoscopy endoscopy did not show any foreign body, but swollen airway/larynx and severe candidiasis obstructing larynx.  History of aspirations with tube feeding earlier this admission.  CT chest on admission 25 reviewed images and  silhouette is stably enlarged. Stable moderate edema pattern. Stable bilateral perihilar and infrahilar consolidation/collapse. Stable small to moderate left pleural effusion. No definite pneumothorax.     Stable exam. Electronically signed by UMAIR SHIRLEY    CTA CHEST W WO CONTRAST  Result Date: 6/20/2025  EXAM:  CTA CHEST W WO CONTRAST INDICATION: Hypoxemia. Respiratory failure. COMPARISON: June 14, 2025 TECHNIQUE: Helical thin section chest CT following intravenous administration of nonionic contrast 75 mL of isovue 370 according to departmental PE protocol. Coronal and sagittal reformats were performed. 3D post processing was performed.  CT dose reduction was achieved through the use of a standardized protocol tailored for this examination and automatic exposure control for dose modulation. FINDINGS: This is a satisfactory quality study for the evaluation of pulmonary embolism to the segmental arterial level. There is no pulmonary embolism to this level. Subsegmental pulmonary arteries are suboptimally evaluated secondary to respiratory motion artifact. MEDIASTINUM: No mass or lymphadenopathy. LINDSEY: No mass or lymphadenopathy. THORACIC AORTA: No aneurysm. HEART: Normal in size. ESOPHAGUS: No wall thickening or dilatation. Nasogastric tube extends into the stomach. TRACHEA/BRONCHI: Patent. Endotracheal tube is in satisfactory position. PLEURA: Trace bilateral pleural effusions. No pneumothorax LUNGS: Dense bilateral lower lobe consolidation. Patchy airspace disease/groundglass opacities in the right upper lobe. UPPER ABDOMEN: Partially imaged. No acute pathology. BONES: No aggressive bone lesion or fracture. Unchanged old, healed sternal fracture. Unchanged old, healed bilateral rib fractures. Degenerative changes are again seen throughout the thoracic spine.     No evidence of acute pulmonary embolus. Persistent dense bilateral lower lobe consolidation, compatible with pneumonia and/or lobar collapse. New patchy

## 2025-06-25 NOTE — PROGRESS NOTES
Comprehensive High Risk Nutrition Assessment Follow-Up    Type and Reason for Visit:  Reassess    Nutrition Recommendations/Plan:   Per ASPEN guidelines hold TF when GRV (gastric residual volumes) >=500  Does not appear 275-150ml GRV high at this time  Cont bowel regimen and could consider GI motility med - defer to MD  Overall inadequate nutrition since admit   Rec re-start TF standard / Jevity 1.5 @ 10ml/hr adv 10 ml/hr Q8-12hrs to 50ml/hr goal rate + 1 ProSource daily via OGT as tr provides 1860kcal, 92g pro, 912ml FW  Monitor BG levels and need to change to Glucerna 1.5 @ 50ml/hr + 1 ProSource provides 1860kcal, 115g pro, 910ml FW  Defer free water to MD given diuresing; if w/o IVF suggest ~145ml Q4hrs  Will likely only receive ~80% TF r/t frequent interruptions  Cont to check Phos, Mg, and K and replete as needed  Cont folate and thiamine and consider add liq centrum/certa-azucena w/min as well via   Cont to monitor POC/NCP, wt trends diuresing, renal fx, lytes, UOP, bowel fx, skin integrity/wound healing and adjust recs as needed     Malnutrition Assessment:  Malnutrition Status:  At risk for malnutrition (06/25/25 1534)      Nutrition Assessment:    60yo M remains in ICU remains sedated on vent w/precedex, fentanyl, versed, no pressors, severe candidiasis; h/o HTN, EtOH use, COPD, smoker, polysubstance abuse cocaine, hep C noted, h/o emesis w/TF, tr low-dose TF @ 30 mL/hr adv slowly to goal of 50 mL/hr high GRV this AM holding TF until improves no evidence for acute abd or obstruction clinically CT abd 6/14/25-no acute intraperitoneal findings, KUB 6/24/25 nil acute, small BM 6/25 on Miralax daily cont Colace twice a day x 2 days per MD.  Finally started TF on 6/23 was adv but now stopped again r/t noted GRV from ~220-275-150; overall inadequate nutrition since admit. wt is down since admit likely fluid given diuresing w/lasix good UOP with variable wt hx trends since admit up/down likely fluid; relatively  stable wt compared to admit wt. on admit wt was up from 97-98kg July to Jan 2025 if correct. no PI wound nurse. pt does not appear with overt signs of malnutrition on observation however fluid maybe masking wt loss. guardianship in process noted.    Nutrition Related Findings:      Wound Type: None       Current Nutrition Intake & Therapies:    Average Meal Intake: NPO (vent +TF)  Average Supplements Intake: NPO  Diet NPO  ADULT TUBE FEEDING; Orogastric; Standard with Fiber; Continuous; 10; Yes; 10; Q 8 hours; 50; 100; Q 4 hours; Protein; 1 Dose; Daily TF on hold again    Anthropometric Measures:  Height: 182.9 cm (6' 0.01\")  Ideal Body Weight (IBW): 178 lbs (81 kg)    Admission Body Weight: 113 kg (249 lb 1.9 oz)  Current Body Weight: 105 kg (231 lb 7.7 oz), 137.5 % IBW. Weight Source: Bed scale  Current BMI (kg/m2): 31.4  Usual Body Weight: 98 kg (216 lb 0.8 oz)  % Weight Change (Calculated): 13.3  BMI Categories: Obese Class 1 (BMI 30.0-34.9)    Estimated Daily Nutrient Needs:  Energy Requirements Based On: Kcal/kg  Weight Used for Energy Requirements: Current  Energy (kcal/day): 1700  Weight Used for Protein Requirements: Ideal  Protein (g/day): 120  Method Used for Fluid Requirements: 1 ml/kcal  Fluid (ml/day): 1700    Nutrition Diagnosis:   Inadequate protein-energy intake related to cognitive or neurological impairment, impaired respiratory function, increase demand for energy/nutrients, catabolic illness, cardiac dysfunction (polysubstance abuse) as evidenced by intubation, nutrition support - enteral nutrition, intake 0-25%, intake 26-50%, lab values, localized or generalized fluid accumulation (+THC, benzo, cocaine on admit, off/on trickle feeds since admit)    Nutrition Interventions:   Food and/or Nutrient Delivery: Start Tube Feeding, Vitamin Supplement, Mineral Supplement  Nutrition Education/Counseling: Education/Counseling not indicated  Coordination of Nutrition Care: Continue to monitor while

## 2025-06-25 NOTE — PROGRESS NOTES
PALLIATIVE MEDICINE    NO AMD ON FILE    GUARDIANSHIP PROCESS BEING MANAGED BY CASE MANAGEMENT.    CODE STATUS: FULL CODE    Seen today in Rm 104.   Patient lying in bed supine with head of bed raised.  Pt. Alert. Tracking. Shakes head yes/no to simple questions.   Intubated. FiO2 45%, PEEP 8.  Gtts: Precedex,Fentanyl, Versed.    Bedside nurse reports patient has been following simple commands.     Palliative Medicine will continue to follow Beny Knapp  during his hospitalization and support him as he is able to  make healthcare decisions and define goals of care.     Maura Kimball RN  Palliative Medicine  [phone]

## 2025-06-25 NOTE — PLAN OF CARE
Problem: Safety - Medical Restraint  Goal: Remains free of injury from restraints (Restraint for Interference with Medical Device)  Description: INTERVENTIONS:  1. Determine that other, less restrictive measures have been tried or would not be effective before applying the restraint  2. Evaluate the patient's condition at the time of restraint application  3. Inform patient/family regarding the reason for restraint  4. Q2H: Monitor safety, psychosocial status, comfort, nutrition and hydration  Outcome: Progressing     Problem: Discharge Planning  Goal: Discharge to home or other facility with appropriate resources  Outcome: Progressing     Problem: Pain  Goal: Verbalizes/displays adequate comfort level or baseline comfort level  Outcome: Progressing     Problem: Chronic Conditions and Co-morbidities  Goal: Patient's chronic conditions and co-morbidity symptoms are monitored and maintained or improved  Outcome: Progressing     Problem: Neurosensory - Adult  Goal: Achieves stable or improved neurological status  Outcome: Progressing  Flowsheets (Taken 6/25/2025 0800)  Achieves stable or improved neurological status:   Assess for and report changes in neurological status   Initiate measures to prevent increased intracranial pressure   Maintain blood pressure and fluid volume within ordered parameters to optimize cerebral perfusion and minimize risk of hemorrhage   Monitor temperature, glucose, and sodium. Initiate appropriate interventions as ordered     Problem: Respiratory - Adult  Goal: Achieves optimal ventilation and oxygenation  Outcome: Progressing     Problem: Cardiovascular - Adult  Goal: Maintains optimal cardiac output and hemodynamic stability  Outcome: Progressing     Problem: Skin/Tissue Integrity - Adult  Goal: Skin integrity remains intact  Description: 1.  Monitor for areas of redness and/or skin breakdown  2.  Assess vascular access sites hourly  3.  Every 4-6 hours minimum:  Change oxygen

## 2025-06-26 ENCOUNTER — APPOINTMENT (OUTPATIENT)
Facility: HOSPITAL | Age: 60
DRG: 368 | End: 2025-06-26
Payer: MEDICARE

## 2025-06-26 LAB
ALBUMIN SERPL-MCNC: 2.6 G/DL (ref 3.4–5)
ALBUMIN/GLOB SERPL: 0.8 (ref 0.8–1.7)
ALP SERPL-CCNC: 52 U/L (ref 45–117)
ALT SERPL-CCNC: 130 U/L (ref 10–50)
ANION GAP SERPL CALC-SCNC: 9 MMOL/L (ref 3–18)
AST SERPL-CCNC: 49 U/L (ref 10–38)
BASE EXCESS BLD CALC-SCNC: 6.3 MMOL/L
BDY SITE: ABNORMAL
BILIRUB DIRECT SERPL-MCNC: 0.4 MG/DL (ref 0–0.2)
BILIRUB SERPL-MCNC: 0.8 MG/DL (ref 0.2–1)
BUN SERPL-MCNC: 36 MG/DL (ref 6–23)
BUN/CREAT SERPL: 70 (ref 12–20)
CALCIUM SERPL-MCNC: 9.1 MG/DL (ref 8.5–10.1)
CHLORIDE SERPL-SCNC: 103 MMOL/L (ref 98–107)
CO2 SERPL-SCNC: 28 MMOL/L (ref 21–32)
CREAT SERPL-MCNC: 0.51 MG/DL (ref 0.6–1.3)
ERYTHROCYTE [DISTWIDTH] IN BLOOD BY AUTOMATED COUNT: 14 % (ref 11.6–14.5)
GAS FLOW.O2 O2 DELIVERY SYS: ABNORMAL
GAS FLOW.O2 SETTING OXYMISER: 14 BPM
GLOBULIN SER CALC-MCNC: 3.1 G/DL (ref 2–4)
GLUCOSE BLD STRIP.AUTO-MCNC: 140 MG/DL (ref 70–110)
GLUCOSE BLD STRIP.AUTO-MCNC: 144 MG/DL (ref 70–110)
GLUCOSE BLD STRIP.AUTO-MCNC: 149 MG/DL (ref 70–110)
GLUCOSE SERPL-MCNC: 149 MG/DL (ref 74–108)
HCO3 BLD-SCNC: 30.6 MMOL/L (ref 21–28)
HCT VFR BLD AUTO: 34.8 % (ref 36–48)
HGB BLD-MCNC: 11.5 G/DL (ref 13–16)
MAGNESIUM SERPL-MCNC: 2.2 MG/DL (ref 1.6–2.6)
MCH RBC QN AUTO: 29.2 PG (ref 24–34)
MCHC RBC AUTO-ENTMCNC: 33 G/DL (ref 31–37)
MCV RBC AUTO: 88.3 FL (ref 78–100)
NRBC # BLD: 0 K/UL (ref 0–0.01)
NRBC BLD-RTO: 0 PER 100 WBC
O2/TOTAL GAS SETTING VFR VENT: 45 %
PCO2 BLD: 42.5 MMHG (ref 35–48)
PEEP RESPIRATORY: 8 CMH2O
PH BLD: 7.47 (ref 7.35–7.45)
PHOSPHATE SERPL-MCNC: 2.8 MG/DL (ref 2.5–4.9)
PLATELET # BLD AUTO: 181 K/UL (ref 135–420)
PMV BLD AUTO: 9.8 FL (ref 9.2–11.8)
PO2 BLD: 83 MMHG (ref 83–108)
POTASSIUM SERPL-SCNC: 4.5 MMOL/L (ref 3.5–5.5)
PROT SERPL-MCNC: 5.6 G/DL (ref 6.4–8.2)
RBC # BLD AUTO: 3.94 M/UL (ref 4.35–5.65)
SAO2 % BLD: 96.7 % (ref 92–97)
SERVICE CMNT-IMP: ABNORMAL
SODIUM SERPL-SCNC: 140 MMOL/L (ref 136–145)
SPECIMEN TYPE: ABNORMAL
VENTILATION MODE VENT: ABNORMAL
VT SETTING VENT: 520 ML
WBC # BLD AUTO: 9.4 K/UL (ref 4.6–13.2)

## 2025-06-26 PROCEDURE — 6370000000 HC RX 637 (ALT 250 FOR IP): Performed by: INTERNAL MEDICINE

## 2025-06-26 PROCEDURE — 6360000002 HC RX W HCPCS: Performed by: INTERNAL MEDICINE

## 2025-06-26 PROCEDURE — 80076 HEPATIC FUNCTION PANEL: CPT

## 2025-06-26 PROCEDURE — 36600 WITHDRAWAL OF ARTERIAL BLOOD: CPT

## 2025-06-26 PROCEDURE — 2700000000 HC OXYGEN THERAPY PER DAY

## 2025-06-26 PROCEDURE — 2500000003 HC RX 250 WO HCPCS: Performed by: INTERNAL MEDICINE

## 2025-06-26 PROCEDURE — 85027 COMPLETE CBC AUTOMATED: CPT

## 2025-06-26 PROCEDURE — 2580000003 HC RX 258: Performed by: INTERNAL MEDICINE

## 2025-06-26 PROCEDURE — 2000000000 HC ICU R&B

## 2025-06-26 PROCEDURE — 84100 ASSAY OF PHOSPHORUS: CPT

## 2025-06-26 PROCEDURE — 71045 X-RAY EXAM CHEST 1 VIEW: CPT

## 2025-06-26 PROCEDURE — 94640 AIRWAY INHALATION TREATMENT: CPT

## 2025-06-26 PROCEDURE — 83735 ASSAY OF MAGNESIUM: CPT

## 2025-06-26 PROCEDURE — 94003 VENT MGMT INPAT SUBQ DAY: CPT

## 2025-06-26 PROCEDURE — 94669 MECHANICAL CHEST WALL OSCILL: CPT

## 2025-06-26 PROCEDURE — 82803 BLOOD GASES ANY COMBINATION: CPT

## 2025-06-26 PROCEDURE — 6360000004 HC RX CONTRAST MEDICATION: Performed by: INTERNAL MEDICINE

## 2025-06-26 PROCEDURE — 71260 CT THORAX DX C+: CPT

## 2025-06-26 PROCEDURE — 82962 GLUCOSE BLOOD TEST: CPT

## 2025-06-26 PROCEDURE — 80048 BASIC METABOLIC PNL TOTAL CA: CPT

## 2025-06-26 RX ORDER — ACETYLCYSTEINE 100 MG/ML
4 SOLUTION ORAL; RESPIRATORY (INHALATION) 2 TIMES DAILY PRN
Status: DISCONTINUED | OUTPATIENT
Start: 2025-06-26 | End: 2025-06-27

## 2025-06-26 RX ORDER — IOPAMIDOL 755 MG/ML
100 INJECTION, SOLUTION INTRAVASCULAR
Status: COMPLETED | OUTPATIENT
Start: 2025-06-26 | End: 2025-06-26

## 2025-06-26 RX ORDER — IOPAMIDOL 612 MG/ML
100 INJECTION, SOLUTION INTRAVASCULAR
Status: CANCELLED | OUTPATIENT
Start: 2025-06-26

## 2025-06-26 RX ORDER — METOCLOPRAMIDE HYDROCHLORIDE 5 MG/ML
5 INJECTION INTRAMUSCULAR; INTRAVENOUS EVERY 6 HOURS
Status: COMPLETED | OUTPATIENT
Start: 2025-06-26 | End: 2025-06-27

## 2025-06-26 RX ORDER — DOCUSATE SODIUM 50 MG/5ML
100 LIQUID ORAL 2 TIMES DAILY
Status: DISPENSED | OUTPATIENT
Start: 2025-06-26 | End: 2025-06-28

## 2025-06-26 RX ADMIN — ARFORMOTEROL TARTRATE 15 MCG: 15 SOLUTION RESPIRATORY (INHALATION) at 07:30

## 2025-06-26 RX ADMIN — ENOXAPARIN SODIUM 30 MG: 100 INJECTION SUBCUTANEOUS at 01:32

## 2025-06-26 RX ADMIN — FENTANYL CITRATE 125 MCG/HR: 0.05 INJECTION, SOLUTION INTRAMUSCULAR; INTRAVENOUS at 01:11

## 2025-06-26 RX ADMIN — BUDESONIDE 500 MCG: 0.5 INHALANT RESPIRATORY (INHALATION) at 19:40

## 2025-06-26 RX ADMIN — POLYETHYLENE GLYCOL 3350 17 G: 17 POWDER, FOR SOLUTION ORAL at 08:26

## 2025-06-26 RX ADMIN — MIDAZOLAM HYDROCHLORIDE 2 MG: 1 INJECTION, SOLUTION INTRAMUSCULAR; INTRAVENOUS at 13:57

## 2025-06-26 RX ADMIN — Medication 100 MG: at 08:26

## 2025-06-26 RX ADMIN — SODIUM CHLORIDE 7 MG/HR: 900 INJECTION, SOLUTION INTRAVENOUS at 10:04

## 2025-06-26 RX ADMIN — MINERAL OIL, PETROLATUM: 425; 568 OINTMENT OPHTHALMIC at 20:06

## 2025-06-26 RX ADMIN — BUDESONIDE 500 MCG: 0.5 INHALANT RESPIRATORY (INHALATION) at 07:30

## 2025-06-26 RX ADMIN — FENTANYL CITRATE 125 MCG/HR: 0.05 INJECTION, SOLUTION INTRAMUSCULAR; INTRAVENOUS at 16:13

## 2025-06-26 RX ADMIN — FUROSEMIDE 40 MG: 10 INJECTION, SOLUTION INTRAMUSCULAR; INTRAVENOUS at 08:26

## 2025-06-26 RX ADMIN — METOCLOPRAMIDE 5 MG: 5 INJECTION, SOLUTION INTRAMUSCULAR; INTRAVENOUS at 20:02

## 2025-06-26 RX ADMIN — WATER 20 MG: 1 INJECTION INTRAMUSCULAR; INTRAVENOUS; SUBCUTANEOUS at 06:10

## 2025-06-26 RX ADMIN — IPRATROPIUM BROMIDE AND ALBUTEROL SULFATE 1 DOSE: .5; 3 SOLUTION RESPIRATORY (INHALATION) at 07:30

## 2025-06-26 RX ADMIN — CEFEPIME 2000 MG: 2 INJECTION, POWDER, FOR SOLUTION INTRAVENOUS at 04:40

## 2025-06-26 RX ADMIN — CHLORHEXIDINE GLUCONATE 15 ML: 1.2 RINSE ORAL at 08:25

## 2025-06-26 RX ADMIN — AMLODIPINE BESYLATE 10 MG: 5 TABLET ORAL at 08:25

## 2025-06-26 RX ADMIN — SODIUM CHLORIDE 7 MG/HR: 900 INJECTION, SOLUTION INTRAVENOUS at 20:54

## 2025-06-26 RX ADMIN — MINERAL OIL, PETROLATUM: 425; 568 OINTMENT OPHTHALMIC at 04:41

## 2025-06-26 RX ADMIN — ACETYLCYSTEINE 400 MG: 100 INHALANT RESPIRATORY (INHALATION) at 15:34

## 2025-06-26 RX ADMIN — MINERAL OIL, PETROLATUM: 425; 568 OINTMENT OPHTHALMIC at 13:05

## 2025-06-26 RX ADMIN — ENOXAPARIN SODIUM 30 MG: 100 INJECTION SUBCUTANEOUS at 13:04

## 2025-06-26 RX ADMIN — FLUCONAZOLE 400 MG: 2 INJECTION, SOLUTION INTRAVENOUS at 09:28

## 2025-06-26 RX ADMIN — MIDAZOLAM HYDROCHLORIDE 2 MG: 1 INJECTION, SOLUTION INTRAMUSCULAR; INTRAVENOUS at 18:09

## 2025-06-26 RX ADMIN — WATER 20 MG: 1 INJECTION INTRAMUSCULAR; INTRAVENOUS; SUBCUTANEOUS at 15:52

## 2025-06-26 RX ADMIN — DEXMEDETOMIDINE 1.2 MCG/KG/HR: 100 INJECTION, SOLUTION INTRAVENOUS at 08:34

## 2025-06-26 RX ADMIN — DOCUSATE SODIUM 100 MG: 50 LIQUID ORAL at 09:55

## 2025-06-26 RX ADMIN — IOPAMIDOL 100 ML: 755 INJECTION, SOLUTION INTRAVENOUS at 14:27

## 2025-06-26 RX ADMIN — FOLIC ACID 1 MG: 1 TABLET ORAL at 08:26

## 2025-06-26 RX ADMIN — DEXMEDETOMIDINE 1.2 MCG/KG/HR: 100 INJECTION, SOLUTION INTRAVENOUS at 13:58

## 2025-06-26 RX ADMIN — SODIUM CHLORIDE, PRESERVATIVE FREE 40 MG: 5 INJECTION INTRAVENOUS at 08:26

## 2025-06-26 RX ADMIN — METOCLOPRAMIDE 5 MG: 5 INJECTION, SOLUTION INTRAMUSCULAR; INTRAVENOUS at 09:23

## 2025-06-26 RX ADMIN — ARFORMOTEROL TARTRATE 15 MCG: 15 SOLUTION RESPIRATORY (INHALATION) at 19:40

## 2025-06-26 RX ADMIN — DEXMEDETOMIDINE 1 MCG/KG/HR: 100 INJECTION, SOLUTION INTRAVENOUS at 21:25

## 2025-06-26 RX ADMIN — CEFEPIME 2000 MG: 2 INJECTION, POWDER, FOR SOLUTION INTRAVENOUS at 13:03

## 2025-06-26 RX ADMIN — SODIUM CHLORIDE, PRESERVATIVE FREE 10 ML: 5 INJECTION INTRAVENOUS at 20:06

## 2025-06-26 RX ADMIN — MINERAL OIL, PETROLATUM: 425; 568 OINTMENT OPHTHALMIC at 01:30

## 2025-06-26 RX ADMIN — SENNOSIDES 8.6 MG: 8.6 TABLET, COATED ORAL at 08:26

## 2025-06-26 RX ADMIN — METOCLOPRAMIDE 5 MG: 5 INJECTION, SOLUTION INTRAMUSCULAR; INTRAVENOUS at 15:51

## 2025-06-26 RX ADMIN — DEXMEDETOMIDINE 1.2 MCG/KG/HR: 100 INJECTION, SOLUTION INTRAVENOUS at 01:34

## 2025-06-26 RX ADMIN — CEFEPIME 2000 MG: 2 INJECTION, POWDER, FOR SOLUTION INTRAVENOUS at 20:55

## 2025-06-26 RX ADMIN — CHLORHEXIDINE GLUCONATE 15 ML: 1.2 RINSE ORAL at 20:06

## 2025-06-26 RX ADMIN — DEXMEDETOMIDINE 1.2 MCG/KG/HR: 100 INJECTION, SOLUTION INTRAVENOUS at 17:22

## 2025-06-26 RX ADMIN — DEXMEDETOMIDINE 1.2 MCG/KG/HR: 100 INJECTION, SOLUTION INTRAVENOUS at 05:05

## 2025-06-26 RX ADMIN — ALBUTEROL SULFATE 2.5 MG: 2.5 SOLUTION RESPIRATORY (INHALATION) at 11:16

## 2025-06-26 RX ADMIN — ACETYLCYSTEINE 400 MG: 100 INHALANT RESPIRATORY (INHALATION) at 11:16

## 2025-06-26 RX ADMIN — SODIUM CHLORIDE, PRESERVATIVE FREE 10 ML: 5 INJECTION INTRAVENOUS at 08:27

## 2025-06-26 RX ADMIN — FENTANYL CITRATE 125 MCG/HR: 0.05 INJECTION, SOLUTION INTRAMUSCULAR; INTRAVENOUS at 10:03

## 2025-06-26 RX ADMIN — DEXMEDETOMIDINE 1.2 MCG/KG/HR: 100 INJECTION, SOLUTION INTRAVENOUS at 11:36

## 2025-06-26 RX ADMIN — MINERAL OIL, PETROLATUM: 425; 568 OINTMENT OPHTHALMIC at 17:22

## 2025-06-26 RX ADMIN — IPRATROPIUM BROMIDE AND ALBUTEROL SULFATE 1 DOSE: .5; 3 SOLUTION RESPIRATORY (INHALATION) at 19:40

## 2025-06-26 RX ADMIN — IPRATROPIUM BROMIDE AND ALBUTEROL SULFATE 1 DOSE: .5; 3 SOLUTION RESPIRATORY (INHALATION) at 15:34

## 2025-06-26 RX ADMIN — MINERAL OIL, PETROLATUM: 425; 568 OINTMENT OPHTHALMIC at 08:26

## 2025-06-26 ASSESSMENT — PAIN SCALES - GENERAL
PAINLEVEL_OUTOF10: 0

## 2025-06-26 ASSESSMENT — PULMONARY FUNCTION TESTS
PIF_VALUE: 22
PIF_VALUE: 20
PIF_VALUE: 21
PIF_VALUE: 21
PIF_VALUE: 20
PIF_VALUE: 21

## 2025-06-26 NOTE — PROGRESS NOTES
Jocelyne Richmond MD        dextrose bolus 10% 125 mL  125 mL IntraVENous PRN Jocelyne Richmond MD        Or    dextrose bolus 10% 250 mL  250 mL IntraVENous PRN Jocelyne Richmond MD        glucagon injection 1 mg  1 mg SubCUTAneous PRN Jocelyne Richmond MD        dextrose 10 % infusion   IntraVENous Continuous PRN Jocelyne Richmond MD        insulin lispro (HUMALOG,ADMELOG) injection vial 0-8 Units  0-8 Units SubCUTAneous Q6H Jocelyne Richmond MD   2 Units at 06/25/25 0556    ipratropium 0.5 mg-albuterol 2.5 mg (DUONEB) nebulizer solution 1 Dose  1 Dose Inhalation TID RT Jocelyne Richmond MD   1 Dose at 06/26/25 0730           Objective:   Intake/Output:     Intake/Output Summary (Last 24 hours) at 6/26/2025 0906  Last data filed at 6/26/2025 0826  Gross per 24 hour   Intake 1620.27 ml   Output 2775 ml   Net -1154.73 ml       Vital Signs:    /63   Pulse 52   Temp 98.5 °F (36.9 °C) (Axillary)   Resp 16   Ht 1.829 m (6' 0.01\")   Wt 108 kg (238 lb 1.6 oz)   SpO2 98%   BMI 32.28 kg/m²     Weight:  Wt Readings from Last 3 Encounters:   06/26/25 108 kg (238 lb 1.6 oz)   06/29/23 111.1 kg (245 lb)        Physical Exam:     General: in no respiratory distress, appears older than stated age, chronically ill looking, mild obesity, on ventilator  HEENT: PERRL, ET and OG tube sin place  Neck: No abnormally enlarged lymph nodes or thyroid, supple  Chest: obese chest wall  Lungs: moderate air entry, few bibasilar rhonchi, no wheezing, no tenderness/ rash; exam limitation 2' to body habitus  Heart: Regular rate and rhythm, S1S2 present, or without murmur or extra heart sounds  Abdomen: protuberant, bowel sounds normoactive, tympanic, abdomen is soft without significant tenderness, masses, organomegaly or guarding, rigidity, rebound; exam limitation 2' to body habitus  Extremity: no pedal BL edema, no cyanosis; peripheral pulses 2+ and symmetric, capillary refill normal bilateral  Neuro: sedated,  change. Clinical correlation is recommended.  99th Percentile:    Women:  0-54 ng/L                                                               Men:  0-78 ng/L         No results for input(s): \"CKTOTAL\" in the last 72 hours.    Invalid input(s): \"TROPOHS\"     Coagulation INR   Date/Time Value Ref Range Status   06/14/2025 09:07 AM 1.1 0.9 - 1.1   Final     Comment:     INR Therapeutic Ranges  (on stable oral anticoagulant):     INDICATION                INR  DVT/PE/Atrial Fib          2.0-3.0  MI/Mechanical Heart Valve  2.5-3.5         No results for input(s): \"INR\", \"APTT\" in the last 72 hours.    Invalid input(s): \"PTT\"       Urinalysis Lab Results   Component Value Date/Time    COLORU YELLOW 06/17/2025 12:01 PM    PHUR 5.5 06/17/2025 12:01 PM    PROTEINU 30 06/17/2025 12:01 PM    GLUCOSEU Negative 06/17/2025 12:01 PM    GLUCOSEU Negative 06/29/2023 02:49 AM    KETUA Negative 06/17/2025 12:01 PM    BILIRUBINUR Negative 06/17/2025 12:01 PM    NITRU Negative 06/17/2025 12:01 PM    LEUKOCYTESUR TRACE 06/17/2025 12:01 PM        Microbiology  Results       Procedure Component Value Units Date/Time    Culture, Respiratory [[id number]]  (Abnormal)  (Susceptibility) Collected: 06/16/25 2051    Order Status: Completed Specimen: Sputum Updated: 06/20/25 1234     Special Requests NO SPECIAL REQUESTS        Gram Stain 1+ WBCS SEEN               OCCASIONAL Gram negative rods                  RARE Gram positive cocci IN CLUSTERS            RARE APPARENT YEAST        Culture HEAVY Serratia marcescens               HEAVY Klebsiella pneumoniae                  FEW NORMAL RESPIRATORY FRAN          Susceptibility        Serratia marcescens      BACTERIAL SUSCEPTIBILITY PANEL RAISA      amikacin <=2 ug/mL Sensitive      ceFAZolin >=64 ug/mL Resistant      cefepime <=1 ug/mL Sensitive      cefOXitin 16 ug/mL Resistant      cefTAZidime <=1 ug/mL Sensitive      cefTRIAXone <=1 ug/mL Sensitive      ciprofloxacin <=0.25 ug/mL Sensitive

## 2025-06-26 NOTE — PLAN OF CARE
stability:   Monitor blood pressure and heart rate   Monitor urine output and notify Licensed Independent Practitioner for values outside of normal range   Assess for signs of decreased cardiac output   Administer fluid and/or volume expanders as ordered   Administer vasoactive medications as ordered  6/25/2025 2139 by Abhinav Feliciano, RN  Outcome: Progressing  Flowsheets (Taken 6/25/2025 2000)  Maintains optimal cardiac output and hemodynamic stability:   Monitor blood pressure and heart rate   Monitor urine output and notify Licensed Independent Practitioner for values outside of normal range   Assess for signs of decreased cardiac output   Administer fluid and/or volume expanders as ordered   Administer vasoactive medications as ordered     Problem: Skin/Tissue Integrity - Adult  Goal: Skin integrity remains intact  Description: 1.  Monitor for areas of redness and/or skin breakdown  2.  Assess vascular access sites hourly  3.  Every 4-6 hours minimum:  Change oxygen saturation probe site  4.  Every 4-6 hours:  If on nasal continuous positive airway pressure, respiratory therapy assess nares and determine need for appliance change or resting period  6/26/2025 1107 by Carolann Figueroa, RN  Outcome: Progressing  Flowsheets (Taken 6/26/2025 0800)  Skin Integrity Remains Intact:   Monitor for areas of redness and/or skin breakdown   Assess vascular access sites hourly   Every 4-6 hours minimum:  Change oxygen saturation probe site   Assess need for specialty bed   Positioning devices  6/25/2025 2139 by Abhinav Feliciano, RN  Outcome: Progressing  Flowsheets (Taken 6/25/2025 2000)  Skin Integrity Remains Intact:   Monitor for areas of redness and/or skin breakdown   Assess vascular access sites hourly   Every 4-6 hours minimum:  Change oxygen saturation probe site   Every 4-6 hours:  If on nasal continuous positive airway pressure, assess nares and determine need for appliance change or resting period   Turn and  lines, tubes and drains   Administer medications as ordered   Instruct and encourage patient and family to use good hand hygiene technique  6/25/2025 2139 by Abhinav Feliciano RN  Outcome: Progressing  Flowsheets (Taken 6/25/2025 2000)  Absence of infection during hospitalization:   Assess and monitor for signs and symptoms of infection   Monitor lab/diagnostic results   Monitor all insertion sites i.e., indwelling lines, tubes and drains   Monitor endotracheal (as able) and nasal secretions for changes in amount and color   [place] appropriate cooling/warming therapies per order   Administer medications as ordered   Instruct and encourage patient and family to use good hand hygiene technique   Identify and instruct in appropriate isolation precautions for identified infection/condition     Problem: Metabolic/Fluid and Electrolytes - Adult  Goal: Electrolytes maintained within normal limits  6/26/2025 1107 by Carolann Figueroa RN  Outcome: Progressing  Flowsheets (Taken 6/26/2025 0800)  Electrolytes maintained within normal limits:   Monitor labs and assess patient for signs and symptoms of electrolyte imbalances   Administer electrolyte replacement as ordered   Monitor response to electrolyte replacements, including repeat lab results as appropriate  6/25/2025 2139 by Abhinav Feliciano RN  Outcome: Progressing  Flowsheets (Taken 6/25/2025 2000)  Electrolytes maintained within normal limits:   Monitor labs and assess patient for signs and symptoms of electrolyte imbalances   Administer electrolyte replacement as ordered   Monitor response to electrolyte replacements, including repeat lab results as appropriate   Fluid restriction as ordered   Instruct patient on fluid and nutrition restrictions as appropriate  Goal: Hemodynamic stability and optimal renal function maintained  6/26/2025 1107 by Carolann Figueroa RN  Outcome: Progressing  Flowsheets (Taken 6/26/2025 0800)  Hemodynamic stability and optimal renal

## 2025-06-26 NOTE — PROGRESS NOTES
[facility]  (A Division of [facility])  Imelda Perez MD , [facility]  Office Tel:  [phone] Option #8  Text                                                              Date of Admission: 6/14/2025       ID FU for antimicrobial management suspected esophageal candidiasis   PCP: Emeli Duckworth MD    C/C: stridor/SOB    Current Antimicrobials:    Prior Antimicrobials:    Cefepime 6/20 to date #6  Fluconazole 6/14-> Micafungin  150 mg daily   #12     Zithro 6/16 to 20  CAX 6/16 to 20     Allergy to antibiotics- NA     Assessment:     Critically ill patient with:    Acute resp failure/ARDS- requiring high oxygen support-- Improving vent support      CXR infiltrate clear 6/24-- vent setting improving with abx treatment. Concern for PCP low at this time.     Infection with Serratia/Klebsiella -resp cultures- 6/16     Esophagitis- presumed candidal--Underlying immunodeficiency?  Fungitell < 31, galactomannan normal( 0.03)= 6/14--makes chronic systemic fungemia/PCP less likley-> repeat testing pending  Stridor/ candidal esophagitis/intubated- 10/2024 in [facility] as well- new info from his Primary MD    COPD    Transaminitis- recurrning, mild elevation    6/14 - blood cutlure X2: NGSF, resp culture- mixed GNR- normal resp kenia        -UA no pyuria, urine cx-Neg        -X-ray-bibasilar scarring/atelectasis, neck x-ray- no acute pathology        -CT CAP: Bibasilar atelectasis/pneumonia left greater than right.  Bullous emphysematous change.  Procal 0.03    6/16- KOH from mouth- no yeast, culture in progress         - respiratory culture- Klebsiella and Serratia growth--SS to cefepime, fungal culture- Candida albicans         =MRSA screen negative    Concern for immunodeficiency  -HIV 1/2- Non -reactive 6/14/25, HIV PCR -<20  -CD4 120  -Immunoglobulins normal    Co-morbidities:  Hyponatremia/SIADH-improved  Hypertension  COPD  Chronic hepatitis C- treated

## 2025-06-26 NOTE — PROGRESS NOTES
0700: No acute events overnight. Patient at a RASS of -1 on 1.2 of precedex, 7 of versed, and 125 of fentanyl. Tube feeds held due to high residual. This morning residual was 150ml. Notified Dr. Bharat Law.     1044: Fentanyl and Versed line changes. 10ml of fentanyl wasted and 10ml of versed wasted with Ashley Martinez, charge nurse.    1200: Residual checked. 220ml. Dr. Law notified. CT abdomen and pelvis obtained.    No acute events during shift. Bed in lowest position. Alarms active and audible.

## 2025-06-26 NOTE — PROGRESS NOTES
Hospitalist Progress Note    Patient: Beny Knapp MRN: [id number]  CSN: [id number]    YOB: 1965  Age: 59 y.o.  Sex: male    DOA: 6/14/2025 LOS:  LOS: 12 days          Chief Complain :  Foreign body, shortness of breath.  59 y.o.  male w/ PMH of COPD, ETOH-use and polysubstance abuse who was BIBA and presents with subjective difficulty breathing and swallowing with throat pain. Brought in by EMS for possible forign body (food) with bolus within region of velecula. ENT saw pt and ED intubated for airway protection. ENT was consulted and performed scope on pt with findings of extensive esophageal candidiasis. Nephrology was consulted for severe hyponatremia with sodium of 117 recommending NS at 75cc/hr. Intensivist was contacted as well d/t pt being intubated, on vent for airway protection.   Subjective:   Patient orally intubated and sedated.  His oxygen requirements are fluctuating.  It had improved and FiO2 was decreased however his FiO2 is now requiring to be increased now at 90%.  Trying to keep oxygen saturations between 90 to 95%.    Assessment/Plan     Active Hospital Problems    Diagnosis     Aspiration pneumonia (HCC) [J69.0]     Cocaine abuse (HCC) [F14.10]     Hyponatremia [E87.1]     Stridor [R06.1]     Obstructed, larynx [J38.6]     Acute respiratory failure with hypoxia and hypercapnia (HCC) [J96.01, J96.02]     ETOH abuse [F10.10]     Polysubstance abuse (HCC) [F19.10]     Candidiasis of esophagus (HCC) [B37.81]     Tobacco abuse [Z72.0]     HTN (hypertension) [I10]     COPD (chronic obstructive pulmonary disease) (HCC) [J44.9]     Chronic hepatitis C (HCC) [B18.2]       6/23  Imrpoved ventilation on

## 2025-06-26 NOTE — CONSULTS
Clinical Ethics Consultation Note        Received ethics consult due to no identified decision maker. Next of kin search has been done but did not identify any relatives. The patient is starting to improve, and decision was made by interdisciplinary team that the patient would be served best by having a guardian for current and future decision making. Guardianship process to be initiated by care management team.     Thank you for involving ethics in the care of this patient, we will continue to follow along.  Please reach out via [employer] if we can be of further assistance.    JUAN Alonzo, MPH  Ethics Consultant

## 2025-06-26 NOTE — PROGRESS NOTES
Palliative Medicine    CODE STATUS: FULL CODE    AMD Status: none on file. Unable to locate family. CM proceeding with guardianship      0945 Seen today in room 104 along with Jannet Maguire NP.  Lying supine with head of bed--eyes closed. Intubated/ventilated/sedated. Intubated 6/14/2025 FiO2 45% PEEP 8. Fentanyl, versed, and precedex gtts infusing. No family at bedside.    Intensivist continuing working toward medical extubation but unable at this time. Progress notes note possible consideration of leak test and anesthesia standby for extubation when ready.    Care management team has initiated guardianship procedures. Patient is unable to make his own decisions at this point.    Disposition plan: to be determined based on response to medical treatment, medical recommendations,  and patient/guardian [if assigned] decisions    Palliative Medicine will continue to follow Beny Knapp  during his hospitalization and support him as he makes healthcare decisions and defines goals of care.    Sherley Frias RN, MSN  Palliative Medicine  P: [phone]

## 2025-06-26 NOTE — PROGRESS NOTES
Hospitalist Progress Note    Patient: Beny Knapp MRN: [id number]  CSN: [id number]    YOB: 1965  Age: 59 y.o.  Sex: male    DOA: 6/14/2025 LOS:  LOS: 11 days          Chief Complain :  Foreign body, shortness of breath.  59 y.o.  male w/ PMH of COPD, ETOH-use and polysubstance abuse who was BIBA and presents with subjective difficulty breathing and swallowing with throat pain. Brought in by EMS for possible forign body (food) with bolus within region of velecula. ENT saw pt and ED intubated for airway protection. ENT was consulted and performed scope on pt with findings of extensive esophageal candidiasis. Nephrology was consulted for severe hyponatremia with sodium of 117 recommending NS at 75cc/hr. Intensivist was contacted as well d/t pt being intubated, on vent for airway protection.   Subjective:   Patient orally intubated and sedated.  His oxygen requirements are fluctuating.  It had improved and FiO2 was decreased however his FiO2 is now requiring to be increased now at 90%.  Trying to keep oxygen saturations between 90 to 95%.    Assessment/Plan     Active Hospital Problems    Diagnosis     Aspiration pneumonia (HCC) [J69.0]     Cocaine abuse (HCC) [F14.10]     Hyponatremia [E87.1]     Stridor [R06.1]     Obstructed, larynx [J38.6]     Acute respiratory failure with hypoxia and hypercapnia (HCC) [J96.01, J96.02]     ETOH abuse [F10.10]     Polysubstance abuse (HCC) [F19.10]     Candidiasis of esophagus (HCC) [B37.81]     Tobacco abuse [Z72.0]     HTN (hypertension) [I10]     COPD (chronic obstructive pulmonary disease) (HCC) [J44.9]     Chronic hepatitis C (HCC) [B18.2]       6/23  Imrpoved ventilation on

## 2025-06-27 ENCOUNTER — APPOINTMENT (OUTPATIENT)
Facility: HOSPITAL | Age: 60
DRG: 368 | End: 2025-06-27
Payer: MEDICARE

## 2025-06-27 LAB
ALBUMIN SERPL-MCNC: 2.8 G/DL (ref 3.4–5)
ALBUMIN/GLOB SERPL: 0.9 (ref 0.8–1.7)
ALP SERPL-CCNC: 55 U/L (ref 45–117)
ALT SERPL-CCNC: 184 U/L (ref 10–50)
ANION GAP SERPL CALC-SCNC: 9 MMOL/L (ref 3–18)
AST SERPL-CCNC: 59 U/L (ref 10–38)
BACTERIA SPEC CULT: ABNORMAL
BASE EXCESS BLD CALC-SCNC: 5.3 MMOL/L
BDY SITE: ABNORMAL
BILIRUB DIRECT SERPL-MCNC: 0.5 MG/DL (ref 0–0.2)
BILIRUB SERPL-MCNC: 0.8 MG/DL (ref 0.2–1)
BUN SERPL-MCNC: 36 MG/DL (ref 6–23)
BUN/CREAT SERPL: 72 (ref 12–20)
CALCIUM SERPL-MCNC: 9.1 MG/DL (ref 8.5–10.1)
CHLORIDE SERPL-SCNC: 103 MMOL/L (ref 98–107)
CO2 SERPL-SCNC: 28 MMOL/L (ref 21–32)
CREAT SERPL-MCNC: 0.5 MG/DL (ref 0.6–1.3)
ERYTHROCYTE [DISTWIDTH] IN BLOOD BY AUTOMATED COUNT: 14.3 % (ref 11.6–14.5)
GAS FLOW.O2 O2 DELIVERY SYS: ABNORMAL
GAS FLOW.O2 SETTING OXYMISER: 14 BPM
GLOBULIN SER CALC-MCNC: 3.1 G/DL (ref 2–4)
GLUCOSE BLD STRIP.AUTO-MCNC: 120 MG/DL (ref 70–110)
GLUCOSE BLD STRIP.AUTO-MCNC: 140 MG/DL (ref 70–110)
GLUCOSE BLD STRIP.AUTO-MCNC: 143 MG/DL (ref 70–110)
GLUCOSE BLD STRIP.AUTO-MCNC: 146 MG/DL (ref 70–110)
GLUCOSE SERPL-MCNC: 145 MG/DL (ref 74–108)
HCO3 BLD-SCNC: 30 MMOL/L (ref 21–28)
HCT VFR BLD AUTO: 36 % (ref 36–48)
HGB BLD-MCNC: 11.9 G/DL (ref 13–16)
MAGNESIUM SERPL-MCNC: 2.3 MG/DL (ref 1.6–2.6)
MCH RBC QN AUTO: 29.4 PG (ref 24–34)
MCHC RBC AUTO-ENTMCNC: 33.1 G/DL (ref 31–37)
MCV RBC AUTO: 88.9 FL (ref 78–100)
NRBC # BLD: 0 K/UL (ref 0–0.01)
NRBC BLD-RTO: 0 PER 100 WBC
O2/TOTAL GAS SETTING VFR VENT: 45 %
PCO2 BLD: 43.4 MMHG (ref 35–48)
PEEP RESPIRATORY: 8 CMH2O
PH BLD: 7.45 (ref 7.35–7.45)
PHOSPHATE SERPL-MCNC: 2.9 MG/DL (ref 2.5–4.9)
PLATELET # BLD AUTO: 222 K/UL (ref 135–420)
PMV BLD AUTO: 10.1 FL (ref 9.2–11.8)
PO2 BLD: 83 MMHG (ref 83–108)
POTASSIUM SERPL-SCNC: 4.5 MMOL/L (ref 3.5–5.5)
PROT SERPL-MCNC: 6 G/DL (ref 6.4–8.2)
RBC # BLD AUTO: 4.05 M/UL (ref 4.35–5.65)
SAO2 % BLD: 96.5 % (ref 92–97)
SERVICE CMNT-IMP: ABNORMAL
SERVICE CMNT-IMP: ABNORMAL
SODIUM SERPL-SCNC: 140 MMOL/L (ref 136–145)
SPECIMEN TYPE: ABNORMAL
VENTILATION MODE VENT: ABNORMAL
VT SETTING VENT: 520 ML
WBC # BLD AUTO: 7.9 K/UL (ref 4.6–13.2)

## 2025-06-27 PROCEDURE — 6370000000 HC RX 637 (ALT 250 FOR IP): Performed by: INTERNAL MEDICINE

## 2025-06-27 PROCEDURE — 2500000003 HC RX 250 WO HCPCS: Performed by: INTERNAL MEDICINE

## 2025-06-27 PROCEDURE — 84100 ASSAY OF PHOSPHORUS: CPT

## 2025-06-27 PROCEDURE — 6360000002 HC RX W HCPCS: Performed by: INTERNAL MEDICINE

## 2025-06-27 PROCEDURE — 94669 MECHANICAL CHEST WALL OSCILL: CPT

## 2025-06-27 PROCEDURE — 82803 BLOOD GASES ANY COMBINATION: CPT

## 2025-06-27 PROCEDURE — 2580000003 HC RX 258: Performed by: INTERNAL MEDICINE

## 2025-06-27 PROCEDURE — 80048 BASIC METABOLIC PNL TOTAL CA: CPT

## 2025-06-27 PROCEDURE — 2000000000 HC ICU R&B

## 2025-06-27 PROCEDURE — 94640 AIRWAY INHALATION TREATMENT: CPT

## 2025-06-27 PROCEDURE — 71045 X-RAY EXAM CHEST 1 VIEW: CPT

## 2025-06-27 PROCEDURE — 2700000000 HC OXYGEN THERAPY PER DAY

## 2025-06-27 PROCEDURE — 80076 HEPATIC FUNCTION PANEL: CPT

## 2025-06-27 PROCEDURE — 94003 VENT MGMT INPAT SUBQ DAY: CPT

## 2025-06-27 PROCEDURE — 83735 ASSAY OF MAGNESIUM: CPT

## 2025-06-27 PROCEDURE — 85027 COMPLETE CBC AUTOMATED: CPT

## 2025-06-27 PROCEDURE — 82962 GLUCOSE BLOOD TEST: CPT

## 2025-06-27 PROCEDURE — 36600 WITHDRAWAL OF ARTERIAL BLOOD: CPT

## 2025-06-27 RX ORDER — FOLIC ACID 5 MG/ML
1 INJECTION, SOLUTION INTRAMUSCULAR; INTRAVENOUS; SUBCUTANEOUS DAILY
Status: DISCONTINUED | OUTPATIENT
Start: 2025-06-27 | End: 2025-07-16

## 2025-06-27 RX ORDER — FUROSEMIDE 10 MG/ML
20 INJECTION INTRAMUSCULAR; INTRAVENOUS DAILY
Status: DISCONTINUED | OUTPATIENT
Start: 2025-06-27 | End: 2025-07-07

## 2025-06-27 RX ORDER — ACETYLCYSTEINE 100 MG/ML
4 SOLUTION ORAL; RESPIRATORY (INHALATION) EVERY 6 HOURS
Status: DISCONTINUED | OUTPATIENT
Start: 2025-06-27 | End: 2025-07-04

## 2025-06-27 RX ORDER — HYDRALAZINE HYDROCHLORIDE 20 MG/ML
10 INJECTION INTRAMUSCULAR; INTRAVENOUS EVERY 6 HOURS PRN
Status: DISCONTINUED | OUTPATIENT
Start: 2025-06-27 | End: 2025-07-16

## 2025-06-27 RX ORDER — THIAMINE HYDROCHLORIDE 100 MG/ML
100 INJECTION, SOLUTION INTRAMUSCULAR; INTRAVENOUS DAILY
Status: DISCONTINUED | OUTPATIENT
Start: 2025-06-27 | End: 2025-07-16

## 2025-06-27 RX ORDER — METRONIDAZOLE 500 MG/100ML
500 INJECTION, SOLUTION INTRAVENOUS EVERY 8 HOURS
Status: COMPLETED | OUTPATIENT
Start: 2025-06-27 | End: 2025-07-04

## 2025-06-27 RX ADMIN — DEXMEDETOMIDINE 1 MCG/KG/HR: 100 INJECTION, SOLUTION INTRAVENOUS at 01:19

## 2025-06-27 RX ADMIN — ARFORMOTEROL TARTRATE 15 MCG: 15 SOLUTION RESPIRATORY (INHALATION) at 07:34

## 2025-06-27 RX ADMIN — FENTANYL CITRATE 125 MCG/HR: 0.05 INJECTION, SOLUTION INTRAMUSCULAR; INTRAVENOUS at 16:40

## 2025-06-27 RX ADMIN — METRONIDAZOLE 500 MG: 500 INJECTION, SOLUTION INTRAVENOUS at 09:09

## 2025-06-27 RX ADMIN — DEXMEDETOMIDINE 1 MCG/KG/HR: 100 INJECTION, SOLUTION INTRAVENOUS at 09:21

## 2025-06-27 RX ADMIN — METOCLOPRAMIDE 5 MG: 5 INJECTION, SOLUTION INTRAMUSCULAR; INTRAVENOUS at 02:49

## 2025-06-27 RX ADMIN — WATER 20 MG: 1 INJECTION INTRAMUSCULAR; INTRAVENOUS; SUBCUTANEOUS at 05:33

## 2025-06-27 RX ADMIN — BUDESONIDE 500 MCG: 0.5 INHALANT RESPIRATORY (INHALATION) at 19:51

## 2025-06-27 RX ADMIN — MINERAL OIL, PETROLATUM: 425; 568 OINTMENT OPHTHALMIC at 17:17

## 2025-06-27 RX ADMIN — MINERAL OIL, PETROLATUM: 425; 568 OINTMENT OPHTHALMIC at 22:18

## 2025-06-27 RX ADMIN — ACETYLCYSTEINE 400 MG: 100 SOLUTION RESPIRATORY (INHALATION) at 19:51

## 2025-06-27 RX ADMIN — ARFORMOTEROL TARTRATE 15 MCG: 15 SOLUTION RESPIRATORY (INHALATION) at 19:51

## 2025-06-27 RX ADMIN — IPRATROPIUM BROMIDE 0.5 MG: 0.5 SOLUTION RESPIRATORY (INHALATION) at 19:51

## 2025-06-27 RX ADMIN — IPRATROPIUM BROMIDE AND ALBUTEROL SULFATE 1 DOSE: .5; 3 SOLUTION RESPIRATORY (INHALATION) at 07:34

## 2025-06-27 RX ADMIN — FUROSEMIDE 20 MG: 10 INJECTION, SOLUTION INTRAMUSCULAR; INTRAVENOUS at 09:06

## 2025-06-27 RX ADMIN — SODIUM CHLORIDE, PRESERVATIVE FREE 10 ML: 5 INJECTION INTRAVENOUS at 21:53

## 2025-06-27 RX ADMIN — MINERAL OIL, PETROLATUM: 425; 568 OINTMENT OPHTHALMIC at 09:21

## 2025-06-27 RX ADMIN — CEFEPIME 2000 MG: 2 INJECTION, POWDER, FOR SOLUTION INTRAVENOUS at 05:06

## 2025-06-27 RX ADMIN — DEXMEDETOMIDINE 1 MCG/KG/HR: 100 INJECTION, SOLUTION INTRAVENOUS at 05:32

## 2025-06-27 RX ADMIN — CEFEPIME 2000 MG: 2 INJECTION, POWDER, FOR SOLUTION INTRAVENOUS at 13:24

## 2025-06-27 RX ADMIN — ACETYLCYSTEINE 400 MG: 100 SOLUTION RESPIRATORY (INHALATION) at 09:35

## 2025-06-27 RX ADMIN — CHLORHEXIDINE GLUCONATE 15 ML: 1.2 RINSE ORAL at 09:11

## 2025-06-27 RX ADMIN — SODIUM CHLORIDE, PRESERVATIVE FREE 40 MG: 5 INJECTION INTRAVENOUS at 09:06

## 2025-06-27 RX ADMIN — IPRATROPIUM BROMIDE 0.5 MG: 0.5 SOLUTION RESPIRATORY (INHALATION) at 09:35

## 2025-06-27 RX ADMIN — WATER 20 MG: 1 INJECTION INTRAMUSCULAR; INTRAVENOUS; SUBCUTANEOUS at 15:22

## 2025-06-27 RX ADMIN — SODIUM CHLORIDE 5 MG/HR: 900 INJECTION, SOLUTION INTRAVENOUS at 12:11

## 2025-06-27 RX ADMIN — MIDAZOLAM HYDROCHLORIDE 2 MG: 1 INJECTION, SOLUTION INTRAMUSCULAR; INTRAVENOUS at 18:41

## 2025-06-27 RX ADMIN — FOLIC ACID 1 MG: 5 INJECTION, SOLUTION INTRAMUSCULAR; INTRAVENOUS; SUBCUTANEOUS at 09:06

## 2025-06-27 RX ADMIN — FENTANYL CITRATE 125 MCG/HR: 0.05 INJECTION, SOLUTION INTRAMUSCULAR; INTRAVENOUS at 07:53

## 2025-06-27 RX ADMIN — IPRATROPIUM BROMIDE 0.5 MG: 0.5 SOLUTION RESPIRATORY (INHALATION) at 15:38

## 2025-06-27 RX ADMIN — MINERAL OIL, PETROLATUM: 425; 568 OINTMENT OPHTHALMIC at 00:21

## 2025-06-27 RX ADMIN — DORNASE ALFA 2.5 MG: 1 SOLUTION RESPIRATORY (INHALATION) at 09:50

## 2025-06-27 RX ADMIN — METRONIDAZOLE 500 MG: 500 INJECTION, SOLUTION INTRAVENOUS at 17:27

## 2025-06-27 RX ADMIN — SODIUM CHLORIDE, PRESERVATIVE FREE 10 ML: 5 INJECTION INTRAVENOUS at 10:27

## 2025-06-27 RX ADMIN — ENOXAPARIN SODIUM 30 MG: 100 INJECTION SUBCUTANEOUS at 00:45

## 2025-06-27 RX ADMIN — MINERAL OIL, PETROLATUM: 425; 568 OINTMENT OPHTHALMIC at 14:35

## 2025-06-27 RX ADMIN — ENOXAPARIN SODIUM 30 MG: 100 INJECTION SUBCUTANEOUS at 13:21

## 2025-06-27 RX ADMIN — THIAMINE HYDROCHLORIDE 100 MG: 100 INJECTION, SOLUTION INTRAMUSCULAR; INTRAVENOUS at 09:06

## 2025-06-27 RX ADMIN — BUDESONIDE 500 MCG: 0.5 INHALANT RESPIRATORY (INHALATION) at 07:34

## 2025-06-27 RX ADMIN — WATER 20 MG: 1 INJECTION INTRAMUSCULAR; INTRAVENOUS; SUBCUTANEOUS at 00:12

## 2025-06-27 RX ADMIN — DEXMEDETOMIDINE 0.4 MCG/KG/HR: 100 INJECTION, SOLUTION INTRAVENOUS at 22:17

## 2025-06-27 RX ADMIN — DORNASE ALFA 2.5 MG: 1 SOLUTION RESPIRATORY (INHALATION) at 19:51

## 2025-06-27 RX ADMIN — FENTANYL CITRATE 125 MCG/HR: 0.05 INJECTION, SOLUTION INTRAMUSCULAR; INTRAVENOUS at 00:43

## 2025-06-27 RX ADMIN — MINERAL OIL, PETROLATUM: 425; 568 OINTMENT OPHTHALMIC at 05:07

## 2025-06-27 RX ADMIN — CEFEPIME 2000 MG: 2 INJECTION, POWDER, FOR SOLUTION INTRAVENOUS at 22:23

## 2025-06-27 RX ADMIN — FLUCONAZOLE 400 MG: 2 INJECTION, SOLUTION INTRAVENOUS at 10:23

## 2025-06-27 RX ADMIN — ACETYLCYSTEINE 400 MG: 100 SOLUTION RESPIRATORY (INHALATION) at 15:38

## 2025-06-27 RX ADMIN — DEXMEDETOMIDINE 0.6 MCG/KG/HR: 100 INJECTION, SOLUTION INTRAVENOUS at 14:05

## 2025-06-27 ASSESSMENT — PULMONARY FUNCTION TESTS
PIF_VALUE: 19
PIF_VALUE: 20
PIF_VALUE: 19
PIF_VALUE: 18
PIF_VALUE: 20
PIF_VALUE: 19

## 2025-06-27 ASSESSMENT — PAIN SCALES - GENERAL
PAINLEVEL_OUTOF10: 0

## 2025-06-27 NOTE — PROGRESS NOTES
Palliative Medicine    CODE STATUS: FULL CODE    AMD Status: none on file. Unable to locate family. CM proceeding with guardianship      0900 Seen today in room 104. Lying supine with eyes open. Awake, alert. Nodding appropriately. Intubated/ventilated/sedated. Intubated 6/14/2025 FiO2 40% PEEP 8. Versed, fentanyl, and precedex gtts infusing.    Spoke with clinical nurse. Weaning sedation.    If he is able to be medically extubated, palliative will have medical surrogate and code status discussions with him    Disposition plan: to be determined based on response to medical treatment, medical recommendations,  and patient/guardian [if assigned] decisions     Palliative Medicine will continue to follow Beny Knapp  during his hospitalization and support him as he makes healthcare decisions and defines goals of care.    Sherley Frias RN, MSN  Palliative Medicine  P: [phone]

## 2025-06-27 NOTE — PROGRESS NOTES
[facility]  Pulmonary, Critical Care, and Sleep Medicine    Name: Beny Knapp MRN: [id number]   : 1965 Hospital: [facility]    Date: 2025  Room: [address] [place] [address] Note                                              Consult requesting physician: Dr. Mark   Reason for Consult: Respiratory failure, hyponatremia, upper airway    IMPRESSION:     Acute respiratory failure with hypoxemia and hypercarbia   J96.01, J96.02  Stridor  Obstructed larynx  Polysubstance abuse   Candidiasis of esophagus   Aspiration pneumonia   COPD/emphysema  EtOH use  Cocaine abuse  Hepatitis C  Cirrhosis of liver  Hyponatremia, resolved      Active Hospital Problems    Diagnosis Date Noted    Aspiration pneumonia (HCC) [J69.0] 2025    Cocaine abuse (HCC) [F14.10] 2025    Hyponatremia [E87.1] 2025    Stridor [R06.1] 2025    Obstructed, larynx [J38.6] 2025    Acute respiratory failure with hypoxia and hypercapnia (HCC) [J96.01, J96.02] 2025    ETOH abuse [F10.10] 2025    Polysubstance abuse (HCC) [F19.10] 2025    Candidiasis of esophagus (HCC) [B37.81] 2025    Tobacco abuse [Z72.0] 2015    HTN (hypertension) [I10] 2015    COPD (chronic obstructive pulmonary disease) (HCC) [J44.9] 2015    Chronic hepatitis C (HCC) [B18.2] 2015        Code status: Full Code       RECOMMENDATIONS:     Respiratory: 59-year-old male with COPD, smoker presents with acute upper airway distress, fullness in the laryngeal area, questionable foreign body. History of aspirations with feeding and bolus feeling on presentation in ER this admission. Patient was intubated 25 for airway protection and hypercarbic and hypoxemic respiratory failure, with ENT at bedside.  Prior to intubation, fiberoptic nasolaryngoscopy endoscopy by ENT did not show any foreign body, but swollen airway/larynx and severe candidiasis obstructing larynx.  CT  MD Jocelyne   2 Units at 06/25/25 0556           Objective:   Intake/Output:     Intake/Output Summary (Last 24 hours) at 6/27/2025 0848  Last data filed at 6/27/2025 0800  Gross per 24 hour   Intake 1773.23 ml   Output 3600 ml   Net -1826.77 ml       Vital Signs:    /81   Pulse 58   Temp 98.5 °F (36.9 °C) (Axillary)   Resp 14   Ht 1.829 m (6' 0.01\")   Wt 103 kg (227 lb 1.2 oz)   SpO2 98%   BMI 30.79 kg/m²     Weight:  Wt Readings from Last 3 Encounters:   06/27/25 103 kg (227 lb 1.2 oz)   06/29/23 111.1 kg (245 lb)        Physical Exam:     General/Neurology: Awaken easily on calling name while on sedatives, following simple commands and nodding to questions appropriately, no respiratory distress, on ventilator, no facial asymmetry, no involuntary movements, moving all extremities to commands appropriately and equally, exam limitation on ventilator  Head:   Normocephalic, without obvious abnormality, atraumatic.  Eye:   EOM intact. No scleral icterus  Nose:   No sinus tenderness  Throat:  Visible lips, mucosa, and tongue normal. No oral thrush.  Neck:   Supple, symmetric. No lymphadenopathy. Trachea midline. Thick neck  Lung:   Moderate air entry bilateral equal.  Few scattered bibasilar rhonchi. No wheezing. No prolongded expiration. No accessory muscle use.   Heart:   Regular rate & rhythm. S1 S2 present. No murmur.  No JVD.  Abdomen:  Soft. NT.  Protuberant. +BS. No masses.  No guarding, rigidity or rebound  Extremities:  Trace bilateral ankle pedal edema. No cyanosis.  Pulses: 2+ and symmetric in DP. Capillary refill: normal bilateral  Skin:   Color, texture, turgor fair    Data:       Recent Results (from the past 24 hours)   POCT Glucose    Collection Time: 06/26/25 12:10 PM   Result Value Ref Range    POC Glucose 149 (H) 70 - 110 mg/dL   POCT Glucose    Collection Time: 06/26/25  6:17 PM   Result Value Ref Range    POC Glucose 144 (H) 70 - 110 mg/dL   POCT Glucose    Collection Time: 06/27/25

## 2025-06-27 NOTE — PROGRESS NOTES
Hospitalist Progress Note    Patient: Beny Knapp MRN: [id number]  CSN: [id number]    YOB: 1965  Age: 59 y.o.  Sex: male    DOA: 6/14/2025 LOS:  LOS: 13 days          Chief Complain :  Foreign body, shortness of breath.  59 y.o.  male w/ PMH of COPD, ETOH-use and polysubstance abuse who was BIBA and presents with subjective difficulty breathing and swallowing with throat pain. Brought in by EMS for possible forign body (food) with bolus within region of velecula. ENT saw pt and ED intubated for airway protection. ENT was consulted and performed scope on pt with findings of extensive esophageal candidiasis. Nephrology was consulted for severe hyponatremia with sodium of 117 recommending NS at 75cc/hr. Intensivist was contacted as well d/t pt being intubated, on vent for airway protection.   Subjective:   Patient orally intubated and sedated.  His oxygen requirements are fluctuating.  It had improved and FiO2 was decreased however his FiO2 is now requiring to be increased now at 90%.  Trying to keep oxygen saturations between 90 to 95%.    Assessment/Plan     Active Hospital Problems    Diagnosis     Aspiration pneumonia (HCC) [J69.0]     Cocaine abuse (HCC) [F14.10]     Hyponatremia [E87.1]     Stridor [R06.1]     Obstructed, larynx [J38.6]     Acute respiratory failure with hypoxia and hypercapnia (HCC) [J96.01, J96.02]     ETOH abuse [F10.10]     Polysubstance abuse (HCC) [F19.10]     Candidiasis of esophagus (HCC) [B37.81]     Tobacco abuse [Z72.0]     HTN (hypertension) [I10]     COPD (chronic obstructive pulmonary disease) (HCC) [J44.9]     Chronic hepatitis C (HCC) [B18.2]       6/23  Imrpoved ventilation on

## 2025-06-27 NOTE — PLAN OF CARE
Problem: Safety - Medical Restraint  Goal: Remains free of injury from restraints (Restraint for Interference with Medical Device)  Description: INTERVENTIONS:  1. Determine that other, less restrictive measures have been tried or would not be effective before applying the restraint  2. Evaluate the patient's condition at the time of restraint application  3. Inform patient/family regarding the reason for restraint  4. Q2H: Monitor safety, psychosocial status, comfort, nutrition and hydration  6/27/2025 1047 by Carolann Figueroa RN  Outcome: Progressing  Flowsheets  Taken 6/27/2025 0800 by Carolann Figueroa RN  Remains free of injury from restraints (restraint for interference with medical device):   Determine that other, less restrictive measures have been tried or would not be effective before applying the restraint   Evaluate the patient's condition at the time of restraint application   Inform patient/family regarding the reason for restraint   Every 2 hours: Monitor safety, psychosocial status, comfort, nutrition and hydration  Taken 6/26/2025 2316 by Abhinav Feliciano RN  Remains free of injury from restraints (restraint for interference with medical device):   Determine that other, less restrictive measures have been tried or would not be effective before applying the restraint   Evaluate the patient's condition at the time of restraint application   Every 2 hours: Monitor safety, psychosocial status, comfort, nutrition and hydration   Inform patient/family regarding the reason for restraint  6/26/2025 2135 by Abhinav Feliciano RN  Outcome: Progressing  Flowsheets (Taken 6/26/2025 2000)  Remains free of injury from restraints (restraint for interference with medical device):   Determine that other, less restrictive measures have been tried or would not be effective before applying the restraint   Evaluate the patient's condition at the time of restraint application   Inform patient/family regarding the reason  infection/condition  6/26/2025 2135 by Abhinav Feliciano RN  Outcome: Progressing  Flowsheets (Taken 6/26/2025 2000)  Absence of infection at discharge:   Assess and monitor for signs and symptoms of infection   Monitor lab/diagnostic results   Monitor all insertion sites i.e., indwelling lines, tubes and drains   Monitor endotracheal (as able) and nasal secretions for changes in amount and color   [place] appropriate cooling/warming therapies per order   Administer medications as ordered   Instruct and encourage patient and family to use good hand hygiene technique   Identify and instruct in appropriate isolation precautions for identified infection/condition  Goal: Absence of infection during hospitalization  6/27/2025 1047 by Carolann Figueroa RN  Outcome: Progressing  Flowsheets (Taken 6/27/2025 0800)  Absence of infection during hospitalization:   Assess and monitor for signs and symptoms of infection   Monitor all insertion sites i.e., indwelling lines, tubes and drains   Monitor lab/diagnostic results   Monitor endotracheal (as able) and nasal secretions for changes in amount and color   [place] appropriate cooling/warming therapies per order   Administer medications as ordered   Instruct and encourage patient and family to use good hand hygiene technique   Identify and instruct in appropriate isolation precautions for identified infection/condition  6/26/2025 2135 by Abhinav Feliciano RN  Outcome: Progressing  Flowsheets (Taken 6/26/2025 2000)  Absence of infection during hospitalization:   Assess and monitor for signs and symptoms of infection   Monitor lab/diagnostic results   Monitor all insertion sites i.e., indwelling lines, tubes and drains   Monitor endotracheal (as able) and nasal secretions for changes in amount and color   [place] appropriate cooling/warming therapies per order   Administer medications as ordered   Instruct and encourage patient and family to use good hand hygiene technique

## 2025-06-27 NOTE — PROGRESS NOTES
Addendum  Met with Dr. Goode and discussed patient.  GI unable to perform colonoscopy without prep and patient with OGT to LIWS.  GI recommended soap enema daily and reevaluate early next week response for any colonoscopy. Order placed. Patient's RN is aware.    Bharat Law MD

## 2025-06-27 NOTE — WOUND CARE
ICU Rounding  Wound care nurse rounded on patient for skin issues.  Patient has a Chauncey score of 13.  Does patient have any pressure injury?no per primary nurse PUMA Vuong     Skin Care & Pressure Relief Recommendations  Minimize layers of linen  Pads under patient to optimize support surface  Turn/reposition approximately every 2 hours  Use pillow wedges to maintain positioning but do not put pillow directly over wounds  Manage incontinence   Promote continence; Skin Protective lotion/cream to buttocks and sacrum daily and as needed with incontinence care  Offload heels pillows    Consult wound care if any wounds noted during admission.  Wound Care nurse will continue to follow during ICU admission.

## 2025-06-27 NOTE — CASE COMMUNICATION
Case Management attempted to reach patients NOK at the following phone numbers:    [phone] - Continues to ring and goes into a fast busy signal  [phone] - Immediately received a fast busy signal    Per Dr. KEELY Law, patients PCP is Dr. Gaby Monroe. Updates made in EPIC.     will continue to follow.     (3) walks occasionally

## 2025-06-27 NOTE — PROGRESS NOTES
[facility]  (A Division of [facility])  Imelda Perez MD , [facility]  Office Tel:  [phone] Option #8  Text                                                              Date of Admission: 6/14/2025       ID FU for antimicrobial management suspected esophageal candidiasis   PCP: Emeli Duckworth MD    C/C: stridor/SOB    Current Antimicrobials:    Prior Antimicrobials:    Cefepime 6/20 to date # 7 d  Fluconazole 6/14-> Micafungin  150 mg daily   #13     Zithro 6/16 to 20  CAX 6/16 to 20     Allergy to antibiotics- NA     Assessment:     Critically ill patient with:    Acute resp failure/ARDS- requiring high oxygen support-- Improving vent support      CXR infiltrate clear 6/24-- vent setting improving with abx treatment. Concern for PCP low at this time.     Infection with Serratia/Klebsiella -resp cultures- 6/16     Esophagitis- presumed candidal--Underlying immunodeficiency?  Fungitell < 31, galactomannan normal( 0.03)= 6/14--makes chronic systemic fungemia/PCP less likley-> repeat testing pending  Stridor/ candidal esophagitis/intubated- 10/2024 in [facility] as well- new info from his Primary MD    COPD    Transaminitis- recurrning, mild elevation    6/14 - blood cutlure X2: NGSF, resp culture- mixed GNR- normal resp kenia        -UA no pyuria, urine cx-Neg        -X-ray-bibasilar scarring/atelectasis, neck x-ray- no acute pathology        -CT CAP: Bibasilar atelectasis/pneumonia left greater than right.  Bullous emphysematous change.  Procal 0.03    6/16- KOH from mouth- no yeast, culture in progress         - respiratory culture- Klebsiella and Serratia growth--SS to cefepime, fungal culture- Candida albicans         =MRSA screen negative    Concern for immunodeficiency  -HIV 1/2- Non -reactive 6/14/25, HIV PCR -<20  -CD4 120  -Immunoglobulins normal    Co-morbidities:  Hyponatremia/SIADH-improved  Hypertension  COPD  Chronic hepatitis C-

## 2025-06-28 ENCOUNTER — APPOINTMENT (OUTPATIENT)
Facility: HOSPITAL | Age: 60
DRG: 368 | End: 2025-06-28
Payer: MEDICARE

## 2025-06-28 LAB
ALBUMIN SERPL-MCNC: 2.6 G/DL (ref 3.4–5)
ALBUMIN/GLOB SERPL: 0.8 (ref 0.8–1.7)
ALP SERPL-CCNC: 54 U/L (ref 45–117)
ALT SERPL-CCNC: 255 U/L (ref 10–50)
ANION GAP SERPL CALC-SCNC: 9 MMOL/L (ref 3–18)
AST SERPL-CCNC: 88 U/L (ref 10–38)
BASE EXCESS BLD CALC-SCNC: 4.9 MMOL/L
BDY SITE: ABNORMAL
BILIRUB DIRECT SERPL-MCNC: 0.8 MG/DL (ref 0–0.2)
BILIRUB SERPL-MCNC: 1.4 MG/DL (ref 0.2–1)
BUN SERPL-MCNC: 34 MG/DL (ref 6–23)
BUN/CREAT SERPL: 86 (ref 12–20)
CALCIUM SERPL-MCNC: 8.9 MG/DL (ref 8.5–10.1)
CHLORIDE SERPL-SCNC: 102 MMOL/L (ref 98–107)
CO2 SERPL-SCNC: 25 MMOL/L (ref 21–32)
CREAT SERPL-MCNC: 0.4 MG/DL (ref 0.6–1.3)
ERYTHROCYTE [DISTWIDTH] IN BLOOD BY AUTOMATED COUNT: 14.1 % (ref 11.6–14.5)
GAS FLOW.O2 O2 DELIVERY SYS: ABNORMAL
GAS FLOW.O2 SETTING OXYMISER: 14 BPM
GLOBULIN SER CALC-MCNC: 3.2 G/DL (ref 2–4)
GLUCOSE BLD STRIP.AUTO-MCNC: 104 MG/DL (ref 70–110)
GLUCOSE BLD STRIP.AUTO-MCNC: 110 MG/DL (ref 70–110)
GLUCOSE BLD STRIP.AUTO-MCNC: 111 MG/DL (ref 70–110)
GLUCOSE BLD STRIP.AUTO-MCNC: 116 MG/DL (ref 70–110)
GLUCOSE SERPL-MCNC: 121 MG/DL (ref 74–108)
HCO3 BLD-SCNC: 29 MMOL/L (ref 21–28)
HCT VFR BLD AUTO: 34.5 % (ref 36–48)
HGB BLD-MCNC: 11.5 G/DL (ref 13–16)
MCH RBC QN AUTO: 29.2 PG (ref 24–34)
MCHC RBC AUTO-ENTMCNC: 33.3 G/DL (ref 31–37)
MCV RBC AUTO: 87.6 FL (ref 78–100)
NRBC # BLD: 0 K/UL (ref 0–0.01)
NRBC BLD-RTO: 0 PER 100 WBC
O2/TOTAL GAS SETTING VFR VENT: 40 %
PCO2 BLD: 40.1 MMHG (ref 35–48)
PEEP RESPIRATORY: 8 CMH2O
PH BLD: 7.47 (ref 7.35–7.45)
PLATELET # BLD AUTO: 196 K/UL (ref 135–420)
PMV BLD AUTO: 9.9 FL (ref 9.2–11.8)
PO2 BLD: 59 MMHG (ref 83–108)
POTASSIUM SERPL-SCNC: 4 MMOL/L (ref 3.5–5.5)
PROT SERPL-MCNC: 5.9 G/DL (ref 6.4–8.2)
RBC # BLD AUTO: 3.94 M/UL (ref 4.35–5.65)
SAO2 % BLD: 91.6 % (ref 92–97)
SERVICE CMNT-IMP: ABNORMAL
SODIUM SERPL-SCNC: 136 MMOL/L (ref 136–145)
SPECIMEN TYPE: ABNORMAL
VENTILATION MODE VENT: ABNORMAL
VT SETTING VENT: 520 ML
WBC # BLD AUTO: 9.7 K/UL (ref 4.6–13.2)

## 2025-06-28 PROCEDURE — 80048 BASIC METABOLIC PNL TOTAL CA: CPT

## 2025-06-28 PROCEDURE — 2580000003 HC RX 258: Performed by: INTERNAL MEDICINE

## 2025-06-28 PROCEDURE — 94640 AIRWAY INHALATION TREATMENT: CPT

## 2025-06-28 PROCEDURE — 85027 COMPLETE CBC AUTOMATED: CPT

## 2025-06-28 PROCEDURE — 82962 GLUCOSE BLOOD TEST: CPT

## 2025-06-28 PROCEDURE — 6360000002 HC RX W HCPCS: Performed by: INTERNAL MEDICINE

## 2025-06-28 PROCEDURE — 71045 X-RAY EXAM CHEST 1 VIEW: CPT

## 2025-06-28 PROCEDURE — 80076 HEPATIC FUNCTION PANEL: CPT

## 2025-06-28 PROCEDURE — 36600 WITHDRAWAL OF ARTERIAL BLOOD: CPT

## 2025-06-28 PROCEDURE — 2700000000 HC OXYGEN THERAPY PER DAY

## 2025-06-28 PROCEDURE — 94668 MNPJ CHEST WALL SBSQ: CPT

## 2025-06-28 PROCEDURE — 82803 BLOOD GASES ANY COMBINATION: CPT

## 2025-06-28 PROCEDURE — 2500000003 HC RX 250 WO HCPCS: Performed by: INTERNAL MEDICINE

## 2025-06-28 PROCEDURE — 94669 MECHANICAL CHEST WALL OSCILL: CPT

## 2025-06-28 PROCEDURE — 36556 INSERT NON-TUNNEL CV CATH: CPT

## 2025-06-28 PROCEDURE — 2000000000 HC ICU R&B

## 2025-06-28 PROCEDURE — 94003 VENT MGMT INPAT SUBQ DAY: CPT

## 2025-06-28 RX ADMIN — CEFEPIME 2000 MG: 2 INJECTION, POWDER, FOR SOLUTION INTRAVENOUS at 06:20

## 2025-06-28 RX ADMIN — THIAMINE HYDROCHLORIDE 100 MG: 100 INJECTION, SOLUTION INTRAMUSCULAR; INTRAVENOUS at 08:45

## 2025-06-28 RX ADMIN — FOLIC ACID 1 MG: 5 INJECTION, SOLUTION INTRAMUSCULAR; INTRAVENOUS; SUBCUTANEOUS at 09:29

## 2025-06-28 RX ADMIN — METRONIDAZOLE 500 MG: 500 INJECTION, SOLUTION INTRAVENOUS at 00:51

## 2025-06-28 RX ADMIN — ARFORMOTEROL TARTRATE 15 MCG: 15 SOLUTION RESPIRATORY (INHALATION) at 08:00

## 2025-06-28 RX ADMIN — IPRATROPIUM BROMIDE 0.5 MG: 0.5 SOLUTION RESPIRATORY (INHALATION) at 08:00

## 2025-06-28 RX ADMIN — SODIUM CHLORIDE, PRESERVATIVE FREE 10 ML: 5 INJECTION INTRAVENOUS at 20:56

## 2025-06-28 RX ADMIN — MIDAZOLAM HYDROCHLORIDE 2 MG: 1 INJECTION, SOLUTION INTRAMUSCULAR; INTRAVENOUS at 23:59

## 2025-06-28 RX ADMIN — ENOXAPARIN SODIUM 30 MG: 100 INJECTION SUBCUTANEOUS at 11:31

## 2025-06-28 RX ADMIN — FENTANYL CITRATE 125 MCG/HR: 0.05 INJECTION, SOLUTION INTRAMUSCULAR; INTRAVENOUS at 23:55

## 2025-06-28 RX ADMIN — MIDAZOLAM HYDROCHLORIDE 2 MG: 1 INJECTION, SOLUTION INTRAMUSCULAR; INTRAVENOUS at 04:25

## 2025-06-28 RX ADMIN — WATER 20 MG: 1 INJECTION INTRAMUSCULAR; INTRAVENOUS; SUBCUTANEOUS at 00:34

## 2025-06-28 RX ADMIN — SODIUM CHLORIDE, PRESERVATIVE FREE 40 MG: 5 INJECTION INTRAVENOUS at 08:45

## 2025-06-28 RX ADMIN — METRONIDAZOLE 500 MG: 500 INJECTION, SOLUTION INTRAVENOUS at 08:52

## 2025-06-28 RX ADMIN — BUDESONIDE 500 MCG: 0.5 INHALANT RESPIRATORY (INHALATION) at 21:30

## 2025-06-28 RX ADMIN — BUDESONIDE 500 MCG: 0.5 INHALANT RESPIRATORY (INHALATION) at 08:00

## 2025-06-28 RX ADMIN — DORNASE ALFA 2.5 MG: 1 SOLUTION RESPIRATORY (INHALATION) at 21:31

## 2025-06-28 RX ADMIN — WATER 20 MG: 1 INJECTION INTRAMUSCULAR; INTRAVENOUS; SUBCUTANEOUS at 14:36

## 2025-06-28 RX ADMIN — FLUCONAZOLE 400 MG: 2 INJECTION, SOLUTION INTRAVENOUS at 08:48

## 2025-06-28 RX ADMIN — MINERAL OIL, PETROLATUM: 425; 568 OINTMENT OPHTHALMIC at 20:54

## 2025-06-28 RX ADMIN — ARFORMOTEROL TARTRATE 15 MCG: 15 SOLUTION RESPIRATORY (INHALATION) at 21:30

## 2025-06-28 RX ADMIN — ENOXAPARIN SODIUM 30 MG: 100 INJECTION SUBCUTANEOUS at 00:54

## 2025-06-28 RX ADMIN — WATER 20 MG: 1 INJECTION INTRAMUSCULAR; INTRAVENOUS; SUBCUTANEOUS at 08:46

## 2025-06-28 RX ADMIN — IPRATROPIUM BROMIDE 0.5 MG: 0.5 SOLUTION RESPIRATORY (INHALATION) at 21:30

## 2025-06-28 RX ADMIN — FUROSEMIDE 20 MG: 10 INJECTION, SOLUTION INTRAMUSCULAR; INTRAVENOUS at 08:45

## 2025-06-28 RX ADMIN — MINERAL OIL, PETROLATUM: 425; 568 OINTMENT OPHTHALMIC at 13:23

## 2025-06-28 RX ADMIN — ACETYLCYSTEINE 400 MG: 100 SOLUTION RESPIRATORY (INHALATION) at 08:00

## 2025-06-28 RX ADMIN — IPRATROPIUM BROMIDE 0.5 MG: 0.5 SOLUTION RESPIRATORY (INHALATION) at 04:15

## 2025-06-28 RX ADMIN — MINERAL OIL, PETROLATUM: 425; 568 OINTMENT OPHTHALMIC at 06:28

## 2025-06-28 RX ADMIN — ACETYLCYSTEINE 400 MG: 100 SOLUTION RESPIRATORY (INHALATION) at 21:30

## 2025-06-28 RX ADMIN — ACETYLCYSTEINE 400 MG: 100 SOLUTION RESPIRATORY (INHALATION) at 14:56

## 2025-06-28 RX ADMIN — MINERAL OIL, PETROLATUM: 425; 568 OINTMENT OPHTHALMIC at 16:11

## 2025-06-28 RX ADMIN — MIDAZOLAM HYDROCHLORIDE 2 MG: 1 INJECTION, SOLUTION INTRAMUSCULAR; INTRAVENOUS at 18:48

## 2025-06-28 RX ADMIN — FENTANYL CITRATE 125 MCG/HR: 0.05 INJECTION, SOLUTION INTRAMUSCULAR; INTRAVENOUS at 15:05

## 2025-06-28 RX ADMIN — FENTANYL CITRATE 75 MCG/HR: 0.05 INJECTION, SOLUTION INTRAMUSCULAR; INTRAVENOUS at 06:19

## 2025-06-28 RX ADMIN — SODIUM CHLORIDE 5 MG/HR: 900 INJECTION, SOLUTION INTRAVENOUS at 19:10

## 2025-06-28 RX ADMIN — ACETYLCYSTEINE 400 MG: 100 SOLUTION RESPIRATORY (INHALATION) at 04:15

## 2025-06-28 RX ADMIN — MINERAL OIL, PETROLATUM: 425; 568 OINTMENT OPHTHALMIC at 00:54

## 2025-06-28 RX ADMIN — SODIUM CHLORIDE, PRESERVATIVE FREE 10 ML: 5 INJECTION INTRAVENOUS at 08:52

## 2025-06-28 RX ADMIN — METRONIDAZOLE 500 MG: 500 INJECTION, SOLUTION INTRAVENOUS at 16:10

## 2025-06-28 RX ADMIN — DEXMEDETOMIDINE 0.4 MCG/KG/HR: 100 INJECTION, SOLUTION INTRAVENOUS at 07:25

## 2025-06-28 RX ADMIN — IPRATROPIUM BROMIDE 0.5 MG: 0.5 SOLUTION RESPIRATORY (INHALATION) at 14:56

## 2025-06-28 RX ADMIN — DORNASE ALFA 2.5 MG: 1 SOLUTION RESPIRATORY (INHALATION) at 08:00

## 2025-06-28 RX ADMIN — SODIUM CHLORIDE 30 ML: 0.9 INJECTION, SOLUTION INTRAVENOUS at 06:20

## 2025-06-28 RX ADMIN — MINERAL OIL, PETROLATUM: 425; 568 OINTMENT OPHTHALMIC at 08:52

## 2025-06-28 RX ADMIN — MIDAZOLAM HYDROCHLORIDE 2 MG: 1 INJECTION, SOLUTION INTRAMUSCULAR; INTRAVENOUS at 14:36

## 2025-06-28 ASSESSMENT — PAIN SCALES - GENERAL
PAINLEVEL_OUTOF10: 0

## 2025-06-28 ASSESSMENT — PULMONARY FUNCTION TESTS
PIF_VALUE: 17
PIF_VALUE: 19
PIF_VALUE: 21
PIF_VALUE: 19
PIF_VALUE: 20

## 2025-06-28 ASSESSMENT — PAIN SCALES - WONG BAKER
WONGBAKER_NUMERICALRESPONSE: NO HURT

## 2025-06-28 NOTE — PROGRESS NOTES
1244- ATTEMPTED TO REACH OUT ON CALL NUCLEAR MED EMPERTARIZ DEAL. BOTH HOME PHONE AND CELL.     1530: EMPERATRIZ DEAL SPOKE DIRECTLY MD RODRIGUEZ REGARDING HIDA SCAN SEE MD NOTE.

## 2025-06-28 NOTE — PROGRESS NOTES
Addendum 3:35 PM  Nuclear tech Adiel called to unit to discuss HIDA scan.  Patient is already on fentanyl 125 mcg/hr and has underlying cirrhosis which would make HIDA scan reliability uncertain and nuclear department recommended to consider other workup. No leukocytosis or fever or signs of acute cholecystitis on exam. Will hold off HIDA scan.  Will order follow-up ultrasound liver and biliary system if LFT doesn't improve in 1-2 days.  ICU aware.    Bharat Law MD

## 2025-06-28 NOTE — PROGRESS NOTES
Hospitalist Progress Note    Patient: Beny Knapp MRN: [id number]  CSN: [id number]    YOB: 1965  Age: 59 y.o.  Sex: male    DOA: 6/14/2025 LOS:  LOS: 14 days          Chief Complain :  Foreign body, shortness of breath.  59 y.o.  male w/ PMH of COPD, ETOH-use and polysubstance abuse who was BIBA and presents with subjective difficulty breathing and swallowing with throat pain. Brought in by EMS for possible forign body (food) with bolus within region of velecula. ENT saw pt and ED intubated for airway protection. ENT was consulted and performed scope on pt with findings of extensive esophageal candidiasis. Nephrology was consulted for severe hyponatremia with sodium of 117 recommending NS at 75cc/hr. Intensivist was contacted as well d/t pt being intubated, on vent for airway protection.   Subjective:   Patient orally intubated and sedated.  His oxygen requirements are fluctuating.  It had improved and FiO2 was decreased however his FiO2 is now requiring to be increased now at 90%.  Trying to keep oxygen saturations between 90 to 95%.    Assessment/Plan     Active Hospital Problems    Diagnosis     Aspiration pneumonia (HCC) [J69.0]     Cocaine abuse (HCC) [F14.10]     Hyponatremia [E87.1]     Stridor [R06.1]     Obstructed, larynx [J38.6]     Acute respiratory failure with hypoxia and hypercapnia (HCC) [J96.01, J96.02]     ETOH abuse [F10.10]     Polysubstance abuse (HCC) [F19.10]     Candidiasis of esophagus (HCC) [B37.81]     Tobacco abuse [Z72.0]     HTN (hypertension) [I10]     COPD (chronic obstructive pulmonary disease) (HCC) [J44.9]     Chronic hepatitis C (HCC) [B18.2]       6/23  Imrpoved ventilation on

## 2025-06-28 NOTE — PROGRESS NOTES
Call receive from Dr Bharat koenig, chart reviewed    Cefepime duration X7 days, can DC  Agree with holding fluconazole-- received X14 days emperic rx. LFT is going up - no additional high temp/leucocytosis noted. Work up to eval cholecystitis with HIDA pending.   Patient has received 14 days of antifungal  so far anyway    Thanks    Renee Stanford MD  [facility])  Office: [phone] -Option #8  Office fax:  [phone]

## 2025-06-28 NOTE — PLAN OF CARE
Problem: Safety - Medical Restraint  Goal: Remains free of injury from restraints (Restraint for Interference with Medical Device)  Description: INTERVENTIONS:  1. Determine that other, less restrictive measures have been tried or would not be effective before applying the restraint  2. Evaluate the patient's condition at the time of restraint application  3. Inform patient/family regarding the reason for restraint  4. Q2H: Monitor safety, psychosocial status, comfort, nutrition and hydration  6/27/2025 2034 by Ines Dewitt, RN  Outcome: Progressing  Flowsheets (Taken 6/27/2025 1943 by Ashley Martinez, RN)  Remains free of injury from restraints (restraint for interference with medical device): Determine that other, less restrictive measures have been tried or would not be effective before applying the restraint  6/27/2025 1047 by Carolann Figueroa RN  Outcome: Progressing  Flowsheets  Taken 6/27/2025 0800 by Carolann Figueroa RN  Remains free of injury from restraints (restraint for interference with medical device):   Determine that other, less restrictive measures have been tried or would not be effective before applying the restraint   Evaluate the patient's condition at the time of restraint application   Inform patient/family regarding the reason for restraint   Every 2 hours: Monitor safety, psychosocial status, comfort, nutrition and hydration  Taken 6/26/2025 2316 by Abhinav Feliciano RN  Remains free of injury from restraints (restraint for interference with medical device):   Determine that other, less restrictive measures have been tried or would not be effective before applying the restraint   Evaluate the patient's condition at the time of restraint application   Every 2 hours: Monitor safety, psychosocial status, comfort, nutrition and hydration   Inform patient/family regarding the reason for restraint     Problem: Discharge Planning  Goal: Discharge to home or other facility with appropriate

## 2025-06-28 NOTE — H&P
Otolaryngology head neck surgery  Hospital day 14     events reviewed  Have discussed situation with intensivist  Patient remains on ventilator however significant improvement is noted patient remains on 45% FiO2 PEEP 8  At this point patient has been on ventilator for 14 days.  Still on FiO2 45% however improving from last week.  Patient still has upper airway obstruction which was the etiology for his initial intubation  Uncertain of etiology.  Consideration may be given for tracheotomy at this point.  Patient has certainly met the threshold from a respiratory standpoint and this coupled with his unknown etiology for upper airway obstruction 14 days prior may indicate need for caution.  May be prudent to simply place tracheotomy and continue weaning process and to reevaluate airway.  Unable to visualize patient's airway as long as ET tube is in place  We will however defer this decision to pulmonology  If tracheotomy desired, please inform  Will continue to follow from a distance

## 2025-06-28 NOTE — PROGRESS NOTES
0846    dexmedeTOMIDine (PRECEDEX) 400 mcg in sodium chloride 0.9 % 100 mL infusion  0.1-1.5 mcg/kg/hr IntraVENous Continuous Jocelyne Richmond MD 5.5 mL/hr at 06/28/25 1020 0.2 mcg/kg/hr at 06/28/25 1020    fentaNYL (SUBLIMAZE) 1,000 mcg in sodium chloride 0.9% 100 mL infusion   mcg/hr IntraVENous Continuous Jocelyne Richmond MD 10 mL/hr at 06/28/25 0827 100 mcg/hr at 06/28/25 0827    [Held by provider] senna (SENOKOT) tablet 8.6 mg  1 tablet Oral BID Jocelyne Richmond MD   8.6 mg at 06/26/25 0826    [Held by provider] polyethylene glycol (GLYCOLAX) packet 17 g  17 g Oral Daily Jocelyne Richmond MD   17 g at 06/26/25 0826    sodium chloride flush 0.9 % injection 5-40 mL  5-40 mL IntraVENous 2 times per day Macho Tan, DO   10 mL at 06/28/25 0852    sodium chloride flush 0.9 % injection 5-40 mL  5-40 mL IntraVENous PRN Macho Tan DO        0.9 % sodium chloride infusion   IntraVENous PRN Macho Tan DO 30 mL/hr at 06/28/25 0620 30 mL at 06/28/25 0620    potassium chloride 20 mEq/50 mL IVPB (Central Line)  20 mEq IntraVENous PRN Macho Tan DO 50 mL/hr at 06/14/25 1121 20 mEq at 06/14/25 1121    Or    potassium chloride 10 mEq/100 mL IVPB (Peripheral Line)  10 mEq IntraVENous PRN Macho Tan DO        magnesium sulfate 2000 mg in 50 mL IVPB premix  2,000 mg IntraVENous PRN Macho Tan DO        enoxaparin Sodium (LOVENOX) injection 30 mg  30 mg SubCUTAneous BID Macho Tan,    30 mg at 06/28/25 0054    polyethylene glycol (GLYCOLAX) packet 17 g  17 g Oral Daily PRN Macho Tan, DO   17 g at 06/23/25 1318    acetaminophen (TYLENOL) tablet 650 mg  650 mg Oral Q6H PRN Macho Tan DO   650 mg at 06/23/25 0152    Or    acetaminophen (TYLENOL) suppository 650 mg  650 mg Rectal Q6H PRN Macho Tan,         sodium phosphate 15 mmol in sodium chloride 0.9 % 250 mL IVPB  15 mmol IntraVENous PRN Macho Tan, DO        bisacodyl  and gas are present in the colon. No pathologic calcification. Osseous structures are age appropriate.     No acute pathology Electronically signed by Eliseo Espinosa    XR CHEST PORTABLE  Result Date: 6/24/2025  PORTABLE CHEST RADIOGRAPH/S: 6/24/2025 6:11 AM INDICATION: Acute hypoxic respiratory failure, basilar infiltrates, ET tube position COMPARISON: 6/23/2025. TECHNIQUE: Portable frontal semiupright radiograph/s of the chest. FINDINGS: The lungs are clear. The radiograph is rotated to the left, obscuring the central airways. An NG tube extends below the diaphragm.     Clear lungs. Electronically signed by Julio Alegria    XR CHEST PORTABLE  Result Date: 6/23/2025  INDICATION: Ventilated patient COMPARISON: 6/22/2025 FINDINGS: Single AP portable view of the chest demonstrates no change in position of the lines and tubes. The cardiomediastinal silhouette is unchanged. Improved depth of inspiration with mild left basilar atelectasis and small pleural effusion. No pulmonary edema or pneumothorax.     Improved depth of inspiration with mild left basilar atelectasis and small pleural effusion. Electronically signed by Dami Solo    XR CHEST PORTABLE  Result Date: 6/22/2025  EXAM: XR CHEST PORTABLE INDICATION: et tube COMPARISON: 640 FINDINGS: A portable AP radiograph of the chest was obtained at 536 hours. Cardiac monitoring leads. Tracheostomy 6.3 cm of the justine.. Improved aeration in the left lung base.. Small left effusion. Bibasilar atelectasis...  The bones and soft tissues are grossly within normal limits.     Improved aeration in the left lung base with persistent small left effusion and bibasilar atelectasis. Electronically signed by Eliseo Espinosa    XR CHEST PORTABLE  Result Date: 6/21/2025  EXAM:  XR CHEST PORTABLE INDICATION: et tube COMPARISON: Chest radiograph 6/20/2025, CTA chest 6/20/2025 TECHNIQUE: Upright portable chest AP view FINDINGS: Stable support apparatus. Cardiomediastinal silhouette

## 2025-06-28 NOTE — PLAN OF CARE
Problem: Safety - Medical Restraint  Goal: Remains free of injury from restraints (Restraint for Interference with Medical Device)  Description: INTERVENTIONS:  1. Determine that other, less restrictive measures have been tried or would not be effective before applying the restraint  2. Evaluate the patient's condition at the time of restraint application  3. Inform patient/family regarding the reason for restraint  4. Q2H: Monitor safety, psychosocial status, comfort, nutrition and hydration  Outcome: Progressing  Flowsheets  Taken 6/28/2025 0600 by Ines Dewitt RN  Remains free of injury from restraints (restraint for interference with medical device): Determine that other, less restrictive measures have been tried or would not be effective before applying the restraint  Taken 6/28/2025 0400 by Ines Dewitt RN  Remains free of injury from restraints (restraint for interference with medical device): Determine that other, less restrictive measures have been tried or would not be effective before applying the restraint  Taken 6/28/2025 0200 by Ines Dewitt RN  Remains free of injury from restraints (restraint for interference with medical device):   Determine that other, less restrictive measures have been tried or would not be effective before applying the restraint   Evaluate the patient's condition at the time of restraint application   Every 2 hours: Monitor safety, psychosocial status, comfort, nutrition and hydration   Inform patient/family regarding the reason for restraint  Taken 6/28/2025 0024 by Ines Dewitt RN  Remains free of injury from restraints (restraint for interference with medical device):   Determine that other, less restrictive measures have been tried or would not be effective before applying the restraint   Evaluate the patient's condition at the time of restraint application   Inform patient/family regarding the reason for restraint   Every 2 hours: Monitor safety, psychosocial status,

## 2025-06-29 ENCOUNTER — APPOINTMENT (OUTPATIENT)
Facility: HOSPITAL | Age: 60
DRG: 368 | End: 2025-06-29
Payer: MEDICARE

## 2025-06-29 LAB
ALBUMIN SERPL-MCNC: 2.6 G/DL (ref 3.4–5)
ALBUMIN/GLOB SERPL: 0.8 (ref 0.8–1.7)
ALP SERPL-CCNC: 57 U/L (ref 45–117)
ALT SERPL-CCNC: 385 U/L (ref 10–50)
ANION GAP SERPL CALC-SCNC: 9 MMOL/L (ref 3–18)
AST SERPL-CCNC: 143 U/L (ref 10–38)
BILIRUB DIRECT SERPL-MCNC: 1.1 MG/DL (ref 0–0.2)
BILIRUB SERPL-MCNC: 1.8 MG/DL (ref 0.2–1)
BUN SERPL-MCNC: 30 MG/DL (ref 6–23)
BUN/CREAT SERPL: 75 (ref 12–20)
CALCIUM SERPL-MCNC: 8.7 MG/DL (ref 8.5–10.1)
CHLORIDE SERPL-SCNC: 100 MMOL/L (ref 98–107)
CO2 SERPL-SCNC: 27 MMOL/L (ref 21–32)
CREAT SERPL-MCNC: 0.4 MG/DL (ref 0.6–1.3)
ERYTHROCYTE [DISTWIDTH] IN BLOOD BY AUTOMATED COUNT: 13.6 % (ref 11.6–14.5)
GLOBULIN SER CALC-MCNC: 3.1 G/DL (ref 2–4)
GLUCOSE BLD STRIP.AUTO-MCNC: 100 MG/DL (ref 70–110)
GLUCOSE BLD STRIP.AUTO-MCNC: 108 MG/DL (ref 70–110)
GLUCOSE BLD STRIP.AUTO-MCNC: 114 MG/DL (ref 70–110)
GLUCOSE BLD STRIP.AUTO-MCNC: 120 MG/DL (ref 70–110)
GLUCOSE BLD STRIP.AUTO-MCNC: 98 MG/DL (ref 70–110)
GLUCOSE SERPL-MCNC: 105 MG/DL (ref 74–108)
HCT VFR BLD AUTO: 33.9 % (ref 36–48)
HGB BLD-MCNC: 11.3 G/DL (ref 13–16)
INR PPP: 1.4 (ref 0.9–1.1)
MCH RBC QN AUTO: 29.1 PG (ref 24–34)
MCHC RBC AUTO-ENTMCNC: 33.3 G/DL (ref 31–37)
MCV RBC AUTO: 87.4 FL (ref 78–100)
NRBC # BLD: 0 K/UL (ref 0–0.01)
NRBC BLD-RTO: 0 PER 100 WBC
PLATELET # BLD AUTO: 198 K/UL (ref 135–420)
PMV BLD AUTO: 10 FL (ref 9.2–11.8)
POTASSIUM SERPL-SCNC: 3.8 MMOL/L (ref 3.5–5.5)
PROT SERPL-MCNC: 5.7 G/DL (ref 6.4–8.2)
PROTHROMBIN TIME: 17 SEC (ref 11.9–14.9)
RBC # BLD AUTO: 3.88 M/UL (ref 4.35–5.65)
SODIUM SERPL-SCNC: 136 MMOL/L (ref 136–145)
WBC # BLD AUTO: 10.7 K/UL (ref 4.6–13.2)

## 2025-06-29 PROCEDURE — 6360000002 HC RX W HCPCS: Performed by: INTERNAL MEDICINE

## 2025-06-29 PROCEDURE — 2700000000 HC OXYGEN THERAPY PER DAY

## 2025-06-29 PROCEDURE — 94003 VENT MGMT INPAT SUBQ DAY: CPT

## 2025-06-29 PROCEDURE — 2500000003 HC RX 250 WO HCPCS: Performed by: INTERNAL MEDICINE

## 2025-06-29 PROCEDURE — 94640 AIRWAY INHALATION TREATMENT: CPT

## 2025-06-29 PROCEDURE — 2580000003 HC RX 258: Performed by: INTERNAL MEDICINE

## 2025-06-29 PROCEDURE — 82962 GLUCOSE BLOOD TEST: CPT

## 2025-06-29 PROCEDURE — 2000000000 HC ICU R&B

## 2025-06-29 PROCEDURE — 85610 PROTHROMBIN TIME: CPT

## 2025-06-29 PROCEDURE — 94669 MECHANICAL CHEST WALL OSCILL: CPT

## 2025-06-29 PROCEDURE — 80076 HEPATIC FUNCTION PANEL: CPT

## 2025-06-29 PROCEDURE — 85027 COMPLETE CBC AUTOMATED: CPT

## 2025-06-29 PROCEDURE — 71045 X-RAY EXAM CHEST 1 VIEW: CPT

## 2025-06-29 PROCEDURE — 80048 BASIC METABOLIC PNL TOTAL CA: CPT

## 2025-06-29 RX ORDER — BISACODYL 10 MG
10 SUPPOSITORY, RECTAL RECTAL DAILY PRN
Status: DISCONTINUED | OUTPATIENT
Start: 2025-06-29 | End: 2025-07-16

## 2025-06-29 RX ADMIN — ACETYLCYSTEINE 400 MG: 100 SOLUTION RESPIRATORY (INHALATION) at 14:27

## 2025-06-29 RX ADMIN — MINERAL OIL, PETROLATUM: 425; 568 OINTMENT OPHTHALMIC at 06:15

## 2025-06-29 RX ADMIN — ARFORMOTEROL TARTRATE 15 MCG: 15 SOLUTION RESPIRATORY (INHALATION) at 08:09

## 2025-06-29 RX ADMIN — DEXMEDETOMIDINE 0.4 MCG/KG/HR: 100 INJECTION, SOLUTION INTRAVENOUS at 17:45

## 2025-06-29 RX ADMIN — IPRATROPIUM BROMIDE 0.5 MG: 0.5 SOLUTION RESPIRATORY (INHALATION) at 14:27

## 2025-06-29 RX ADMIN — IPRATROPIUM BROMIDE 0.5 MG: 0.5 SOLUTION RESPIRATORY (INHALATION) at 19:24

## 2025-06-29 RX ADMIN — BUDESONIDE 500 MCG: 0.5 INHALANT RESPIRATORY (INHALATION) at 08:09

## 2025-06-29 RX ADMIN — MINERAL OIL, PETROLATUM: 425; 568 OINTMENT OPHTHALMIC at 22:42

## 2025-06-29 RX ADMIN — MINERAL OIL, PETROLATUM: 425; 568 OINTMENT OPHTHALMIC at 13:06

## 2025-06-29 RX ADMIN — METRONIDAZOLE 500 MG: 500 INJECTION, SOLUTION INTRAVENOUS at 09:16

## 2025-06-29 RX ADMIN — DORNASE ALFA 2.5 MG: 1 SOLUTION RESPIRATORY (INHALATION) at 19:24

## 2025-06-29 RX ADMIN — FENTANYL CITRATE 125 MCG/HR: 0.05 INJECTION, SOLUTION INTRAMUSCULAR; INTRAVENOUS at 15:49

## 2025-06-29 RX ADMIN — IPRATROPIUM BROMIDE 0.5 MG: 0.5 SOLUTION RESPIRATORY (INHALATION) at 08:09

## 2025-06-29 RX ADMIN — SODIUM CHLORIDE, PRESERVATIVE FREE 40 MG: 5 INJECTION INTRAVENOUS at 09:01

## 2025-06-29 RX ADMIN — METRONIDAZOLE 500 MG: 500 INJECTION, SOLUTION INTRAVENOUS at 00:55

## 2025-06-29 RX ADMIN — MINERAL OIL, PETROLATUM: 425; 568 OINTMENT OPHTHALMIC at 01:34

## 2025-06-29 RX ADMIN — IPRATROPIUM BROMIDE 0.5 MG: 0.5 SOLUTION RESPIRATORY (INHALATION) at 01:13

## 2025-06-29 RX ADMIN — DORNASE ALFA 2.5 MG: 1 SOLUTION RESPIRATORY (INHALATION) at 08:09

## 2025-06-29 RX ADMIN — WATER 20 MG: 1 INJECTION INTRAMUSCULAR; INTRAVENOUS; SUBCUTANEOUS at 23:02

## 2025-06-29 RX ADMIN — ENOXAPARIN SODIUM 30 MG: 100 INJECTION SUBCUTANEOUS at 00:55

## 2025-06-29 RX ADMIN — SODIUM CHLORIDE 6 MG/HR: 900 INJECTION, SOLUTION INTRAVENOUS at 14:27

## 2025-06-29 RX ADMIN — MINERAL OIL, PETROLATUM: 425; 568 OINTMENT OPHTHALMIC at 16:53

## 2025-06-29 RX ADMIN — SODIUM CHLORIDE, PRESERVATIVE FREE 10 ML: 5 INJECTION INTRAVENOUS at 09:20

## 2025-06-29 RX ADMIN — ACETYLCYSTEINE 400 MG: 100 SOLUTION RESPIRATORY (INHALATION) at 19:24

## 2025-06-29 RX ADMIN — DEXMEDETOMIDINE 0.4 MCG/KG/HR: 100 INJECTION, SOLUTION INTRAVENOUS at 02:40

## 2025-06-29 RX ADMIN — THIAMINE HYDROCHLORIDE 100 MG: 100 INJECTION, SOLUTION INTRAMUSCULAR; INTRAVENOUS at 09:01

## 2025-06-29 RX ADMIN — FUROSEMIDE 20 MG: 10 INJECTION, SOLUTION INTRAMUSCULAR; INTRAVENOUS at 09:01

## 2025-06-29 RX ADMIN — MIDAZOLAM HYDROCHLORIDE 2 MG: 1 INJECTION, SOLUTION INTRAMUSCULAR; INTRAVENOUS at 14:21

## 2025-06-29 RX ADMIN — FOLIC ACID 1 MG: 5 INJECTION, SOLUTION INTRAMUSCULAR; INTRAVENOUS; SUBCUTANEOUS at 09:21

## 2025-06-29 RX ADMIN — ARFORMOTEROL TARTRATE 15 MCG: 15 SOLUTION RESPIRATORY (INHALATION) at 19:24

## 2025-06-29 RX ADMIN — ACETYLCYSTEINE 400 MG: 100 SOLUTION RESPIRATORY (INHALATION) at 01:13

## 2025-06-29 RX ADMIN — WATER 20 MG: 1 INJECTION INTRAMUSCULAR; INTRAVENOUS; SUBCUTANEOUS at 15:08

## 2025-06-29 RX ADMIN — WATER 20 MG: 1 INJECTION INTRAMUSCULAR; INTRAVENOUS; SUBCUTANEOUS at 00:55

## 2025-06-29 RX ADMIN — MINERAL OIL, PETROLATUM: 425; 568 OINTMENT OPHTHALMIC at 09:16

## 2025-06-29 RX ADMIN — METRONIDAZOLE 500 MG: 500 INJECTION, SOLUTION INTRAVENOUS at 16:53

## 2025-06-29 RX ADMIN — ACETYLCYSTEINE 400 MG: 100 SOLUTION RESPIRATORY (INHALATION) at 08:09

## 2025-06-29 RX ADMIN — SODIUM CHLORIDE, PRESERVATIVE FREE 10 ML: 5 INJECTION INTRAVENOUS at 22:42

## 2025-06-29 RX ADMIN — ENOXAPARIN SODIUM 30 MG: 100 INJECTION SUBCUTANEOUS at 13:06

## 2025-06-29 RX ADMIN — WATER 20 MG: 1 INJECTION INTRAMUSCULAR; INTRAVENOUS; SUBCUTANEOUS at 06:15

## 2025-06-29 RX ADMIN — BUDESONIDE 500 MCG: 0.5 INHALANT RESPIRATORY (INHALATION) at 19:24

## 2025-06-29 RX ADMIN — FENTANYL CITRATE 125 MCG/HR: 0.05 INJECTION, SOLUTION INTRAMUSCULAR; INTRAVENOUS at 07:59

## 2025-06-29 ASSESSMENT — PULMONARY FUNCTION TESTS
PIF_VALUE: 20
PIF_VALUE: 19
PIF_VALUE: 20
PIF_VALUE: 19
PIF_VALUE: 18
PIF_VALUE: 20
PIF_VALUE: 19

## 2025-06-29 ASSESSMENT — PAIN SCALES - GENERAL: PAINLEVEL_OUTOF10: 0

## 2025-06-29 NOTE — PLAN OF CARE
Problem: Safety - Medical Restraint  Goal: Remains free of injury from restraints (Restraint for Interference with Medical Device)  Description: INTERVENTIONS:  1. Determine that other, less restrictive measures have been tried or would not be effective before applying the restraint  2. Evaluate the patient's condition at the time of restraint application  3. Inform patient/family regarding the reason for restraint  4. Q2H: Monitor safety, psychosocial status, comfort, nutrition and hydration  Outcome: Progressing  Flowsheets (Taken 6/28/2025 2000 by Rachel Perez RN)  Remains free of injury from restraints (restraint for interference with medical device):   Determine that other, less restrictive measures have been tried or would not be effective before applying the restraint   Evaluate the patient's condition at the time of restraint application   Every 2 hours: Monitor safety, psychosocial status, comfort, nutrition and hydration     Problem: Discharge Planning  Goal: Discharge to home or other facility with appropriate resources  Outcome: Progressing  Flowsheets (Taken 6/29/2025 0800)  Discharge to home or other facility with appropriate resources:   Identify barriers to discharge with patient and caregiver   Arrange for needed discharge resources and transportation as appropriate   Identify discharge learning needs (meds, wound care, etc)     Problem: Pain  Goal: Verbalizes/displays adequate comfort level or baseline comfort level  Outcome: Progressing  Flowsheets (Taken 6/29/2025 0800)  Verbalizes/displays adequate comfort level or baseline comfort level:   Encourage patient to monitor pain and request assistance   Assess pain using appropriate pain scale   Administer analgesics based on type and severity of pain and evaluate response   Consider cultural and social influences on pain and pain management     Problem: Chronic Conditions and Co-morbidities  Goal: Patient's chronic conditions and

## 2025-06-29 NOTE — PROGRESS NOTES
[facility]  Pulmonary, Critical Care, and Sleep Medicine    Name: Beny Knapp MRN: [id number]   : 1965 Hospital: [facility]    Date: 2025  Room: [address] [place] [address] Note                                              Consult requesting physician: Dr. Mark   Reason for Consult: Respiratory failure, hyponatremia, upper airway    IMPRESSION:     Acute respiratory failure with hypoxemia and hypercarbia   J96.01, J96.02  Stridor  Obstructed larynx  Polysubstance abuse   Candidiasis of esophagus   Aspiration pneumonia   COPD/emphysema  EtOH use  Cocaine abuse  Hepatitis C  Cirrhosis of liver  Hyponatremia, resolved      Active Hospital Problems    Diagnosis Date Noted    Aspiration pneumonia (HCC) [J69.0] 2025    Cocaine abuse (HCC) [F14.10] 2025    Hyponatremia [E87.1] 2025    Stridor [R06.1] 2025    Obstructed, larynx [J38.6] 2025    Acute respiratory failure with hypoxia and hypercapnia (HCC) [J96.01, J96.02] 2025    ETOH abuse [F10.10] 2025    Polysubstance abuse (HCC) [F19.10] 2025    Candidiasis of esophagus (HCC) [B37.81] 2025    Tobacco abuse [Z72.0] 2015    HTN (hypertension) [I10] 2015    COPD (chronic obstructive pulmonary disease) (HCC) [J44.9] 2015    Chronic hepatitis C (HCC) [B18.2] 2015        Code status: Full Code       RECOMMENDATIONS:     Respiratory: 59-year-old male with COPD, smoker presents with acute upper airway distress, fullness in the laryngeal area, questionable foreign body. History of aspirations with feeding and bolus feeling on presentation in ER this admission. Patient was intubated 25 for airway protection and hypercarbic and hypoxemic respiratory failure, with ENT at bedside.  Prior to intubation, fiberoptic nasolaryngoscopy endoscopy by ENT did not show any foreign body, but swollen airway/larynx and severe candidiasis obstructing larynx.  CT

## 2025-06-29 NOTE — PROGRESS NOTES
[facility]  (A Division of [facility])  Renee Stanford MD   Office Tel:  [phone] Option #8                                                               Date of Admission: 6/14/2025       ID FU for antimicrobial management suspected esophageal candidiasis   PCP: Gaby Monroe MD    C/C: stridor/SOB    Current Antimicrobials:    Prior Antimicrobials:      Metronidazoli 500 mg IV Q8 hours-6/27 to date   Zithro 6/16 to 20  CAX 6/16 to 20    Cefepime 6/20 to date # 7 days  Fluconazole 6/14-> Micafungin  150 mg daily   #14     Allergy to antibiotics- NA     Assessment:     Critically ill patient with:    Acute resp failure/ARDS- requiring high oxygen support-- Improving vent support      Underlying COPD      CXR infiltrate clear 6/24-- vent setting improving with abx treatment. Concern for PCP low at this time.     Infection with Serratia/Klebsiella -resp cultures- 6/16 - treated X7 dats    Esophagitis- presumed candidal--Underlying immunodeficiency?  Fungitell < 31, galactomannan normal( 0.03)= 6/14--makes systemci fungemia/PCP less likley-> repeat testing pending  Stridor/ candidal esophagitis/intubated- [facility]- new info from his Primary MD    Colitis Vs Ileus- on CT scan 6/26-Non distended abd    Transaminitis- recurrning, inclining up-- etiology unclear. Suspected drugs( antifungal?) Vs GB issues. Unable to do HIDA scan. No associated high fever or leucocytosis- monitoring    6/14 - blood cutlure X2: NGSF, resp culture- mixed GNR- normal resp kenia        -UA no pyuria, urine cx-Neg        -X-ray-bibasilar scarring/atelectasis, neck x-ray- no acute pathology        -CT CAP: Bibasilar atelectasis/pneumonia left greater than right.  Bullous emphysematous change.  Procal 0.03    6/16- KOH from mouth- no yeast, culture in progress         - respiratory culture- Klebsiella and Serratia growth--SS to cefepime, fungal culture- Candida

## 2025-06-29 NOTE — PROGRESS NOTES
Hospitalist Progress Note    Patient: Beny Knapp MRN: [id number]  CSN: [id number]    YOB: 1965  Age: 59 y.o.  Sex: male    DOA: 6/14/2025 LOS:  LOS: 15 days          Chief Complain :  Foreign body, shortness of breath.  59 y.o.  male w/ PMH of COPD, ETOH-use and polysubstance abuse who was BIBA and presents with subjective difficulty breathing and swallowing with throat pain. Brought in by EMS for possible forign body (food) with bolus within region of velecula. ENT saw pt and ED intubated for airway protection. ENT was consulted and performed scope on pt with findings of extensive esophageal candidiasis. Nephrology was consulted for severe hyponatremia with sodium of 117 recommending NS at 75cc/hr. Intensivist was contacted as well d/t pt being intubated, on vent for airway protection.   Subjective:   Patient orally intubated and sedated.  His oxygen requirements are fluctuating.  It had improved and FiO2 was decreased however his FiO2 is now requiring to be increased now at 90%.  Trying to keep oxygen saturations between 90 to 95%.    Assessment/Plan     Active Hospital Problems    Diagnosis     Aspiration pneumonia (HCC) [J69.0]     Cocaine abuse (HCC) [F14.10]     Hyponatremia [E87.1]     Stridor [R06.1]     Obstructed, larynx [J38.6]     Acute respiratory failure with hypoxia and hypercapnia (HCC) [J96.01, J96.02]     ETOH abuse [F10.10]     Polysubstance abuse (HCC) [F19.10]     Candidiasis of esophagus (HCC) [B37.81]     Tobacco abuse [Z72.0]     HTN (hypertension) [I10]     COPD (chronic obstructive pulmonary disease) (HCC) [J44.9]     Chronic hepatitis C (HCC) [B18.2]       6/23  Imrpoved ventilation on  days        Physical Exam: As above and,  General: As above    HEENT: NC, Atraumatic.  PERRLA, anicteric sclerae.  Lungs: CTA Bilaterally. No Wheezing/Rhonchi/Rales.  Heart:  S1 S2,  No murmur, No Rubs, No Gallops  Abdomen: Soft, Non distended, Non tender.  +Bowel sounds,   Extremities: No c/c/e         Vital signs/Intake and Output:  Visit Vitals  /64   Pulse (!) 47   Temp 97.6 °F (36.4 °C) (Axillary)   Resp 14   Ht 1.829 m (6' 0.01\")   Wt 101.1 kg (222 lb 14.2 oz)   SpO2 95%   BMI 30.22 kg/m²     Current Shift:  No intake/output data recorded.  Last three shifts:  06/27 1901 - 06/29 0700  In: 1143.1 [I.V.:537.5]  Out: 4033 [Urine:3683]            Labs: Results:       Chemistry Recent Labs     06/27/25 0253 06/27/25 0254 06/28/25  0336 06/29/25  0620   NA  --  140 136 136   K  --  4.5 4.0 3.8   CL  --  103 102 100   CO2  --  28 25 27   BUN  --  36* 34* 30*   GLOB 3.1  --  3.2 3.1   ,No results found for: \"LACTA\"   CBC w/Diff Recent Labs     06/27/25  0254 06/28/25  0336 06/29/25  0620   WBC 7.9 9.7 10.7   RBC 4.05* 3.94* 3.88*   HGB 11.9* 11.5* 11.3*   HCT 36.0 34.5* 33.9*    196 198      Cardiac Enzymes Lab Results   Component Value Date    TROPHS 6 06/29/2023   ,No results found for: \"BNP\"   Coagulation Recent Labs     06/29/25  0620   INR 1.4*         Lipid Panel No results found for: \"CHOL\", \"CHOLPOCT\", \"CHLST\", \"CHOLV\", \"074106\", \"HDL\", \"HDLC\", \"LDL\", \"LDLC\", \"VLDLC\", \"VLDL\"   Pancreas No results for input(s): \"LIPASE\" in the last 72 hours.,No results for input(s): \"AMYLASE\" in the last 72 hours.   Liver Enzymes No results for input(s): \"TP\" in the last 72 hours.    Invalid input(s): \"ALB\", \"TBIL\", \"AP\", \"SGOT\", \"GPT\", \"DBIL\"   Thyroid Studies No results found for: \"T4\", \"T3RU\", \"TSH\"     Procedures/imaging: see electronic medical records for all procedures/Xrays and details which were not copied into this note but were reviewed prior to creation of Plan    TIME: E/M Time spent with patient and

## 2025-06-30 ENCOUNTER — APPOINTMENT (OUTPATIENT)
Facility: HOSPITAL | Age: 60
DRG: 368 | End: 2025-06-30
Payer: MEDICARE

## 2025-06-30 ENCOUNTER — HOSPITAL ENCOUNTER (INPATIENT)
Facility: HOSPITAL | Age: 60
DRG: 368 | End: 2025-06-30
Payer: MEDICARE

## 2025-06-30 PROBLEM — E44.0 MODERATE MALNUTRITION: Status: ACTIVE | Noted: 2025-06-30

## 2025-06-30 LAB
ALBUMIN SERPL-MCNC: 2.6 G/DL (ref 3.4–5)
ALBUMIN/GLOB SERPL: 1 (ref 0.8–1.7)
ALP SERPL-CCNC: 56 U/L (ref 45–117)
ALT SERPL-CCNC: 454 U/L (ref 10–50)
ANION GAP SERPL CALC-SCNC: 9 MMOL/L (ref 3–18)
AST SERPL-CCNC: 139 U/L (ref 10–38)
BASE EXCESS BLD CALC-SCNC: 3.9 MMOL/L
BDY SITE: ABNORMAL
BILIRUB DIRECT SERPL-MCNC: 0.9 MG/DL (ref 0–0.2)
BILIRUB SERPL-MCNC: 1.5 MG/DL (ref 0.2–1)
BUN SERPL-MCNC: 26 MG/DL (ref 6–23)
BUN/CREAT SERPL: 70 (ref 12–20)
CALCIUM SERPL-MCNC: 8.8 MG/DL (ref 8.5–10.1)
CHLORIDE SERPL-SCNC: 98 MMOL/L (ref 98–107)
CO2 SERPL-SCNC: 27 MMOL/L (ref 21–32)
CREAT SERPL-MCNC: 0.37 MG/DL (ref 0.6–1.3)
ERYTHROCYTE [DISTWIDTH] IN BLOOD BY AUTOMATED COUNT: 13.5 % (ref 11.6–14.5)
FUNGITELL INTERPRETATION: NORMAL
FUNGITELL: <31.25 PG/ML
GAS FLOW.O2 O2 DELIVERY SYS: ABNORMAL
GAS FLOW.O2 SETTING OXYMISER: 14 BPM
GLOBULIN SER CALC-MCNC: 2.7 G/DL (ref 2–4)
GLUCOSE BLD STRIP.AUTO-MCNC: 104 MG/DL (ref 70–110)
GLUCOSE BLD STRIP.AUTO-MCNC: 110 MG/DL (ref 70–110)
GLUCOSE BLD STRIP.AUTO-MCNC: 120 MG/DL (ref 70–110)
GLUCOSE SERPL-MCNC: 107 MG/DL (ref 74–108)
HCO3 BLD-SCNC: 27.9 MMOL/L (ref 21–28)
HCT VFR BLD AUTO: 32.7 % (ref 36–48)
HGB BLD-MCNC: 11 G/DL (ref 13–16)
INR PPP: 1.4 (ref 0.9–1.1)
Lab: NORMAL
MCH RBC QN AUTO: 29.4 PG (ref 24–34)
MCHC RBC AUTO-ENTMCNC: 33.6 G/DL (ref 31–37)
MCV RBC AUTO: 87.4 FL (ref 78–100)
NRBC # BLD: 0 K/UL (ref 0–0.01)
NRBC BLD-RTO: 0 PER 100 WBC
O2/TOTAL GAS SETTING VFR VENT: 40 %
PCO2 BLD: 38.5 MMHG (ref 35–48)
PEEP RESPIRATORY: 8 CMH2O
PH BLD: 7.47 (ref 7.35–7.45)
PLATELET # BLD AUTO: 196 K/UL (ref 135–420)
PMV BLD AUTO: 10.1 FL (ref 9.2–11.8)
PO2 BLD: 68 MMHG (ref 83–108)
POTASSIUM SERPL-SCNC: 4 MMOL/L (ref 3.5–5.5)
PROT SERPL-MCNC: 5.3 G/DL (ref 6.4–8.2)
PROTHROMBIN TIME: 16.9 SEC (ref 11.9–14.9)
RBC # BLD AUTO: 3.74 M/UL (ref 4.35–5.65)
REFERENCE VALUE: NORMAL
RESULT: NEGATIVE
SAO2 % BLD: 94.5 % (ref 92–97)
SERVICE CMNT-IMP: ABNORMAL
SODIUM SERPL-SCNC: 133 MMOL/L (ref 136–145)
SPECIMEN TYPE: ABNORMAL
VENTILATION MODE VENT: ABNORMAL
VT SETTING VENT: 520 ML
WBC # BLD AUTO: 6.2 K/UL (ref 4.6–13.2)

## 2025-06-30 PROCEDURE — 36600 WITHDRAWAL OF ARTERIAL BLOOD: CPT

## 2025-06-30 PROCEDURE — 2500000003 HC RX 250 WO HCPCS: Performed by: INTERNAL MEDICINE

## 2025-06-30 PROCEDURE — 6360000002 HC RX W HCPCS: Performed by: INTERNAL MEDICINE

## 2025-06-30 PROCEDURE — 2580000003 HC RX 258: Performed by: INTERNAL MEDICINE

## 2025-06-30 PROCEDURE — 94668 MNPJ CHEST WALL SBSQ: CPT

## 2025-06-30 PROCEDURE — 76705 ECHO EXAM OF ABDOMEN: CPT

## 2025-06-30 PROCEDURE — 6370000000 HC RX 637 (ALT 250 FOR IP): Performed by: INTERNAL MEDICINE

## 2025-06-30 PROCEDURE — 80076 HEPATIC FUNCTION PANEL: CPT

## 2025-06-30 PROCEDURE — 85027 COMPLETE CBC AUTOMATED: CPT

## 2025-06-30 PROCEDURE — 85610 PROTHROMBIN TIME: CPT

## 2025-06-30 PROCEDURE — P9047 ALBUMIN (HUMAN), 25%, 50ML: HCPCS | Performed by: INTERNAL MEDICINE

## 2025-06-30 PROCEDURE — 80048 BASIC METABOLIC PNL TOTAL CA: CPT

## 2025-06-30 PROCEDURE — C1751 CATH, INF, PER/CENT/MIDLINE: HCPCS

## 2025-06-30 PROCEDURE — 82803 BLOOD GASES ANY COMBINATION: CPT

## 2025-06-30 PROCEDURE — 82962 GLUCOSE BLOOD TEST: CPT

## 2025-06-30 PROCEDURE — 94669 MECHANICAL CHEST WALL OSCILL: CPT

## 2025-06-30 PROCEDURE — 94003 VENT MGMT INPAT SUBQ DAY: CPT

## 2025-06-30 PROCEDURE — 71045 X-RAY EXAM CHEST 1 VIEW: CPT

## 2025-06-30 PROCEDURE — 2000000000 HC ICU R&B

## 2025-06-30 PROCEDURE — 2700000000 HC OXYGEN THERAPY PER DAY

## 2025-06-30 PROCEDURE — 94640 AIRWAY INHALATION TREATMENT: CPT

## 2025-06-30 RX ORDER — DIAZEPAM 10 MG/2ML
2.5 INJECTION, SOLUTION INTRAMUSCULAR; INTRAVENOUS ONCE
Status: COMPLETED | OUTPATIENT
Start: 2025-06-30 | End: 2025-06-30

## 2025-06-30 RX ORDER — HALOPERIDOL 5 MG/ML
5 INJECTION INTRAMUSCULAR ONCE
Status: COMPLETED | OUTPATIENT
Start: 2025-06-30 | End: 2025-06-30

## 2025-06-30 RX ORDER — OLANZAPINE 2.5 MG/1
10 TABLET, FILM COATED ORAL ONCE
Status: COMPLETED | OUTPATIENT
Start: 2025-06-30 | End: 2025-06-30

## 2025-06-30 RX ORDER — ALBUMIN (HUMAN) 12.5 G/50ML
25 SOLUTION INTRAVENOUS ONCE
Status: COMPLETED | OUTPATIENT
Start: 2025-06-30 | End: 2025-06-30

## 2025-06-30 RX ORDER — OLANZAPINE 10 MG/2ML
10 INJECTION, POWDER, FOR SOLUTION INTRAMUSCULAR ONCE
Status: COMPLETED | OUTPATIENT
Start: 2025-06-30 | End: 2025-06-30

## 2025-06-30 RX ORDER — FENTANYL CITRATE-0.9 % NACL/PF 10 MCG/ML
25-200 PLASTIC BAG, INJECTION (ML) INTRAVENOUS CONTINUOUS
Status: DISCONTINUED | OUTPATIENT
Start: 2025-06-30 | End: 2025-07-01

## 2025-06-30 RX ADMIN — MIDAZOLAM HYDROCHLORIDE 2 MG: 1 INJECTION, SOLUTION INTRAMUSCULAR; INTRAVENOUS at 04:57

## 2025-06-30 RX ADMIN — FOLIC ACID 1 MG: 5 INJECTION, SOLUTION INTRAMUSCULAR; INTRAVENOUS; SUBCUTANEOUS at 12:47

## 2025-06-30 RX ADMIN — FENTANYL CITRATE 125 MCG/HR: 0.05 INJECTION, SOLUTION INTRAMUSCULAR; INTRAVENOUS at 06:03

## 2025-06-30 RX ADMIN — MINERAL OIL, PETROLATUM: 425; 568 OINTMENT OPHTHALMIC at 02:00

## 2025-06-30 RX ADMIN — ACETYLCYSTEINE 400 MG: 100 SOLUTION RESPIRATORY (INHALATION) at 13:25

## 2025-06-30 RX ADMIN — FUROSEMIDE 20 MG: 10 INJECTION, SOLUTION INTRAMUSCULAR; INTRAVENOUS at 08:46

## 2025-06-30 RX ADMIN — MINERAL OIL, PETROLATUM: 425; 568 OINTMENT OPHTHALMIC at 08:47

## 2025-06-30 RX ADMIN — DIAZEPAM 2.5 MG: 5 INJECTION, SOLUTION INTRAMUSCULAR; INTRAVENOUS at 13:02

## 2025-06-30 RX ADMIN — WATER 20 MG: 1 INJECTION INTRAMUSCULAR; INTRAVENOUS; SUBCUTANEOUS at 14:59

## 2025-06-30 RX ADMIN — MINERAL OIL, PETROLATUM: 425; 568 OINTMENT OPHTHALMIC at 12:48

## 2025-06-30 RX ADMIN — OLANZAPINE 10 MG: 10 INJECTION, POWDER, FOR SOLUTION INTRAMUSCULAR at 20:14

## 2025-06-30 RX ADMIN — SODIUM CHLORIDE, PRESERVATIVE FREE 10 ML: 5 INJECTION INTRAVENOUS at 22:04

## 2025-06-30 RX ADMIN — METRONIDAZOLE 500 MG: 500 INJECTION, SOLUTION INTRAVENOUS at 17:26

## 2025-06-30 RX ADMIN — DEXMEDETOMIDINE 0.2 MCG/KG/HR: 100 INJECTION, SOLUTION INTRAVENOUS at 12:47

## 2025-06-30 RX ADMIN — ACETYLCYSTEINE 400 MG: 100 SOLUTION RESPIRATORY (INHALATION) at 02:53

## 2025-06-30 RX ADMIN — IPRATROPIUM BROMIDE 0.5 MG: 0.5 SOLUTION RESPIRATORY (INHALATION) at 07:31

## 2025-06-30 RX ADMIN — DORNASE ALFA 2.5 MG: 1 SOLUTION RESPIRATORY (INHALATION) at 19:16

## 2025-06-30 RX ADMIN — METRONIDAZOLE 500 MG: 500 INJECTION, SOLUTION INTRAVENOUS at 08:53

## 2025-06-30 RX ADMIN — ACETYLCYSTEINE 400 MG: 100 SOLUTION RESPIRATORY (INHALATION) at 07:32

## 2025-06-30 RX ADMIN — IPRATROPIUM BROMIDE 0.5 MG: 0.5 SOLUTION RESPIRATORY (INHALATION) at 19:16

## 2025-06-30 RX ADMIN — WATER 20 MG: 1 INJECTION INTRAMUSCULAR; INTRAVENOUS; SUBCUTANEOUS at 05:53

## 2025-06-30 RX ADMIN — THIAMINE HYDROCHLORIDE 100 MG: 100 INJECTION, SOLUTION INTRAMUSCULAR; INTRAVENOUS at 08:46

## 2025-06-30 RX ADMIN — ARFORMOTEROL TARTRATE 15 MCG: 15 SOLUTION RESPIRATORY (INHALATION) at 19:16

## 2025-06-30 RX ADMIN — SODIUM CHLORIDE, PRESERVATIVE FREE 10 ML: 5 INJECTION INTRAVENOUS at 08:47

## 2025-06-30 RX ADMIN — IPRATROPIUM BROMIDE 0.5 MG: 0.5 SOLUTION RESPIRATORY (INHALATION) at 13:25

## 2025-06-30 RX ADMIN — ALBUMIN (HUMAN) 25 G: 0.25 INJECTION, SOLUTION INTRAVENOUS at 15:52

## 2025-06-30 RX ADMIN — MINERAL OIL, PETROLATUM: 425; 568 OINTMENT OPHTHALMIC at 17:35

## 2025-06-30 RX ADMIN — BUDESONIDE 500 MCG: 0.5 INHALANT RESPIRATORY (INHALATION) at 07:32

## 2025-06-30 RX ADMIN — SODIUM CHLORIDE, PRESERVATIVE FREE 40 MG: 5 INJECTION INTRAVENOUS at 08:46

## 2025-06-30 RX ADMIN — POLYETHYLENE GLYCOL 3350 17 G: 17 POWDER, FOR SOLUTION ORAL at 08:47

## 2025-06-30 RX ADMIN — BUDESONIDE 500 MCG: 0.5 INHALANT RESPIRATORY (INHALATION) at 19:16

## 2025-06-30 RX ADMIN — SODIUM CHLORIDE 10 MG/HR: 900 INJECTION, SOLUTION INTRAVENOUS at 17:53

## 2025-06-30 RX ADMIN — MIDAZOLAM HYDROCHLORIDE 2 MG: 1 INJECTION, SOLUTION INTRAMUSCULAR; INTRAVENOUS at 22:17

## 2025-06-30 RX ADMIN — ACETYLCYSTEINE 400 MG: 100 SOLUTION RESPIRATORY (INHALATION) at 19:16

## 2025-06-30 RX ADMIN — FENTANYL CITRATE 125 MCG/HR: 0.05 INJECTION, SOLUTION INTRAMUSCULAR; INTRAVENOUS at 00:21

## 2025-06-30 RX ADMIN — ENOXAPARIN SODIUM 30 MG: 100 INJECTION SUBCUTANEOUS at 13:14

## 2025-06-30 RX ADMIN — MINERAL OIL, PETROLATUM: 425; 568 OINTMENT OPHTHALMIC at 20:17

## 2025-06-30 RX ADMIN — IPRATROPIUM BROMIDE 0.5 MG: 0.5 SOLUTION RESPIRATORY (INHALATION) at 02:53

## 2025-06-30 RX ADMIN — POLYETHYLENE GLYCOL-3350 AND ELECTROLYTES WITH FLAVOR PACK 4000 ML: 240; 5.84; 2.98; 6.72; 22.72 POWDER, FOR SOLUTION ORAL at 13:09

## 2025-06-30 RX ADMIN — MIDAZOLAM HYDROCHLORIDE 2 MG: 1 INJECTION, SOLUTION INTRAMUSCULAR; INTRAVENOUS at 10:57

## 2025-06-30 RX ADMIN — OLANZAPINE 10 MG: 2.5 TABLET, FILM COATED ORAL at 18:52

## 2025-06-30 RX ADMIN — FENTANYL CITRATE 125 MCG/HR: 0.05 INJECTION, SOLUTION INTRAMUSCULAR; INTRAVENOUS at 14:08

## 2025-06-30 RX ADMIN — ARFORMOTEROL TARTRATE 15 MCG: 15 SOLUTION RESPIRATORY (INHALATION) at 07:31

## 2025-06-30 RX ADMIN — HALOPERIDOL LACTATE 5 MG: 5 INJECTION, SOLUTION INTRAMUSCULAR at 14:59

## 2025-06-30 RX ADMIN — SODIUM CHLORIDE 6 MG/HR: 900 INJECTION, SOLUTION INTRAVENOUS at 06:03

## 2025-06-30 RX ADMIN — METRONIDAZOLE 500 MG: 500 INJECTION, SOLUTION INTRAVENOUS at 00:13

## 2025-06-30 RX ADMIN — FENTANYL CITRATE 175 MCG/HR: 0.05 INJECTION, SOLUTION INTRAMUSCULAR; INTRAVENOUS at 21:18

## 2025-06-30 RX ADMIN — DORNASE ALFA 2.5 MG: 1 SOLUTION RESPIRATORY (INHALATION) at 07:32

## 2025-06-30 ASSESSMENT — PAIN SCALES - GENERAL
PAINLEVEL_OUTOF10: 0
PAINLEVEL_OUTOF10: 0

## 2025-06-30 ASSESSMENT — PAIN SCALES - WONG BAKER: WONGBAKER_NUMERICALRESPONSE: NO HURT

## 2025-06-30 ASSESSMENT — PULMONARY FUNCTION TESTS
PIF_VALUE: 20
PIF_VALUE: 19
PIF_VALUE: 19
PIF_VALUE: 18
PIF_VALUE: 22

## 2025-06-30 NOTE — PROGRESS NOTES
Comprehensive High Risk Nutrition Assessment Follow-Up    Type and Reason for Visit:  Reassess    Nutrition Recommendations/Plan:   Per ASPEN guidelines hold TF when GRV (gastric residual volumes) >=500  Does not appear 250ml GRV high at this time and having bowel fx  Cont bowel regimen and could consider GI motility med - defer to MD  Overall inadequate nutrition since admit ongoing x 16 days  Rec re-start trickle feeds change TF to Peptide-Based / Vital 1.5 @ 15ml/hr + 1 ProSource BID as tr via NGT  Defer free water to MD given diuresing  Will likely only receive ~80% TF r/t frequent interruptions  Cont to check Phos, Mg, and K and replete as needed  Cont folate and thiamine and consider add liq centrum/certa-azucena w/min as well via   Cont to monitor POC/NCP, wt trends diuresing, renal fx, lytes, UOP, bowel fx, skin integrity/wound healing and adjust recs as needed     Malnutrition Assessment:  Malnutrition Status:  Moderate malnutrition (06/30/25 1313)    Context:  Acute Illness     Findings of the 6 clinical characteristics of malnutrition:  Energy Intake:  50% or less of estimated energy requirements for 5 or more days  Weight Loss:  Unable to assess     Body Fat Loss:  Moderate body fat loss     Muscle Mass Loss:  Moderate muscle mass loss    Fluid Accumulation:  Moderate to Severe     Strength:  Not Performed    Nutrition Assessment:    58yo M remains in ICU remains sedated on vent w/fentanyl, unable tr sedation holiday r/t agitation noted trying get out of bed limiting SBT, no major GRVs per nursing noted yesterday, +BM w/enema again 6/28, good UOP, re-started trickle feeds 6/29, constipation noted on scans per MD noted 6/29.  sedated on vent w/fentanyl. unable to get HIDA scan noted. overall inadequate nutrition since admit x 16 days. Re-started trickle feeds Jevity 1.5 @ 10ml/hr 6/29 appears stopped again 6/30 r/t 250ml GRVs. having bowel fx noted. no PI per wound eval noted. 2L UOP. no recent Phos

## 2025-06-30 NOTE — PROGRESS NOTES
8270-7741- Plan for the day, liver ultrasound, plan for midline/extended dwell placement, tube feeds on hold for colonoscopy planned on 07/1 with VIKAS tamayo, Tentatively plan for Trach with Froilan sometime this week. Bowel prep started.   2630-8806- Patient consistently agitated despite max sedation, precedex not effective and leads to bradycardia and patient still wide awake throwing legs over bed, Vent asynchrony, and fighting ventilator, 1x doses of haldol, valium ineffective and patient wide awake. Multiple methods to redirect unsuccessful.   0831-1054- MD caesar snyder to increase fentanyl max dose, monitor for symptomatic bradycardia, 1 dose of albumin given to offset hypotension-vitals improved.

## 2025-06-30 NOTE — PROCEDURES
PROCEDURE NOTE  Date: 6/30/2025   Name: Beny Knapp  YOB: 1965    Procedures: HIDA Scan with Intervention  Canceling order for HIDA scan per conversation between Dr. Law and [facility] [employer] [facility] regarding pt liver enzymes resulting in false positive. Also pt is on fentanyl which is contraindicated for test. I spoke with RN today at 11:15am and she said she was unable to cancel the test so I told her I would take care of it. Please reorder at a later time if still needed and pt is off morphine derivatives and liver enzymes stabilize. Thank you.

## 2025-06-30 NOTE — PROGRESS NOTES
[facility]  Pulmonary, Critical Care, and Sleep Medicine    Name: Beny Knapp MRN: [id number]   : 1965 Hospital: [facility]    Date: 2025  Room: [address] [place] [address] Note                                              Consult requesting physician: Dr. Mark   Reason for Consult: Respiratory failure, hyponatremia, upper airway    IMPRESSION:     Acute respiratory failure with hypoxemia and hypercarbia   J96.01, J96.02  Stridor  Obstructed larynx  Polysubstance abuse   Candidiasis of esophagus   Aspiration pneumonia   COPD/emphysema  EtOH use  Cocaine abuse  Hepatitis C  Cirrhosis of liver  Hyponatremia, resolved      Active Hospital Problems    Diagnosis Date Noted    Moderate malnutrition [E44.0] 2025    Aspiration pneumonia (HCC) [J69.0] 2025    Cocaine abuse (HCC) [F14.10] 2025    Hyponatremia [E87.1] 2025    Stridor [R06.1] 2025    Obstructed, larynx [J38.6] 2025    Acute respiratory failure with hypoxia and hypercapnia (HCC) [J96.01, J96.02] 2025    ETOH abuse [F10.10] 2025    Polysubstance abuse (HCC) [F19.10] 2025    Candidiasis of esophagus (HCC) [B37.81] 2025    Tobacco abuse [Z72.0] 2015    HTN (hypertension) [I10] 2015    COPD (chronic obstructive pulmonary disease) (HCC) [J44.9] 2015    Chronic hepatitis C (HCC) [B18.2] 2015        Code status: Full Code       RECOMMENDATIONS:     Respiratory: 59-year-old male with COPD, smoker presents with acute upper airway distress, fullness in the laryngeal area, questionable foreign body. History of aspirations with feeding and bolus feeling on presentation in ER this admission. Patient was intubated 25 for airway protection and hypercarbic and hypoxemic respiratory failure, with ENT at bedside.  Prior to intubation, fiberoptic nasolaryngoscopy endoscopy by ENT did not show any foreign body, but swollen airway/larynx  overlying the stomach. There are no significant dilated loops of bowel. No significant pathologic calcifications. No significant bony abnormalities. There are multilevel degenerative changes of the spine.     Nonspecific bowel gas patten. Electronically signed by ASHLIE Morton    XR CHEST PORTABLE  Result Date: 6/16/2025  EXAM:  XR CHEST PORTABLE INDICATION:   et tube COMPARISON: 6/15/2025 radiograph. TECHNIQUE: Portable frontal view radiograph of the chest was acquired. FINDINGS: Endotracheal tube projects 4.3 cm above the justine; orogastric tube descending below the diaphragm to terminate outside the field-of-view; and right jugular access central venous catheter projecting over the SVC, similar to prior study. External monitor leads project over the right lower chest and precordium/epigastric area. Lungs/Pleura: Stable bibasilar and has slightly increased right lower lung opacities. No pleural effusion or pneumothorax. Mediastinum: Cardiomediastinal silhouette is within normal limits. MSK: No acute osseous abnormalities. Upper abdomen: Unremarkable.     Line and tube in satisfactory position as noted. Worsening right lower lung opacity. Electronically signed by KAYLEIGH FRIAS    XR CHEST 1 VIEW  Result Date: 6/15/2025  EXAM: XR CHEST 1 VIEW INDICATION: swelling COMPARISON: 6/15/2025 at 1016 hours FINDINGS: A portable AP radiograph of the chest was obtained at 1333 hours hours. The patient is on a cardiac monitor. There are small bilateral pleural effusions.. The cardiac and mediastinal contours and pulmonary vascularity are otherwise normal.  The bones and soft tissues are grossly within normal limits. The NG tube extends into the stomach. Endotracheal tube terminates at the thoracic inlet. The right IJ line tip overlies the SVC.     Small bilateral pleural effusions. Lines and tubes as described.. Electronically signed by Sheridan Biswas    XR CHEST PORTABLE  Result Date: 6/15/2025  EXAM:  XR CHEST PORTABLE

## 2025-06-30 NOTE — PROGRESS NOTES
met nurse in ICU for a follow-up visit.    Nurse said there was no change in the patient's condition. Nurse said patient would get a trach.There is a hold on guardianship. Nurse thanked  for the visit.     provided presence and support for staff.    Chaplains will provide follow-up care for patient and staff as needed.    Spiritual Health History and Assessment/Progress Note  [facility]    Follow-up,  ,  ,      Name: Beny Knapp MRN: [id number]    Age: 59 y.o.     Sex: male   Language: English   Sabianist: Congregation   Acute respiratory failure with hypoxia and hypercapnia (HCC)     Date: 6/30/2025            Total Time Calculated: 10 min              Spiritual Assessment continued in [facility] 1 INTENSIVE CARE        Referral/Consult From: Palliative Care   Encounter Overview/Reason: Follow-up  Service Provided For: Patient    Love, Belief, Meaning:   Patient identifies as spiritual, is connected with a love tradition or spiritual practice, and has beliefs or practices that help with coping during difficult times  Family/Friends No family/friends present      Importance and Influence:  Patient unable to assess at this time  Family/Friends No family/friends present    Community:  Patient expresses feelings of isolation: disconnected from family/friends  Family/Friends No family/friends present    Assessment and Plan of Care:     Patient Interventions include: Other: Unable to assess.  Family/Friends Interventions include: No family/friends present    Patient Plan of Care: No spiritual needs identified for follow-up  Family/Friends Plan of Care: No family/friends present    Electronically signed by Chaplain Kevin on 6/30/2025 at 3:26 PM

## 2025-06-30 NOTE — WOUND CARE
ICU Rounding  Wound care nurse rounded on patient for skin issues.  Patient has a Chauncey score of 13.  Does patient have any pressure injury?no per primary nurse PUMA Mohan     Skin Care & Pressure Relief Recommendations  Minimize layers of linen  Pads under patient to optimize support surface  Turn/reposition approximately every 2 hours  Use pillow wedges to maintain positioning but do not put pillow directly over wounds  Manage incontinence   Promote continence; Skin Protective lotion/cream to buttocks and sacrum daily and as needed with incontinence care  Offload heels pillows    Consult wound care if any wounds noted during admission.  Wound Care nurse will continue to follow during ICU admission.

## 2025-06-30 NOTE — PROGRESS NOTES
Hospitalist Progress Note    Patient: Beny Knapp MRN: [id number]  CSN: [id number]    YOB: 1965  Age: 59 y.o.  Sex: male    DOA: 6/14/2025 LOS:  LOS: 16 days          Summary  Foreign body, shortness of breath.  59 y.o.  male w/ PMH of COPD, ETOH-use and polysubstance abuse who was BIBA and presents with subjective difficulty breathing and swallowing with throat pain. Brought in by EMS for possible forign body (food) with bolus within region of velecula. ENT saw pt and ED intubated for airway protection. ENT was consulted and performed scope on pt with findings of extensive esophageal candidiasis. Nephrology was consulted for severe hyponatremia with sodium of 117 recommending NS at 75cc/hr. Intensivist was contacted as well d/t pt being intubated, on vent for airway protection.  Patient has had a prolonged hospital stay in the ICU with difficulties with sedation he has been on max sedation some limited by hypotension and bradycardia namely Precedex he has completed his 14-day course of antifungal for thrush initially ENT would not do trach due to severe edema this has improved per ENT and they are willing to trach maybe later this week in their interim he has had some ileus diverticulitis diverticulitis fecal stasis there is also a possible stricture despite multiple enemas patient has started on a colonoscopy prep and is for colonoscopy on Tuesday unfortunately there has been no family found to help with decision-making palliative care has been helping get guardianship patient is very much awake on the ventilator.  Difficult to wean off the plan is for trach after colonoscopy continue with weaning  Subjective:

## 2025-06-30 NOTE — PROGRESS NOTES
Palliative Medicine    CODE STATUS: FULL CODE    AMD Status: none on file. Care management team has initiated guardianship proceedings.     Seen today in room 104.  Lying supine with eyes open. Agitated while nurse attempting to find new IV site. Intubated/ventilated/sedated. Intubated 6/14/ FiO2 40% PEEP 8. Fentanyl, versed, and precedex gtts infusing    Did not tolerate sedation vacation over weekend. Intensivist discussed possibly proceeding with tracheostomy with otolaryngologist. No decision made yet.    Disposition plan: to be determined based on response to medical treatment, medical recommendations,  and patient/family decisions    Palliative Medicine will continue to follow Beny Knapp during his hospitalization and support him as he makes healthcare decisions and defines goals of care.    Sherley Frias RN, MSN  Palliative Medicine  P: [phone]

## 2025-07-01 ENCOUNTER — APPOINTMENT (OUTPATIENT)
Facility: HOSPITAL | Age: 60
DRG: 368 | End: 2025-07-01
Payer: MEDICARE

## 2025-07-01 LAB
ALBUMIN SERPL-MCNC: 3.1 G/DL (ref 3.4–5)
ALBUMIN/GLOB SERPL: 1.2 (ref 0.8–1.7)
ALP SERPL-CCNC: 60 U/L (ref 45–117)
ALT SERPL-CCNC: 507 U/L (ref 10–50)
ANION GAP SERPL CALC-SCNC: 11 MMOL/L (ref 3–18)
AST SERPL-CCNC: 149 U/L (ref 10–38)
BASOPHILS # BLD: 0.01 K/UL (ref 0–0.1)
BASOPHILS NFR BLD: 0.1 % (ref 0–2)
BILIRUB DIRECT SERPL-MCNC: 0.9 MG/DL (ref 0–0.2)
BILIRUB SERPL-MCNC: 1.6 MG/DL (ref 0.2–1)
BUN SERPL-MCNC: 21 MG/DL (ref 6–23)
BUN/CREAT SERPL: 57 (ref 12–20)
CALCIUM SERPL-MCNC: 8.9 MG/DL (ref 8.5–10.1)
CHLORIDE SERPL-SCNC: 100 MMOL/L (ref 98–107)
CO2 SERPL-SCNC: 28 MMOL/L (ref 21–32)
CREAT SERPL-MCNC: 0.37 MG/DL (ref 0.6–1.3)
DIFFERENTIAL METHOD BLD: ABNORMAL
EOSINOPHIL # BLD: 0 K/UL (ref 0–0.4)
EOSINOPHIL NFR BLD: 0 % (ref 0–5)
ERYTHROCYTE [DISTWIDTH] IN BLOOD BY AUTOMATED COUNT: 13.4 % (ref 11.6–14.5)
GLOBULIN SER CALC-MCNC: 2.6 G/DL (ref 2–4)
GLUCOSE BLD STRIP.AUTO-MCNC: 108 MG/DL (ref 70–110)
GLUCOSE BLD STRIP.AUTO-MCNC: 115 MG/DL (ref 70–110)
GLUCOSE BLD STRIP.AUTO-MCNC: 117 MG/DL (ref 70–110)
GLUCOSE BLD STRIP.AUTO-MCNC: 93 MG/DL (ref 70–110)
GLUCOSE BLD STRIP.AUTO-MCNC: 93 MG/DL (ref 70–110)
GLUCOSE SERPL-MCNC: 111 MG/DL (ref 74–108)
HCT VFR BLD AUTO: 32.5 % (ref 36–48)
HGB BLD-MCNC: 11 G/DL (ref 13–16)
IMM GRANULOCYTES # BLD AUTO: 0.07 K/UL (ref 0–0.04)
IMM GRANULOCYTES NFR BLD AUTO: 0.8 % (ref 0–0.5)
LYMPHOCYTES # BLD: 0.28 K/UL (ref 0.9–3.3)
LYMPHOCYTES NFR BLD: 3.2 % (ref 21–52)
MAGNESIUM SERPL-MCNC: 2.1 MG/DL (ref 1.6–2.6)
MCH RBC QN AUTO: 29.5 PG (ref 24–34)
MCHC RBC AUTO-ENTMCNC: 33.8 G/DL (ref 31–37)
MCV RBC AUTO: 87.1 FL (ref 78–100)
MONOCYTES # BLD: 0.3 K/UL (ref 0.05–1.2)
MONOCYTES NFR BLD: 3.5 % (ref 3–10)
NEUTS SEG # BLD: 7.99 K/UL (ref 1.8–8)
NEUTS SEG NFR BLD: 92.4 % (ref 40–73)
NRBC # BLD: 0 K/UL (ref 0–0.01)
NRBC BLD-RTO: 0 PER 100 WBC
PHOSPHATE SERPL-MCNC: 2.4 MG/DL (ref 2.5–4.9)
PLATELET # BLD AUTO: 208 K/UL (ref 135–420)
PMV BLD AUTO: 9.9 FL (ref 9.2–11.8)
POTASSIUM SERPL-SCNC: 3.5 MMOL/L (ref 3.5–5.5)
PROT SERPL-MCNC: 5.6 G/DL (ref 6.4–8.2)
RBC # BLD AUTO: 3.73 M/UL (ref 4.35–5.65)
SODIUM SERPL-SCNC: 140 MMOL/L (ref 136–145)
WBC # BLD AUTO: 8.7 K/UL (ref 4.6–13.2)

## 2025-07-01 PROCEDURE — 6360000002 HC RX W HCPCS: Performed by: INTERNAL MEDICINE

## 2025-07-01 PROCEDURE — 2580000003 HC RX 258: Performed by: INTERNAL MEDICINE

## 2025-07-01 PROCEDURE — 83735 ASSAY OF MAGNESIUM: CPT

## 2025-07-01 PROCEDURE — 2720000010 HC SURG SUPPLY STERILE

## 2025-07-01 PROCEDURE — 82962 GLUCOSE BLOOD TEST: CPT

## 2025-07-01 PROCEDURE — 2700000000 HC OXYGEN THERAPY PER DAY

## 2025-07-01 PROCEDURE — 80048 BASIC METABOLIC PNL TOTAL CA: CPT

## 2025-07-01 PROCEDURE — 94668 MNPJ CHEST WALL SBSQ: CPT

## 2025-07-01 PROCEDURE — 99231 SBSQ HOSP IP/OBS SF/LOW 25: CPT

## 2025-07-01 PROCEDURE — C1751 CATH, INF, PER/CENT/MIDLINE: HCPCS

## 2025-07-01 PROCEDURE — 94669 MECHANICAL CHEST WALL OSCILL: CPT

## 2025-07-01 PROCEDURE — 2500000003 HC RX 250 WO HCPCS: Performed by: INTERNAL MEDICINE

## 2025-07-01 PROCEDURE — 84100 ASSAY OF PHOSPHORUS: CPT

## 2025-07-01 PROCEDURE — 80076 HEPATIC FUNCTION PANEL: CPT

## 2025-07-01 PROCEDURE — 94003 VENT MGMT INPAT SUBQ DAY: CPT

## 2025-07-01 PROCEDURE — 2000000000 HC ICU R&B

## 2025-07-01 PROCEDURE — 85025 COMPLETE CBC W/AUTO DIFF WBC: CPT

## 2025-07-01 PROCEDURE — 71045 X-RAY EXAM CHEST 1 VIEW: CPT

## 2025-07-01 PROCEDURE — 94640 AIRWAY INHALATION TREATMENT: CPT

## 2025-07-01 RX ORDER — DIAZEPAM 10 MG/2ML
2 INJECTION, SOLUTION INTRAMUSCULAR; INTRAVENOUS ONCE
Status: COMPLETED | OUTPATIENT
Start: 2025-07-01 | End: 2025-07-01

## 2025-07-01 RX ORDER — MIDAZOLAM HYDROCHLORIDE 1 MG/ML
1-10 INJECTION, SOLUTION INTRAVENOUS CONTINUOUS
Status: DISCONTINUED | OUTPATIENT
Start: 2025-07-01 | End: 2025-07-05

## 2025-07-01 RX ORDER — FENTANYL CITRATE-0.9 % NACL/PF 10 MCG/ML
25-200 PLASTIC BAG, INJECTION (ML) INTRAVENOUS CONTINUOUS
Status: DISCONTINUED | OUTPATIENT
Start: 2025-07-01 | End: 2025-07-05

## 2025-07-01 RX ORDER — PROPOFOL 10 MG/ML
5-50 INJECTION, EMULSION INTRAVENOUS CONTINUOUS
Status: DISCONTINUED | OUTPATIENT
Start: 2025-07-01 | End: 2025-07-05

## 2025-07-01 RX ADMIN — WATER 20 MG: 1 INJECTION INTRAMUSCULAR; INTRAVENOUS; SUBCUTANEOUS at 21:37

## 2025-07-01 RX ADMIN — IPRATROPIUM BROMIDE 0.5 MG: 0.5 SOLUTION RESPIRATORY (INHALATION) at 07:32

## 2025-07-01 RX ADMIN — PROPOFOL 45 MCG/KG/MIN: 10 INJECTION, EMULSION INTRAVENOUS at 17:52

## 2025-07-01 RX ADMIN — IPRATROPIUM BROMIDE 0.5 MG: 0.5 SOLUTION RESPIRATORY (INHALATION) at 19:20

## 2025-07-01 RX ADMIN — METRONIDAZOLE 500 MG: 500 INJECTION, SOLUTION INTRAVENOUS at 00:56

## 2025-07-01 RX ADMIN — FUROSEMIDE 20 MG: 10 INJECTION, SOLUTION INTRAMUSCULAR; INTRAVENOUS at 08:34

## 2025-07-01 RX ADMIN — ACETYLCYSTEINE 400 MG: 100 SOLUTION RESPIRATORY (INHALATION) at 19:20

## 2025-07-01 RX ADMIN — BUDESONIDE 500 MCG: 0.5 INHALANT RESPIRATORY (INHALATION) at 07:32

## 2025-07-01 RX ADMIN — FENTANYL CITRATE 200 MCG/HR: 0.05 INJECTION, SOLUTION INTRAMUSCULAR; INTRAVENOUS at 02:33

## 2025-07-01 RX ADMIN — METRONIDAZOLE 500 MG: 500 INJECTION, SOLUTION INTRAVENOUS at 17:14

## 2025-07-01 RX ADMIN — MIDAZOLAM HYDROCHLORIDE 2 MG: 1 INJECTION, SOLUTION INTRAMUSCULAR; INTRAVENOUS at 22:46

## 2025-07-01 RX ADMIN — FENTANYL CITRATE 100 MCG/HR: 50 INJECTION INTRAVENOUS at 19:35

## 2025-07-01 RX ADMIN — MINERAL OIL, PETROLATUM: 425; 568 OINTMENT OPHTHALMIC at 21:32

## 2025-07-01 RX ADMIN — IPRATROPIUM BROMIDE 0.5 MG: 0.5 SOLUTION RESPIRATORY (INHALATION) at 13:33

## 2025-07-01 RX ADMIN — ACETYLCYSTEINE 400 MG: 100 SOLUTION RESPIRATORY (INHALATION) at 07:34

## 2025-07-01 RX ADMIN — SODIUM CHLORIDE 10 MG/HR: 900 INJECTION, SOLUTION INTRAVENOUS at 13:30

## 2025-07-01 RX ADMIN — PROPOFOL 20 MCG/KG/MIN: 10 INJECTION, EMULSION INTRAVENOUS at 10:14

## 2025-07-01 RX ADMIN — POTASSIUM CHLORIDE 10 MEQ: 7.46 INJECTION, SOLUTION INTRAVENOUS at 11:21

## 2025-07-01 RX ADMIN — DORNASE ALFA 2.5 MG: 1 SOLUTION RESPIRATORY (INHALATION) at 07:32

## 2025-07-01 RX ADMIN — WATER 20 MG: 1 INJECTION INTRAMUSCULAR; INTRAVENOUS; SUBCUTANEOUS at 07:02

## 2025-07-01 RX ADMIN — BUDESONIDE 500 MCG: 0.5 INHALANT RESPIRATORY (INHALATION) at 19:20

## 2025-07-01 RX ADMIN — ACETYLCYSTEINE 400 MG: 100 SOLUTION RESPIRATORY (INHALATION) at 13:33

## 2025-07-01 RX ADMIN — IPRATROPIUM BROMIDE 0.5 MG: 0.5 SOLUTION RESPIRATORY (INHALATION) at 02:59

## 2025-07-01 RX ADMIN — METRONIDAZOLE 500 MG: 500 INJECTION, SOLUTION INTRAVENOUS at 08:38

## 2025-07-01 RX ADMIN — POTASSIUM CHLORIDE 10 MEQ: 7.46 INJECTION, SOLUTION INTRAVENOUS at 12:36

## 2025-07-01 RX ADMIN — SODIUM CHLORIDE 10 MG/HR: 900 INJECTION, SOLUTION INTRAVENOUS at 02:00

## 2025-07-01 RX ADMIN — SODIUM CHLORIDE, PRESERVATIVE FREE 10 ML: 5 INJECTION INTRAVENOUS at 08:44

## 2025-07-01 RX ADMIN — MINERAL OIL, PETROLATUM: 425; 568 OINTMENT OPHTHALMIC at 12:36

## 2025-07-01 RX ADMIN — METRONIDAZOLE 500 MG: 500 INJECTION, SOLUTION INTRAVENOUS at 22:57

## 2025-07-01 RX ADMIN — ARFORMOTEROL TARTRATE 15 MCG: 15 SOLUTION RESPIRATORY (INHALATION) at 19:20

## 2025-07-01 RX ADMIN — DORNASE ALFA 2.5 MG: 1 SOLUTION RESPIRATORY (INHALATION) at 19:20

## 2025-07-01 RX ADMIN — MINERAL OIL, PETROLATUM: 425; 568 OINTMENT OPHTHALMIC at 08:44

## 2025-07-01 RX ADMIN — THIAMINE HYDROCHLORIDE 100 MG: 100 INJECTION, SOLUTION INTRAMUSCULAR; INTRAVENOUS at 08:34

## 2025-07-01 RX ADMIN — MINERAL OIL, PETROLATUM: 425; 568 OINTMENT OPHTHALMIC at 00:59

## 2025-07-01 RX ADMIN — ENOXAPARIN SODIUM 30 MG: 100 INJECTION SUBCUTANEOUS at 00:58

## 2025-07-01 RX ADMIN — MIDAZOLAM HYDROCHLORIDE 2 MG: 1 INJECTION, SOLUTION INTRAMUSCULAR; INTRAVENOUS at 03:34

## 2025-07-01 RX ADMIN — POTASSIUM PHOSPHATE, MONOBASIC AND POTASSIUM PHOSPHATE, DIBASIC 15 MMOL: 224; 236 INJECTION, SOLUTION, CONCENTRATE INTRAVENOUS at 15:06

## 2025-07-01 RX ADMIN — ARFORMOTEROL TARTRATE 15 MCG: 15 SOLUTION RESPIRATORY (INHALATION) at 07:32

## 2025-07-01 RX ADMIN — PROPOFOL 45 MCG/KG/MIN: 10 INJECTION, EMULSION INTRAVENOUS at 13:31

## 2025-07-01 RX ADMIN — WATER 20 MG: 1 INJECTION INTRAMUSCULAR; INTRAVENOUS; SUBCUTANEOUS at 00:56

## 2025-07-01 RX ADMIN — SODIUM CHLORIDE, PRESERVATIVE FREE 10 ML: 5 INJECTION INTRAVENOUS at 21:38

## 2025-07-01 RX ADMIN — FENTANYL CITRATE 200 MCG/HR: 0.05 INJECTION, SOLUTION INTRAMUSCULAR; INTRAVENOUS at 13:16

## 2025-07-01 RX ADMIN — FENTANYL CITRATE 200 MCG/HR: 0.05 INJECTION, SOLUTION INTRAMUSCULAR; INTRAVENOUS at 07:52

## 2025-07-01 RX ADMIN — DIAZEPAM 2 MG: 5 INJECTION, SOLUTION INTRAMUSCULAR; INTRAVENOUS at 09:24

## 2025-07-01 RX ADMIN — ACETYLCYSTEINE 400 MG: 100 SOLUTION RESPIRATORY (INHALATION) at 02:59

## 2025-07-01 RX ADMIN — MINERAL OIL, PETROLATUM: 425; 568 OINTMENT OPHTHALMIC at 06:45

## 2025-07-01 RX ADMIN — SODIUM CHLORIDE, PRESERVATIVE FREE 40 MG: 5 INJECTION INTRAVENOUS at 08:33

## 2025-07-01 RX ADMIN — FOLIC ACID 1 MG: 5 INJECTION, SOLUTION INTRAMUSCULAR; INTRAVENOUS; SUBCUTANEOUS at 08:34

## 2025-07-01 RX ADMIN — MINERAL OIL, PETROLATUM: 425; 568 OINTMENT OPHTHALMIC at 17:16

## 2025-07-01 ASSESSMENT — PULMONARY FUNCTION TESTS
PIF_VALUE: 19
PIF_VALUE: 18
PIF_VALUE: 19
PIF_VALUE: 19
PIF_VALUE: 20
PIF_VALUE: 18

## 2025-07-01 ASSESSMENT — PAIN SCALES - WONG BAKER
WONGBAKER_NUMERICALRESPONSE: NO HURT
WONGBAKER_NUMERICALRESPONSE: NO HURT

## 2025-07-01 ASSESSMENT — PAIN SCALES - GENERAL
PAINLEVEL_OUTOF10: 0
PAINLEVEL_OUTOF10: 0

## 2025-07-01 NOTE — PLAN OF CARE
needed   Discuss catheterization for long term situations as appropriate     Problem: Infection - Adult  Goal: Absence of infection at discharge  7/1/2025 0949 by Kimberlee Doan RN  Outcome: Progressing  Flowsheets (Taken 7/1/2025 0800)  Absence of infection at discharge:   Assess and monitor for signs and symptoms of infection   Monitor all insertion sites i.e., indwelling lines, tubes and drains  6/30/2025 2323 by Abhinav Feliciano RN  Outcome: Progressing  Flowsheets (Taken 6/30/2025 2000)  Absence of infection at discharge:   Assess and monitor for signs and symptoms of infection   Monitor lab/diagnostic results   Monitor all insertion sites i.e., indwelling lines, tubes and drains   Monitor endotracheal (as able) and nasal secretions for changes in amount and color   Crowder appropriate cooling/warming therapies per order   Administer medications as ordered  Goal: Absence of infection during hospitalization  7/1/2025 0949 by Kimberlee Doan RN  Outcome: Progressing  Flowsheets (Taken 7/1/2025 0800)  Absence of infection during hospitalization: Assess and monitor for signs and symptoms of infection  6/30/2025 2323 by Abhinav Feliciano RN  Outcome: Progressing  Flowsheets (Taken 6/30/2025 2000)  Absence of infection during hospitalization:   Assess and monitor for signs and symptoms of infection   Monitor lab/diagnostic results   Monitor all insertion sites i.e., indwelling lines, tubes and drains   Monitor endotracheal (as able) and nasal secretions for changes in amount and color   Crowder appropriate cooling/warming therapies per order   Administer medications as ordered   Instruct and encourage patient and family to use good hand hygiene technique   Identify and instruct in appropriate isolation precautions for identified infection/condition     Problem: Metabolic/Fluid and Electrolytes - Adult  Goal: Electrolytes maintained within normal limits  7/1/2025 0949 by Kimberlee Doan RN  Outcome:

## 2025-07-01 NOTE — PROGRESS NOTES
7681-0411- Per night shift report patient extremely agitated trying to climb out of bed despite iv push prn. Patient maxed on Fent/versed and wide awake pulling at restraints, unable to be redirected frequently. Paged MD 1 push of valium given. Patient still bucking the vent and kicking his legs.     0970-3849- Patient still extremely agitated, bagging legs against side rails and feet. Patient a safety risk MD caesar ok to start propofol.     3112-3433- MD aware of bradycardia attempt to titrate versed/fentanyl first to address bradycardia secondary with propofol

## 2025-07-01 NOTE — PROGRESS NOTES
Palliative Medicine    CODE STATUS: FULL CODE     AMD Status: none on file. Care management team has initiated guardianship proceedings.     0930 Seen today in room 104 along with Jannet Maguire NP.  Lying supine with eyes open. Agitated pulling at restraints. Intubated/ventilated/sedated. Intubated 6/14/2025 FiO2 40% PEEP 8. Fentanyl fara versed gtts infusing. Propofol had been truned off 2/2 bradycardia but was resumed after se saw the patient due to increasing agitation.     Continues unable to proceed with SBTs due to increased PEEP and sedation needs. Trach tentatively planned with otolaryngology when legal consent obtained.    EGD and colonoscopy planned for today with gastroenterology    Disposition plan: to be determined based on response to medical treatment, medical recommendations,  and patient/family decisions     Palliative Medicine will continue to follow Beny Knapp during his hospitalization and support him as he makes healthcare decisions and defines goals of care.    Sherley Frias RN, MSN  Palliative Medicine  P: [phone]

## 2025-07-01 NOTE — PROGRESS NOTES
Palliative Medicine Progress Note  [facility]: [phone]     Patient Name: Beny Knapp  YOB: 1965    Date of Service: 2025  Provider: OCTAVIO Gasca CNP  Admit Date: 2025  Referring Provider:  Jocelyne Richmond MD    Reason for Consult: goals of care       HISTORY OF PRESENT ILLNESS:     Beny Knapp is a 59 y.o. with a past history of COPD on chronic/frequent steroid therapy, HTN, HCV cirrhosis (infection treated and HCV quant negative), tobacco use disorder (1/3- PPD), bipolar 1 disorder, and alcohol and cocaine use disorder recently at [facility] in 2025 who was admitted on 2025 when he presented to ED with sensation of foreign body in the throat with stridor. Found to have severe candidiasis and required intubation. Patient was also found to have hyponatremia. He remains in ICU on ventilator with fluctuating oxygen requirements and challenging sedation. Today is  day 17 and patient will require court order for tracheostomy and PEG.    Psychosocial: Patient has no emergency contacts on file. A relative search is underway with Case Management to identify surrogate decision-makers and verify personal history. Phone numbers for a mother, Rose Al, have not been working. Welfare check for a brother in [place], [place] determined he is . Previous medical records consistently state patient has never been . One medical record states he has one child, but lists no name or contact information.    Functional: Patient's baseline functional status was presumably independent with ADLs and IADLs, but with significant psychosocial morbidity. He is currently intubated/sedated and RASS +2 requiring escalating sedation protocol.     PHYSICAL EXAM:     From RN flowsheet:  Wt Readings from Last 3 Encounters:   25 101.1 kg (222 lb 14.2 oz)   23 111.1 kg (245 lb)     BP (!) 93/58   Pulse 72   Temp 97.5 °F (36.4 °C)

## 2025-07-01 NOTE — WOUND CARE
ICU Rounding  Wound care nurse rounded on patient for skin issues.  Patient has a Chauncey score of 13.  Does patient have any pressure injury?no per visual assessment with primary nurse PUMA Mohan     Skin Care & Pressure Relief Recommendations  Minimize layers of linen  Pads under patient to optimize support surface  Turn/reposition approximately every 2 hours  Use pillow wedges to maintain positioning but do not put pillow directly over wounds  Manage incontinence   Promote continence; Skin Protective lotion/cream to buttocks and sacrum daily and as needed with incontinence care  Offload heels pillows    Consult wound care if any wounds noted during admission.  Wound Care nurse will continue to follow during ICU admission.

## 2025-07-01 NOTE — CONSULTS
Clinical Ethics Consultation Note    History and Context:  Ethics was consulted regarding plan of care and treatment decisions due to the patient having no decision makers.    PtForrest came to [facility] via ambulance from [facility] [employer] [place] on 6/14/25 due to c/o something stuck in patient's throat and required intubation.  PMHx significant for HTN, ETOH use, COPD, smoking, polysubstance abuse including cocaine, obesity, hepatitis C, severe candidiasis, severe hyponatremia and limited history.  No NOK were able to be found.  At this point, the care team feels that it is in the patient's best interest to pursue a tracheostomy due to respiratory function.   Length of Stay: 17  Verified Advance Directive: No    Respect for Autonomy:  Mr. Knapp lacks decisional capacity per medical judgement. Respect for autonomy is, “not applicable in the context of making decisions regarding life-sustaining treatments for unrepresented patients…respecting a patient's autonomy requires the patient to have expressed an autonomous treatment preference applicable to the clinical situation at hand.” (ATS 2020 Unrepresented Patients Statement) Therefore, Mr. Knapp falls under the best interest standard.     Best Interest Standard:    If there is not adequate evidence or information of the patient's preferences and values or no surrogate decision-maker exists, medical decisions should be based upon the best interest of the patient. “Best interest decisions should be based on… the pain and suffering associated with the intervention, the degree of and potential for benefit, impairments that may result from the intervention, and quality of life as experienced by the patient.” (A Code of Ethics, 2.1.2, 2022)    Beneficence:    The care team is committed to promoting the patient's interests, doing good for the patient through medically appropriate care, and protecting the patient from undue harm. Therefore, the care team is obligated to offer

## 2025-07-01 NOTE — PROGRESS NOTES
ICU Nutrition Note Brief    Please refer to full High Risk Nutrition Assessment/Note/Recs completed 6/30/25.    Noted poss sigmoid colon stricture area narrow small focus of diverticulitis of sigmoid colon ileus per MDs; plan EGD/colonoscopy today with GI and then trach w/ENT noted.  Remains sedated on vent w/fentanyl and versed.    Off/on trickle feeds with some GRVs noted overall inadequate nutrition since admit x 17 days. Re-started trickle feeds Jevity 1.5 @ 10ml/hr 6/29 stopped again 6/30 r/t 250ml GRVs. having bowel fx noted. no PI per wound eval noted. 2.6L UOP 6/30. Phos low 2.4. ongoing diuresing on lasix good UOP. overall wt is down since admit suspect fluid w/some true wt loss given inadequate nutrition since admit as fluid likely masking wt loss.     Nutrition Recommendations/Plan:   Monitor ability to use gut and re-start TF after EGD/colonoscopy   ?Peg - defer to MD  Overall inadequate nutrition since admit ongoing x 17 days  As medically feasible re-start trickle feeds but change TF to Peptide-Based / Vital 1.5 @ 15ml/hr + 1 ProSource BID as tr via NGT  Defer free water to MD given diuresing  Will likely only receive ~80% TF r/t frequent interruptions  May be at risk for Refeeding Syndrome given prolonged inadequate nutrition  Cont to check Phos, Mg, and K and replete as needed  Cont folate and thiamine and consider add liq centrum/certa-azucena w/min as well via   Cont to monitor POC/NCP, wt trends diuresing, renal fx, lytes, UOP, bowel fx, skin integrity/wound healing and adjust recs as needed    Carlota Santacruz MS, RD  Clinical Dietitian  E: [email or web address]  O:  [phone]  C:  [phone]

## 2025-07-01 NOTE — PROGRESS NOTES
[facility]  (A Division of [facility])  Renee Stanford MD   Office Tel:  [phone] Option #8                                                               Date of Admission: 6/14/2025       ID FU for antimicrobial management suspected esophageal candidiasis   PCP: Gaby Monroe MD    C/C: stridor/SOB    Current Antimicrobials:    Prior Antimicrobials:      Metronidazoli 500 mg IV Q8 hours-6/27 to date   Zithro 6/16 to 20  CAX 6/16 to 20    Cefepime 6/20 to date # 7 days  Fluconazole 6/14-> Micafungin  150 mg daily   #14     Allergy to antibiotics- NA     Assessment:     Critically ill patient with:    Acute resp failure/ARDS- requiring high oxygen support-- Improving vent support      Underlying COPD      CXR infiltrate clear 6/24-- vent setting improving with abx treatment. Concern for PCP low at this time.       Fungitell X2-normal    Infection with Serratia/Klebsiella -resp cultures- 6/16 - treated X7 dats    Esophagitis- presumed candidal--Underlying immunodeficiency?  Fungitell < 31, galactomannan normal( 0.03)= 6/14--makes systemci fungemia/PCP less likley-> repeat testing pending  Stridor/ candidal esophagitis/intubated- 10/2024 in [place] as well- new info from his Primary MD    Colitis Vs Ileus- on CT scan 6/26-Non distended abd    Transaminitis- recurrning, inclining up--management per others    6/14 - blood cutlure X2: NGSF, resp culture- mixed GNR- normal resp kenia        -UA no pyuria, urine cx-Neg        -X-ray-bibasilar scarring/atelectasis, neck x-ray- no acute pathology        -CT CAP: Bibasilar atelectasis/pneumonia left greater than right.  Bullous emphysematous change.  Procal 0.03    6/16- KOH from mouth- no yeast, culture in progress         - respiratory culture- Klebsiella and Serratia growth--SS to cefepime, fungal culture- Candida albicans         =MRSA screen negative    Concern for immunodeficiency  -HIV 1/2- Non -reactive  magnesium sulfate 2000 mg in 50 mL IVPB premix  2,000 mg IntraVENous PRN Macho Tan DO        enoxaparin Sodium (LOVENOX) injection 30 mg  30 mg SubCUTAneous BID Macho Tan DO   30 mg at 07/01/25 0058    polyethylene glycol (GLYCOLAX) packet 17 g  17 g Oral Daily PRN Macho Tan DO   17 g at 06/23/25 1318    [Held by provider] acetaminophen (TYLENOL) tablet 650 mg  650 mg Oral Q6H PRN Macho Tan DO   650 mg at 06/23/25 0152    Or    [Held by provider] acetaminophen (TYLENOL) suppository 650 mg  650 mg Rectal Q6H PRN Macho Tan DO        sodium phosphate 15 mmol in sodium chloride 0.9 % 250 mL IVPB  15 mmol IntraVENous PRN Macho Tan DO        ondansetron (ZOFRAN) injection 4 mg  4 mg IntraVENous Q6H PRN Macho Tan DO        lubrifresh P.M. (artificial tears) ophthalmic ointment   Both Eyes Q4H Macho Tan DO   Given at 07/01/25 1236    arformoterol tartrate (BROVANA) nebulizer solution 15 mcg  15 mcg Nebulization BID RT Jocelyne Richmond MD   15 mcg at 07/01/25 0732    budesonide (PULMICORT) nebulizer suspension 500 mcg  0.5 mg Nebulization BID RT Jocelyne Richmond MD   500 mcg at 07/01/25 0732    midazolam PF (VERSED) injection 2 mg  2 mg IntraVENous Q4H PRN Jocelyne Richmond MD   2 mg at 07/01/25 0334    sulfur hexafluoride microspheres (LUMASON) 60.7-25 MG injection 2 mL  2 mL IntraVENous ONCE PRN Александр Pennington MD        glucose chewable tablet 16 g  4 tablet Oral PRN Jocelyne Richmond MD        dextrose bolus 10% 125 mL  125 mL IntraVENous PRN Jocelyne Richmond MD        Or    dextrose bolus 10% 250 mL  250 mL IntraVENous PRN Joceylne Richmond MD        glucagon injection 1 mg  1 mg SubCUTAneous PRN Jocelyne Richmond MD        dextrose 10 % infusion   IntraVENous Continuous PRN Jocelyne Richmond MD        insulin lispro (HUMALOG,ADMELOG) injection vial 0-8 Units  0-8 Units SubCUTAneous Q6H Jocelyne Richmond MD   2

## 2025-07-01 NOTE — PROGRESS NOTES
650 mg  650 mg Oral Q6H PRN Macho Tan, DO   650 mg at 06/23/25 0152    Or    [Held by provider] acetaminophen (TYLENOL) suppository 650 mg  650 mg Rectal Q6H PRN Macho Tan,         sodium phosphate 15 mmol in sodium chloride 0.9 % 250 mL IVPB  15 mmol IntraVENous PRN Macho Tan DO        ondansetron (ZOFRAN) injection 4 mg  4 mg IntraVENous Q6H PRN Macho Tan DO        lubrifresh P.M. (artificial tears) ophthalmic ointment   Both Eyes Q4H Macho Tan,    Given at 07/01/25 1236    arformoterol tartrate (BROVANA) nebulizer solution 15 mcg  15 mcg Nebulization BID RT Jocelyne Richmond MD   15 mcg at 07/01/25 0732    budesonide (PULMICORT) nebulizer suspension 500 mcg  0.5 mg Nebulization BID RT Jocelyne Richmond MD   500 mcg at 07/01/25 0732    midazolam PF (VERSED) injection 2 mg  2 mg IntraVENous Q4H PRN Jocelyne Richmond MD   2 mg at 07/01/25 0334    sulfur hexafluoride microspheres (LUMASON) 60.7-25 MG injection 2 mL  2 mL IntraVENous ONCE PRN Александр Pennington MD        glucose chewable tablet 16 g  4 tablet Oral PRN Jocelyne Richmond MD        dextrose bolus 10% 125 mL  125 mL IntraVENous PRN Jocelyne Richmond MD        Or    dextrose bolus 10% 250 mL  250 mL IntraVENous PRN Jocelyne Richmond MD        glucagon injection 1 mg  1 mg SubCUTAneous PRN Jocelyne Richmond MD        dextrose 10 % infusion   IntraVENous Continuous PRN Jocelyne Richmond MD        insulin lispro (HUMALOG,ADMELOG) injection vial 0-8 Units  0-8 Units SubCUTAneous Q6H Jocelyne Richmond MD   2 Units at 06/25/25 0556           Objective:   Intake/Output:     Intake/Output Summary (Last 24 hours) at 7/1/2025 1333  Last data filed at 7/1/2025 0800  Gross per 24 hour   Intake 2201.53 ml   Output 2575 ml   Net -373.47 ml       Vital Signs:    BP (!) 93/58   Pulse 72   Temp 97.5 °F (36.4 °C) (Axillary)   Resp 14   Ht 1.829 m (6' 0.01\")   Wt 101.1 kg (222 lb 14.2 oz)   SpO2  Aspiration pneumonia COMPARISON: None. TECHNIQUE: Supine frontal abdomen (KUB). FINDINGS: Nasogastric tube in the gastric lumen. No dilated small bowel. Stool and gas are present in the colon. No pathologic calcification. Osseous structures are age appropriate.     No acute pathology Electronically signed by Eliseo Espinosa    XR CHEST PORTABLE  Result Date: 6/24/2025  PORTABLE CHEST RADIOGRAPH/S: 6/24/2025 6:11 AM INDICATION: Acute hypoxic respiratory failure, basilar infiltrates, ET tube position COMPARISON: 6/23/2025. TECHNIQUE: Portable frontal semiupright radiograph/s of the chest. FINDINGS: The lungs are clear. The radiograph is rotated to the left, obscuring the central airways. An NG tube extends below the diaphragm.     Clear lungs. Electronically signed by Julio Alegria    XR CHEST PORTABLE  Result Date: 6/23/2025  INDICATION: Ventilated patient COMPARISON: 6/22/2025 FINDINGS: Single AP portable view of the chest demonstrates no change in position of the lines and tubes. The cardiomediastinal silhouette is unchanged. Improved depth of inspiration with mild left basilar atelectasis and small pleural effusion. No pulmonary edema or pneumothorax.     Improved depth of inspiration with mild left basilar atelectasis and small pleural effusion. Electronically signed by Dami Solo    XR CHEST PORTABLE  Result Date: 6/22/2025  EXAM: XR CHEST PORTABLE INDICATION: et tube COMPARISON: 640 FINDINGS: A portable AP radiograph of the chest was obtained at 536 hours. Cardiac monitoring leads. Tracheostomy 6.3 cm of the justine.. Improved aeration in the left lung base.. Small left effusion. Bibasilar atelectasis...  The bones and soft tissues are grossly within normal limits.     Improved aeration in the left lung base with persistent small left effusion and bibasilar atelectasis. Electronically signed by Eliseo Espinosa    XR CHEST PORTABLE  Result Date: 6/21/2025  EXAM:  XR CHEST PORTABLE INDICATION: et tube

## 2025-07-01 NOTE — PROGRESS NOTES
Otolaryngology head neck surgery    Patient seen and examined  Remains on vent  Very restless night  Discussed with Dr. Law yesterday  Patient does not appear to be ready for extubation  Patient had vent for past 17 days will make arrangements for tracheotomy  Will discuss with OR and try to place on schedule tomorrow  Will need two-physician consent    Bon Mcgovern

## 2025-07-01 NOTE — PROGRESS NOTES
Discussed with Dr. Godoe at bedside.  EGD and colonoscopy is medically necessary.  No family available for consent.  Two-physician consent signed by me and Dr. Goode.    Pedro Law MD 7/1/2025  1:56 PM

## 2025-07-01 NOTE — PROGRESS NOTES
[facility]  (A Division of [facility])  Renee Stanford MD   Office Tel:  [phone] Option #8                                                               Date of Admission: 6/14/2025       ID FU for antimicrobial management suspected esophageal candidiasis   PCP: Gaby Monroe MD    C/C: stridor/SOB    Current Antimicrobials:    Prior Antimicrobials:      Metronidazoli 500 mg IV Q8 hours-6/27 to date   Zithro 6/16 to 20  CAX 6/16 to 20    Cefepime 6/20 to date # 7 days  Fluconazole 6/14-> Micafungin  150 mg daily   #14     Allergy to antibiotics- NA     Assessment:     Critically ill patient with:    Acute resp failure/ARDS- requiring high oxygen support-- Improving vent support      Underlying COPD      CXR infiltrate clear 6/24-- vent setting improving with abx treatment. Concern for PCP low at this time.       Fungitell X2-normal    Infection with Serratia/Klebsiella -resp cultures- 6/16 - treated X7 dats    Esophagitis- presumed candidal--Underlying immunodeficiency?  Fungitell < 31, galactomannan normal( 0.03)= 6/14--makes systemci fungemia/PCP less likley-> repeat testing pending  Stridor/ candidal esophagitis/intubated- 10/2024 in [place] as well- new info from his Primary MD    Colitis Vs Ileus- on CT scan 6/26-Non distended abd    Transaminitis- recurrning, inclining up-- etiology unclear. Suspected drugs( antifungal?) Vs GB issues. Unable to do HIDA scan. No associated high fever or leucocytosis- monitoring    6/14 - blood cutlure X2: NGSF, resp culture- mixed GNR- normal resp kenia        -UA no pyuria, urine cx-Neg        -X-ray-bibasilar scarring/atelectasis, neck x-ray- no acute pathology        -CT CAP: Bibasilar atelectasis/pneumonia left greater than right.  Bullous emphysematous change.  Procal 0.03    6/16- KOH from mouth- no yeast, culture in progress         - respiratory culture- Klebsiella and Serratia growth--SS to cefepime,

## 2025-07-01 NOTE — PROGRESS NOTES
Hospitalist Progress Note    Patient: Beny Knapp MRN: [id number]  CSN: [id number]    YOB: 1965  Age: 59 y.o.  Sex: male    DOA: 6/14/2025 LOS:  LOS: 17 days          Summary  Foreign body, shortness of breath.  59 y.o.  male w/ PMH of COPD, ETOH-use and polysubstance abuse who was BIBA and presents with subjective difficulty breathing and swallowing with throat pain. Brought in by EMS for possible forign body (food) with bolus within region of velecula. ENT saw pt and ED intubated for airway protection. ENT was consulted and performed scope on pt with findings of extensive esophageal candidiasis. Nephrology was consulted for severe hyponatremia with sodium of 117 recommending NS at 75cc/hr. Intensivist was contacted as well d/t pt being intubated, on vent for airway protection.  Patient has had a prolonged hospital stay in the ICU with difficulties with sedation he has been on max sedation some limited by hypotension and bradycardia namely Precedex he has completed his 14-day course of antifungal for thrush initially ENT would not do trach due to severe edema this has improved per ENT and they are willing to trach maybe later this week in their interim he has had some ileus diverticulitis diverticulitis fecal stasis there is also a possible stricture despite multiple enemas patient has started on a colonoscopy prep and is for colonoscopy on Tuesday unfortunately there has been no family found to help with decision-making palliative care has been helping get guardianship patient is very much awake on the ventilator.  Difficult to wean off the plan is for trach after colonoscopy continue with weaning   Ethics

## 2025-07-01 NOTE — PROGRESS NOTES
Ethics committee's recommendations: Since EGD and colonoscopy are medically necessary but not emergent procedure, EGD/colonoscopy has to wait until further instruction from the court or guardianship.  Discussed with RN who is paging out to GI Dr. Goode to notify.    Pedro Law MD 7/1/2025 3:13 PM

## 2025-07-02 ENCOUNTER — APPOINTMENT (OUTPATIENT)
Facility: HOSPITAL | Age: 60
DRG: 368 | End: 2025-07-02
Payer: MEDICARE

## 2025-07-02 LAB
ALBUMIN SERPL-MCNC: 2.9 G/DL (ref 3.4–5)
ALBUMIN/GLOB SERPL: 1 (ref 0.8–1.7)
ALP SERPL-CCNC: 59 U/L (ref 45–117)
ALT SERPL-CCNC: 526 U/L (ref 10–50)
ANION GAP SERPL CALC-SCNC: 10 MMOL/L (ref 3–18)
AST SERPL-CCNC: 131 U/L (ref 10–38)
BASE EXCESS BLD CALC-SCNC: 6.9 MMOL/L
BASOPHILS # BLD: 0 K/UL (ref 0–0.1)
BASOPHILS NFR BLD: 0 % (ref 0–2)
BDY SITE: ABNORMAL
BILIRUB SERPL-MCNC: 1.2 MG/DL (ref 0.2–1)
BUN SERPL-MCNC: 24 MG/DL (ref 6–23)
BUN/CREAT SERPL: 56 (ref 12–20)
CALCIUM SERPL-MCNC: 9 MG/DL (ref 8.5–10.1)
CHLORIDE SERPL-SCNC: 101 MMOL/L (ref 98–107)
CHOLEST SERPL-MCNC: 139 MG/DL
CO2 SERPL-SCNC: 28 MMOL/L (ref 21–32)
CREAT SERPL-MCNC: 0.42 MG/DL (ref 0.6–1.3)
DIFFERENTIAL METHOD BLD: ABNORMAL
EOSINOPHIL # BLD: 0 K/UL (ref 0–0.4)
EOSINOPHIL NFR BLD: 0 % (ref 0–5)
ERYTHROCYTE [DISTWIDTH] IN BLOOD BY AUTOMATED COUNT: 13.8 % (ref 11.6–14.5)
FLUCONAZOLE MIC: NORMAL
FUNGUS ISLT: NORMAL
GAS FLOW.O2 O2 DELIVERY SYS: ABNORMAL
GAS FLOW.O2 SETTING OXYMISER: 14 BPM
GLOBULIN SER CALC-MCNC: 2.8 G/DL (ref 2–4)
GLUCOSE BLD STRIP.AUTO-MCNC: 106 MG/DL (ref 70–110)
GLUCOSE BLD STRIP.AUTO-MCNC: 109 MG/DL (ref 70–110)
GLUCOSE BLD STRIP.AUTO-MCNC: 133 MG/DL (ref 70–110)
GLUCOSE SERPL-MCNC: 121 MG/DL (ref 74–108)
HCO3 BLD-SCNC: 31 MMOL/L (ref 21–28)
HCT VFR BLD AUTO: 32.9 % (ref 36–48)
HDLC SERPL-MCNC: 41 MG/DL (ref 40–60)
HDLC SERPL: 3.4 (ref 0–5)
HGB BLD-MCNC: 11.1 G/DL (ref 13–16)
IMM GRANULOCYTES # BLD AUTO: 0.07 K/UL (ref 0–0.04)
IMM GRANULOCYTES NFR BLD AUTO: 0.9 % (ref 0–0.5)
LDLC SERPL CALC-MCNC: 76 MG/DL (ref 0–100)
LYMPHOCYTES # BLD: 0.49 K/UL (ref 0.9–3.3)
LYMPHOCYTES NFR BLD: 6 % (ref 21–52)
MCH RBC QN AUTO: 29.4 PG (ref 24–34)
MCHC RBC AUTO-ENTMCNC: 33.7 G/DL (ref 31–37)
MCV RBC AUTO: 87 FL (ref 78–100)
MONOCYTES # BLD: 0.35 K/UL (ref 0.05–1.2)
MONOCYTES NFR BLD: 4.3 % (ref 3–10)
NEUTS SEG # BLD: 7.29 K/UL (ref 1.8–8)
NEUTS SEG NFR BLD: 88.8 % (ref 40–73)
NRBC # BLD: 0 K/UL (ref 0–0.01)
NRBC BLD-RTO: 0 PER 100 WBC
O2/TOTAL GAS SETTING VFR VENT: 40 %
PCO2 BLD: 41.2 MMHG (ref 35–48)
PEEP RESPIRATORY: 8 CMH2O
PH BLD: 7.48 (ref 7.35–7.45)
PHOSPHATE SERPL-MCNC: 3.2 MG/DL (ref 2.5–4.9)
PLATELET # BLD AUTO: 182 K/UL (ref 135–420)
PMV BLD AUTO: 9.9 FL (ref 9.2–11.8)
PO2 BLD: 72 MMHG (ref 83–108)
POTASSIUM SERPL-SCNC: 3.6 MMOL/L (ref 3.5–5.5)
PROT SERPL-MCNC: 5.7 G/DL (ref 6.4–8.2)
RBC # BLD AUTO: 3.78 M/UL (ref 4.35–5.65)
SAO2 % BLD: 95.3 % (ref 92–97)
SERVICE CMNT-IMP: ABNORMAL
SODIUM SERPL-SCNC: 139 MMOL/L (ref 136–145)
SPECIMEN SOURCE: NORMAL
SPECIMEN TYPE: ABNORMAL
TRIGL SERPL-MCNC: 108 MG/DL (ref 0–150)
VENTILATION MODE VENT: ABNORMAL
VLDLC SERPL CALC-MCNC: 22 MG/DL
VT SETTING VENT: 520 ML
WBC # BLD AUTO: 8.2 K/UL (ref 4.6–13.2)

## 2025-07-02 PROCEDURE — 36600 WITHDRAWAL OF ARTERIAL BLOOD: CPT

## 2025-07-02 PROCEDURE — 80061 LIPID PANEL: CPT

## 2025-07-02 PROCEDURE — 6360000002 HC RX W HCPCS: Performed by: INTERNAL MEDICINE

## 2025-07-02 PROCEDURE — 82803 BLOOD GASES ANY COMBINATION: CPT

## 2025-07-02 PROCEDURE — 2700000000 HC OXYGEN THERAPY PER DAY

## 2025-07-02 PROCEDURE — 82962 GLUCOSE BLOOD TEST: CPT

## 2025-07-02 PROCEDURE — 80053 COMPREHEN METABOLIC PANEL: CPT

## 2025-07-02 PROCEDURE — 2500000003 HC RX 250 WO HCPCS: Performed by: INTERNAL MEDICINE

## 2025-07-02 PROCEDURE — 94640 AIRWAY INHALATION TREATMENT: CPT

## 2025-07-02 PROCEDURE — 94669 MECHANICAL CHEST WALL OSCILL: CPT

## 2025-07-02 PROCEDURE — 94668 MNPJ CHEST WALL SBSQ: CPT

## 2025-07-02 PROCEDURE — 94003 VENT MGMT INPAT SUBQ DAY: CPT

## 2025-07-02 PROCEDURE — 2000000000 HC ICU R&B

## 2025-07-02 PROCEDURE — 85025 COMPLETE CBC W/AUTO DIFF WBC: CPT

## 2025-07-02 PROCEDURE — 2580000003 HC RX 258: Performed by: INTERNAL MEDICINE

## 2025-07-02 PROCEDURE — 84100 ASSAY OF PHOSPHORUS: CPT

## 2025-07-02 PROCEDURE — 71045 X-RAY EXAM CHEST 1 VIEW: CPT

## 2025-07-02 PROCEDURE — 6370000000 HC RX 637 (ALT 250 FOR IP): Performed by: INTERNAL MEDICINE

## 2025-07-02 RX ADMIN — BUDESONIDE 500 MCG: 0.5 INHALANT RESPIRATORY (INHALATION) at 20:52

## 2025-07-02 RX ADMIN — FENTANYL CITRATE 75 MCG/HR: 50 INJECTION INTRAVENOUS at 19:36

## 2025-07-02 RX ADMIN — MINERAL OIL, PETROLATUM: 425; 568 OINTMENT OPHTHALMIC at 17:26

## 2025-07-02 RX ADMIN — ENOXAPARIN SODIUM 30 MG: 100 INJECTION SUBCUTANEOUS at 00:46

## 2025-07-02 RX ADMIN — POLYETHYLENE GLYCOL 3350 17 G: 17 POWDER, FOR SOLUTION ORAL at 07:43

## 2025-07-02 RX ADMIN — DORNASE ALFA 2.5 MG: 1 SOLUTION RESPIRATORY (INHALATION) at 07:35

## 2025-07-02 RX ADMIN — ACETYLCYSTEINE 400 MG: 100 SOLUTION RESPIRATORY (INHALATION) at 02:56

## 2025-07-02 RX ADMIN — PROPOFOL 20 MCG/KG/MIN: 10 INJECTION, EMULSION INTRAVENOUS at 05:56

## 2025-07-02 RX ADMIN — IPRATROPIUM BROMIDE 0.5 MG: 0.5 SOLUTION RESPIRATORY (INHALATION) at 07:35

## 2025-07-02 RX ADMIN — METRONIDAZOLE 500 MG: 500 INJECTION, SOLUTION INTRAVENOUS at 23:49

## 2025-07-02 RX ADMIN — IPRATROPIUM BROMIDE 0.5 MG: 0.5 SOLUTION RESPIRATORY (INHALATION) at 02:56

## 2025-07-02 RX ADMIN — BUDESONIDE 500 MCG: 0.5 INHALANT RESPIRATORY (INHALATION) at 07:35

## 2025-07-02 RX ADMIN — THIAMINE HYDROCHLORIDE 100 MG: 100 INJECTION, SOLUTION INTRAMUSCULAR; INTRAVENOUS at 07:43

## 2025-07-02 RX ADMIN — ACETYLCYSTEINE 400 MG: 100 SOLUTION RESPIRATORY (INHALATION) at 20:52

## 2025-07-02 RX ADMIN — MIDAZOLAM 5 MG/HR: 5 INJECTION INTRAMUSCULAR; INTRAVENOUS at 05:17

## 2025-07-02 RX ADMIN — MINERAL OIL, PETROLATUM: 425; 568 OINTMENT OPHTHALMIC at 05:20

## 2025-07-02 RX ADMIN — FOLIC ACID 1 MG: 5 INJECTION, SOLUTION INTRAMUSCULAR; INTRAVENOUS; SUBCUTANEOUS at 08:02

## 2025-07-02 RX ADMIN — MINERAL OIL, PETROLATUM: 425; 568 OINTMENT OPHTHALMIC at 08:04

## 2025-07-02 RX ADMIN — PROPOFOL 30 MCG/KG/MIN: 10 INJECTION, EMULSION INTRAVENOUS at 00:49

## 2025-07-02 RX ADMIN — DORNASE ALFA 2.5 MG: 1 SOLUTION RESPIRATORY (INHALATION) at 20:52

## 2025-07-02 RX ADMIN — FUROSEMIDE 20 MG: 10 INJECTION, SOLUTION INTRAMUSCULAR; INTRAVENOUS at 07:43

## 2025-07-02 RX ADMIN — SODIUM CHLORIDE, PRESERVATIVE FREE 10 ML: 5 INJECTION INTRAVENOUS at 20:48

## 2025-07-02 RX ADMIN — METRONIDAZOLE 500 MG: 500 INJECTION, SOLUTION INTRAVENOUS at 17:04

## 2025-07-02 RX ADMIN — MINERAL OIL, PETROLATUM: 425; 568 OINTMENT OPHTHALMIC at 20:48

## 2025-07-02 RX ADMIN — ENOXAPARIN SODIUM 30 MG: 100 INJECTION SUBCUTANEOUS at 15:04

## 2025-07-02 RX ADMIN — ACETYLCYSTEINE 400 MG: 100 SOLUTION RESPIRATORY (INHALATION) at 16:13

## 2025-07-02 RX ADMIN — METRONIDAZOLE 500 MG: 500 INJECTION, SOLUTION INTRAVENOUS at 08:37

## 2025-07-02 RX ADMIN — ALBUTEROL SULFATE 2.5 MG: 2.5 SOLUTION RESPIRATORY (INHALATION) at 16:13

## 2025-07-02 RX ADMIN — IPRATROPIUM BROMIDE 0.5 MG: 0.5 SOLUTION RESPIRATORY (INHALATION) at 13:24

## 2025-07-02 RX ADMIN — ARFORMOTEROL TARTRATE 15 MCG: 15 SOLUTION RESPIRATORY (INHALATION) at 20:52

## 2025-07-02 RX ADMIN — MINERAL OIL, PETROLATUM: 425; 568 OINTMENT OPHTHALMIC at 00:46

## 2025-07-02 RX ADMIN — ARFORMOTEROL TARTRATE 15 MCG: 15 SOLUTION RESPIRATORY (INHALATION) at 07:35

## 2025-07-02 RX ADMIN — PROPOFOL 25 MCG/KG/MIN: 10 INJECTION, EMULSION INTRAVENOUS at 20:43

## 2025-07-02 RX ADMIN — WATER 20 MG: 1 INJECTION INTRAMUSCULAR; INTRAVENOUS; SUBCUTANEOUS at 06:23

## 2025-07-02 RX ADMIN — FENTANYL CITRATE 75 MCG/HR: 50 INJECTION INTRAVENOUS at 05:55

## 2025-07-02 RX ADMIN — PROPOFOL 20 MCG/KG/MIN: 10 INJECTION, EMULSION INTRAVENOUS at 14:33

## 2025-07-02 RX ADMIN — IPRATROPIUM BROMIDE 0.5 MG: 0.5 SOLUTION RESPIRATORY (INHALATION) at 20:52

## 2025-07-02 RX ADMIN — MINERAL OIL, PETROLATUM: 425; 568 OINTMENT OPHTHALMIC at 13:45

## 2025-07-02 RX ADMIN — SODIUM CHLORIDE, PRESERVATIVE FREE 40 MG: 5 INJECTION INTRAVENOUS at 07:43

## 2025-07-02 RX ADMIN — WATER 20 MG: 1 INJECTION INTRAMUSCULAR; INTRAVENOUS; SUBCUTANEOUS at 20:48

## 2025-07-02 ASSESSMENT — PAIN SCALES - GENERAL
PAINLEVEL_OUTOF10: 0

## 2025-07-02 ASSESSMENT — PULMONARY FUNCTION TESTS
PIF_VALUE: 15
PIF_VALUE: 18
PIF_VALUE: 16
PIF_VALUE: 19
PIF_VALUE: 18

## 2025-07-02 ASSESSMENT — PAIN SCALES - WONG BAKER
WONGBAKER_NUMERICALRESPONSE: NO HURT
WONGBAKER_NUMERICALRESPONSE: NO HURT

## 2025-07-02 NOTE — PROGRESS NOTES
Tracheostomy placement.  Dr. Mcgovern on standby.    Ventilator bundle & Sedation protocol followed. Daily sedation holiday, assessment for readiness for SBT and then re-titrate if required. Chlorhexidine mouth washes.   Mech. Ventilated patients- aim to keep peak plateau pressure less than or equal to 30cm H2O.  Keep SPO2 >=92%. HOB 30 degree elevation all the time. Aggressive pulmonary toileting. Aspiration precautions, pulmonary hygiene care.    CVS:   Sinus bradycardia in 40s; Precedex discontinued on 6/30/2025.  Hemodynamically stable; continue to monitor.  IV albumin as needed.  Continue Lasix 20 mg IV daily.  Diuresing well.    Echo 6/14/25: normal LV systolic function; EF 55 to 60%; normal diastolic function; no significant valvular heart disease; pulmonary pressures not reported; no pericardial effusion. No inter-atrial shunt.    Avoid beta-blocker due to cocaine use.  Troponin and proBNP not elevated on admission.    ID: Low-grade fever last 6/23/25 evening; no fever further.  Leukocytosis likely related to steroid-resolved  Microbiology reviewed  Blood cultures 6/14/2025: No growth, final.  Urine culture 6/14/2025: No growth, final  Respiratory culture 6/16/25: Serratia, Klebsiella-sensitive to ceftriaxone.  Respiratory culture 6/14/2025: Mixed gram-negative rods  MRSA culture 6/16/2025: MRSA not present  Oral fungal culture 6/16/2025: heavy Candida Albicans     HIV testing-negative.  CD4 132-low.  Immunoglobulins-IgG, IgM normal; IgA high.  Hep C-positive.  Urine Legionella and pneumococcal antigen-negative.  Serum Fungitell and Aspergillus galactomannan-negative.   Repeat Fungitell 6/25/25: <31.25.    IV antibiotics: S/p azithromycin.  Ceftriaxone switched to cefepime by ID from 6/20/25-6/28/25; completed 7-day course.  IV antifungal: Diflucan caused elevated LFT.  Completed micafungin x 14 days.  Flagyl for reported small sigmoid segment diverticulitis on CT abd 6/26/25    Severe candidiasis in the  for: \"AMYLASE\", \"LIPASE\"  No results for input(s): \"AMYLASE\", \"LIPASE\" in the last 72 hours.    Lipase: No results found for: \"LIPASE\"  No results for input(s): \"LIPASE\" in the last 72 hours.    Ammonis:   Lab Results   Component Value Date/Time    AMMONIA 12 06/29/2023 02:11 AM      No results for input(s): \"AMMONIA\" in the last 72 hours.     Lactic Acid Lactic Acid, Plasma   Date Value Ref Range Status   06/29/2023 1.8 0.4 - 2.0 MMOL/L Final     POC Lactic Acid   Date Value Ref Range Status   06/17/2025 1.66 0.40 - 2.00 mmol/L Final   06/14/2025 <0.40 (L) 0.40 - 2.00 mmol/L Final       No results for input(s): \"LACACIDPL\" in the last 72 hours.     CBC w/Diff Recent Labs     06/30/25  0409 07/01/25  0313 07/02/25  0438   WBC 6.2 8.7 8.2   RBC 3.74* 3.73* 3.78*   HGB 11.0* 11.0* 11.1*   HCT 32.7* 32.5* 32.9*   MCV 87.4 87.1 87.0   MCH 29.4 29.5 29.4   MCHC 33.6 33.8 33.7   RDW 13.5 13.4 13.8    208 182   MPV 10.1 9.9 9.9         Cardiac Enzymes Troponin, High Sensitivity   Date/Time Value Ref Range Status   06/29/2023 04:16 AM 6 0 - 78 ng/L Final     Comment:     A HS troponin value change of (+ or -) 50% or more below the 99th percentile, in a 1/2/3 hr interval represents a significant change. Clinical correlation is recommended.  A HS troponin value change of (+ or -) 20% or above the 99th percentile, in a 1/2/3 hr interval represents a significant change. Clinical correlation is recommended.  99th Percentile:    Women:  0-54 ng/L                                                               Men:  0-78 ng/L     06/29/2023 02:11 AM 6 0 - 78 ng/L Final     Comment:     A HS troponin value change of (+ or -) 50% or more below the 99th percentile, in a 1/2/3 hr interval represents a significant change. Clinical correlation is recommended.  A HS troponin value change of (+ or -) 20% or above the 99th percentile, in a 1/2/3 hr interval represents a significant change. Clinical correlation is recommended.  99th

## 2025-07-02 NOTE — PROGRESS NOTES
Palliative Medicine    CODE STATUS: FULL CODE     AMD Status: none on file. Care management team has initiated guardianship proceedings.     0940 Seen today in room 104.  Lying supine with eyes open. Calm. Did not attempt to communicate with him as this often agitates him. Intubated/ventilated/sedated. Intubated 6/14/2025 FiO2 40% PEEP 8. Fentanyl, versed, and propofol gtts infusing.    Did not have EGD yesterday awaiting court approval for non-emergent procedure.     Disposition plan: to be determined based on response to medical treatment, medical recommendations,  and patient/family decisions      Palliative Medicine will continue to follow Beny Knapp during his hospitalization and support him as he makes healthcare decisions and defines goals of care    Sherley Frias RN, MSN  Palliative Medicine  P: [phone]

## 2025-07-02 NOTE — PROGRESS NOTES
Comprehensive High Risk Nutrition Assessment Follow-Up    Type and Reason for Visit:  Reassess    Nutrition Recommendations/Plan:   Overall inadequate nutrition since admit ongoing x 18 days  As medically feasible rec trickle feeds Peptide-Based / Vital 1.2 @ 15ml/hr + 1 ProSource BID as tr via OGT x 24-48hrs - ok to order per MD today  If tr slow adv 10ml/hr Q12hr to goal rates below:  With Propofol @ 35ml/hr goal + 1 ProSource BID  W/o Propofol @ 55ml/hr goal + 1 ProSource BID as tr provides 1704kcal, 129g pro, 1070ml FW   Defer free water to MD given diuresing - 200ml Q6hr ordered  Will likely only receive ~80% TF r/t frequent interruptions  May be at risk for Refeeding Syndrome given prolonged inadequate nutrition  Cont to check Phos, Mg, and K and replete as needed  Cont folate and thiamine and consider add liq centrum/certa-azucena w/min as well via   Cont to monitor POC/NCP, wt trends diuresing, renal fx, lytes, UOP, bowel fx, skin integrity/wound healing and adjust recs as needed     Malnutrition Assessment:  Malnutrition Status:  Moderate malnutrition (07/02/25 1200)    Context:  Acute Illness     Findings of the 6 clinical characteristics of malnutrition:  Energy Intake:  50% or less of estimated energy requirements for 5 or more days  Weight Loss:  Greater than 2% over 1 week (suspected)     Body Fat Loss:  Moderate body fat loss Orbital   Muscle Mass Loss:  Moderate muscle mass loss Calf (gastrocnemius)  Fluid Accumulation:  Unable to assess     Strength:  Not Performed    Nutrition Assessment:    60yo M remains in ICU sedated on vent now w/propofol @ 12.1ml/hr versed/fentanyl gtt, no pressors, EGD/colonoscopy, trach canceled until court order per hospital ethics committee, no n/v/d, hemodynamically stable, TF to restart, liq stool after bowel prep +FMS in place per MD.  spoke to nursing yesterday GI rec start TF w/elemental TF RD left recs discussed w/MD today ok to order RD discussed w/nursing as

## 2025-07-02 NOTE — PROGRESS NOTES
Hospitalist ICU Progress Note      Patient name: Beny Knapp.  MRN: [id number]          PCP: Gaby Monroe MD     Summary:      Beny Knapp is a 59 y.o.  male w/ PMH of COPD, ETOH-use and polysubstance abuse who was BIBA and presents with subjective difficulty breathing and swallowing with throat pain. Brought in by EMS for possible forign body (food) with bolus within region of velecula. ENT saw pt and ED intubated for airway protection. ENT was consulted and performed scope on pt with findings of extensive esophageal candidiasis. Nephrology was consulted for severe hyponatremia with sodium of 117 recommending NS at 75cc/hr.   Patient has had a prolonged hospital stay in the ICU with difficulties with sedation requiring max sedation- at times, limited by hypotension and bradycardia.  Pt completed his 14-day course of antifungal for thrush. Initially, ENT would not do trach due to severe edema which has now improved.   In the interim, attempts made to reach a next-of-kin for decision making, which was not successful thus Ethics was consulted.  Pt also had ileus w/ diverticulitis and fecal stasis & possible stricture.  Despite multiple enemas, GI consulted for colonoscopy/decompression.   This was placed on hold while establishing/contacting NOK.          Seen in ICU    Assessment/Plan:  Medical Complexity: High-1 acute medical problem with high risk threat, at least 3 follow up items    # Acute airway obstruction - Intubated to protect airway  # Serratia & klebsiella VAP  Evaluated by ENT- initially nasal trumpet was placed w/o any significant improvement.  Underwent flexible fiberoptic nasolaryngoscopy-  showing acute airway obstruction w/ dramatic amount of thrush noted in oropharynx, hypopharynx & laryngeal area obstructing the larynx.  Significant candidal esophagitis.   Treated with extended course of antifungals  (now completed)  CXR : persistent but improved bilateral lower lobe  obtained through the neck, chest, abdomen and pelvis. Coronal and sagittal reconstructions were generated. ORAL CONTRAST: None CT dose reduction was achieved through use of a standardized protocol tailored for this examination and automatic exposure control for dose modulation. Adaptive statistical iterative reconstruction (ASIR) was utilized. FINDINGS: NASOPHARYNX: Normal. SUPRAHYOID NECK: ET tube and NG tube in place. There is ethmoid and maxillary sinus disease.. INFRAHYOID NECK: Normal larynx, hypopharynx and supraglottis. PAROTID GLANDS: Normal. SUBMANDIBULAR GLANDS: Normal. THYROID GLAND: No suspicious nodule.. MEDIASTINUM: ET tube and NG tube in place. Patulous esophagus. No hilar or mediastinal lymphadenopathy. No esophageal mass. No endotracheal or endobronchial mass. PLEURA/LUNGS:: Left greater than right basilar atelectasis/pneumonia. Bullous emphysematous change. SOFT TISSUE/ AXILLA:  No mass or lymphadenopathy.  CORONARY CALCIUM: Not visualized. LIVER/GALLBLADDER:  : Hepatic steatosis and cholelithiasis. There is no intrahepatic duct dilatation. There is no hepatic parenchymal mass. Hepatic enhancement pattern is within normal limits. Portal vein is patent. SPLEEN/PANCREAS: No mass.  There is no pancreatic duct dilatation. There is no evidence of splenomegaly. ADRENALS/KIDNEYS: Punctate nonobstructive left renal calculus. There is no hydronephrosis. There is no renal mass. There is no perinephric mass. STOMACH: No dilatation or wall thickening. COLON AND SMALL BOWEL: Colonic diverticulosis. There is no free intraperitoneal air. There is no evidence of incarceration or obstruction. No mesenteric adenopathy. PERITONEUM: Unremarkable. APPENDIX: Unremarkable. BLADDER/REPRODUCTIVE ORGANS: No mass or calculus. RETROPERITONEUM: Unremarkable. The abdominal aorta is normal in caliber. No aneurysm. No retroperitoneal adenopathy. OSSEOUS STRUCTURES: No destructive bone lesion.     Bibasilar atelectasis/pneumonia

## 2025-07-02 NOTE — PLAN OF CARE
Problem: Safety - Medical Restraint  Goal: Remains free of injury from restraints (Restraint for Interference with Medical Device)  Description: INTERVENTIONS:  1. Determine that other, less restrictive measures have been tried or would not be effective before applying the restraint  2. Evaluate the patient's condition at the time of restraint application  3. Inform patient/family regarding the reason for restraint  4. Q2H: Monitor safety, psychosocial status, comfort, nutrition and hydration  7/1/2025 2205 by Kedar Theodore, RN  Outcome: Progressing  Flowsheets  Taken 7/1/2025 2200  Remains free of injury from restraints (restraint for interference with medical device):   Determine that other, less restrictive measures have been tried or would not be effective before applying the restraint   Evaluate the patient's condition at the time of restraint application   Inform patient/family regarding the reason for restraint   Every 2 hours: Monitor safety, psychosocial status, comfort, nutrition and hydration  Taken 7/1/2025 2000  Remains free of injury from restraints (restraint for interference with medical device):   Determine that other, less restrictive measures have been tried or would not be effective before applying the restraint   Evaluate the patient's condition at the time of restraint application   Inform patient/family regarding the reason for restraint   Every 2 hours: Monitor safety, psychosocial status, comfort, nutrition and hydration  7/1/2025 0949 by Kimberlee Doan, RN  Outcome: Progressing  Flowsheets (Taken 7/1/2025 0800)  Remains free of injury from restraints (restraint for interference with medical device):   Determine that other, less restrictive measures have been tried or would not be effective before applying the restraint   Evaluate the patient's condition at the time of restraint application   Inform patient/family regarding the reason for restraint   Every 2 hours: Monitor safety,

## 2025-07-02 NOTE — CARE COORDINATION
CM reached back out to Mr. Houston regarding NOK search for patient. Mr. Michelle stated the phone number for patients sister, Afshan Al, is [phone]. CM attempted to reach Ms. Al at the number provided. No answer, LVM for a return call to [phone]. 1352 - Afshan Al returned call. Patients two patient identifiers verified. Ms. Al updated on patients current inpatient status in the ICU. Ms. Al stated she is in the local area and will come to the hospital later today. Dr. Kumar updated via [employer].

## 2025-07-02 NOTE — CARE COORDINATION
Third advanced search request from the legal department returned a phone number [phone] belonging to Newton Houston (patients brother-in-law). Mr. Houston confirmed his spouse is Afshan Al and her brother is Beny Knapp. Mr. Houston stated he will reach out to his spouse and have her contact CM at [phone].

## 2025-07-02 NOTE — PROGRESS NOTES
[facility]  (A Division of [facility])  Renee Stanford MD   Office Tel:  [phone] Option #8                                                               Date of Admission: 6/14/2025       ID FU for antimicrobial management suspected esophageal candidiasis   PCP: Gaby Monroe MD    C/C: stridor/SOB    Current Antimicrobials:    Prior Antimicrobials:      Metronidazoli 500 mg IV Q8 hours-6/27 to date   Zithro 6/16 to 20  CAX 6/16 to 20    Cefepime 6/20 to date # 7 days  Fluconazole 6/14-> Micafungin  150 mg daily   #14     Allergy to antibiotics- NA     Assessment:     Critically ill patient with:    Acute resp failure/ARDS- requiring high oxygen support-- Improving vent support      Underlying COPD      CXR infiltrate clear 6/24-- vent setting improving with abx treatment. Concern for PCP low at this time.       Fungitell X2-normal    Infection with Serratia/Klebsiella -resp cultures- 6/16 - treated X7 dats    Esophagitis- presumed candidal--Underlying immunodeficiency?  Fungitell < 31, galactomannan normal( 0.03)= 6/14--makes systemci fungemia/PCP less likley-> repeat testing pending  Stridor/ candidal esophagitis/intubated- 10/2024 in [place] as well- new info from his Primary MD    Colitis Vs Ileus- on CT scan 6/26-Non distended abd    Transaminitis- recurrning, inclining up--management per others    6/14 - blood cutlure X2: NGSF, resp culture- mixed GNR- normal resp kenia        -UA no pyuria, urine cx-Neg        -X-ray-bibasilar scarring/atelectasis, neck x-ray- no acute pathology        -CT CAP: Bibasilar atelectasis/pneumonia left greater than right.  Bullous emphysematous change.  Procal 0.03    6/16- KOH from mouth- no yeast, culture in progress         - respiratory culture- Klebsiella and Serratia growth--SS to cefepime, fungal culture- Candida albicans         =MRSA screen negative    Concern for immunodeficiency  -HIV 1/2- Non -reactive  (Oral)   Resp 14   Ht 1.829 m (6' 0.01\")   Wt 101.1 kg (222 lb 14.2 oz)   SpO2 96%   BMI 30.22 kg/m²   Temp (24hrs), Av.6 °F (36.4 °C), Min:97.1 °F (36.2 °C), Max:97.9 °F (36.6 °C)      Lines: PIV BL arms      General:   WD obese intubated    Skin:   no diffuse rash-bruises on chest/abd noted  No diffuse rash   HEENT:  Normocephalic, atraumatic, ET/OT in place  Oral mucosa visible- no thrush       Lungs:   non-labored, bilaterally clear   Heart:  RRR, s1 and s2; no murmurs    Abdomen:  soft, non-distended, active bowel sounds. Non-tender-FMS in place   Genitourinary: Rai-in   Extremities:   no clubbing, cyanosis; no joint effusions   Neurologic:  Intubated   Psychiatric:  Intubated     Labs: Results:   Chemistry Recent Labs     25  0409 25  0313 25  0438   * 140 139   K 4.0 3.5 3.6   CL 98 100 101   CO2 27 28 28   BUN 26* 21 24*   GLOB 2.7 2.6 2.8      CBC w/Diff Recent Labs     25  04025  0313 25  0438   WBC 6.2 8.7 8.2   RBC 3.74* 3.73* 3.78*   HGB 11.0* 11.0* 11.1*   HCT 32.7* 32.5* 32.9*    208 182            No results found for: \"SDES\" No components found for: \"CULT\"         Imaging:   All imaging reviewed from Admission to present as per radiology interpretation in [facility]    Radiology reports reviewed:    [facility] IR TECHNOLOGIST SERVICE  Result Date: 2025  Automatic Administrative Result. Imaging Guidance used by the IR Department.  See the Patient Chart for specific details.      Imaging guidance utilized by the IR Department.    XR CHEST PORTABLE  Result Date: 2025  EXAM:  XR CHEST PORTABLE INDICATION: Ventilator COMPARISON: 2025 TECHNIQUE: portable chest AP view FINDINGS: Endotracheal tube and nasogastric tube are stable in position. Right internal jugular central venous catheter has been removed. The cardiac silhouette is within normal limits. There is increased bibasilar airspace disease. Small bilateral pleural effusions are

## 2025-07-02 NOTE — PLAN OF CARE
Problem: Safety - Medical Restraint  Goal: Remains free of injury from restraints (Restraint for Interference with Medical Device)  Description: INTERVENTIONS:  1. Determine that other, less restrictive measures have been tried or would not be effective before applying the restraint  2. Evaluate the patient's condition at the time of restraint application  3. Inform patient/family regarding the reason for restraint  4. Q2H: Monitor safety, psychosocial status, comfort, nutrition and hydration  7/2/2025 1156 by Brayan Calhoun RN  Outcome: Progressing  Flowsheets (Taken 7/1/2025 2200 by Kedar Theodore RN)  Remains free of injury from restraints (restraint for interference with medical device):   Determine that other, less restrictive measures have been tried or would not be effective before applying the restraint   Evaluate the patient's condition at the time of restraint application   Inform patient/family regarding the reason for restraint   Every 2 hours: Monitor safety, psychosocial status, comfort, nutrition and hydration  7/1/2025 2205 by Kedar Theodore RN  Outcome: Progressing  Flowsheets  Taken 7/1/2025 2200  Remains free of injury from restraints (restraint for interference with medical device):   Determine that other, less restrictive measures have been tried or would not be effective before applying the restraint   Evaluate the patient's condition at the time of restraint application   Inform patient/family regarding the reason for restraint   Every 2 hours: Monitor safety, psychosocial status, comfort, nutrition and hydration  Taken 7/1/2025 2000  Remains free of injury from restraints (restraint for interference with medical device):   Determine that other, less restrictive measures have been tried or would not be effective before applying the restraint   Evaluate the patient's condition at the time of restraint application   Inform patient/family regarding the reason for restraint   Every 2

## 2025-07-02 NOTE — WOUND CARE
ICU Rounding  Wound care nurse rounded on patient for skin issues.  Patient has a Chauncey score of 13.  Does patient have any pressure injury?no per primary nurse PUMA Ponce     Skin Care & Pressure Relief Recommendations  Minimize layers of linen  Pads under patient to optimize support surface  Turn/reposition approximately every 2 hours  Use pillow wedges to maintain positioning but do not put pillow directly over wounds  Manage incontinence   Promote continence; Skin Protective lotion/cream to buttocks and sacrum daily and as needed with incontinence care  Offload heels pillows    Consult wound care if any wounds noted during admission.  Wound Care nurse will continue to follow during ICU admission.    IV discontinued, cath removed intact

## 2025-07-02 NOTE — PROGRESS NOTES
Otolaryngology head neck surgery  Patient originally on schedule today for tracheotomy  Informed by palliative care that order needed prior to placement of tracheotomy  We will hold off with planned placement   Will follow

## 2025-07-03 ENCOUNTER — APPOINTMENT (OUTPATIENT)
Facility: HOSPITAL | Age: 60
DRG: 368 | End: 2025-07-03
Payer: MEDICARE

## 2025-07-03 LAB
ALBUMIN SERPL-MCNC: 2.8 G/DL (ref 3.4–5)
ALBUMIN/GLOB SERPL: 1.1 (ref 0.8–1.7)
ALP SERPL-CCNC: 62 U/L (ref 45–117)
ALT SERPL-CCNC: 499 U/L (ref 10–50)
ANION GAP SERPL CALC-SCNC: 11 MMOL/L (ref 3–18)
AST SERPL-CCNC: 120 U/L (ref 10–38)
BASOPHILS # BLD: 0.01 K/UL (ref 0–0.1)
BASOPHILS NFR BLD: 0.1 % (ref 0–2)
BILIRUB SERPL-MCNC: 1 MG/DL (ref 0.2–1)
BUN SERPL-MCNC: 23 MG/DL (ref 6–23)
BUN/CREAT SERPL: 49 (ref 12–20)
CALCIUM SERPL-MCNC: 9 MG/DL (ref 8.5–10.1)
CHLORIDE SERPL-SCNC: 102 MMOL/L (ref 98–107)
CHOLEST SERPL-MCNC: 138 MG/DL
CO2 SERPL-SCNC: 28 MMOL/L (ref 21–32)
CREAT SERPL-MCNC: 0.47 MG/DL (ref 0.6–1.3)
DIFFERENTIAL METHOD BLD: ABNORMAL
EOSINOPHIL # BLD: 0 K/UL (ref 0–0.4)
EOSINOPHIL NFR BLD: 0 % (ref 0–5)
ERYTHROCYTE [DISTWIDTH] IN BLOOD BY AUTOMATED COUNT: 13.9 % (ref 11.6–14.5)
GLOBULIN SER CALC-MCNC: 2.5 G/DL (ref 2–4)
GLUCOSE BLD STRIP.AUTO-MCNC: 108 MG/DL (ref 70–110)
GLUCOSE BLD STRIP.AUTO-MCNC: 117 MG/DL (ref 70–110)
GLUCOSE BLD STRIP.AUTO-MCNC: 134 MG/DL (ref 70–110)
GLUCOSE BLD STRIP.AUTO-MCNC: 136 MG/DL (ref 70–110)
GLUCOSE SERPL-MCNC: 135 MG/DL (ref 74–108)
HCT VFR BLD AUTO: 33 % (ref 36–48)
HDLC SERPL-MCNC: 44 MG/DL (ref 40–60)
HDLC SERPL: 3.2 (ref 0–5)
HGB BLD-MCNC: 11.1 G/DL (ref 13–16)
IMM GRANULOCYTES # BLD AUTO: 0.19 K/UL (ref 0–0.04)
IMM GRANULOCYTES NFR BLD AUTO: 1.9 % (ref 0–0.5)
LDLC SERPL CALC-MCNC: 74 MG/DL (ref 0–100)
LYMPHOCYTES # BLD: 0.47 K/UL (ref 0.9–3.3)
LYMPHOCYTES NFR BLD: 4.8 % (ref 21–52)
MCH RBC QN AUTO: 29.2 PG (ref 24–34)
MCHC RBC AUTO-ENTMCNC: 33.6 G/DL (ref 31–37)
MCV RBC AUTO: 86.8 FL (ref 78–100)
MONOCYTES # BLD: 0.41 K/UL (ref 0.05–1.2)
MONOCYTES NFR BLD: 4.2 % (ref 3–10)
NEUTS SEG # BLD: 8.79 K/UL (ref 1.8–8)
NEUTS SEG NFR BLD: 89 % (ref 40–73)
NRBC # BLD: 0 K/UL (ref 0–0.01)
NRBC BLD-RTO: 0 PER 100 WBC
PLATELET # BLD AUTO: 156 K/UL (ref 135–420)
PMV BLD AUTO: 10.1 FL (ref 9.2–11.8)
POTASSIUM SERPL-SCNC: 3.6 MMOL/L (ref 3.5–5.5)
PROT SERPL-MCNC: 5.4 G/DL (ref 6.4–8.2)
RBC # BLD AUTO: 3.8 M/UL (ref 4.35–5.65)
SODIUM SERPL-SCNC: 140 MMOL/L (ref 136–145)
TRIGL SERPL-MCNC: 102 MG/DL (ref 0–150)
VLDLC SERPL CALC-MCNC: 20 MG/DL
WBC # BLD AUTO: 9.9 K/UL (ref 4.6–13.2)

## 2025-07-03 PROCEDURE — 2000000000 HC ICU R&B

## 2025-07-03 PROCEDURE — 71045 X-RAY EXAM CHEST 1 VIEW: CPT

## 2025-07-03 PROCEDURE — 6360000002 HC RX W HCPCS: Performed by: INTERNAL MEDICINE

## 2025-07-03 PROCEDURE — 2580000003 HC RX 258: Performed by: INTERNAL MEDICINE

## 2025-07-03 PROCEDURE — 82962 GLUCOSE BLOOD TEST: CPT

## 2025-07-03 PROCEDURE — 6370000000 HC RX 637 (ALT 250 FOR IP): Performed by: INTERNAL MEDICINE

## 2025-07-03 PROCEDURE — 2700000000 HC OXYGEN THERAPY PER DAY

## 2025-07-03 PROCEDURE — 80053 COMPREHEN METABOLIC PANEL: CPT

## 2025-07-03 PROCEDURE — 2500000003 HC RX 250 WO HCPCS: Performed by: INTERNAL MEDICINE

## 2025-07-03 PROCEDURE — 94669 MECHANICAL CHEST WALL OSCILL: CPT

## 2025-07-03 PROCEDURE — 80061 LIPID PANEL: CPT

## 2025-07-03 PROCEDURE — 94668 MNPJ CHEST WALL SBSQ: CPT

## 2025-07-03 PROCEDURE — 94640 AIRWAY INHALATION TREATMENT: CPT

## 2025-07-03 PROCEDURE — 85025 COMPLETE CBC W/AUTO DIFF WBC: CPT

## 2025-07-03 PROCEDURE — 94003 VENT MGMT INPAT SUBQ DAY: CPT

## 2025-07-03 RX ADMIN — MINERAL OIL, PETROLATUM: 425; 568 OINTMENT OPHTHALMIC at 14:45

## 2025-07-03 RX ADMIN — PROPOFOL 30 MCG/KG/MIN: 10 INJECTION, EMULSION INTRAVENOUS at 11:09

## 2025-07-03 RX ADMIN — IPRATROPIUM BROMIDE 0.5 MG: 0.5 SOLUTION RESPIRATORY (INHALATION) at 20:47

## 2025-07-03 RX ADMIN — MINERAL OIL, PETROLATUM: 425; 568 OINTMENT OPHTHALMIC at 10:07

## 2025-07-03 RX ADMIN — ENOXAPARIN SODIUM 30 MG: 100 INJECTION SUBCUTANEOUS at 14:44

## 2025-07-03 RX ADMIN — MIDAZOLAM 5 MG/HR: 5 INJECTION INTRAMUSCULAR; INTRAVENOUS at 03:53

## 2025-07-03 RX ADMIN — DORNASE ALFA 2.5 MG: 1 SOLUTION RESPIRATORY (INHALATION) at 07:26

## 2025-07-03 RX ADMIN — BUDESONIDE 500 MCG: 0.5 INHALANT RESPIRATORY (INHALATION) at 20:47

## 2025-07-03 RX ADMIN — ACETYLCYSTEINE 400 MG: 100 SOLUTION RESPIRATORY (INHALATION) at 20:47

## 2025-07-03 RX ADMIN — BUDESONIDE 500 MCG: 0.5 INHALANT RESPIRATORY (INHALATION) at 07:26

## 2025-07-03 RX ADMIN — IPRATROPIUM BROMIDE 0.5 MG: 0.5 SOLUTION RESPIRATORY (INHALATION) at 07:26

## 2025-07-03 RX ADMIN — MINERAL OIL, PETROLATUM: 425; 568 OINTMENT OPHTHALMIC at 06:14

## 2025-07-03 RX ADMIN — ACETYLCYSTEINE 400 MG: 100 SOLUTION RESPIRATORY (INHALATION) at 07:26

## 2025-07-03 RX ADMIN — WATER 20 MG: 1 INJECTION INTRAMUSCULAR; INTRAVENOUS; SUBCUTANEOUS at 06:14

## 2025-07-03 RX ADMIN — FENTANYL CITRATE 75 MCG/HR: 50 INJECTION INTRAVENOUS at 23:02

## 2025-07-03 RX ADMIN — METRONIDAZOLE 500 MG: 500 INJECTION, SOLUTION INTRAVENOUS at 10:02

## 2025-07-03 RX ADMIN — IPRATROPIUM BROMIDE 0.5 MG: 0.5 SOLUTION RESPIRATORY (INHALATION) at 13:55

## 2025-07-03 RX ADMIN — FOLIC ACID 1 MG: 5 INJECTION, SOLUTION INTRAMUSCULAR; INTRAVENOUS; SUBCUTANEOUS at 10:00

## 2025-07-03 RX ADMIN — PROPOFOL 35 MCG/KG/MIN: 10 INJECTION, EMULSION INTRAVENOUS at 06:18

## 2025-07-03 RX ADMIN — ACETYLCYSTEINE 400 MG: 100 SOLUTION RESPIRATORY (INHALATION) at 13:55

## 2025-07-03 RX ADMIN — FENTANYL CITRATE 75 MCG/HR: 50 INJECTION INTRAVENOUS at 09:38

## 2025-07-03 RX ADMIN — PROPOFOL 30 MCG/KG/MIN: 10 INJECTION, EMULSION INTRAVENOUS at 16:42

## 2025-07-03 RX ADMIN — ARFORMOTEROL TARTRATE 15 MCG: 15 SOLUTION RESPIRATORY (INHALATION) at 20:47

## 2025-07-03 RX ADMIN — FUROSEMIDE 20 MG: 10 INJECTION, SOLUTION INTRAMUSCULAR; INTRAVENOUS at 09:57

## 2025-07-03 RX ADMIN — ARFORMOTEROL TARTRATE 15 MCG: 15 SOLUTION RESPIRATORY (INHALATION) at 07:26

## 2025-07-03 RX ADMIN — MINERAL OIL, PETROLATUM: 425; 568 OINTMENT OPHTHALMIC at 16:42

## 2025-07-03 RX ADMIN — PROPOFOL 30 MCG/KG/MIN: 10 INJECTION, EMULSION INTRAVENOUS at 21:20

## 2025-07-03 RX ADMIN — PROPOFOL 35 MCG/KG/MIN: 10 INJECTION, EMULSION INTRAVENOUS at 02:32

## 2025-07-03 RX ADMIN — ENOXAPARIN SODIUM 30 MG: 100 INJECTION SUBCUTANEOUS at 00:09

## 2025-07-03 RX ADMIN — ACETYLCYSTEINE 400 MG: 100 SOLUTION RESPIRATORY (INHALATION) at 01:02

## 2025-07-03 RX ADMIN — SODIUM CHLORIDE, PRESERVATIVE FREE 40 MG: 5 INJECTION INTRAVENOUS at 09:48

## 2025-07-03 RX ADMIN — SODIUM CHLORIDE, PRESERVATIVE FREE 10 ML: 5 INJECTION INTRAVENOUS at 09:48

## 2025-07-03 RX ADMIN — DORNASE ALFA 2.5 MG: 1 SOLUTION RESPIRATORY (INHALATION) at 20:47

## 2025-07-03 RX ADMIN — MINERAL OIL, PETROLATUM: 425; 568 OINTMENT OPHTHALMIC at 01:45

## 2025-07-03 RX ADMIN — THIAMINE HYDROCHLORIDE 100 MG: 100 INJECTION, SOLUTION INTRAMUSCULAR; INTRAVENOUS at 09:56

## 2025-07-03 RX ADMIN — IPRATROPIUM BROMIDE 0.5 MG: 0.5 SOLUTION RESPIRATORY (INHALATION) at 01:01

## 2025-07-03 RX ADMIN — POLYETHYLENE GLYCOL 3350 17 G: 17 POWDER, FOR SOLUTION ORAL at 10:03

## 2025-07-03 RX ADMIN — METRONIDAZOLE 500 MG: 500 INJECTION, SOLUTION INTRAVENOUS at 16:38

## 2025-07-03 RX ADMIN — MINERAL OIL, PETROLATUM: 425; 568 OINTMENT OPHTHALMIC at 21:20

## 2025-07-03 ASSESSMENT — PULMONARY FUNCTION TESTS
PIF_VALUE: 15
PIF_VALUE: 16
PIF_VALUE: 16
PIF_VALUE: 15
PIF_VALUE: 16
PIF_VALUE: 16

## 2025-07-03 ASSESSMENT — PAIN SCALES - GENERAL
PAINLEVEL_OUTOF10: 0

## 2025-07-03 ASSESSMENT — PAIN SCALES - WONG BAKER
WONGBAKER_NUMERICALRESPONSE: NO HURT
WONGBAKER_NUMERICALRESPONSE: NO HURT

## 2025-07-03 NOTE — PROGRESS NOTES
Palliative Medicine    CODE STATUS: FULL CODE    AMD Status: none on file. A sister, Afshan Al, has been located per  documentation     0940 Seen today in room 104 along with Jannet Maguire NP.  Lying supine with eyes open.Nodding to nursing staff Intubated/ventilated/sedated. Intubated 6/14/2025 FiO2 40% PEEP 5. Fentanyl, versed, and propofol gtts infusing    Ms Al had told  that she was coming yesterday afternoon but did not arrive. Nursing told us that they expect Ms Al today at 1400. Will be in ICU at that time    1430: MS Al did not arrive in the ICU. I called her number and left a message asking her to call the ICU.     Primary goal for the conversation from a palliative point of view is for her to get a medical update and assess if she is prepared to act as surrogate medical decision maker. Is she is unable to act in that role, will need to continue obtaining permission from a state appointed guardian or via a court order.    Disposition plan: to be determined based on response to medical treatment, medical recommendations,  and patient/family decisions    Palliative Medicine will continue to follow Beny Knapp  and his family during his hospitalization and support them as they make healthcare decisions and define goals of care.    Shreley Frias RN, MSN  Palliative Medicine  P: [phone]

## 2025-07-03 NOTE — WOUND CARE
ICU Rounding  Wound care nurse rounded on patient for skin issues.  Patient has a Chauncey score of 14.  Does patient have any pressure injury?no per primary nurse PUMA Orozco     Skin Care & Pressure Relief Recommendations  Minimize layers of linen  Pads under patient to optimize support surface  Turn/reposition approximately every 2 hours  Use pillow wedges to maintain positioning but do not put pillow directly over wounds  Manage incontinence   Promote continence; Skin Protective lotion/cream to buttocks and sacrum daily and as needed with incontinence care  Offload heels pillows    Consult wound care if any wounds noted during admission.  Wound Care nurse will continue to follow during ICU admission.

## 2025-07-03 NOTE — PROGRESS NOTES
COMPARISON: CT 6/26/2025 TECHNIQUE:  Limited abdominal ultrasound. FINDINGS: Liver: Length: 19.5 cm, enlarged. Diffusely echogenic and heterogeneous.  No focal liver lesion. Main portal vein flow: Toward the liver with a velocity of 12 cm/s. Diameter 1.3 cm. Fluid: No ascites. Gallbladder: A gallstone measures 1.9 cm. No gallbladder wall thickening or pericholecystic fluid. Bile ducts: There is no intra or extrahepatic biliary ductal dilatation.  The common bile duct measures 4 mm. Pancreas: The visualized portions are within normal limits. Kidneys: Right length: 13.2 cm. No hydronephrosis.     1. Hepatomegaly. A diffusely echogenic and heterogeneous liver can be seen with hepatic steatosis or nonspecific parenchymal liver disease. 2. Cholelithiasis. Electronically signed by David Porter    [facility] IR TECHNOLOGIST SERVICE  Result Date: 6/30/2025  Automatic Administrative Result. Imaging Guidance used by the IR Department.  See the Patient Chart for specific details.      Imaging guidance utilized by the IR Department.    XR CHEST PORTABLE  Result Date: 6/30/2025  EXAM:  XR CHEST PORTABLE INDICATION: Intubated. Small pleural effusions. COMPARISON: 6/29/2025 TECHNIQUE: Portable AP semiupright chest view at 0503 hours FINDINGS: The endotracheal tube, enteric tube, and right IJ catheter are stable. The cardiomediastinal contours are stable. The pulmonary vasculature is within normal limits. There are slightly increased small pleural effusions and bibasilar atelectasis. There is no pneumothorax. The bones and upper abdomen are stable stable support     Stable support devices. Slightly increased small pleural effusions with bibasilar atelectasis. Electronically signed by David Porter    XR CHEST PORTABLE  Result Date: 6/29/2025  EXAM:  XR CHEST PORTABLE INDICATION: Acute hypoxic respiratory failure, basilar infiltrates, ET tube position Comparison to 6/28/2025. Portable exam obtained at 625 demonstrates little change in  [place]        06/14/25    ECHO (TTE) COMPLETE (PRN CONTRAST/BUBBLE/STRAIN/3D) 06/14/2025  9:29 PM (Final)    Interpretation Summary    Image quality is fair.    Left Ventricle: Normal left ventricular systolic function with a visually estimated EF of 55 - 60%. EF by 2D Simpsons Biplane is 57%. Left ventricle size is normal. Normal wall thickness. Normal wall motion. Normal diastolic function.    Mitral Valve: There is mild posterior annular calcification noted. Thickened leaflets. No stenosis noted.    Tricuspid Valve : Unable to assess RVSP due to inadequate or insignificant tricuspid regurgitation.    Pericardium : No pericardial effusion.    IVC/SVC : Patient is ventilated, cannot use IVC diameter to estimate right atrial pressure. IVC size is normal.    Signed by: Александр Pennington MD on 6/14/2025  9:29 PM           Documentation Time:     I have spent 65 minutes on the comprehensive care of this critically ill patient in ICU, excluding any procedures.  Half of this time was dedicated to counseling the patient and family, as well as coordinating care on the floor. The critical care time was performed to assess and manage the high probability of eminent, life-threatening deterioration that could result in multiorgan failure and possible death.    Time Includes:  Preparing to see the patient (eg, review of tests)  Obtaining and/or reviewing separately obtained history  Performing a medically necessary appropriate examination and/or evaluation  Counseling and educating the patient/family/caregiver  Ordering medications, tests, or procedures  Referring and communicating with other health care professionals as needed  Documenting clinical information in the electronic or other health record  Independently interpreting results (not reported separately) and communicating results to the patient/family/caregiver  Care coordination and discharge planning with Case Management.            Dear patient Beny Knapp,

## 2025-07-03 NOTE — PROGRESS NOTES
1751 - Talked with Afshan Al (pt's sister) discussed if she was in agreement with being patient's decision maker Pt's sister confirmed she was okay with being primary decision maker. Informed sister of what time we do rounds so she may participate. Confirmed a good time for her to come in is 0800. Sister plans to come into unit at 0800 tomorrow 07/04/2025 for rounds.

## 2025-07-03 NOTE — PROGRESS NOTES
met patient's nurses in ICU.     Patient was intubated at the time of the visit. Nurse said family members have been located. Nurse said he would page  when family members arrive. Staff members thanked  for the visit.     provided presence and support for staff.    Chaplains will provide follow-up care for patient, family,and staff as needed.    Spiritual Health History and Assessment/Progress Note  [facility]    Palliative Care,  ,  ,      Name: Beny Knapp MRN: [id number]    Age: 59 y.o.     Sex: male   Language: English   Islam: Jew   Acute respiratory failure with hypoxia and hypercapnia (HCC)     Date: 7/3/2025            Total Time Calculated: 5 min              Spiritual Assessment began in [facility] 1 INTENSIVE CARE        Referral/Consult From: Palliative Care   Encounter Overview/Reason: Palliative Care  Service Provided For: Patient    Love, Belief, Meaning:   Patient identifies as spiritual, is connected with a love tradition or spiritual practice, and has beliefs or practices that help with coping during difficult times  Family/Friends No family/friends present      Importance and Influence:  Patient unable to assess at this time  Family/Friends No family/friends present    Community:  Patient feels well-supported. Support system includes: Extended family  Family/Friends No family/friends present    Assessment and Plan of Care:     Patient Interventions include: Other: Unable to assess.  Family/Friends Interventions include: No family/friends present    Patient Plan of Care: No spiritual needs identified for follow-up  Family/Friends Plan of Care: No family/friends present    Electronically signed by Chaplain Kevin on 7/3/2025 at 10:15 AM

## 2025-07-03 NOTE — PROGRESS NOTES
[facility] Specialists  Pulmonary, Critical Care, and Sleep Medicine    Name: Beny Knapp MRN: [id number]   : 1965 Hospital: [facility]    Date: 7/3/2025  Room: [address] [place] [address] Note                                              Consult requesting physician: Dr. Mark   Reason for Consult: Respiratory failure, hyponatremia, upper airway    IMPRESSION:     Acute respiratory failure with hypoxemia and hypercarbia   J96.01, J96.02  Stridor  Obstructed larynx  Polysubstance abuse   Candidiasis of esophagus   Aspiration pneumonia   COPD/emphysema  EtOH use  Cocaine abuse  Hepatitis C  Cirrhosis of liver  Hyponatremia, resolved      Active Hospital Problems    Diagnosis Date Noted    Moderate malnutrition [E44.0] 2025    Aspiration pneumonia (HCC) [J69.0] 2025    Cocaine abuse (HCC) [F14.10] 2025    Hyponatremia [E87.1] 2025    Stridor [R06.1] 2025    Obstructed, larynx [J38.6] 2025    Acute respiratory failure with hypoxia and hypercapnia (HCC) [J96.01, J96.02] 2025    ETOH abuse [F10.10] 2025    Polysubstance abuse (HCC) [F19.10] 2025    Candidiasis of esophagus (HCC) [B37.81] 2025    Tobacco abuse [Z72.0] 2015    HTN (hypertension) [I10] 2015    COPD (chronic obstructive pulmonary disease) (HCC) [J44.9] 2015    Chronic hepatitis C (HCC) [B18.2] 2015        Code status: Full Code       RECOMMENDATIONS:     Respiratory: 59-year-old male with COPD, smoker presents with acute upper airway distress, fullness in the laryngeal area, questionable foreign body. History of aspirations with feeding and bolus feeling on presentation in ER this admission. Patient was intubated 25 for airway protection and hypercarbic and hypoxemic respiratory failure, with ENT at bedside.  Prior to intubation, fiberoptic nasolaryngoscopy endoscopy by ENT did not show any foreign body, but swollen airway/larynx and  severe candidiasis obstructing larynx.  CT Neck and soft tissue 6/14/25 reviewed report and images personally  CT chest on admission 6/14/25 reviewed images and report-bilateral centrilobular/panlobular emphysema changes, especially in the upper lobes; bilateral lower lobe consolidations; there appears to be a cavitary versus bullous lesion in the right lower lobe.  CTA chest done 6/20/25 reviewed images and report, no central or segmental PE; biapical emphysema; severe bilateral lower lobe consolidations; no significant pleural effusions; right basal cavitary/cystic lesion no longer evident.  CT Chest w/ contrast 6/26/25 report and images reviewed: Bibasilar atelectasis, asymmetric to the left. Left lower lobe bronchus is fluid filled.    CXR 7/30/2025: ETT 6.5 cm above justine; RT order placed to who CT involved by 1.5 cm.  OGT in place.  Unchanged bibasilar atelectasis.  ABG 7/2/25: pH 7.48, pCO2 41.2, , SpO2 95.3%.    On AC 14/520/40/5.    Chest PT/bed percussive maneuvers every 6 hours  Mucomyst every 6 hours and Pulmozyme every 12 hours nebs with bronchodilators  Bronchodilators: Continue Brovana nebs twice daily, Pulmicort nebs twice daily, ipratropium nebs 4 times daily, DuoNeb as needed.    Steroids: Reduce Solu-Medrol to 20 mg IV daily; further titrate per clinical course.    As per Dr. Bharat Law's discussion over the phone with Dr. Monroe on 6/27/25 (patient's PCP, which Dr. Bharat Law have confirmed with patient during morning rounds on 6/27/25), during October 2024, patient had similar presentation and required intubation and ventilator support.  Unable to locate information in Care Everywhere.    Sedation: Precedex was stopped due to sinus bradycardia in high 40s.  On Versed, fentanyl, propofol drip.  Propofol works best for patient's agitation.    Unable to perform SBT due to severe agitation and sedation being titrated down.  Waiting for court order/guidance.  Tracheostomy placement.

## 2025-07-03 NOTE — PLAN OF CARE
Problem: Safety - Medical Restraint  Goal: Remains free of injury from restraints (Restraint for Interference with Medical Device)  Description: INTERVENTIONS:  1. Determine that other, less restrictive measures have been tried or would not be effective before applying the restraint  2. Evaluate the patient's condition at the time of restraint application  3. Inform patient/family regarding the reason for restraint  4. Q2H: Monitor safety, psychosocial status, comfort, nutrition and hydration  7/2/2025 2312 by Kedar Theodore RN  Outcome: Progressing  Flowsheets  Taken 7/2/2025 2000 by Kedar Theodore RN  Remains free of injury from restraints (restraint for interference with medical device):   Determine that other, less restrictive measures have been tried or would not be effective before applying the restraint   Evaluate the patient's condition at the time of restraint application   Every 2 hours: Monitor safety, psychosocial status, comfort, nutrition and hydration   Inform patient/family regarding the reason for restraint  Taken 7/2/2025 1800 by Kris Barraza RN  Remains free of injury from restraints (restraint for interference with medical device):   Determine that other, less restrictive measures have been tried or would not be effective before applying the restraint   Evaluate the patient's condition at the time of restraint application  7/2/2025 1156 by Brayan Calhoun RN  Outcome: Progressing  Flowsheets (Taken 7/1/2025 2200 by Kedar Theodore RN)  Remains free of injury from restraints (restraint for interference with medical device):   Determine that other, less restrictive measures have been tried or would not be effective before applying the restraint   Evaluate the patient's condition at the time of restraint application   Inform patient/family regarding the reason for restraint   Every 2 hours: Monitor safety, psychosocial status, comfort, nutrition and hydration     Problem: Discharge  (Taken 7/2/2025 0800 by Kris Barraza, RN)  Skin Integrity Remains Intact:   Monitor for areas of redness and/or skin breakdown   Assess vascular access sites hourly   Every 4-6 hours minimum:  Change oxygen saturation probe site     Problem: ABCDS Injury Assessment  Goal: Absence of physical injury  7/2/2025 2312 by Kedar Theodore RN  Outcome: Progressing  7/2/2025 1156 by Brayan Calhoun RN  Outcome: Progressing  Flowsheets (Taken 6/15/2025 1930 by Hoda Diaz, RN)  Absence of Physical Injury: Implement safety measures based on patient assessment     Problem: Behavior  Goal: Pt/Family maintain appropriate behavior and adhere to behavioral management agreement, if implemented  Description: INTERVENTIONS:  1. Assess patient/family's coping skills and  non-compliant behavior (including use of illegal substances)  2. Notify security of behavior or suspected illegal substances which indicate the need for search of the family and/or belongings  3. Encourage verbalization of thoughts and concerns in a socially appropriate manner  4. Utilize positive, consistent limit setting strategies supporting safety of patient, staff and others  5. Encourage participation in the decision making process about the behavioral management agreement  6. If a visitor's behavior poses a threat to safety call refer to organization policy.  7. Initiate consult with , Psychosocial CNS, Spiritual Care as appropriate  7/2/2025 2312 by Kedar Theodore RN  Outcome: Progressing  7/2/2025 1156 by Brayan Calhoun RN  Outcome: Progressing  Flowsheets (Taken 7/2/2025 0800 by Kris Barraza, RN)  Patient/family maintains appropriate behavior and adheres to behavioral management agreement, if implemented:   Assess patient/family’s coping skills and  non-compliant behavior (including use of illegal substances)   Notify security of behavior or suspected illegal substances which indicate the need for search of the patient

## 2025-07-03 NOTE — PLAN OF CARE
Problem: Safety - Medical Restraint  Goal: Remains free of injury from restraints (Restraint for Interference with Medical Device)  Description: INTERVENTIONS:  1. Determine that other, less restrictive measures have been tried or would not be effective before applying the restraint  2. Evaluate the patient's condition at the time of restraint application  3. Inform patient/family regarding the reason for restraint  4. Q2H: Monitor safety, psychosocial status, comfort, nutrition and hydration  7/3/2025 1207 by Kris Barraza RN  Outcome: Progressing  Flowsheets  Taken 7/3/2025 0600 by Kedar Theodore RN  Remains free of injury from restraints (restraint for interference with medical device): Determine that other, less restrictive measures have been tried or would not be effective before applying the restraint  Taken 7/3/2025 0400 by Kedar Theodore RN  Remains free of injury from restraints (restraint for interference with medical device): Determine that other, less restrictive measures have been tried or would not be effective before applying the restraint  Taken 7/3/2025 0200 by Kedar Theodore RN  Remains free of injury from restraints (restraint for interference with medical device):   Determine that other, less restrictive measures have been tried or would not be effective before applying the restraint   Evaluate the patient's condition at the time of restraint application   Inform patient/family regarding the reason for restraint   Every 2 hours: Monitor safety, psychosocial status, comfort, nutrition and hydration  Taken 7/3/2025 0000 by Kedar Theodore RN  Remains free of injury from restraints (restraint for interference with medical device): Determine that other, less restrictive measures have been tried or would not be effective before applying the restraint  7/2/2025 2312 by Kedar Theodore RN  Outcome: Progressing  Flowsheets  Taken 7/2/2025 2200 by Kedar Theodore RN  Remains free of injury  as appropriate  Goal: Hemodynamic stability and optimal renal function maintained  7/3/2025 1207 by Kris Barraza RN  Outcome: Progressing  Flowsheets (Taken 7/3/2025 0800)  Hemodynamic stability and optimal renal function maintained:   Monitor labs and assess for signs and symptoms of volume excess or deficit   Monitor intake, output and patient weight   Monitor urine specific gravity, serum osmolarity and serum sodium as indicated or ordered   Monitor response to interventions for patient's volume status, including labs, urine output, blood pressure (other measures as available)  7/2/2025 2312 by Kedar Theodore, RN  Outcome: Progressing  Flowsheets (Taken 7/2/2025 2000)  Hemodynamic stability and optimal renal function maintained:   Instruct patient on fluid and nutrition restrictions as appropriate   Encourage oral intake as appropriate   Monitor urine specific gravity, serum osmolarity and serum sodium as indicated or ordered   Monitor response to interventions for patient's volume status, including labs, urine output, blood pressure (other measures as available)   Monitor intake, output and patient weight   Monitor labs and assess for signs and symptoms of volume excess or deficit  Goal: Glucose maintained within prescribed range  7/3/2025 1207 by Kris Barraza, RN  Outcome: Progressing  Flowsheets (Taken 7/3/2025 0800)  Glucose maintained within prescribed range:   Monitor blood glucose as ordered   Assess for signs and symptoms of hyperglycemia and hypoglycemia   Administer ordered medications to maintain glucose within target range  7/2/2025 2312 by Kedar Theodore, RN  Outcome: Progressing  Flowsheets (Taken 7/2/2025 2000)  Glucose maintained within prescribed range:   Instruct patient on self management of diabetes and initiate consult as needed   Assess barriers to adequate nutritional intake and initiate nutrition consult as needed   Administer ordered medications to maintain glucose within target

## 2025-07-04 ENCOUNTER — APPOINTMENT (OUTPATIENT)
Facility: HOSPITAL | Age: 60
DRG: 368 | End: 2025-07-04
Payer: MEDICARE

## 2025-07-04 LAB
ALBUMIN SERPL-MCNC: 2.8 G/DL (ref 3.4–5)
ALBUMIN/GLOB SERPL: 1.2 (ref 0.8–1.7)
ALP SERPL-CCNC: 60 U/L (ref 45–117)
ALT SERPL-CCNC: 447 U/L (ref 10–50)
ANION GAP SERPL CALC-SCNC: 10 MMOL/L (ref 3–18)
ANION GAP SERPL CALC-SCNC: 11 MMOL/L (ref 3–18)
AST SERPL-CCNC: 107 U/L (ref 10–38)
BASOPHILS # BLD: 0.03 K/UL (ref 0–0.1)
BASOPHILS NFR BLD: 0.3 % (ref 0–2)
BILIRUB SERPL-MCNC: 0.8 MG/DL (ref 0.2–1)
BUN SERPL-MCNC: 21 MG/DL (ref 6–23)
BUN SERPL-MCNC: 23 MG/DL (ref 6–23)
BUN/CREAT SERPL: 46 (ref 12–20)
BUN/CREAT SERPL: 51 (ref 12–20)
CALCIUM SERPL-MCNC: 8.4 MG/DL (ref 8.5–10.1)
CALCIUM SERPL-MCNC: 8.7 MG/DL (ref 8.5–10.1)
CHLORIDE SERPL-SCNC: 100 MMOL/L (ref 98–107)
CHLORIDE SERPL-SCNC: 101 MMOL/L (ref 98–107)
CHOLEST SERPL-MCNC: 132 MG/DL
CO2 SERPL-SCNC: 26 MMOL/L (ref 21–32)
CO2 SERPL-SCNC: 29 MMOL/L (ref 21–32)
CREAT SERPL-MCNC: 0.41 MG/DL (ref 0.6–1.3)
CREAT SERPL-MCNC: 0.49 MG/DL (ref 0.6–1.3)
DIFFERENTIAL METHOD BLD: ABNORMAL
EOSINOPHIL # BLD: 0.07 K/UL (ref 0–0.4)
EOSINOPHIL NFR BLD: 0.8 % (ref 0–5)
ERYTHROCYTE [DISTWIDTH] IN BLOOD BY AUTOMATED COUNT: 14.1 % (ref 11.6–14.5)
GLOBULIN SER CALC-MCNC: 2.4 G/DL (ref 2–4)
GLUCOSE BLD STRIP.AUTO-MCNC: 124 MG/DL (ref 70–110)
GLUCOSE BLD STRIP.AUTO-MCNC: 130 MG/DL (ref 70–110)
GLUCOSE BLD STRIP.AUTO-MCNC: 137 MG/DL (ref 70–110)
GLUCOSE SERPL-MCNC: 102 MG/DL (ref 74–108)
GLUCOSE SERPL-MCNC: 138 MG/DL (ref 74–108)
HCT VFR BLD AUTO: 29.5 % (ref 36–48)
HDLC SERPL-MCNC: 43 MG/DL (ref 40–60)
HDLC SERPL: 3.1 (ref 0–5)
HGB BLD-MCNC: 9.8 G/DL (ref 13–16)
IMM GRANULOCYTES # BLD AUTO: 0.25 K/UL (ref 0–0.04)
IMM GRANULOCYTES NFR BLD AUTO: 2.8 % (ref 0–0.5)
INR PPP: 1.1 (ref 0.9–1.2)
LDLC SERPL CALC-MCNC: 59 MG/DL (ref 0–100)
LYMPHOCYTES # BLD: 1.23 K/UL (ref 0.9–3.3)
LYMPHOCYTES NFR BLD: 13.7 % (ref 21–52)
MCH RBC QN AUTO: 29.8 PG (ref 24–34)
MCHC RBC AUTO-ENTMCNC: 33.2 G/DL (ref 31–37)
MCV RBC AUTO: 89.7 FL (ref 78–100)
MONOCYTES # BLD: 0.52 K/UL (ref 0.05–1.2)
MONOCYTES NFR BLD: 5.8 % (ref 3–10)
NEUTS SEG # BLD: 6.87 K/UL (ref 1.8–8)
NEUTS SEG NFR BLD: 76.6 % (ref 40–73)
NRBC # BLD: 0.07 K/UL (ref 0–0.01)
NRBC BLD-RTO: 0.8 PER 100 WBC
PLATELET # BLD AUTO: 134 K/UL (ref 135–420)
PMV BLD AUTO: 10 FL (ref 9.2–11.8)
POTASSIUM SERPL-SCNC: 3.2 MMOL/L (ref 3.5–5.5)
POTASSIUM SERPL-SCNC: 3.7 MMOL/L (ref 3.5–5.5)
PROT SERPL-MCNC: 5.2 G/DL (ref 6.4–8.2)
PROTHROMBIN TIME: 14.6 SEC (ref 12–15.1)
RBC # BLD AUTO: 3.29 M/UL (ref 4.35–5.65)
SODIUM SERPL-SCNC: 138 MMOL/L (ref 136–145)
SODIUM SERPL-SCNC: 140 MMOL/L (ref 136–145)
TRIGL SERPL-MCNC: 151 MG/DL (ref 0–150)
VLDLC SERPL CALC-MCNC: 30 MG/DL
WBC # BLD AUTO: 9 K/UL (ref 4.6–13.2)

## 2025-07-04 PROCEDURE — 6360000002 HC RX W HCPCS: Performed by: INTERNAL MEDICINE

## 2025-07-04 PROCEDURE — 71045 X-RAY EXAM CHEST 1 VIEW: CPT

## 2025-07-04 PROCEDURE — 94669 MECHANICAL CHEST WALL OSCILL: CPT

## 2025-07-04 PROCEDURE — 80053 COMPREHEN METABOLIC PANEL: CPT

## 2025-07-04 PROCEDURE — 85610 PROTHROMBIN TIME: CPT

## 2025-07-04 PROCEDURE — 6370000000 HC RX 637 (ALT 250 FOR IP): Performed by: INTERNAL MEDICINE

## 2025-07-04 PROCEDURE — 94640 AIRWAY INHALATION TREATMENT: CPT

## 2025-07-04 PROCEDURE — 2500000003 HC RX 250 WO HCPCS: Performed by: INTERNAL MEDICINE

## 2025-07-04 PROCEDURE — 85025 COMPLETE CBC W/AUTO DIFF WBC: CPT

## 2025-07-04 PROCEDURE — 2700000000 HC OXYGEN THERAPY PER DAY

## 2025-07-04 PROCEDURE — 2000000000 HC ICU R&B

## 2025-07-04 PROCEDURE — 94003 VENT MGMT INPAT SUBQ DAY: CPT

## 2025-07-04 PROCEDURE — 2580000003 HC RX 258: Performed by: INTERNAL MEDICINE

## 2025-07-04 PROCEDURE — 80061 LIPID PANEL: CPT

## 2025-07-04 PROCEDURE — 82962 GLUCOSE BLOOD TEST: CPT

## 2025-07-04 RX ADMIN — SODIUM CHLORIDE, PRESERVATIVE FREE 10 ML: 5 INJECTION INTRAVENOUS at 08:55

## 2025-07-04 RX ADMIN — SODIUM CHLORIDE, PRESERVATIVE FREE 10 ML: 5 INJECTION INTRAVENOUS at 21:00

## 2025-07-04 RX ADMIN — PROPOFOL 30 MCG/KG/MIN: 10 INJECTION, EMULSION INTRAVENOUS at 14:50

## 2025-07-04 RX ADMIN — METRONIDAZOLE 500 MG: 500 INJECTION, SOLUTION INTRAVENOUS at 16:19

## 2025-07-04 RX ADMIN — PROPOFOL 30 MCG/KG/MIN: 10 INJECTION, EMULSION INTRAVENOUS at 10:29

## 2025-07-04 RX ADMIN — SODIUM CHLORIDE, PRESERVATIVE FREE 40 MG: 5 INJECTION INTRAVENOUS at 08:53

## 2025-07-04 RX ADMIN — BUDESONIDE 500 MCG: 0.5 INHALANT RESPIRATORY (INHALATION) at 07:24

## 2025-07-04 RX ADMIN — IPRATROPIUM BROMIDE 0.5 MG: 0.5 SOLUTION RESPIRATORY (INHALATION) at 19:19

## 2025-07-04 RX ADMIN — ACETYLCYSTEINE 400 MG: 100 SOLUTION RESPIRATORY (INHALATION) at 07:24

## 2025-07-04 RX ADMIN — IPRATROPIUM BROMIDE 0.5 MG: 0.5 SOLUTION RESPIRATORY (INHALATION) at 07:23

## 2025-07-04 RX ADMIN — MINERAL OIL, PETROLATUM: 425; 568 OINTMENT OPHTHALMIC at 13:35

## 2025-07-04 RX ADMIN — MINERAL OIL, PETROLATUM: 425; 568 OINTMENT OPHTHALMIC at 01:50

## 2025-07-04 RX ADMIN — PROPOFOL 30 MCG/KG/MIN: 10 INJECTION, EMULSION INTRAVENOUS at 20:04

## 2025-07-04 RX ADMIN — ENOXAPARIN SODIUM 30 MG: 100 INJECTION SUBCUTANEOUS at 13:34

## 2025-07-04 RX ADMIN — MINERAL OIL, PETROLATUM: 425; 568 OINTMENT OPHTHALMIC at 06:47

## 2025-07-04 RX ADMIN — FOLIC ACID 1 MG: 5 INJECTION, SOLUTION INTRAMUSCULAR; INTRAVENOUS; SUBCUTANEOUS at 08:55

## 2025-07-04 RX ADMIN — SODIUM CHLORIDE, PRESERVATIVE FREE 10 ML: 5 INJECTION INTRAVENOUS at 00:42

## 2025-07-04 RX ADMIN — METRONIDAZOLE 500 MG: 500 INJECTION, SOLUTION INTRAVENOUS at 00:39

## 2025-07-04 RX ADMIN — WATER 20 MG: 1 INJECTION INTRAMUSCULAR; INTRAVENOUS; SUBCUTANEOUS at 08:53

## 2025-07-04 RX ADMIN — METRONIDAZOLE 500 MG: 500 INJECTION, SOLUTION INTRAVENOUS at 08:37

## 2025-07-04 RX ADMIN — ARFORMOTEROL TARTRATE 15 MCG: 15 SOLUTION RESPIRATORY (INHALATION) at 07:23

## 2025-07-04 RX ADMIN — MIDAZOLAM 5 MG/HR: 5 INJECTION INTRAMUSCULAR; INTRAVENOUS at 17:53

## 2025-07-04 RX ADMIN — ACETYLCYSTEINE 400 MG: 100 SOLUTION RESPIRATORY (INHALATION) at 01:21

## 2025-07-04 RX ADMIN — IPRATROPIUM BROMIDE 0.5 MG: 0.5 SOLUTION RESPIRATORY (INHALATION) at 13:42

## 2025-07-04 RX ADMIN — MIDAZOLAM 5 MG/HR: 5 INJECTION INTRAMUSCULAR; INTRAVENOUS at 02:01

## 2025-07-04 RX ADMIN — BUDESONIDE 500 MCG: 0.5 INHALANT RESPIRATORY (INHALATION) at 19:19

## 2025-07-04 RX ADMIN — MINERAL OIL, PETROLATUM: 425; 568 OINTMENT OPHTHALMIC at 16:50

## 2025-07-04 RX ADMIN — ARFORMOTEROL TARTRATE 15 MCG: 15 SOLUTION RESPIRATORY (INHALATION) at 19:19

## 2025-07-04 RX ADMIN — MINERAL OIL, PETROLATUM: 425; 568 OINTMENT OPHTHALMIC at 22:01

## 2025-07-04 RX ADMIN — PROPOFOL 30 MCG/KG/MIN: 10 INJECTION, EMULSION INTRAVENOUS at 03:43

## 2025-07-04 RX ADMIN — IPRATROPIUM BROMIDE 0.5 MG: 0.5 SOLUTION RESPIRATORY (INHALATION) at 01:21

## 2025-07-04 RX ADMIN — FUROSEMIDE 20 MG: 10 INJECTION, SOLUTION INTRAMUSCULAR; INTRAVENOUS at 08:53

## 2025-07-04 RX ADMIN — THIAMINE HYDROCHLORIDE 100 MG: 100 INJECTION, SOLUTION INTRAMUSCULAR; INTRAVENOUS at 08:53

## 2025-07-04 RX ADMIN — MINERAL OIL, PETROLATUM: 425; 568 OINTMENT OPHTHALMIC at 08:55

## 2025-07-04 RX ADMIN — DORNASE ALFA 2.5 MG: 1 SOLUTION RESPIRATORY (INHALATION) at 07:23

## 2025-07-04 RX ADMIN — POTASSIUM BICARBONATE 40 MEQ: 782 TABLET, EFFERVESCENT ORAL at 10:21

## 2025-07-04 RX ADMIN — FENTANYL CITRATE 75 MCG/HR: 50 INJECTION INTRAVENOUS at 14:06

## 2025-07-04 RX ADMIN — ENOXAPARIN SODIUM 30 MG: 100 INJECTION SUBCUTANEOUS at 01:57

## 2025-07-04 ASSESSMENT — PULMONARY FUNCTION TESTS
PIF_VALUE: 16
PIF_VALUE: 15
PIF_VALUE: 16

## 2025-07-04 ASSESSMENT — PAIN SCALES - GENERAL: PAINLEVEL_OUTOF10: 0

## 2025-07-04 NOTE — PROGRESS NOTES
[facility]  Pulmonary, Critical Care, and Sleep Medicine    Name: Beny Knapp MRN: [id number]   : 1965 Hospital: [facility]    Date: 2025  Room: [address] [place] [address] Note                                              Consult requesting physician: Dr. Mark   Reason for Consult: Respiratory failure, hyponatremia, upper airway    IMPRESSION:     Acute respiratory failure with hypoxemia and hypercarbia   J96.01, J96.02  Stridor  Obstructed larynx  Polysubstance abuse   Candidiasis of esophagus   Aspiration pneumonia   COPD/emphysema  EtOH use  Cocaine abuse  Hepatitis C  Cirrhosis of liver  Hyponatremia, resolved      Active Hospital Problems    Diagnosis Date Noted    Moderate malnutrition [E44.0] 2025    Aspiration pneumonia (HCC) [J69.0] 2025    Cocaine abuse (HCC) [F14.10] 2025    Hyponatremia [E87.1] 2025    Stridor [R06.1] 2025    Obstructed, larynx [J38.6] 2025    Acute respiratory failure with hypoxia and hypercapnia (HCC) [J96.01, J96.02] 2025    ETOH abuse [F10.10] 2025    Polysubstance abuse (HCC) [F19.10] 2025    Candidiasis of esophagus (HCC) [B37.81] 2025    Tobacco abuse [Z72.0] 2015    HTN (hypertension) [I10] 2015    COPD (chronic obstructive pulmonary disease) (HCC) [J44.9] 2015    Chronic hepatitis C (HCC) [B18.2] 2015        Code status: Full Code       RECOMMENDATIONS:     Respiratory: 59-year-old male with COPD, smoker presents with acute upper airway distress, fullness in the laryngeal area, questionable foreign body. History of aspirations with feeding and bolus feeling on presentation in ER this admission. Patient was intubated 25 for airway protection and hypercarbic and hypoxemic respiratory failure, with ENT at bedside.  Prior to intubation, fiberoptic nasolaryngoscopy endoscopy by ENT did not show any foreign body, but swollen airway/larynx and  obtained at 244 hours. . Bibasilar scarring and atelectasis. Possible small left effusion.. Cardiomegaly..  The bones and soft tissues are grossly within normal limits.     Bibasilar scarring and atelectasis with a possible small left effusion. Electronically signed by Eliseo Espinosa    XR NECK SOFT TISSUE  Result Date: 6/14/2025  PROCEDURE: XR NECK SOFT TISSUE COMPARISON: None REASON FOR STUDY: foreign body sensation FINDINGS: 2 views of the neck soft tissues were obtained. The patient has a edentulous. Large bulky confluent anterior osteophytes are noted in the cervical spine. No radiopaque foreign body is present     No acute pathology Electronically signed by Elsieo Espinosa       PCCM will follow the patient in ICU with primary and other medical team. PCCM will manage the hemodynamics, ventilator, and pulmonary problems. PCCM will defer other respective systems problem management to primary team attending Madeline Kumar DO and other respective consultants. The appropriate providers are aware of patient and they will follow up regarding results of tests and/or lab work and/or pathology results and/or imaging studies and/or cardiovascular testing etc. as they are received. PCCM will defer other respective systems problem management to primary team and other respective consultants. Any abnormalities on the tests, labs, pathology results, radiologic imaging studies, cardiovascular testing etc. that are not addressed during the hospital course, and if any pending results, should be conveyed to the patient’s PCP and respecrive consultants at the time of discharge. This is deferred to the primary team. Please call if any questions.    Further recommendations will be based on the patient's response to recommended treatment and results of the investigation ordered.      Please note: Voice-recognition software may have been used to generate this report, which may have resulted in some phonetic-based errors in grammar and

## 2025-07-04 NOTE — PROGRESS NOTES
Otolaryngology head neck surgery    Events noted  Consent still pending  We will proceed with tracheotomy once this is obtained  Apparently sister has been found and will be discussing  patient care with patient treatment team

## 2025-07-04 NOTE — PROGRESS NOTES
1408: Witnessed waste of 20Ml of Fentanyl with Anisha LINO RN.  1756: Witnessed 45Ml of versed waste with Anisha LINO RN.

## 2025-07-04 NOTE — PLAN OF CARE
Problem: Safety - Medical Restraint  Goal: Remains free of injury from restraints (Restraint for Interference with Medical Device)  Description: INTERVENTIONS:  1. Determine that other, less restrictive measures have been tried or would not be effective before applying the restraint  2. Evaluate the patient's condition at the time of restraint application  3. Inform patient/family regarding the reason for restraint  4. Q2H: Monitor safety, psychosocial status, comfort, nutrition and hydration  Outcome: Progressing  Flowsheets (Taken 7/4/2025 0800)  Remains free of injury from restraints (restraint for interference with medical device):   Determine that other, less restrictive measures have been tried or would not be effective before applying the restraint   Evaluate the patient's condition at the time of restraint application   Every 2 hours: Monitor safety, psychosocial status, comfort, nutrition and hydration     Problem: Discharge Planning  Goal: Discharge to home or other facility with appropriate resources  Outcome: Progressing  Flowsheets (Taken 7/3/2025 2000 by Candace Kidd RN)  Discharge to home or other facility with appropriate resources:   Identify barriers to discharge with patient and caregiver   Arrange for needed discharge resources and transportation as appropriate   Identify discharge learning needs (meds, wound care, etc)   Arrange for interpreters to assist at discharge as needed   Refer to discharge planning if patient needs post-hospital services based on physician order or complex needs related to functional status, cognitive ability or social support system     Problem: Pain  Goal: Verbalizes/displays adequate comfort level or baseline comfort level  Outcome: Progressing  Flowsheets  Taken 7/4/2025 0400 by Candace Kidd RN  Verbalizes/displays adequate comfort level or baseline comfort level:   Encourage patient to monitor pain and request assistance   Assess pain using  medications as ordered  Goal: Absence of infection during hospitalization  Outcome: Progressing  Flowsheets (Taken 7/3/2025 2000 by Candace Kidd RN)  Absence of infection during hospitalization:   Assess and monitor for signs and symptoms of infection   Monitor lab/diagnostic results   Monitor all insertion sites i.e., indwelling lines, tubes and drains   Monitor endotracheal (as able) and nasal secretions for changes in amount and color   Administer medications as ordered   [place] appropriate cooling/warming therapies per order   Instruct and encourage patient and family to use good hand hygiene technique   Identify and instruct in appropriate isolation precautions for identified infection/condition     Problem: Metabolic/Fluid and Electrolytes - Adult  Goal: Electrolytes maintained within normal limits  Outcome: Progressing  Goal: Hemodynamic stability and optimal renal function maintained  Outcome: Progressing  Flowsheets (Taken 7/3/2025 2000 by Candace Kidd RN)  Hemodynamic stability and optimal renal function maintained:   Monitor labs and assess for signs and symptoms of volume excess or deficit   Monitor intake, output and patient weight   Monitor urine specific gravity, serum osmolarity and serum sodium as indicated or ordered   Instruct patient on fluid and nutrition restrictions as appropriate   Monitor response to interventions for patient's volume status, including labs, urine output, blood pressure (other measures as available)   Encourage oral intake as appropriate  Goal: Glucose maintained within prescribed range  Outcome: Progressing  Flowsheets (Taken 7/3/2025 2000 by Candace Kidd RN)  Glucose maintained within prescribed range:   Monitor blood glucose as ordered   Assess for signs and symptoms of hyperglycemia and hypoglycemia   Administer ordered medications to maintain glucose within target range   Assess barriers to adequate nutritional intake and initiate nutrition consult

## 2025-07-04 NOTE — PROGRESS NOTES
[facility]  (A Division of [facility])  Renee Stanford MD   Office Tel:  [phone] Option #8                                                               Date of Admission: 6/14/2025       ID FU for antimicrobial management suspected esophageal candidiasis   PCP: Gaby Monroe MD    C/C: stridor/SOB    Current Antimicrobials:    Prior Antimicrobials:      Metronidazoli 500 mg IV Q8 hours-6/27 to date   Zithro 6/16 to 20  CAX 6/16 to 20    Cefepime 6/20 to date # 7 days  Fluconazole 6/14-> Micafungin  150 mg daily   #14     Allergy to antibiotics- NA     Assessment:     Acutely  ill patient with:    Acute resp failure/ARDS- Improved. Awaiting trach      Underlying COPD      CXR infiltrate clear 6/24-- vent setting improving with abx treatment. Concern for PCP low at this time.       Fungitell X2-normal    Infection with Serratia/Klebsiella -resp cultures- 6/16 - treated X7 Days    Esophagitis- presumed candidal--Underlying immunodeficiency?  Fungitell < 31, galactomannan normal( 0.03)= 6/14--makes systemci fungemia/PCP less likley-> repeat testing pending  Stridor/ candidal esophagitis/intubated- 10/2024 in [facility] as well- new info from his Primary MD    Colitis Vs Ileus- on CT scan 6/26-Non distended abd    Transaminitis- recurrning, inclining up--management per others    6/14 - blood cutlure X2: NGSF, resp culture- mixed GNR- normal resp kenia        -UA no pyuria, urine cx-Neg        -X-ray-bibasilar scarring/atelectasis, neck x-ray- no acute pathology        -CT CAP: Bibasilar atelectasis/pneumonia left greater than right.  Bullous emphysematous change.  Procal 0.03    6/16- KOH from mouth- no yeast, culture in progress         - respiratory culture- Klebsiella and Serratia growth--SS to cefepime, fungal culture- Candida albicans         =MRSA screen negative    Concern for immunodeficiency  -HIV 1/2- Non -reactive 6/14/25, HIV PCR -<20  -CD4

## 2025-07-04 NOTE — PROGRESS NOTES
Care manager noted from previous documentation that patient's sister, listed as Afshan Al, was supposed to come to ICU this morning at 0800 but did not come.  Care manager called phone listed in chart [phone]; no answer so left message asking that she contact ICU at [phone].

## 2025-07-05 ENCOUNTER — APPOINTMENT (OUTPATIENT)
Facility: HOSPITAL | Age: 60
DRG: 368 | End: 2025-07-05
Payer: MEDICARE

## 2025-07-05 LAB
ANION GAP SERPL CALC-SCNC: 10 MMOL/L (ref 3–18)
BASOPHILS # BLD: 0.01 K/UL (ref 0–0.1)
BASOPHILS NFR BLD: 0.1 % (ref 0–2)
BUN SERPL-MCNC: 23 MG/DL (ref 6–23)
BUN/CREAT SERPL: 72 (ref 12–20)
CALCIUM SERPL-MCNC: 8.7 MG/DL (ref 8.5–10.1)
CHLORIDE SERPL-SCNC: 100 MMOL/L (ref 98–107)
CO2 SERPL-SCNC: 30 MMOL/L (ref 21–32)
CREAT SERPL-MCNC: 0.33 MG/DL (ref 0.6–1.3)
DIFFERENTIAL METHOD BLD: ABNORMAL
EOSINOPHIL # BLD: 0.03 K/UL (ref 0–0.4)
EOSINOPHIL NFR BLD: 0.4 % (ref 0–5)
ERYTHROCYTE [DISTWIDTH] IN BLOOD BY AUTOMATED COUNT: 14.3 % (ref 11.6–14.5)
GLUCOSE BLD STRIP.AUTO-MCNC: 127 MG/DL (ref 70–110)
GLUCOSE BLD STRIP.AUTO-MCNC: 128 MG/DL (ref 70–110)
GLUCOSE BLD STRIP.AUTO-MCNC: 89 MG/DL (ref 70–110)
GLUCOSE BLD STRIP.AUTO-MCNC: 98 MG/DL (ref 70–110)
GLUCOSE SERPL-MCNC: 118 MG/DL (ref 74–108)
HCT VFR BLD AUTO: 29.9 % (ref 36–48)
HGB BLD-MCNC: 10.1 G/DL (ref 13–16)
IMM GRANULOCYTES # BLD AUTO: 0.17 K/UL (ref 0–0.04)
IMM GRANULOCYTES NFR BLD AUTO: 2.2 % (ref 0–0.5)
LYMPHOCYTES # BLD: 0.85 K/UL (ref 0.9–3.3)
LYMPHOCYTES NFR BLD: 11.1 % (ref 21–52)
MAGNESIUM SERPL-MCNC: 2.1 MG/DL (ref 1.6–2.6)
MCH RBC QN AUTO: 29.1 PG (ref 24–34)
MCHC RBC AUTO-ENTMCNC: 33.8 G/DL (ref 31–37)
MCV RBC AUTO: 86.2 FL (ref 78–100)
MONOCYTES # BLD: 0.38 K/UL (ref 0.05–1.2)
MONOCYTES NFR BLD: 5 % (ref 3–10)
NEUTS SEG # BLD: 6.19 K/UL (ref 1.8–8)
NEUTS SEG NFR BLD: 81.2 % (ref 40–73)
NRBC # BLD: 0 K/UL (ref 0–0.01)
NRBC BLD-RTO: 0 PER 100 WBC
PHOSPHATE SERPL-MCNC: 2.9 MG/DL (ref 2.5–4.9)
PLATELET # BLD AUTO: 135 K/UL (ref 135–420)
PMV BLD AUTO: 10.1 FL (ref 9.2–11.8)
POTASSIUM SERPL-SCNC: 3.3 MMOL/L (ref 3.5–5.5)
POTASSIUM SERPL-SCNC: 4 MMOL/L (ref 3.5–5.5)
RBC # BLD AUTO: 3.47 M/UL (ref 4.35–5.65)
SODIUM SERPL-SCNC: 140 MMOL/L (ref 136–145)
WBC # BLD AUTO: 7.6 K/UL (ref 4.6–13.2)

## 2025-07-05 PROCEDURE — 6360000002 HC RX W HCPCS: Performed by: INTERNAL MEDICINE

## 2025-07-05 PROCEDURE — 84100 ASSAY OF PHOSPHORUS: CPT

## 2025-07-05 PROCEDURE — 84132 ASSAY OF SERUM POTASSIUM: CPT

## 2025-07-05 PROCEDURE — 2500000003 HC RX 250 WO HCPCS: Performed by: INTERNAL MEDICINE

## 2025-07-05 PROCEDURE — 6370000000 HC RX 637 (ALT 250 FOR IP): Performed by: INTERNAL MEDICINE

## 2025-07-05 PROCEDURE — 82962 GLUCOSE BLOOD TEST: CPT

## 2025-07-05 PROCEDURE — 83735 ASSAY OF MAGNESIUM: CPT

## 2025-07-05 PROCEDURE — 71045 X-RAY EXAM CHEST 1 VIEW: CPT

## 2025-07-05 PROCEDURE — 94003 VENT MGMT INPAT SUBQ DAY: CPT

## 2025-07-05 PROCEDURE — 2580000003 HC RX 258: Performed by: INTERNAL MEDICINE

## 2025-07-05 PROCEDURE — 2700000000 HC OXYGEN THERAPY PER DAY

## 2025-07-05 PROCEDURE — 94640 AIRWAY INHALATION TREATMENT: CPT

## 2025-07-05 PROCEDURE — 2000000000 HC ICU R&B

## 2025-07-05 PROCEDURE — 80048 BASIC METABOLIC PNL TOTAL CA: CPT

## 2025-07-05 PROCEDURE — 85025 COMPLETE CBC W/AUTO DIFF WBC: CPT

## 2025-07-05 PROCEDURE — 94669 MECHANICAL CHEST WALL OSCILL: CPT

## 2025-07-05 PROCEDURE — 94668 MNPJ CHEST WALL SBSQ: CPT

## 2025-07-05 RX ORDER — DEXMEDETOMIDINE HYDROCHLORIDE 4 UG/ML
.1-1.5 INJECTION, SOLUTION INTRAVENOUS CONTINUOUS
Status: DISCONTINUED | OUTPATIENT
Start: 2025-07-05 | End: 2025-07-08

## 2025-07-05 RX ORDER — SODIUM CHLORIDE FOR INHALATION 0.9 %
3 VIAL, NEBULIZER (ML) INHALATION EVERY 4 HOURS PRN
Status: DISCONTINUED | OUTPATIENT
Start: 2025-07-05 | End: 2025-07-05

## 2025-07-05 RX ADMIN — ENOXAPARIN SODIUM 30 MG: 100 INJECTION SUBCUTANEOUS at 01:37

## 2025-07-05 RX ADMIN — PROPOFOL 30 MCG/KG/MIN: 10 INJECTION, EMULSION INTRAVENOUS at 01:37

## 2025-07-05 RX ADMIN — IPRATROPIUM BROMIDE 0.5 MG: 0.5 SOLUTION RESPIRATORY (INHALATION) at 03:19

## 2025-07-05 RX ADMIN — RACEPINEPHRINE HYDROCHLORIDE 0.5 ML: 11.25 SOLUTION RESPIRATORY (INHALATION) at 16:31

## 2025-07-05 RX ADMIN — BUDESONIDE 500 MCG: 0.5 INHALANT RESPIRATORY (INHALATION) at 07:21

## 2025-07-05 RX ADMIN — IPRATROPIUM BROMIDE 0.5 MG: 0.5 SOLUTION RESPIRATORY (INHALATION) at 19:25

## 2025-07-05 RX ADMIN — FUROSEMIDE 20 MG: 10 INJECTION, SOLUTION INTRAMUSCULAR; INTRAVENOUS at 08:20

## 2025-07-05 RX ADMIN — WATER 125 MG: 1 INJECTION INTRAMUSCULAR; INTRAVENOUS; SUBCUTANEOUS at 16:27

## 2025-07-05 RX ADMIN — MINERAL OIL, PETROLATUM: 425; 568 OINTMENT OPHTHALMIC at 02:10

## 2025-07-05 RX ADMIN — DEXMEDETOMIDINE 0.1 MCG/KG/HR: 100 INJECTION, SOLUTION INTRAVENOUS at 11:25

## 2025-07-05 RX ADMIN — THIAMINE HYDROCHLORIDE 100 MG: 100 INJECTION, SOLUTION INTRAMUSCULAR; INTRAVENOUS at 08:20

## 2025-07-05 RX ADMIN — IPRATROPIUM BROMIDE 0.5 MG: 0.5 SOLUTION RESPIRATORY (INHALATION) at 07:21

## 2025-07-05 RX ADMIN — SODIUM CHLORIDE, PRESERVATIVE FREE 10 ML: 5 INJECTION INTRAVENOUS at 08:54

## 2025-07-05 RX ADMIN — POTASSIUM BICARBONATE 40 MEQ: 782 TABLET, EFFERVESCENT ORAL at 06:28

## 2025-07-05 RX ADMIN — FENTANYL CITRATE 75 MCG/HR: 50 INJECTION INTRAVENOUS at 00:20

## 2025-07-05 RX ADMIN — BUDESONIDE 500 MCG: 0.5 INHALANT RESPIRATORY (INHALATION) at 19:25

## 2025-07-05 RX ADMIN — ENOXAPARIN SODIUM 30 MG: 100 INJECTION SUBCUTANEOUS at 13:03

## 2025-07-05 RX ADMIN — MINERAL OIL, PETROLATUM: 425; 568 OINTMENT OPHTHALMIC at 08:45

## 2025-07-05 RX ADMIN — ARFORMOTEROL TARTRATE 15 MCG: 15 SOLUTION RESPIRATORY (INHALATION) at 07:21

## 2025-07-05 RX ADMIN — RACEPINEPHRINE HYDROCHLORIDE 0.5 ML: 11.25 SOLUTION RESPIRATORY (INHALATION) at 16:42

## 2025-07-05 RX ADMIN — MINERAL OIL, PETROLATUM: 425; 568 OINTMENT OPHTHALMIC at 06:00

## 2025-07-05 RX ADMIN — PROPOFOL 30 MCG/KG/MIN: 10 INJECTION, EMULSION INTRAVENOUS at 07:08

## 2025-07-05 RX ADMIN — FOLIC ACID 1 MG: 5 INJECTION, SOLUTION INTRAMUSCULAR; INTRAVENOUS; SUBCUTANEOUS at 08:21

## 2025-07-05 RX ADMIN — SODIUM CHLORIDE, PRESERVATIVE FREE 40 MG: 5 INJECTION INTRAVENOUS at 08:20

## 2025-07-05 RX ADMIN — ARFORMOTEROL TARTRATE 15 MCG: 15 SOLUTION RESPIRATORY (INHALATION) at 19:25

## 2025-07-05 RX ADMIN — IPRATROPIUM BROMIDE 0.5 MG: 0.5 SOLUTION RESPIRATORY (INHALATION) at 11:54

## 2025-07-05 ASSESSMENT — PAIN SCALES - GENERAL: PAINLEVEL_OUTOF10: 0

## 2025-07-05 ASSESSMENT — PULMONARY FUNCTION TESTS
PIF_VALUE: 16
PIF_VALUE: 16

## 2025-07-05 NOTE — PROGRESS NOTES
1114:Called  related to having increase in agitation. Patient hitting kicking and trying to bite nurses. Received order for restraints and precedex gtt to be started. Monitor Heart rate for bradycardia while on Precedex gtt.

## 2025-07-05 NOTE — PLAN OF CARE
Problem: Safety - Medical Restraint  Goal: Remains free of injury from restraints (Restraint for Interference with Medical Device)  Description: INTERVENTIONS:  1. Determine that other, less restrictive measures have been tried or would not be effective before applying the restraint  2. Evaluate the patient's condition at the time of restraint application  3. Inform patient/family regarding the reason for restraint  4. Q2H: Monitor safety, psychosocial status, comfort, nutrition and hydration  7/5/2025 0909 by Carolann Figueroa RN  Outcome: Progressing  Flowsheets (Taken 7/4/2025 2000 by Candace Kidd RN)  Remains free of injury from restraints (restraint for interference with medical device):   Determine that other, less restrictive measures have been tried or would not be effective before applying the restraint   Evaluate the patient's condition at the time of restraint application   Inform patient/family regarding the reason for restraint   Every 2 hours: Monitor safety, psychosocial status, comfort, nutrition and hydration  7/4/2025 2128 by Candace Kidd RN  Outcome: Progressing  Flowsheets  Taken 7/4/2025 2000 by Candace Kidd RN  Remains free of injury from restraints (restraint for interference with medical device):   Determine that other, less restrictive measures have been tried or would not be effective before applying the restraint   Evaluate the patient's condition at the time of restraint application   Inform patient/family regarding the reason for restraint   Every 2 hours: Monitor safety, psychosocial status, comfort, nutrition and hydration  Taken 7/4/2025 1646 by Carolann Figueroa RN  Remains free of injury from restraints (restraint for interference with medical device):   Determine that other, less restrictive measures have been tried or would not be effective before applying the restraint   Evaluate the patient's condition at the time of restraint application  Taken 7/4/2025 1600  injury  7/5/2025 0909 by Carolann Figueroa RN  Outcome: Progressing  Flowsheets (Taken 6/15/2025 1930 by Hoda Diaz, RN)  Absence of Physical Injury: Implement safety measures based on patient assessment  7/4/2025 2128 by Candace Kidd RN  Outcome: Progressing  Flowsheets (Taken 6/15/2025 1930 by Hoda Diaz, RN)  Absence of Physical Injury: Implement safety measures based on patient assessment     Problem: Behavior  Goal: Pt/Family maintain appropriate behavior and adhere to behavioral management agreement, if implemented  Description: INTERVENTIONS:  1. Assess patient/family's coping skills and  non-compliant behavior (including use of illegal substances)  2. Notify security of behavior or suspected illegal substances which indicate the need for search of the family and/or belongings  3. Encourage verbalization of thoughts and concerns in a socially appropriate manner  4. Utilize positive, consistent limit setting strategies supporting safety of patient, staff and others  5. Encourage participation in the decision making process about the behavioral management agreement  6. If a visitor's behavior poses a threat to safety call refer to organization policy.  7. Initiate consult with , Psychosocial CNS, Spiritual Care as appropriate  7/5/2025 0909 by Carolann Figueroa RN  Outcome: Progressing  Flowsheets (Taken 7/5/2025 0800)  Patient/family maintains appropriate behavior and adheres to behavioral management agreement, if implemented:   Assess patient/family’s coping skills and  non-compliant behavior (including use of illegal substances)   Utilize positive, consistent limit setting strategies supporting safety of patient, staff and others  7/4/2025 2128 by Candace Kidd RN  Outcome: Progressing  Flowsheets (Taken 7/4/2025 1930)  Patient/family maintains appropriate behavior and adheres to behavioral management agreement, if implemented:   Assess patient/family’s coping

## 2025-07-05 NOTE — PROGRESS NOTES
(36.6 °C) (Axillary)   Resp 20   Ht 1.829 m (6' 0.01\")   Wt 95 kg (209 lb 7 oz)   SpO2 100%   BMI 28.40 kg/m²   Pertinent physical exam findings relative to the acute hospital needs are:  General:   Vented; resting  CV: RRR, S1, S2   Lungs: Intubated  Abdom:  Soft, NTTP, ND.    Ext:  No c/c,    edema.         Intake/Output    In: 2492.2 [I.V.:1143.2; NG/GT:1055]  Out: 3965 [Urine:3735]       Pertinent Labs:  Susceptibility data from last 90 days.  Collected Specimen Info Organism Amikacin Ampicillin Ampicillin + Sulbactam Cefazolin Cefepime Cefoxitin Ceftazidime Ceftriaxone Ciprofloxacin Gentamicin Levofloxacin Piperacillin + Tazobactam Spec Grav, Fluid   06/16/25 Sputum Serratia marcescens  S    R  S  R  S  S  S  S  S   S     Klebsiella pneumoniae  S  R  S  S  S  S  S  S  S  S  S  S  S   06/16/25 Mouth Candida albicans                  Collected Specimen Info Organism Tobramcyin Trimethoprim + Sulfamethoxazole   06/16/25 Sputum Serratia marcescens  S      Klebsiella pneumoniae  S  S   06/16/25 Mouth Candida albicans       Results       ** No results found for the last 336 hours. **          Chemistry Lab Results   Component Value Date/Time     07/05/2025 04:20 AM    K 3.3 07/05/2025 04:20 AM     07/05/2025 04:20 AM    CO2 30 07/05/2025 04:20 AM    BUN 23 07/05/2025 04:20 AM    CREATININE 0.33 07/05/2025 04:20 AM    GLUCOSE 118 07/05/2025 04:20 AM    CALCIUM 8.7 07/05/2025 04:20 AM    LABGLOM >90 07/05/2025 04:20 AM    LABGLOM >60 06/29/2023 02:11 AM       CBC w/Diff Recent Labs     07/03/25  0452 07/04/25  0507 07/05/25  0420   WBC 9.9 9.0 7.6   RBC 3.80* 3.29* 3.47*   HGB 11.1* 9.8* 10.1*   HCT 33.0* 29.5* 29.9*    134* 135   LYMPHOPCT 4.8* 13.7* 11.1*        Liver Enzymes Recent Labs     07/04/25  0507   *   *   ALKPHOS 60   BILITOT 0.8      Thyroid Studies No results found for: \"X6SRMIV\", \"FT3\", \"T4FREE\", \"TSH\"       BNP No results for input(s): \"BNP\" in the last 72 hours.      Cardiac Enzymes No results for input(s): \"CKTOTAL\", \"CKMB\", \"CKMBINDEX\", \"TROPONINI\" in the last 72 hours.    Invalid input(s): \"DOREEN\"   Coagulation Recent Labs     07/04/25  0507   PROTIME 14.6   INR 1.1         Lipid Panel Lab Results   Component Value Date/Time    CHOL 132 07/04/2025 05:07 AM    TRIG 151 07/04/2025 05:07 AM    HDL 43 07/04/2025 05:07 AM    VLDL 30 07/04/2025 05:07 AM    CHOLHDLRATIO 3.1 07/04/2025 05:07 AM          Pertinent Radiology/Procedures:    XR CHEST PORTABLE  Result Date: 7/4/2025  INDICATION: Ventilator COMPARISON: 7/3/2025 FINDINGS: Single AP portable view of the chest demonstrates no change in position of the lines and tubes. The cardiomediastinal silhouette is unchanged. Persistent bibasilar atelectasis.     No significant change. Electronically signed by Dami Solo    XR CHEST PORTABLE  Result Date: 7/3/2025  EXAM:  XR CHEST PORTABLE INDICATION: Ventilator COMPARISON: 7/2/2025 TECHNIQUE: 0607 hours portable chest AP view FINDINGS: The ET tube is 6.5 cm above the justine. NG tube courses into the stomach. Lungs demonstrate bibasilar atelectasis left greater than right unchanged. Small pleural effusions are suspected.     1. ET tube is 6.5 cm above the justine. This could be advanced 1.5 cm. Electronically signed by Jorge Cornelius    XR CHEST PORTABLE  Result Date: 7/2/2025  EXAM:  XR CHEST PORTABLE INDICATION: Ventilator COMPARISON: 7/1/2025 TECHNIQUE: 0550 hours portable chest AP view FINDINGS: ET tube is 7 cm above the justine. This could be advanced 2 cm. NG tube courses into the stomach. The distal tip is not seen. Small bilateral pleural effusions and bibasilar atelectasis persist unchanged.     1. No change bibasal atelectasis. 2. The ET tube is 7 cm above the justine. This could be advanced 2 cm. Electronically signed by Jorge [facility]TA IR TECHNOLOGIST [employer]ICE  Result Date: 7/1/2025  Automatic Administrative Result. Imaging Guidance used by the IR Department.  See the

## 2025-07-05 NOTE — PLAN OF CARE
vasoactive medications as ordered   Administer fluid and/or volume expanders as ordered   Assess for signs of decreased cardiac output   Monitor blood pressure and heart rate   Monitor urine output and notify Licensed Independent Practitioner for values outside of normal range   For PPHN infants, administer sedation as ordered and minimize all controllable stressors.  7/4/2025 1111 by Carolann Figueroa RN  Outcome: Progressing  Flowsheets (Taken 7/3/2025 2000 by Candace Kidd RN)  Maintains optimal cardiac output and hemodynamic stability:   Monitor blood pressure and heart rate   Monitor urine output and notify Licensed Independent Practitioner for values outside of normal range   Assess for signs of decreased cardiac output   Administer fluid and/or volume expanders as ordered   Administer vasoactive medications as ordered   For PPHN infants, administer sedation as ordered and minimize all controllable stressors.     Problem: Skin/Tissue Integrity - Adult  Goal: Skin integrity remains intact  Description: 1.  Monitor for areas of redness and/or skin breakdown  2.  Assess vascular access sites hourly  3.  Every 4-6 hours minimum:  Change oxygen saturation probe site  4.  Every 4-6 hours:  If on nasal continuous positive airway pressure, respiratory therapy assess nares and determine need for appliance change or resting period  7/4/2025 2128 by Candace Kidd RN  Outcome: Progressing  Flowsheets (Taken 7/4/2025 1930)  Skin Integrity Remains Intact:   Monitor for areas of redness and/or skin breakdown   Assess vascular access sites hourly   Every 4-6 hours minimum:  Change oxygen saturation probe site   Every 4-6 hours:  If on nasal continuous positive airway pressure, assess nares and determine need for appliance change or resting period   Turn and reposition as indicated   Assess need for specialty bed   Positioning devices   Pressure redistribution bed/mattress (bed type)   Check visual cues for pain    Monitor skin under medical devices  7/4/2025 1111 by Carolann Figueroa RN  Outcome: Progressing  Flowsheets (Taken 7/3/2025 2000 by Candace Kidd RN)  Skin Integrity Remains Intact:   Monitor for areas of redness and/or skin breakdown   Assess vascular access sites hourly   Every 4-6 hours minimum:  Change oxygen saturation probe site   Every 4-6 hours:  If on nasal continuous positive airway pressure, assess nares and determine need for appliance change or resting period   Turn and reposition as indicated   Assess need for specialty bed   Positioning devices   Pressure redistribution bed/mattress (bed type)   Check visual cues for pain   Monitor skin under medical devices  Goal: Oral mucous membranes remain intact  7/4/2025 2128 by Candace Kidd RN  Outcome: Progressing  Flowsheets (Taken 7/4/2025 1930)  Oral Mucous Membranes Remain Intact:   Assess oral mucosa and hygiene practices   Implement preventative oral hygiene regimen   Implement oral medicated treatments as ordered  7/4/2025 1111 by Carolann Figueroa RN  Outcome: Progressing  Flowsheets (Taken 7/3/2025 2000 by Candace Kidd RN)  Oral Mucous Membranes Remain Intact:   Assess oral mucosa and hygiene practices   Implement preventative oral hygiene regimen   Implement oral medicated treatments as ordered     Problem: Musculoskeletal - Adult  Goal: Return mobility to safest level of function  7/4/2025 2128 by Candace Kidd RN  Outcome: Progressing  Flowsheets (Taken 7/4/2025 1930)  Return Mobility to Safest Level of Function:   Assess patient stability and activity tolerance for standing, transferring and ambulating with or without assistive devices   Assist with transfers and ambulation using safe patient handling equipment as needed   Ensure adequate protection for wounds/incisions during mobilization   Obtain physical therapy/occupational therapy consults as needed   Apply continuous passive motion per provider or physical therapy orders

## 2025-07-05 NOTE — PROGRESS NOTES
1625: Spoke with  regarding pt having new signs of increase swelling in the throat hours after extubation. Received orders for racepinephrine neb treatment and solumedrol. If no change in swelling call   back for new orders.    1631:[facility] respiratory therapist is at bedside giving neb treatment.  1642: 2nd treatment given.  1749:Pt maintaining O2  saturations 99% able to cough and clear airway.  1756: Spoke with Dr. Law regarding patient status post racepinephrine neb tx. State continue to monitor and repeat neb in a couple of hours.

## 2025-07-05 NOTE — PROGRESS NOTES
[facility]  Pulmonary, Critical Care, and Sleep Medicine    Name: Beny Knapp MRN: [id number]   : 1965 Hospital: [facility]    Date: 2025  Room: [address] [place] [address] Note                                              Consult requesting physician: Dr. Mark   Reason for Consult: Respiratory failure, hyponatremia, upper airway    IMPRESSION:     Acute respiratory failure with hypoxemia and hypercarbia   J96.01, J96.02  Stridor  Obstructed larynx  Polysubstance abuse   Candidiasis of esophagus   Aspiration pneumonia   COPD/emphysema  EtOH use  Cocaine abuse  Hepatitis C  Cirrhosis of liver  Hyponatremia, resolved      Active Hospital Problems    Diagnosis Date Noted    Moderate malnutrition [E44.0] 2025    Aspiration pneumonia (HCC) [J69.0] 2025    Cocaine abuse (HCC) [F14.10] 2025    Hyponatremia [E87.1] 2025    Stridor [R06.1] 2025    Obstructed, larynx [J38.6] 2025    Acute respiratory failure with hypoxia and hypercapnia (HCC) [J96.01, J96.02] 2025    ETOH abuse [F10.10] 2025    Polysubstance abuse (HCC) [F19.10] 2025    Candidiasis of esophagus (HCC) [B37.81] 2025    Tobacco abuse [Z72.0] 2015    HTN (hypertension) [I10] 2015    COPD (chronic obstructive pulmonary disease) (HCC) [J44.9] 2015    Chronic hepatitis C (HCC) [B18.2] 2015        Code status: Full Code       RECOMMENDATIONS:     Respiratory: 59-year-old male with COPD, smoker presents with acute upper airway distress, fullness in the laryngeal area, questionable foreign body. History of aspirations with feeding and bolus feeling on presentation in ER this admission. Patient was intubated 25 for airway protection and hypercarbic and hypoxemic respiratory failure, with ENT at bedside.  Prior to intubation, fiberoptic nasolaryngoscopy endoscopy by ENT did not show any foreign body, but swollen airway/larynx and  07/04/25  0507 07/05/25  0420   WBC 9.9 9.0 7.6   RBC 3.80* 3.29* 3.47*   HGB 11.1* 9.8* 10.1*   HCT 33.0* 29.5* 29.9*   MCV 86.8 89.7 86.2   MCH 29.2 29.8 29.1   MCHC 33.6 33.2 33.8   RDW 13.9 14.1 14.3    134* 135   MPV 10.1 10.0 10.1         Cardiac Enzymes Troponin, High Sensitivity   Date/Time Value Ref Range Status   06/29/2023 04:16 AM 6 0 - 78 ng/L Final     Comment:     A HS troponin value change of (+ or -) 50% or more below the 99th percentile, in a 1/2/3 hr interval represents a significant change. Clinical correlation is recommended.  A HS troponin value change of (+ or -) 20% or above the 99th percentile, in a 1/2/3 hr interval represents a significant change. Clinical correlation is recommended.  99th Percentile:    Women:  0-54 ng/L                                                               Men:  0-78 ng/L     06/29/2023 02:11 AM 6 0 - 78 ng/L Final     Comment:     A HS troponin value change of (+ or -) 50% or more below the 99th percentile, in a 1/2/3 hr interval represents a significant change. Clinical correlation is recommended.  A HS troponin value change of (+ or -) 20% or above the 99th percentile, in a 1/2/3 hr interval represents a significant change. Clinical correlation is recommended.  99th Percentile:    Women:  0-54 ng/L                                                               Men:  0-78 ng/L         No results for input(s): \"CKTOTAL\" in the last 72 hours.    Invalid input(s): \"TROPOHS\"     Coagulation INR   Date/Time Value Ref Range Status   07/04/2025 05:07 AM 1.1 0.9 - 1.2   Final     Comment:     INR Therapeutic Ranges  (on stable oral anticoagulant):     INDICATION                INR  DVT/PE/Atrial Fib          2.0-3.0  MI/Mechanical Heart Valve  2.5-3.5         Recent Labs     07/04/25  0507   INR 1.1            Urinalysis Lab Results   Component Value Date/Time    COLORU YELLOW 06/17/2025 12:01 PM    PHUR 5.5 06/17/2025 12:01 PM    PROTEINU 30 06/17/2025 12:01 PM

## 2025-07-05 NOTE — PROGRESS NOTES
Hospitalist ICU Progress Note      Patient name: Beny Knapp.  MRN: [id number]          PCP: Gaby Monroe MD     Summary:      Beny Knapp is a 59 y.o.  male w/ PMH of COPD, ETOH-use and polysubstance abuse who was BIBA and presents with subjective difficulty breathing and swallowing with throat pain. Brought in by EMS for possible forign body (food) with bolus within region of velecula. ENT saw pt and ED intubated for airway protection. ENT was consulted and performed scope on pt with findings of extensive esophageal candidiasis. Nephrology was consulted for severe hyponatremia with sodium of 117 recommending NS at 75cc/hr.   Patient has had a prolonged hospital stay in the ICU with difficulties with sedation requiring max sedation- at times, limited by hypotension and bradycardia.  Pt completed his 14-day course of antifungal for thrush. Initially, ENT would not do trach due to severe edema which has now improved.   In the interim, attempts made to reach a next-of-kin for decision making, which was not successful thus Ethics was consulted.  Pt also had ileus w/ diverticulitis and fecal stasis & possible stricture.  Despite multiple enemas, GI consulted for colonoscopy/decompression.   This was placed on hold while establishing/contacting NOK.    Sister Afshan (apparently the POA) was reached, stated she would come to hospital-  but team Is unable to reach her (and no appearance) on 7/03      Seen in ICU    Assessment/Plan:  Medical Complexity: High-1 acute medical problem with high risk threat, at least 3 follow up items    # Acute airway obstruction - Intubated to protect airway  # Serratia & klebsiella VAP  Evaluated by ENT- initially nasal trumpet was placed w/o any significant improvement.  Underwent flexible fiberoptic nasolaryngoscopy-  showing acute airway obstruction w/ dramatic amount of thrush noted in oropharynx, hypopharynx & laryngeal area obstructing the larynx.  Significant  catheter projecting over the SVC, similar to prior study. External monitor leads project over the right lower chest and precordium/epigastric area. Lungs/Pleura: Stable bibasilar and has slightly increased right lower lung opacities. No pleural effusion or pneumothorax. Mediastinum: Cardiomediastinal silhouette is within normal limits. MSK: No acute osseous abnormalities. Upper abdomen: Unremarkable.     Line and tube in satisfactory position as noted. Worsening right lower lung opacity. Electronically signed by KAYLEIGH FRIAS    XR CHEST 1 VIEW  Result Date: 6/15/2025  EXAM: XR CHEST 1 VIEW INDICATION: swelling COMPARISON: 6/15/2025 at 1016 hours FINDINGS: A portable AP radiograph of the chest was obtained at 1333 hours hours. The patient is on a cardiac monitor. There are small bilateral pleural effusions.. The cardiac and mediastinal contours and pulmonary vascularity are otherwise normal.  The bones and soft tissues are grossly within normal limits. The NG tube extends into the stomach. Endotracheal tube terminates at the thoracic inlet. The right IJ line tip overlies the SVC.     Small bilateral pleural effusions. Lines and tubes as described.. Electronically signed by Sheridan Biswas    XR CHEST PORTABLE  Result Date: 6/15/2025  EXAM:  XR CHEST PORTABLE INDICATION: et tube COMPARISON: Prior day TECHNIQUE: portable chest AP view at 1013 hours FINDINGS: The cardiac silhouette is within normal limits. The pulmonary vasculature is within normal limits. Endotracheal tube terminates at the thoracic inlet. NG tube extends below the hemidiaphragm. The right IJ line terminates in the SVC. There is no pneumothorax. Small bilateral pleural effusions. The lungs are clear. The visualized bones and upper abdomen are age-appropriate.     Satisfactory endotracheal tube position. Electronically signed by Sheridan Biswas    Echo (TTE) complete (PRN contrast/bubble/strain/3D)  Result Date: 6/14/2025    Image quality is fair.   Left

## 2025-07-05 NOTE — PROGRESS NOTES
Patient NT Suctioned, PEP and Oscillatory Therapy performed, as well as placed on a HFNC at 60LPM and 60% FIO2.

## 2025-07-05 NOTE — PROGRESS NOTES
Speech Therapy:    Evaluation orders received. Upon completion of chart review and discussion with RN, pt not appropriate for SLP evaluation this date.  Currently, patient is:    [] Tolerating current po diet (per RN report)  [] Tolerating current po diet; however, poor po intake (per RN report)   [] Receiving nutrition via tube feeding   [] Lethargic, solomnent, or difficult to arouse for safe po trials     [] Unable to manage secretions  [] Receiving intervention for respiratory distress  [x] < 6 hours s/p extubation   [] Other:     Please contact SLP with questions/concerns.  SLP will follow up next day.    Thank you for this referral.    Latoya Garcia MA CCC-SLP  Speech-Language Pathologist

## 2025-07-06 LAB
ANION GAP SERPL CALC-SCNC: 11 MMOL/L (ref 3–18)
BASOPHILS # BLD: 0.01 K/UL (ref 0–0.1)
BASOPHILS NFR BLD: 0.1 % (ref 0–2)
BUN SERPL-MCNC: 26 MG/DL (ref 6–23)
BUN/CREAT SERPL: 59 (ref 12–20)
CALCIUM SERPL-MCNC: 9.8 MG/DL (ref 8.5–10.1)
CHLORIDE SERPL-SCNC: 99 MMOL/L (ref 98–107)
CO2 SERPL-SCNC: 32 MMOL/L (ref 21–32)
CREAT SERPL-MCNC: 0.44 MG/DL (ref 0.6–1.3)
DIFFERENTIAL METHOD BLD: ABNORMAL
EOSINOPHIL # BLD: 0 K/UL (ref 0–0.4)
EOSINOPHIL NFR BLD: 0 % (ref 0–5)
ERYTHROCYTE [DISTWIDTH] IN BLOOD BY AUTOMATED COUNT: 13.9 % (ref 11.6–14.5)
GLUCOSE BLD STRIP.AUTO-MCNC: 120 MG/DL (ref 70–110)
GLUCOSE BLD STRIP.AUTO-MCNC: 128 MG/DL (ref 70–110)
GLUCOSE BLD STRIP.AUTO-MCNC: 134 MG/DL (ref 70–110)
GLUCOSE BLD STRIP.AUTO-MCNC: 143 MG/DL (ref 70–110)
GLUCOSE BLD STRIP.AUTO-MCNC: 144 MG/DL (ref 70–110)
GLUCOSE SERPL-MCNC: 136 MG/DL (ref 74–108)
HCT VFR BLD AUTO: 35.8 % (ref 36–48)
HGB BLD-MCNC: 12.1 G/DL (ref 13–16)
IMM GRANULOCYTES # BLD AUTO: 0.17 K/UL (ref 0–0.04)
IMM GRANULOCYTES NFR BLD AUTO: 2.4 % (ref 0–0.5)
LYMPHOCYTES # BLD: 0.32 K/UL (ref 0.9–3.3)
LYMPHOCYTES NFR BLD: 4.5 % (ref 21–52)
MAGNESIUM SERPL-MCNC: 2.3 MG/DL (ref 1.6–2.6)
MCH RBC QN AUTO: 29.4 PG (ref 24–34)
MCHC RBC AUTO-ENTMCNC: 33.8 G/DL (ref 31–37)
MCV RBC AUTO: 87.1 FL (ref 78–100)
MONOCYTES # BLD: 0.07 K/UL (ref 0.05–1.2)
MONOCYTES NFR BLD: 1 % (ref 3–10)
NEUTS SEG # BLD: 6.51 K/UL (ref 1.8–8)
NEUTS SEG NFR BLD: 92 % (ref 40–73)
NRBC # BLD: 0 K/UL (ref 0–0.01)
NRBC BLD-RTO: 0 PER 100 WBC
PHOSPHATE SERPL-MCNC: 3.7 MG/DL (ref 2.5–4.9)
PLATELET # BLD AUTO: 126 K/UL (ref 135–420)
PMV BLD AUTO: 10.2 FL (ref 9.2–11.8)
POTASSIUM SERPL-SCNC: 4.4 MMOL/L (ref 3.5–5.5)
RBC # BLD AUTO: 4.11 M/UL (ref 4.35–5.65)
SODIUM SERPL-SCNC: 142 MMOL/L (ref 136–145)
WBC # BLD AUTO: 7.1 K/UL (ref 4.6–13.2)

## 2025-07-06 PROCEDURE — 2500000003 HC RX 250 WO HCPCS: Performed by: INTERNAL MEDICINE

## 2025-07-06 PROCEDURE — 83735 ASSAY OF MAGNESIUM: CPT

## 2025-07-06 PROCEDURE — 2700000000 HC OXYGEN THERAPY PER DAY

## 2025-07-06 PROCEDURE — 85025 COMPLETE CBC W/AUTO DIFF WBC: CPT

## 2025-07-06 PROCEDURE — 6360000002 HC RX W HCPCS: Performed by: INTERNAL MEDICINE

## 2025-07-06 PROCEDURE — 92526 ORAL FUNCTION THERAPY: CPT

## 2025-07-06 PROCEDURE — 2000000000 HC ICU R&B

## 2025-07-06 PROCEDURE — 92610 EVALUATE SWALLOWING FUNCTION: CPT

## 2025-07-06 PROCEDURE — 80048 BASIC METABOLIC PNL TOTAL CA: CPT

## 2025-07-06 PROCEDURE — 82962 GLUCOSE BLOOD TEST: CPT

## 2025-07-06 PROCEDURE — 97163 PT EVAL HIGH COMPLEX 45 MIN: CPT

## 2025-07-06 PROCEDURE — 2580000003 HC RX 258: Performed by: INTERNAL MEDICINE

## 2025-07-06 PROCEDURE — 94669 MECHANICAL CHEST WALL OSCILL: CPT

## 2025-07-06 PROCEDURE — 51702 INSERT TEMP BLADDER CATH: CPT

## 2025-07-06 PROCEDURE — 84100 ASSAY OF PHOSPHORUS: CPT

## 2025-07-06 PROCEDURE — 94640 AIRWAY INHALATION TREATMENT: CPT

## 2025-07-06 PROCEDURE — 94668 MNPJ CHEST WALL SBSQ: CPT

## 2025-07-06 RX ORDER — MORPHINE SULFATE 4 MG/ML
4 INJECTION, SOLUTION INTRAMUSCULAR; INTRAVENOUS EVERY 4 HOURS PRN
Status: DISCONTINUED | OUTPATIENT
Start: 2025-07-06 | End: 2025-07-06

## 2025-07-06 RX ORDER — MORPHINE SULFATE 4 MG/ML
4 INJECTION, SOLUTION INTRAMUSCULAR; INTRAVENOUS ONCE
Status: COMPLETED | OUTPATIENT
Start: 2025-07-06 | End: 2025-07-06

## 2025-07-06 RX ADMIN — ARFORMOTEROL TARTRATE 15 MCG: 15 SOLUTION RESPIRATORY (INHALATION) at 08:24

## 2025-07-06 RX ADMIN — IPRATROPIUM BROMIDE 0.5 MG: 0.5 SOLUTION RESPIRATORY (INHALATION) at 08:25

## 2025-07-06 RX ADMIN — SODIUM CHLORIDE, PRESERVATIVE FREE 10 ML: 5 INJECTION INTRAVENOUS at 21:08

## 2025-07-06 RX ADMIN — ENOXAPARIN SODIUM 30 MG: 100 INJECTION SUBCUTANEOUS at 01:42

## 2025-07-06 RX ADMIN — WATER 20 MG: 1 INJECTION INTRAMUSCULAR; INTRAVENOUS; SUBCUTANEOUS at 12:18

## 2025-07-06 RX ADMIN — BUDESONIDE 500 MCG: 0.5 INHALANT RESPIRATORY (INHALATION) at 08:24

## 2025-07-06 RX ADMIN — MINERAL OIL, PETROLATUM: 425; 568 OINTMENT OPHTHALMIC at 01:40

## 2025-07-06 RX ADMIN — DEXMEDETOMIDINE 0.6 MCG/KG/HR: 100 INJECTION, SOLUTION INTRAVENOUS at 18:43

## 2025-07-06 RX ADMIN — THIAMINE HYDROCHLORIDE 100 MG: 100 INJECTION, SOLUTION INTRAMUSCULAR; INTRAVENOUS at 08:06

## 2025-07-06 RX ADMIN — FUROSEMIDE 20 MG: 10 INJECTION, SOLUTION INTRAMUSCULAR; INTRAVENOUS at 08:06

## 2025-07-06 RX ADMIN — IPRATROPIUM BROMIDE 0.5 MG: 0.5 SOLUTION RESPIRATORY (INHALATION) at 15:16

## 2025-07-06 RX ADMIN — SODIUM CHLORIDE, PRESERVATIVE FREE 10 ML: 5 INJECTION INTRAVENOUS at 08:06

## 2025-07-06 RX ADMIN — MINERAL OIL, PETROLATUM: 425; 568 OINTMENT OPHTHALMIC at 08:06

## 2025-07-06 RX ADMIN — MINERAL OIL, PETROLATUM: 425; 568 OINTMENT OPHTHALMIC at 05:02

## 2025-07-06 RX ADMIN — SODIUM CHLORIDE, PRESERVATIVE FREE 40 MG: 5 INJECTION INTRAVENOUS at 08:06

## 2025-07-06 RX ADMIN — DEXMEDETOMIDINE 0.8 MCG/KG/HR: 100 INJECTION, SOLUTION INTRAVENOUS at 12:51

## 2025-07-06 RX ADMIN — MORPHINE SULFATE 4 MG: 4 INJECTION, SOLUTION INTRAMUSCULAR; INTRAVENOUS at 01:41

## 2025-07-06 RX ADMIN — ENOXAPARIN SODIUM 30 MG: 100 INJECTION SUBCUTANEOUS at 15:06

## 2025-07-06 RX ADMIN — IPRATROPIUM BROMIDE 0.5 MG: 0.5 SOLUTION RESPIRATORY (INHALATION) at 01:40

## 2025-07-06 RX ADMIN — ARFORMOTEROL TARTRATE 15 MCG: 15 SOLUTION RESPIRATORY (INHALATION) at 19:24

## 2025-07-06 RX ADMIN — FOLIC ACID 1 MG: 5 INJECTION, SOLUTION INTRAMUSCULAR; INTRAVENOUS; SUBCUTANEOUS at 10:01

## 2025-07-06 RX ADMIN — IPRATROPIUM BROMIDE 0.5 MG: 0.5 SOLUTION RESPIRATORY (INHALATION) at 19:24

## 2025-07-06 RX ADMIN — DEXMEDETOMIDINE 0.8 MCG/KG/HR: 100 INJECTION, SOLUTION INTRAVENOUS at 07:33

## 2025-07-06 RX ADMIN — MINERAL OIL, PETROLATUM: 425; 568 OINTMENT OPHTHALMIC at 21:07

## 2025-07-06 RX ADMIN — SODIUM CHLORIDE, PRESERVATIVE FREE 10 ML: 5 INJECTION INTRAVENOUS at 00:28

## 2025-07-06 RX ADMIN — HYDRALAZINE HYDROCHLORIDE 10 MG: 20 INJECTION INTRAMUSCULAR; INTRAVENOUS at 22:59

## 2025-07-06 RX ADMIN — BUDESONIDE 500 MCG: 0.5 INHALANT RESPIRATORY (INHALATION) at 19:24

## 2025-07-06 ASSESSMENT — PAIN SCALES - WONG BAKER: WONGBAKER_NUMERICALRESPONSE: NO HURT

## 2025-07-06 ASSESSMENT — PAIN SCALES - GENERAL
PAINLEVEL_OUTOF10: 0
PAINLEVEL_OUTOF10: 0
PAINLEVEL_OUTOF10: 8
PAINLEVEL_OUTOF10: 0
PAINLEVEL_OUTOF10: 0

## 2025-07-06 ASSESSMENT — PAIN DESCRIPTION - ORIENTATION: ORIENTATION: POSTERIOR

## 2025-07-06 ASSESSMENT — PAIN DESCRIPTION - DESCRIPTORS: DESCRIPTORS: THROBBING

## 2025-07-06 ASSESSMENT — PAIN DESCRIPTION - LOCATION: LOCATION: BACK

## 2025-07-06 NOTE — PROGRESS NOTES
[facility]  Pulmonary, Critical Care, and Sleep Medicine    Name: Beny Knapp MRN: [id number]   : 1965 Hospital: [facility]    Date: 2025  Room: [address] [place] [address] Note                                              Consult requesting physician: Dr. Mark   Reason for Consult: Respiratory failure, hyponatremia, upper airway    IMPRESSION:     Acute respiratory failure with hypoxemia and hypercarbia   J96.01, J96.02  Stridor  Obstructed larynx  Polysubstance abuse   Candidiasis of esophagus   Aspiration pneumonia   COPD/emphysema  EtOH use  Cocaine abuse  Hepatitis C  Cirrhosis of liver  Hyponatremia, resolved      Active Hospital Problems    Diagnosis Date Noted    Moderate malnutrition [E44.0] 2025    Aspiration pneumonia (HCC) [J69.0] 2025    Cocaine abuse (HCC) [F14.10] 2025    Hyponatremia [E87.1] 2025    Stridor [R06.1] 2025    Obstructed, larynx [J38.6] 2025    Acute respiratory failure with hypoxia and hypercapnia (HCC) [J96.01, J96.02] 2025    ETOH abuse [F10.10] 2025    Polysubstance abuse (HCC) [F19.10] 2025    Candidiasis of esophagus (HCC) [B37.81] 2025    Tobacco abuse [Z72.0] 2015    HTN (hypertension) [I10] 2015    COPD (chronic obstructive pulmonary disease) (HCC) [J44.9] 2015    Chronic hepatitis C (HCC) [B18.2] 2015        Code status: Full Code       RECOMMENDATIONS:     Respiratory: 59-year-old male with COPD, smoker presents with acute upper airway distress, fullness in the laryngeal area, questionable foreign body. History of aspirations with feeding and bolus feeling on presentation in ER this admission. Patient was intubated 25 for airway protection and hypercarbic and hypoxemic respiratory failure, with ENT at bedside.  Prior to intubation, fiberoptic nasolaryngoscopy endoscopy by ENT did not show any foreign body, but swollen airway/larynx and

## 2025-07-06 NOTE — PROGRESS NOTES
1838: Sister Afshan called for an update. She stated she hasn't been able to come due to her care being broken. Asked that we call her tomorrow any updates or plans of care.

## 2025-07-06 NOTE — PLAN OF CARE
Problem: Safety - Medical Restraint  Goal: Remains free of injury from restraints (Restraint for Interference with Medical Device)  Description: INTERVENTIONS:  1. Determine that other, less restrictive measures have been tried or would not be effective before applying the restraint  2. Evaluate the patient's condition at the time of restraint application  3. Inform patient/family regarding the reason for restraint  4. Q2H: Monitor safety, psychosocial status, comfort, nutrition and hydration  Flowsheets  Taken 7/5/2025 2000 by Candace Kidd RN  Remains free of injury from restraints (restraint for interference with medical device):   Determine that other, less restrictive measures have been tried or would not be effective before applying the restraint   Evaluate the patient's condition at the time of restraint application   Inform patient/family regarding the reason for restraint   Every 2 hours: Monitor safety, psychosocial status, comfort, nutrition and hydration  Taken 7/5/2025 1119 by Ashley Martinez RN  Remains free of injury from restraints (restraint for interference with medical device): Evaluate the patient's condition at the time of restraint application     Problem: Discharge Planning  Goal: Discharge to home or other facility with appropriate resources  Flowsheets (Taken 7/5/2025 2000)  Discharge to home or other facility with appropriate resources:   Identify barriers to discharge with patient and caregiver   Arrange for needed discharge resources and transportation as appropriate   Identify discharge learning needs (meds, wound care, etc)   Arrange for interpreters to assist at discharge as needed   Refer to discharge planning if patient needs post-hospital services based on physician order or complex needs related to functional status, cognitive ability or social support system     Problem: Pain  Goal: Verbalizes/displays adequate comfort level or baseline comfort level  Flowsheets (Taken  patient for signs and symptoms of electrolyte imbalances   Administer electrolyte replacement as ordered   Instruct patient on fluid and nutrition restrictions as appropriate   Monitor response to electrolyte replacements, including repeat lab results as appropriate   Fluid restriction as ordered  Goal: Hemodynamic stability and optimal renal function maintained  Flowsheets (Taken 7/5/2025 2000)  Hemodynamic stability and optimal renal function maintained:   Monitor labs and assess for signs and symptoms of volume excess or deficit   Monitor intake, output and patient weight   Monitor urine specific gravity, serum osmolarity and serum sodium as indicated or ordered   Monitor response to interventions for patient's volume status, including labs, urine output, blood pressure (other measures as available)   Encourage oral intake as appropriate   Instruct patient on fluid and nutrition restrictions as appropriate  Goal: Glucose maintained within prescribed range  Flowsheets (Taken 7/5/2025 2000)  Glucose maintained within prescribed range:   Monitor blood glucose as ordered   Assess for signs and symptoms of hyperglycemia and hypoglycemia   Administer ordered medications to maintain glucose within target range   Assess barriers to adequate nutritional intake and initiate nutrition consult as needed   Instruct patient on self management of diabetes and initiate consult as needed     Problem: Safety - Adult  Goal: Free from fall injury  Flowsheets (Taken 6/18/2025 0341 by Leidy Middleton, RN)  Free From Fall Injury: Instruct family/caregiver on patient safety     Problem: Skin/Tissue Integrity  Goal: Skin integrity remains intact  Description: 1.  Monitor for areas of redness and/or skin breakdown  2.  Assess vascular access sites hourly  3.  Every 4-6 hours minimum:  Change oxygen saturation probe site  4.  Every 4-6 hours:  If on nasal continuous positive airway pressure, respiratory therapy assess nares and determine

## 2025-07-06 NOTE — PROGRESS NOTES
Hospitalist Progress Note    Patient: Beny Knapp MRN: [id number]  CSN: [id number]    YOB: 1965  Age: 59 y.o.  Sex: male    DOA: 6/14/2025 LOS:  LOS: 22 days          Chief Complaint:    Beny Knapp is a 59 y.o.  male w/ PMH of COPD, ETOH-use and polysubstance abuse who was BIBA and presents with subjective difficulty breathing and swallowing with throat pain. Brought in by EMS for possible forign body (food) with bolus within region of velecula. ENT saw pt and ED intubated for airway protection. ENT was consulted and performed scope on pt with findings of extensive esophageal candidiasis.   Subjective:   Alert and no distress  He is on high flow oxygen      Assessment/Plan     Active Hospital Problems    Diagnosis     Moderate malnutrition [E44.0]     Aspiration pneumonia (HCC) [J69.0]     Cocaine abuse (HCC) [F14.10]     Hyponatremia [E87.1]     Stridor [R06.1]     Obstructed, larynx [J38.6]     Acute respiratory failure with hypoxia and hypercapnia (HCC) [J96.01, J96.02]     ETOH abuse [F10.10]     Polysubstance abuse (HCC) [F19.10]     Candidiasis of esophagus (HCC) [B37.81]     Tobacco abuse [Z72.0]     HTN (hypertension) [I10]     COPD (chronic obstructive pulmonary disease) (HCC) [J44.9]     Chronic hepatitis C (HCC) [B18.2]       Pneumonia:  seratia and klabsiella    HYPONATREMIA  Nephrology was consulted for severe hyponatremia with sodium of 117   Na has normalized    Pulmonary:    Patient has had a prolonged hospital stay in the ICU with difficulties with sedation requiring max  Interval History: PCA well controlling pain. NACNY with SS drainage. No n/v. NG tube with bilious output. No BM or flatus at this time.    Medications:  Continuous Infusions:   sodium chloride 0.9% 75 mL/hr at 06/22/23 1534    morphine       Scheduled Meds:   fluticasone propionate  2 spray Each Nostril Daily    miconazole NITRATE 2 %   Topical (Top) BID    pantoprazole  40 mg Intravenous Daily    piperacillin-tazobactam (Zosyn) IV (PEDS and ADULTS) (extended infusion is not appropriate)  4.5 g Intravenous Q8H     PRN Meds:acetaminophen, hydrALAZINE, naloxone, prochlorperazine, sodium chloride 0.9%     Review of patient's allergies indicates:  No Known Allergies  Objective:     Vital Signs (Most Recent):  Temp: 98.6 °F (37 °C) (06/23/23 0706)  Pulse: 62 (06/23/23 0706)  Resp: 18 (06/23/23 0706)  BP: (!) 130/58 (06/23/23 0706)  SpO2: 98 % (06/23/23 0706) Vital Signs (24h Range):  Temp:  [97.9 °F (36.6 °C)-99.9 °F (37.7 °C)] 98.6 °F (37 °C)  Pulse:  [50-86] 62  Resp:  [12-38] 18  SpO2:  [95 %-99 %] 98 %  BP: (103-197)/(53-74) 130/58     Weight: 91.3 kg (201 lb 4.5 oz)  Body mass index is 28.88 kg/m².    Intake/Output - Last 3 Shifts         06/21 0700  06/22 0659 06/22 0700 06/23 0659 06/23 0700  06/24 0659    I.V. (mL/kg)   10 (0.1)    IV Piggyback  1600     Total Intake(mL/kg)  1600 (17.5) 10 (0.1)    Urine (mL/kg/hr)  700     Drains   90    Blood  150     Total Output  850 90    Net  +750 -80                    Physical Exam  Vitals and nursing note reviewed.   Constitutional:       General: He is not in acute distress.     Appearance: Normal appearance. He is ill-appearing. He is not toxic-appearing.   HENT:      Head: Normocephalic.      Right Ear: External ear normal.      Left Ear: External ear normal.      Nose: Nose normal.   Eyes:      Conjunctiva/sclera: Conjunctivae normal.   Cardiovascular:      Rate and Rhythm: Normal rate.   Pulmonary:      Effort: Pulmonary effort is normal. No respiratory distress.    Abdominal:      Comments: Soft, +mild distention, appropriate TTP, NANCY with SS drainage, Midline incision with dressing that is clean and dry. Abdominal binder in place.    Skin:     General: Skin is warm and dry.   Neurological:      Mental Status: He is alert and oriented to person, place, and time.   Psychiatric:         Mood and Affect: Mood normal.         Behavior: Behavior normal.        Significant Labs:  I have reviewed all pertinent lab results within the past 24 hours.  CBC:   Recent Labs   Lab 06/23/23  0457   WBC 13.51*   RBC 3.60*   HGB 10.4*   HCT 33.2*      MCV 92   MCH 28.9   MCHC 31.3*     BMP:   Recent Labs   Lab 06/23/23  0457   *      K 3.9      CO2 24   BUN 37*   CREATININE 1.8*   CALCIUM 7.6*       Significant Diagnostics:  I have reviewed all pertinent imaging results/findings within the past 24 hours.   - 07/06 1900  In: -   Out: 1350 [Urine:1350]  Last three shifts:  07/04 1901 - 07/06 0700  In: 748.4 [I.V.:473.4]  Out: 3385 [Urine:3295]            Labs: Results:       Chemistry Recent Labs     07/04/25  0507 07/04/25  1449 07/05/25  0420 07/05/25  1208 07/06/25  0433    140 140  --  142   K 3.2* 3.7 3.3* 4.0 4.4    100 100  --  99   CO2 26 29 30  --  32   BUN 23 21 23  --  26*   GLOB 2.4  --   --   --   --    ,No results found for: \"LACTA\"   CBC w/Diff Recent Labs     07/04/25  0507 07/05/25  0420 07/06/25  0433   WBC 9.0 7.6 7.1   RBC 3.29* 3.47* 4.11*   HGB 9.8* 10.1* 12.1*   HCT 29.5* 29.9* 35.8*   * 135 126*      Cardiac Enzymes Lab Results   Component Value Date    TROPHS 6 06/29/2023   ,No results found for: \"BNP\"   Coagulation Recent Labs     07/04/25  0507   INR 1.1       Lipid Panel Lab Results   Component Value Date/Time    CHOL 132 07/04/2025 05:07 AM    HDL 43 07/04/2025 05:07 AM    LDL 59 07/04/2025 05:07 AM    VLDL 30 07/04/2025 05:07 AM      Pancreas No results for input(s): \"LIPASE\" in the last 72 hours.,No results for input(s): \"AMYLASE\" in the last 72 hours.   Liver Enzymes No results for input(s): \"TP\" in the last 72 hours.    Invalid input(s): \"ALB\", \"TBIL\", \"AP\", \"SGOT\", \"GPT\", \"DBIL\"   Thyroid Studies No results found for: \"T4\", \"T3RU\", \"TSH\"     Procedures/imaging: see electronic medical records for all procedures/Xrays and details which were not copied into this note but were reviewed prior to creation of Plan    TIME: E/M Time spent with patient and patient care issues: 55 mins.     This time also includes physician non-face-to-face service time visit on the date of service such as  Preparing to see the patient (eg, review of tests)  Obtaining and/or reviewing separately obtained history  Performing a medically necessary appropriate examination and/or evaluation  Counseling and educating the patient/family/caregiver  Ordering medications, tests, or procedures  Referring

## 2025-07-06 NOTE — PROGRESS NOTES
Physical Therapy Attempt    Chart reviewed and discussed pt w/ Nurse Ashley.  Attempted PT eval.  Initially pt attempted avoidance behavior.  Pt spontaneously moved L UE in flexion.  Pt difficult to understand but then clearly states x2 \"Leave me alone that is all I ask.\"  Pt appearing to become agitated w/ nudge attempt to participate.  Will attempt again later today.    Eli Gan PT

## 2025-07-06 NOTE — PROGRESS NOTES
Physical Therapy Goals:  Initiated 7/6/2025 to be met within 7-10 days.  Short Term Goals  Short Term Goal 1: Pt will be able to reposition self safely and roll L and R S to prevent pressure areas.  Short Term Goal 2: Pt will be able to transfer supine<>sit min A to improve functional mobility.  Short Term Goal 3: Pt will be able to sit EOB unsupported w/ fair+ balance x10' to improve activity tolerance.  Short Term Goal 4: Pt will be able to transfer sit<>stand min A to improve independence.  Short Term Goal 5: Pt will be able to ambulate >50' w/ RW, min A to improve ability to negotiate environment.      PHYSICAL THERAPY EVALUATION    Patient: Beny Knapp (59 y.o. male)  Date: 7/6/2025  Primary Diagnosis: Hyponatremia [E87.1]  Laryngeal edema [J38.4]  Candida laryngitis [B37.89]  Procedure(s) (LRB):  COLONOSCOPY DIAGNOSTIC (N/A)     Precautions: Fall Risk, NPO,  ,  ,  ,  ,  ,  ,    PLOF:  lives in apt (chart review); pt unable to provide reliable hx/info at this time  ASSESSMENT :  Introduced self, ed on PT role in acute setting and pt more receptive this afternoon for PT evaluation.  Based on objective findings as described below pt presents w/ dec functional mobility and independence in regards to bed mobility, transfers, gait quality/tolerance, balance, and safety.  Generalized weakness w/ dec activity tolerance due to prolonged bed stay w/ intubation, pain in back noted w/ B LE movement and per pt report during mobility, impulsive nature w/ impaired insight also impacting functional mobility.  Pt supine in bed, soft wrist restraints loosely tied upon arrival.  Pt reports pain in back unable to rate but noted w/ facial expressions during bed level activity.  A&O to self.  Pt unable to transition to long sit in bed despite pull w/ therapist assist.  Performed 10 reps B LE hip flex/IROT/EROT, B knee flex/ext, B ankle DF/PF w/ little resistance and dec muscle control relying on A for control.  Pt appeared to

## 2025-07-06 NOTE — PROGRESS NOTES
2115 - Patient agitated, violently pulling at restraints, attempting to spit at nurses, and attempting to kick nurses. Precedex restarted.     2145 - Patient still attempting to get out of bed, agitated and violently pulling at restraints. Non-redirectable, precedex increased per MAR.     0150 - Patient swinging legs out of bed, agitated and attempting to sit up. Precedex increased per MAR.

## 2025-07-06 NOTE — PLAN OF CARE
Problem: Discharge Planning  Goal: Discharge to home or other facility with appropriate resources  7/6/2025 1019 by Ashley Martinez, RN  Outcome: Progressing  Flowsheets (Taken 7/6/2025 0807)  Discharge to home or other facility with appropriate resources: Identify barriers to discharge with patient and caregiver  7/5/2025 2358 by Candace Kidd RN  Flowsheets (Taken 7/5/2025 2000)  Discharge to home or other facility with appropriate resources:   Identify barriers to discharge with patient and caregiver   Arrange for needed discharge resources and transportation as appropriate   Identify discharge learning needs (meds, wound care, etc)   Arrange for interpreters to assist at discharge as needed   Refer to discharge planning if patient needs post-hospital services based on physician order or complex needs related to functional status, cognitive ability or social support system

## 2025-07-06 NOTE — PLAN OF CARE
Problem: SLP Adult - Impaired Swallowing  Goal: By Discharge: Advance to least restrictive diet without signs or symptoms of aspiration for planned discharge setting.  See evaluation for individualized goals.  Outcome: Progressing  Note: Patient will:  1. Tolerate PO trials with 0 s/s overt distress in 4/5 trials.  2. Utilize compensatory swallow strategies/maneuvers (decrease bite/sip, size/rate, alt. liq/sol) with min cues in 4/5 trials.  3. Perform oral-motor/laryngeal exercises to increase oropharyngeal swallow function with min cues.  4. Complete an objective swallow study (i.e., MBSS) to assess swallow integrity, r/o aspiration, and determine of safest LRD, min A.    Rec:     NPO  Pt may require assistance with meal set up  Aspiration precautions  HOB >45 during po intake, remain >30 for 30-45 minutes after po   Small bites/sips; alternate liquid/solid with slow feeding rate   Oral care TID  Meds via alternative form    SPEECH LANGUAGE PATHOLOGY BEDSIDE SWALLOW EVALUATION/TREATMENT    Patient: Beny Knapp (59 y.o. male)  Date: 7/6/2025  Primary Diagnosis: Hyponatremia [E87.1]  Laryngeal edema [J38.4]  Candida laryngitis [B37.89]  Procedure(s) (LRB):  COLONOSCOPY DIAGNOSTIC (N/A)     Precautions: Aspiration  PLOF: As per H&P  ASSESSMENT:  MD orders received as pt s/p extubation yesterday. Based on the objective data described below, the patient presents with mod-severe oropharyngeal dysphagia.   Pt oriented to self only, upright in bed, on 60L via HFNC 80%, with bed restraints. Oral motor exam revealed weak, decreased laryngeal elevation via palpation and slow,discoordinated labial and lingual structures. Dx trials of ice chips and thin liquids x1 via spoon as fed by SLP. Pt presents with s/s of facial redness, oral R side pocketing of ice chips, strong immediate coughing and throat clearing, decreased o2 stats to 93%, increased HR, wet vocal quality requiring max verbal prompts to clear trials and

## 2025-07-07 ENCOUNTER — APPOINTMENT (OUTPATIENT)
Facility: HOSPITAL | Age: 60
DRG: 368 | End: 2025-07-07
Payer: MEDICARE

## 2025-07-07 LAB
ALBUMIN SERPL-MCNC: 3.3 G/DL (ref 3.4–5)
ALBUMIN/GLOB SERPL: 1 (ref 0.8–1.7)
ALP SERPL-CCNC: 85 U/L (ref 45–117)
ALT SERPL-CCNC: 500 U/L (ref 10–50)
ANION GAP SERPL CALC-SCNC: 13 MMOL/L (ref 3–18)
ARTERIAL PATENCY WRIST A: POSITIVE
AST SERPL-CCNC: 126 U/L (ref 10–38)
BASE EXCESS BLD CALC-SCNC: 5 MMOL/L
BDY SITE: ABNORMAL
BILIRUB SERPL-MCNC: 1.1 MG/DL (ref 0.2–1)
BUN SERPL-MCNC: 27 MG/DL (ref 6–23)
BUN/CREAT SERPL: 67 (ref 12–20)
CA-I SERPL-SCNC: 1.09 MMOL/L (ref 1.12–1.32)
CA-I SERPL-SCNC: 1.45 MMOL/L (ref 1.12–1.32)
CALCIUM SERPL-MCNC: 9.6 MG/DL (ref 8.5–10.1)
CHLORIDE SERPL-SCNC: 100 MMOL/L (ref 98–107)
CO2 SERPL-SCNC: 28 MMOL/L (ref 21–32)
CREAT SERPL-MCNC: 0.41 MG/DL (ref 0.6–1.3)
GAS FLOW.O2 O2 DELIVERY SYS: ABNORMAL
GLOBULIN SER CALC-MCNC: 3.2 G/DL (ref 2–4)
GLUCOSE BLD STRIP.AUTO-MCNC: 106 MG/DL (ref 70–110)
GLUCOSE BLD STRIP.AUTO-MCNC: 107 MG/DL (ref 70–110)
GLUCOSE BLD STRIP.AUTO-MCNC: 116 MG/DL (ref 70–110)
GLUCOSE BLD STRIP.AUTO-MCNC: 118 MG/DL (ref 70–110)
GLUCOSE SERPL-MCNC: 119 MG/DL (ref 74–108)
HCO3 BLD-SCNC: 28.9 MMOL/L (ref 21–28)
MAGNESIUM SERPL-MCNC: 2.2 MG/DL (ref 1.6–2.6)
O2/TOTAL GAS SETTING VFR VENT: 4 %
PCO2 BLD: 39.1 MMHG (ref 35–48)
PH BLD: 7.48 (ref 7.35–7.45)
PHOSPHATE SERPL-MCNC: 2.8 MG/DL (ref 2.5–4.9)
PO2 BLD: 43 MMHG (ref 83–108)
POTASSIUM SERPL-SCNC: 3.7 MMOL/L (ref 3.5–5.5)
PROT SERPL-MCNC: 6.5 G/DL (ref 6.4–8.2)
SAO2 % BLD: 82.3 % (ref 92–97)
SERVICE CMNT-IMP: ABNORMAL
SODIUM SERPL-SCNC: 141 MMOL/L (ref 136–145)
SPECIMEN TYPE: ABNORMAL

## 2025-07-07 PROCEDURE — 94640 AIRWAY INHALATION TREATMENT: CPT

## 2025-07-07 PROCEDURE — 97166 OT EVAL MOD COMPLEX 45 MIN: CPT

## 2025-07-07 PROCEDURE — 2500000003 HC RX 250 WO HCPCS: Performed by: INTERNAL MEDICINE

## 2025-07-07 PROCEDURE — 94668 MNPJ CHEST WALL SBSQ: CPT

## 2025-07-07 PROCEDURE — 6360000002 HC RX W HCPCS: Performed by: INTERNAL MEDICINE

## 2025-07-07 PROCEDURE — 92526 ORAL FUNCTION THERAPY: CPT

## 2025-07-07 PROCEDURE — 2700000000 HC OXYGEN THERAPY PER DAY

## 2025-07-07 PROCEDURE — 2580000003 HC RX 258: Performed by: INTERNAL MEDICINE

## 2025-07-07 PROCEDURE — 82803 BLOOD GASES ANY COMBINATION: CPT

## 2025-07-07 PROCEDURE — 82962 GLUCOSE BLOOD TEST: CPT

## 2025-07-07 PROCEDURE — 97530 THERAPEUTIC ACTIVITIES: CPT

## 2025-07-07 PROCEDURE — 84100 ASSAY OF PHOSPHORUS: CPT

## 2025-07-07 PROCEDURE — 71045 X-RAY EXAM CHEST 1 VIEW: CPT

## 2025-07-07 PROCEDURE — 36600 WITHDRAWAL OF ARTERIAL BLOOD: CPT

## 2025-07-07 PROCEDURE — 83735 ASSAY OF MAGNESIUM: CPT

## 2025-07-07 PROCEDURE — 80053 COMPREHEN METABOLIC PANEL: CPT

## 2025-07-07 PROCEDURE — 2000000000 HC ICU R&B

## 2025-07-07 PROCEDURE — 82330 ASSAY OF CALCIUM: CPT

## 2025-07-07 PROCEDURE — 94669 MECHANICAL CHEST WALL OSCILL: CPT

## 2025-07-07 RX ORDER — DIAZEPAM 10 MG/2ML
5 INJECTION, SOLUTION INTRAMUSCULAR; INTRAVENOUS
Status: DISCONTINUED | OUTPATIENT
Start: 2025-07-07 | End: 2025-07-10 | Stop reason: DRUGHIGH

## 2025-07-07 RX ORDER — CALCIUM GLUCONATE 20 MG/ML
2000 INJECTION, SOLUTION INTRAVENOUS ONCE
Status: COMPLETED | OUTPATIENT
Start: 2025-07-07 | End: 2025-07-07

## 2025-07-07 RX ORDER — FUROSEMIDE 10 MG/ML
40 INJECTION INTRAMUSCULAR; INTRAVENOUS DAILY
Status: DISCONTINUED | OUTPATIENT
Start: 2025-07-07 | End: 2025-07-12

## 2025-07-07 RX ORDER — ENOXAPARIN SODIUM 100 MG/ML
40 INJECTION SUBCUTANEOUS DAILY
Status: DISCONTINUED | OUTPATIENT
Start: 2025-07-07 | End: 2025-07-16

## 2025-07-07 RX ADMIN — FOLIC ACID 1 MG: 5 INJECTION, SOLUTION INTRAMUSCULAR; INTRAVENOUS; SUBCUTANEOUS at 08:06

## 2025-07-07 RX ADMIN — BUDESONIDE 500 MCG: 0.5 INHALANT RESPIRATORY (INHALATION) at 08:12

## 2025-07-07 RX ADMIN — BUDESONIDE 500 MCG: 0.5 INHALANT RESPIRATORY (INHALATION) at 20:32

## 2025-07-07 RX ADMIN — SODIUM CHLORIDE, PRESERVATIVE FREE 40 MG: 5 INJECTION INTRAVENOUS at 08:01

## 2025-07-07 RX ADMIN — DEXMEDETOMIDINE 0.6 MCG/KG/HR: 100 INJECTION, SOLUTION INTRAVENOUS at 10:18

## 2025-07-07 RX ADMIN — IPRATROPIUM BROMIDE 0.5 MG: 0.5 SOLUTION RESPIRATORY (INHALATION) at 13:51

## 2025-07-07 RX ADMIN — THIAMINE HYDROCHLORIDE 100 MG: 100 INJECTION, SOLUTION INTRAMUSCULAR; INTRAVENOUS at 08:01

## 2025-07-07 RX ADMIN — ARFORMOTEROL TARTRATE 15 MCG: 15 SOLUTION RESPIRATORY (INHALATION) at 20:32

## 2025-07-07 RX ADMIN — FUROSEMIDE 40 MG: 10 INJECTION, SOLUTION INTRAMUSCULAR; INTRAVENOUS at 18:10

## 2025-07-07 RX ADMIN — MINERAL OIL, PETROLATUM: 425; 568 OINTMENT OPHTHALMIC at 05:45

## 2025-07-07 RX ADMIN — MINERAL OIL, PETROLATUM: 425; 568 OINTMENT OPHTHALMIC at 01:45

## 2025-07-07 RX ADMIN — CALCIUM GLUCONATE 2000 MG: 20 INJECTION, SOLUTION INTRAVENOUS at 05:05

## 2025-07-07 RX ADMIN — SODIUM CHLORIDE, PRESERVATIVE FREE 10 ML: 5 INJECTION INTRAVENOUS at 19:29

## 2025-07-07 RX ADMIN — MINERAL OIL, PETROLATUM: 425; 568 OINTMENT OPHTHALMIC at 08:05

## 2025-07-07 RX ADMIN — IPRATROPIUM BROMIDE 0.5 MG: 0.5 SOLUTION RESPIRATORY (INHALATION) at 01:16

## 2025-07-07 RX ADMIN — ENOXAPARIN SODIUM 40 MG: 100 INJECTION SUBCUTANEOUS at 22:12

## 2025-07-07 RX ADMIN — IPRATROPIUM BROMIDE 0.5 MG: 0.5 SOLUTION RESPIRATORY (INHALATION) at 08:12

## 2025-07-07 RX ADMIN — IPRATROPIUM BROMIDE 0.5 MG: 0.5 SOLUTION RESPIRATORY (INHALATION) at 20:32

## 2025-07-07 RX ADMIN — DIAZEPAM 5 MG: 5 INJECTION, SOLUTION INTRAMUSCULAR; INTRAVENOUS at 22:52

## 2025-07-07 RX ADMIN — DEXMEDETOMIDINE 0.6 MCG/KG/HR: 100 INJECTION, SOLUTION INTRAVENOUS at 02:28

## 2025-07-07 RX ADMIN — ENOXAPARIN SODIUM 30 MG: 100 INJECTION SUBCUTANEOUS at 02:05

## 2025-07-07 RX ADMIN — ARFORMOTEROL TARTRATE 15 MCG: 15 SOLUTION RESPIRATORY (INHALATION) at 08:12

## 2025-07-07 RX ADMIN — WATER 20 MG: 1 INJECTION INTRAMUSCULAR; INTRAVENOUS; SUBCUTANEOUS at 19:32

## 2025-07-07 RX ADMIN — WATER 20 MG: 1 INJECTION INTRAMUSCULAR; INTRAVENOUS; SUBCUTANEOUS at 08:01

## 2025-07-07 RX ADMIN — SODIUM CHLORIDE, PRESERVATIVE FREE 10 ML: 5 INJECTION INTRAVENOUS at 08:06

## 2025-07-07 ASSESSMENT — PAIN SCALES - WONG BAKER
WONGBAKER_NUMERICALRESPONSE: NO HURT

## 2025-07-07 ASSESSMENT — PAIN SCALES - GENERAL
PAINLEVEL_OUTOF10: 0

## 2025-07-07 NOTE — PROGRESS NOTES
Speech-Language Pathology    Order for MBSS placed on 7/5 noted. Pt is on 60L O2 80% via HFNC and inappropriate for a MBSS at this time. ST will continue to monitor pt for MBSS appropriateness. D/w MD and RN.    Thank you for this referral,     Shea Roberts M.S., CCC-SLP

## 2025-07-07 NOTE — PROGRESS NOTES
[facility]  (A Division of [facility])  Renee Stanford MD   Office Tel:  [phone] Option #8                                                               Date of Admission: 6/14/2025       ID FU for antimicrobial management suspected esophageal candidiasis   PCP: Gaby Monroe MD    C/C: stridor/SOB    Current Antimicrobials:    Prior Antimicrobials:      None 7/5 Zithro 6/16 to 20  CAX 6/16 to 20    Cefepime 6/20 to date # 7 days  Fluconazole 6/14-> Micafungin  150 mg daily   #14    Metronidazoli 500 mg IV Q8 hours-6/27 to 7/4       Allergy to antibiotics- NA     Assessment:     Acutely  ill patient with:    Acute resp failure/ARDS- Improved. Awaiting trach      Underlying COPD      CXR infiltrate clear 6/24-- vent setting improving with abx treatment. Concern for PCP low at this time.       Fungitell X2-normal    Infection with Serratia/Klebsiella -resp cultures- 6/16 - treated X7 Days    Esophagitis- presumed candidal--Underlying immunodeficiency?  Fungitell < 31, galactomannan normal( 0.03)= 6/14--makes systemci fungemia/PCP less likley-> repeat testing pending  Stridor/ candidal esophagitis/intubated- 10/2024 in [facility] as well- new info from his Primary MD    Colitis Vs Ileus- on CT scan 6/26-Non distended abd    Transaminitis- recurrning, inclining up--management per others    6/14 - blood cutlure X2: NGSF, resp culture- mixed GNR- normal resp kenia        -UA no pyuria, urine cx-Neg        -X-ray-bibasilar scarring/atelectasis, neck x-ray- no acute pathology        -CT CAP: Bibasilar atelectasis/pneumonia left greater than right.  Bullous emphysematous change.  Procal 0.03    6/16- KOH from mouth- no yeast on stain         - respiratory culture- Klebsiella and Serratia growth--SS to cefepime, fungal culture- Candida albicans- SS to Fluconazole          =MRSA screen negative    Concern for immunodeficiency  -HIV 1/2- Non -reactive 6/14/25, HIV PCR

## 2025-07-07 NOTE — PLAN OF CARE
Progressing  Flowsheets (Taken 7/7/2025 0855)  Achieves stable or improved neurological status: Assess for and report changes in neurological status  7/7/2025 0855 by Brayan Calhoun RN  Outcome: Progressing  Flowsheets (Taken 7/7/2025 0855)  Achieves stable or improved neurological status: Assess for and report changes in neurological status  7/7/2025 0208 by Karen Barajas RN  Outcome: Progressing  Flowsheets (Taken 7/6/2025 2000)  Achieves stable or improved neurological status:   Assess for and report changes in neurological status   Initiate measures to prevent increased intracranial pressure   Maintain blood pressure and fluid volume within ordered parameters to optimize cerebral perfusion and minimize risk of hemorrhage   Monitor temperature, glucose, and sodium. Initiate appropriate interventions as ordered     Problem: Respiratory - Adult  Goal: Achieves optimal ventilation and oxygenation  7/7/2025 0910 by Brayan Calhoun RN  Outcome: Progressing  Flowsheets (Taken 7/7/2025 0855)  Achieves optimal ventilation and oxygenation:   Assess for changes in respiratory status   Assess for changes in mentation and behavior   Position to facilitate oxygenation and minimize respiratory effort   Oxygen supplementation based on oxygen saturation or arterial blood gases   Respiratory therapy support as indicated  7/7/2025 0855 by Brayan Calhoun RN  Outcome: Progressing  Flowsheets (Taken 7/7/2025 0855)  Achieves optimal ventilation and oxygenation:   Assess for changes in respiratory status   Assess for changes in mentation and behavior   Position to facilitate oxygenation and minimize respiratory effort   Oxygen supplementation based on oxygen saturation or arterial blood gases   Respiratory therapy support as indicated  7/7/2025 0208 by Karen Barajas RN  Outcome: Progressing  Flowsheets (Taken 7/6/2025 2000)  Achieves optimal ventilation and oxygenation:   Assess for changes in respiratory status   Assess for  6/18/2025 0341 by Leidy Middleton RN)  Free From Fall Injury: Instruct family/caregiver on patient safety  7/7/2025 0855 by Brayan Calhoun RN  Outcome: Progressing  7/7/2025 0208 by Karen Barajas RN  Outcome: Progressing     Problem: Skin/Tissue Integrity  Goal: Skin integrity remains intact  Description: 1.  Monitor for areas of redness and/or skin breakdown  2.  Assess vascular access sites hourly  3.  Every 4-6 hours minimum:  Change oxygen saturation probe site  4.  Every 4-6 hours:  If on nasal continuous positive airway pressure, respiratory therapy assess nares and determine need for appliance change or resting period  7/7/2025 0910 by Brayan Calhoun RN  Outcome: Progressing  Flowsheets (Taken 7/7/2025 0855)  Skin Integrity Remains Intact: Monitor for areas of redness and/or skin breakdown  7/7/2025 0855 by Brayan Calhoun RN  Outcome: Progressing  Flowsheets (Taken 7/7/2025 0855)  Skin Integrity Remains Intact: Monitor for areas of redness and/or skin breakdown  7/7/2025 0208 by Karen Barajas RN  Outcome: Progressing  Flowsheets (Taken 7/6/2025 2000)  Skin Integrity Remains Intact:   Monitor for areas of redness and/or skin breakdown   Assess vascular access sites hourly   Every 4-6 hours minimum:  Change oxygen saturation probe site   Every 4-6 hours:  If on nasal continuous positive airway pressure, assess nares and determine need for appliance change or resting period   Turn and reposition as indicated   Assess need for specialty bed   Monitor skin under medical devices   Check visual cues for pain   Positioning devices   Pressure redistribution bed/mattress (bed type)     Problem: ABCDS Injury Assessment  Goal: Absence of physical injury  7/7/2025 0910 by Brayan Calhoun RN  Outcome: Progressing  Flowsheets (Taken 6/15/2025 1930 by Hoda Diaz, RN)  Absence of Physical Injury: Implement safety measures based on patient assessment  7/7/2025 0855 by Brayan Calhoun RN  Outcome:

## 2025-07-07 NOTE — PROGRESS NOTES
PALLIATIVE MEDICINE    AMD STATUS: NO DOCUMENT ON FILE    CODE STATUS: FULL CODE    According to [place] hierarchy of decision making, in absence of AMD legal next of kin is default decision maker.     Sister, Afshan Al,was recently located. She has been unable to be at hospital due to car trouble. She has requested phone conversation. Bedside nurse reported she has not been reached today.     Seen today in Rm 104. Patient lying in bed supine with head of bed raised.  Alert. Garbled speech.  Pt on HF (60L O2, 80% FiO2. Precedex gtt.   No complaints of pain.  Restless at times but easily re-directed.     Palliative medicine will  continue to follow Beny Knapp and his family during his admission.     Maura Kimball RN  Palliative Medicine  [phone]

## 2025-07-07 NOTE — PROGRESS NOTES
Comprehensive High Risk Nutrition Assessment Follow-Up    Type and Reason for Visit:  Reassess    Nutrition Recommendations/Plan:   Overall inadequate nutrition since admit ongoing x 23 days  NPO check pending MBSS   If unable to adv diet and take PO and able to use gut rec replace NGT verify re-start TF Vital 1.2 @ 15ml/hr adv 10ml/hr Q8-12hrs to 65ml/hr goal as tr provides 1872kcal, 117g pro, 1265ml FW  ?trending up LFTs r/t Propofol  Defer free water to MD - if w/o IVF suggest 155ml Q4hrs  May be at risk for Refeeding Syndrome given prolonged inadequate nutrition  Cont to check Phos, Mg, and K and replete as needed  Cont folate and thiamine and consider add liq centrum/certa-azucena w/min as well via   Cont to monitor POC/NCP, wt trends diuresing, renal fx, lytes, UOP, bowel fx, skin integrity/wound healing and adjust recs as needed     Malnutrition Assessment:  Malnutrition Status:  Moderate malnutrition (07/07/25 1411)    Context:  Acute Illness     Findings of the 6 clinical characteristics of malnutrition:  Energy Intake:  50% or less of estimated energy requirements for 5 or more days  Weight Loss:  Greater than 5% over 1 month     Body Fat Loss:  Moderate body fat loss Triceps   Muscle Mass Loss:  Moderate muscle mass loss Calf (gastrocnemius)  Fluid Accumulation:  Unable to assess     Strength:  Not Performed    Nutrition Assessment:    60yo M extubated 7/5 on O2NC improved uvula edema, on precedex gtt, alert, garbled speech, restless but re-directed per MDs.  alert. failed SLP eval, plan MBSS and EGD/colonoscopy noted. not consistently on TF never at goal since admit r/t GRVs and was on TF 15-25ml/hr from 7/4-7/5 remains overall w/inadequate nutrition since admit x 23 days. 30ml stool 7/5, ~>2L UOP 7/6. lasix held noted.  no PI per wound eval noted. overall wt is down since admit suspect some fluid and likely sig true wt loss given inadequate nutrition since admit as fluid likely masking wt loss. pt

## 2025-07-07 NOTE — PROGRESS NOTES
Care Mgmt Assistant, assisting Care Mgr Ladonna Cohn RN with Discharge Planning needs for patient.  Referrals sent to area Skilled Nursing Facilities (SNF's) for review and consideration for placement.    Will follow along for further needs.

## 2025-07-07 NOTE — PROGRESS NOTES
[facility]  Pulmonary, Critical Care, and Sleep Medicine    Name: Beny Knapp MRN: [id number]   : 1965 Hospital: [facility]    Date: 2025  Room: [address] [place] [address] Note                                              Consult requesting physician: Dr. Mark   Reason for Consult: Respiratory failure, hyponatremia, upper airway    IMPRESSION:     Acute respiratory failure with hypoxemia and hypercarbia   J96.01, J96.02  Stridor  Obstructed larynx  Polysubstance abuse   Candidiasis of esophagus   Aspiration pneumonia   COPD/emphysema  EtOH use  Cocaine abuse  Hepatitis C  Cirrhosis of liver  Hyponatremia, resolved      Active Hospital Problems    Diagnosis Date Noted    Moderate malnutrition [E44.0] 2025    Aspiration pneumonia (HCC) [J69.0] 2025    Cocaine abuse (HCC) [F14.10] 2025    Hyponatremia [E87.1] 2025    Stridor [R06.1] 2025    Obstructed, larynx [J38.6] 2025    Acute respiratory failure with hypoxia and hypercapnia (HCC) [J96.01, J96.02] 2025    ETOH abuse [F10.10] 2025    Polysubstance abuse (HCC) [F19.10] 2025    Candidiasis of esophagus (HCC) [B37.81] 2025    Tobacco abuse [Z72.0] 2015    HTN (hypertension) [I10] 2015    COPD (chronic obstructive pulmonary disease) (HCC) [J44.9] 2015    Chronic hepatitis C (HCC) [B18.2] 2015        Code status: Full Code       RECOMMENDATIONS:     Respiratory: 59-year-old male with COPD, smoker presents with acute upper airway distress, fullness in the laryngeal area, questionable foreign body. History of aspirations with feeding and bolus feeling on presentation in ER this admission. Patient was intubated 25 for airway protection and hypercarbic and hypoxemic respiratory failure, with ENT at bedside.  Prior to intubation, fiberoptic nasolaryngoscopy endoscopy by ENT did not show any foreign body, but swollen airway/larynx and  severe candidiasis obstructing larynx.  Patient completed steroids.  Extubated on 7/5/2025.    CT Neck and soft tissue 6/14/25 reviewed report and images personally  CT chest on admission 6/14/25 reviewed images and report-bilateral centrilobular/panlobular emphysema changes, especially in the upper lobes; bilateral lower lobe consolidations; there appears to be a cavitary versus bullous lesion in the right lower lobe.  CTA chest done 6/20/25 reviewed images and report, no central or segmental PE; biapical emphysema; severe bilateral lower lobe consolidations; no significant pleural effusions; right basal cavitary/cystic lesion no longer evident.  CT Chest w/ contrast 6/26/25 report and images reviewed: Bibasilar atelectasis, asymmetric to the left. Left lower lobe bronchus is fluid filled.  CXR 7/5/2025 reviewed: ETT about 5 cm above justine.  OGT in place.  Unchanged bibasilar atelectasis.  Chest x-ray 7/7/2025 reviewed images and report-no cardiomegaly, bilateral basilar effusions left> right, no focal consolidations.    S/p extubation 2 days ago; stable respirations.  Oxygen desaturations-poor waveform, also more breathing.  Recommend weaning of high flow nasal cannula oxygen to 4 to 5 L, and monitor O2 sats.  ABG pH 7.48, pCO2 39, , O2 sats 82%-appears to be venous, adequate ventilation.  Tolerate O2 sats 88-95%.  No wheezing or stridor; status post IV Solu-Medrol and racemic epinephrine; Solu-Medrol currently 20 mg IV twice daily.  ENT- Dr. Mcgovern updated with extubation, and may consider laryngoscopy examination.  Bronchodilators-Brovana and budesonide nebs twice daily; ipratropium nebs 4 times daily.  Incentive spirometry as tolerated.  Encouraged coughing maneuvers and expectoration.  Keep SPO2 >=92%. HOB 30 degree elevation all the time. Aggressive pulmonary toileting. Aspiration precautions.    CVS: Patient on Precedex infusion; monitor for bradycardia.  Blood pressure-stable.   Diuretic-Lasix 40 mg

## 2025-07-07 NOTE — PROGRESS NOTES
Hospitalist Progress Note    Patient: Beny Knapp MRN: [id number]  CSN: [id number]    YOB: 1965  Age: 59 y.o.  Sex: male    DOA: 6/14/2025 LOS:  LOS: 23 days          Chief Complain :  Foreign body, shortness of breath.  59 y.o.  male w/ PMH of COPD, ETOH-use and polysubstance abuse who was BIBA and presents with subjective difficulty breathing and swallowing with throat pain. Brought in by EMS for possible forign body (food) with bolus within region of velecula. ENT saw pt and ED intubated for airway protection. ENT was consulted and performed scope on pt with findings of extensive esophageal candidiasis. Nephrology was consulted for severe hyponatremia with sodium of 117 recommending NS at 75cc/hr. Intensivist was contacted as well d/t pt being intubated, on vent for airway protection. Prolong intubation, extubated 07/05/2025  Subjective:   Patient sitting on bed, watching TV, on high flow oxygen    Assessment/Plan     Active Hospital Problems    Diagnosis     Moderate malnutrition [E44.0]     Aspiration pneumonia (HCC) [J69.0]     Cocaine abuse (HCC) [F14.10]     Hyponatremia [E87.1]     Stridor [R06.1]     Obstructed, larynx [J38.6]     Acute respiratory failure with hypoxia and hypercapnia (HCC) [J96.01, J96.02]     ETOH abuse [F10.10]     Polysubstance abuse (HCC) [F19.10]     Candidiasis of esophagus (HCC) [B37.81]     Tobacco abuse [Z72.0]     HTN (hypertension) [I10]     COPD (chronic obstructive pulmonary disease) (HCC) [J44.9]     Chronic hepatitis C (HCC) [B18.2]           CRITICAL CARE PLAN    Resp   Acute resp failure  On high flow oxygen  Question of foreign body, seen by ENT initially nasal

## 2025-07-07 NOTE — WOUND CARE
ICU Rounding  Wound care nurse rounded on patient for skin issues.  Patient has a Chauncey score of 11.  Does patient have any pressure injury?no per primary nurse PUMA Mohan     Skin Care & Pressure Relief Recommendations  Minimize layers of linen  Pads under patient to optimize support surface  Turn/reposition approximately every 2 hours  Use pillow wedges to maintain positioning but do not put pillow directly over wounds  Manage incontinence   Promote continence; Skin Protective lotion/cream to buttocks and sacrum daily and as needed with incontinence care  Offload heels pillows    Consult wound care if any wounds noted during admission.  Wound Care nurse will continue to follow during ICU admission.

## 2025-07-07 NOTE — PROGRESS NOTES
Occupational Therapy Goals:  Initiated 7/7/2025 to be met within 7-10 days.  Short Term Goals  Time Frame for Short Term Goals: 7 days  Short Term Goal 1: Pt will complete upper body bathing and dressing with mod A.  Short Term Goal 2: Pt will complete lower body bathing and dressing with max A.  Short Term Goal 3: Pt will complete toileting with mod A.  Short Term Goal 4: Pt will complete ADL task seated EOB with mod A.  OCCUPATIONAL THERAPY EVALUATION    Patient: Beny Knapp (59 y.o. male)  Date: 7/7/2025  Primary Diagnosis: Hyponatremia [E87.1]  Laryngeal edema [J38.4]  Candida laryngitis [B37.89]  Procedure(s) (LRB):  COLONOSCOPY DIAGNOSTIC (N/A)     Precautions: Fall Risk, NPO,  ,  ,  ,  ,  ,  ,    PLOF: unsure of PLOF. Patient is a poor historian and becomes agitated with questions. Attempted to call next of kin to obtain PLOF and living situation information, however, no response.     ASSESSMENT :  Pt received supine in bed. Alert. Oriented only to self. Overall pleasant and per PT, better cooperation and command following than yesterday in PT evaluation. Patient however, declined all EOB and OOB activity becoming agitated when asked to complete sup>sit to R to EOB. PT attempted to assist BLE to EOB and patient resistive. Therapists backed off of demand. Reporting pain to back throughout session, but could not rate 0-10. Seen in co-eval/treat with PT. Attached to IV, Pulse ox, BP cuff, oxygen , berry catheter, tele , and bed alarm on. Patient total A for donning bilateral socks 2/2 decreased cooperation and attempt to complete himself. Declined all other ADL options when offered. Patient completed 1x10 bilateral ankel pumps with tactile assist from PT to complete as well as LLE 1x10 heel slides for knee flexion supine in bed (limited by pain and command following), 1x5 RLE heel slides for knee flexion (limited by pain and withdraw from activity). Patient completed bilateral ROM assessment with much

## 2025-07-07 NOTE — PROGRESS NOTES
Otolaryngology head neck surgery    Patient seen and examined    Events noted  Patient with some significant breathiness  Flexible endoscopy reveals no evidence of any specific abnormality.  Both vocal cords are mobile.  No evidence of any obstruction seen in the laryngeal area    Recommend continued speech therapy  Recommend continue pulmonology monitoring and aggressive pulmonary toilet    Will hold off with any other otolaryngologic intervention at this time

## 2025-07-07 NOTE — PROGRESS NOTES
Physical Therapy Goals:  Initiated 7/6/2025 to be met within 7-10 days.  Short Term Goals  Short Term Goal 1: Pt will be able to reposition self safely and roll L and R S to prevent pressure areas.  Short Term Goal 2: Pt will be able to transfer supine<>sit min A to improve functional mobility.  Short Term Goal 3: Pt will be able to sit EOB unsupported w/ fair+ balance x10' to improve activity tolerance.  Short Term Goal 4: Pt will be able to transfer sit<>stand min A to improve independence.  Short Term Goal 5: Pt will be able to ambulate >50' w/ RW, min A to improve ability to negotiate environment.     PHYSICAL THERAPY TREATMENT    Patient: Beny Knapp (59 y.o. male)  Date: 7/7/2025  Diagnosis: Hyponatremia [E87.1]  Laryngeal edema [J38.4]  Candida laryngitis [B37.89] Acute respiratory failure with hypoxia and hypercapnia (HCC)  Procedure(s) (LRB):  COLONOSCOPY DIAGNOSTIC (N/A)    Precautions: Fall Risk, NPO,  ,  ,  ,  ,  ,  ,        ASSESSMENT:  Introduced self to pt and pt initially agreeable to PT treatment.  Pt seen w/ OT for second pair of skilled hands.  Pt required encouragement and nudge strategies to participate w/ pt self limiting due to willingness.  Pt in semi concepcion position upon arrival.  Pt grimaces w/ repositioning in bed and reports back pain intermittently but not able to expand on pain.  Pt self limiting to bed level activity as sitting EOB was met by resistance and near agitation.  Pt did participate in AA therapeutic exercises B LE w/ limited reps again due to self limitations.  B AP x20 reps, B heel slides AA x10 reps, R ABD/ADD x4 reps.  Pt able to use B rail for pull to long sit when repositioning pillows w/ min A.  Pt remained on HFNC throughout session w/ noted SPO2 to 80% during activity requiring multiple rest periods and additional time for all activity.  Pt requires multiple strategies to participate in activity.  Pt does fatigue quickly and would benefit from frequent short  prior services.    This AMPAC score should be considered in conjunction with interdisciplinary team recommendations to determine the most appropriate discharge setting. Patient's social support, diagnosis, medical stability, and prior level of function should also be taken into consideration.     SUBJECTIVE:   Patient stated, \"Subjective: I don't really care.\"    OBJECTIVE DATA SUMMARY:   Critical Behavior:  Orientation  Overall Orientation Status: Impaired  Orientation Level: Oriented to person  Cognition  Overall Cognitive Status: Exceptions  Arousal/Alertness: Appears intact  Following Commands: Impaired;Follows one step commands with increased time  Attention Span: Impaired;Difficulty attending to directions;Attends with cues to redirect  Memory: Impaired  Safety Judgement: Impaired;Decreased awareness of need for safety;Decreased awareness of need for assistance  Problem Solving: Impaired;Decreased awareness of errors;Assistance required to correct errors made;Assistance required to implement solutions;Assistance required to identify errors made;Assistance required to generate solutions  Insights: Impaired  Initiation: Impaired  Sequencing: Impaired    Functional Mobility Training:  Bed Mobility:  Bed Mobility Training  Bed Mobility Training: Yes  Interventions: Safety awareness training;Verbal cues  Supine to Sit: Minimal assistance (pulling self to long sit using B rails (briefly))  Sit to Supine: Unable to assess  Scooting: Unable to assess  Transfers:  Transfer Training  Transfer Training: No  Balance:  Balance  Sitting: Impaired  Sitting - Static: Fair (occasional);Poor (constant support)  Sitting - Dynamic: Poor (constant support)   Wheelchair Mobility:   Ambulation/Gait Training:  Gait  Gait Training: No  Neuro Re-Education:  Therapeutic Exercises:     EXERCISE   Sets   Reps   Active Active Assist   Passive Self ROM   Comments   Ankle Pumps 1 20  [] [x] [] []    Quad Sets/Glut Sets    [] [] [] [] Hold

## 2025-07-07 NOTE — PROGRESS NOTES
Pharmacist Review and Automatic Dose Adjustment of Prophylactic Enoxaparin    *Review reason for admission/hospital problem list*    The reviewing pharmacist has made an adjustment to the ordered enoxaparin dose or converted to UFH per the approved [facility] protocol and table as identified below.        Beny Knapp is a 59 y.o. male.     Recent Labs     07/05/25  0420 07/06/25  0433 07/07/25  0402   CREATININE 0.33* 0.44* 0.41*       Estimated Creatinine Clearance: 213 mL/min (A) (based on SCr of 0.41 mg/dL (L)).    Height:   Ht Readings from Last 1 Encounters:   06/15/25 1.829 m (6' 0.01\")     Weight:  Wt Readings from Last 1 Encounters:   07/07/25 91.5 kg (201 lb 11.5 oz)               Plan: Based upon the patient's weight and renal function, the ordered enoxaparin dose of 30 mg q12h has been changed to Enoxaparin 40 mg sc daily ( non-orthopedic )   NOTE: Pts weight has decreased since admission. Nurse updated weight today ( 91.5 kg )     LILO FRANKEL, RPGERALD , BCPS

## 2025-07-07 NOTE — CARE COORDINATION
CM called to follow up with patients sister. ICU nurse note from 7/6 stated she was having car issues and had not been able to make it to the hospital. No answer, [facility] for a return call to [phone]. CM will continue to follow.

## 2025-07-07 NOTE — CARE COORDINATION
Patient extubated on 7/6/25. Possible SNF placement needed at DC. Referrals sent to local SNF's. CM will continue to follow for DC planning needs.

## 2025-07-07 NOTE — PLAN OF CARE
Problem: SLP Adult - Impaired Swallowing  Goal: By Discharge: Advance to least restrictive diet without signs or symptoms of aspiration for planned discharge setting.  See evaluation for individualized goals.  Description: Patient will:  1. Tolerate PO trials with 0 s/s overt distress in 4/5 trials  2. Utilize compensatory swallow strategies/maneuvers (decrease bite/sip, size/rate, alt. liq/sol) with min cues in 4/5 trials  3. Perform oral-motor/laryngeal exercises to increase oropharyngeal swallow function with min cues  4. Complete an objective swallow study (i.e., MBSS) to assess swallow integrity, r/o aspiration, and determine of safest LRD, min A as indicated/ordered by MD     Rec:     NPO pending MBSS  Strict aspiration precautions  HOB >45 during po intake, remain >30 for 30-45 minutes after po   Oral care TID w/ suction  Meds via IV/non orally  MBSS to further assess oropharyngeal swallow fxn   Outcome: Progressing  SPEECH LANGUAGE PATHOLOGY DYSPHAGIA TREATMENT    Patient: Beny Knapp (59 y.o. male)  Date: 7/7/2025  Diagnosis: Hyponatremia [E87.1]  Laryngeal edema [J38.4]  Candida laryngitis [B37.89] Acute respiratory failure with hypoxia and hypercapnia (HCC)  Procedure(s) (LRB):  COLONOSCOPY DIAGNOSTIC (N/A)    Precautions: Standard, aspiration  PLOF: As per H&P      ASSESSMENT:  Pt seen today for a dysphagia management follow up. Currently on 8L O2 80% via salter, down from 60L. Pt remains confused @ A&Ox1. Oral care w/ suction performed prior to PO trials. Pt demo'd immediate congested and weak coughing s/p 3/3 small ice chips, independently suctioning the oral cavity following each swallow. Bolus manipulation was slow w/ 1 x anterior loss. Recommend MBSS tomorrow w/ continued stability and reduced O2 requirements,  NPO pending MBSS, meds via IV/non orally, oral care w/ suction TID, and strict aspiration precautions. D/w MD Car and PUMA Mohan. Plan for MBSS next day. ST will continue to follow    Labial: Decreased rate, Impaired coordination  Dentition: Natural  Oral Hygiene: Clean  Lingual: Decreased rate, Decreased strength, Incoordinated  Mandible: No impairment        P.O. Trials:   PO Trials  Assessment Method(s): Observation  Vocal Quality: Wet, Strain, Low volume  Consistency Presented: Ice Chips, Thin  How Presented: SLP-fed/Presented  Type of Impairment: Oral holding  Propulsion: Discoordination, Lingual tremors, Tongue pumping  Laryngeal Elevation: Decreased, Weak  Aspiration Signs/Symptoms: Strong cough, Watery eyes, Facial redness, Decrease in RR, Decrease in O2 saturations, Throat clear, Change of vocal quality  Pharyngeal Phase Characteristics: Altered vocal quality, Multiple swallows  Effective Modifications: Double swallow  Cues for Modifications: Maximal        DYSPHAGIA DIAGNOSIS: Dysphagia Diagnosis: Severe pharyngeal stage dysphagia, Moderate oral stage dysphagia    PAIN:  Intensity Pre-treatment: 0/10   Intensity Post-treatment: 0/10    After treatment:   [x]              Patient left in no apparent distress in same position as upon arrival  [x]              Call bell left within reach  [x]              Nursing notified  []              Family/caregiver present    COMMUNICATION/EDUCATION:   []            Aspiration precautions; swallow safety; compensatory techniques provided via demonstration, verbalization and teach back of comprehension  []         Patient/family have participated as able in goal setting and plan of care.  []            Patient/family agree to work toward stated goals and plan of care.  []            Patient understands intent and goals of therapy, neutral about participation.  [x]            Patient unable to participate in goal setting/plan of care secondary to cognition, hearing/vision deficits; education ongoing with interdisciplinary staff   []            Handout regarding diet recommendations and thickener instructions provided.  []         Posted safety

## 2025-07-08 ENCOUNTER — APPOINTMENT (OUTPATIENT)
Facility: HOSPITAL | Age: 60
DRG: 368 | End: 2025-07-08
Payer: MEDICARE

## 2025-07-08 LAB
ALBUMIN SERPL-MCNC: 3.4 G/DL (ref 3.4–5)
ALBUMIN/GLOB SERPL: 1 (ref 0.8–1.7)
ALP SERPL-CCNC: 112 U/L (ref 45–117)
ALT SERPL-CCNC: 589 U/L (ref 10–50)
ANION GAP BLD CALC-SCNC: ABNORMAL MMOL/L (ref 10–20)
ANION GAP SERPL CALC-SCNC: 20 MMOL/L (ref 3–18)
ARTERIAL PATENCY WRIST A: POSITIVE
AST SERPL-CCNC: 172 U/L (ref 10–38)
BASE EXCESS BLD CALC-SCNC: 9.7 MMOL/L
BASOPHILS # BLD: 0.02 K/UL (ref 0–0.1)
BASOPHILS NFR BLD: 0.2 % (ref 0–2)
BDY SITE: ABNORMAL
BILIRUB SERPL-MCNC: 1.8 MG/DL (ref 0.2–1)
BODY TEMPERATURE: 98
BUN SERPL-MCNC: 29 MG/DL (ref 6–23)
BUN/CREAT SERPL: 68 (ref 12–20)
CA-I BLD-MCNC: 1.16 MMOL/L (ref 1.15–1.33)
CALCIUM SERPL-MCNC: 9.5 MG/DL (ref 8.5–10.1)
CHLORIDE BLD-SCNC: 101 MMOL/L (ref 98–107)
CHLORIDE SERPL-SCNC: 96 MMOL/L (ref 98–107)
CO2 BLD-SCNC: 33 MMOL/L (ref 22–29)
CO2 SERPL-SCNC: 27 MMOL/L (ref 21–32)
CREAT BLD-MCNC: 0.6 MG/DL (ref 0.6–1.3)
CREAT SERPL-MCNC: 0.43 MG/DL (ref 0.6–1.3)
DIFFERENTIAL METHOD BLD: ABNORMAL
EKG ATRIAL RATE: 97 BPM
EKG DIAGNOSIS: NORMAL
EKG P AXIS: 39 DEGREES
EKG P-R INTERVAL: 136 MS
EKG Q-T INTERVAL: 314 MS
EKG QRS DURATION: 90 MS
EKG QTC CALCULATION (BAZETT): 398 MS
EKG R AXIS: 21 DEGREES
EKG T AXIS: 234 DEGREES
EKG VENTRICULAR RATE: 97 BPM
EOSINOPHIL # BLD: 0 K/UL (ref 0–0.4)
EOSINOPHIL NFR BLD: 0 % (ref 0–5)
ERYTHROCYTE [DISTWIDTH] IN BLOOD BY AUTOMATED COUNT: 13.8 % (ref 11.6–14.5)
FIO2 ON VENT: 82 %
GAS FLOW.O2 O2 DELIVERY SYS: ABNORMAL
GLOBULIN SER CALC-MCNC: 3.5 G/DL (ref 2–4)
GLUCOSE BLD STRIP.AUTO-MCNC: 103 MG/DL (ref 70–110)
GLUCOSE BLD STRIP.AUTO-MCNC: 131 MG/DL (ref 70–110)
GLUCOSE BLD STRIP.AUTO-MCNC: 154 MG/DL (ref 70–110)
GLUCOSE BLD-MCNC: 158 MG/DL (ref 74–99)
GLUCOSE SERPL-MCNC: 110 MG/DL (ref 74–108)
HCO3 BLD-SCNC: 34.6 MMOL/L (ref 21–28)
HCT VFR BLD AUTO: 44.3 % (ref 36–48)
HGB BLD-MCNC: 15.2 G/DL (ref 13–16)
IMM GRANULOCYTES # BLD AUTO: 0.04 K/UL (ref 0–0.04)
IMM GRANULOCYTES NFR BLD AUTO: 0.5 % (ref 0–0.5)
LACTATE BLD-SCNC: 1.03 MMOL/L (ref 0.4–2)
LYMPHOCYTES # BLD: 0.44 K/UL (ref 0.9–3.3)
LYMPHOCYTES NFR BLD: 5.4 % (ref 21–52)
MCH RBC QN AUTO: 29.7 PG (ref 24–34)
MCHC RBC AUTO-ENTMCNC: 34.3 G/DL (ref 31–37)
MCV RBC AUTO: 86.7 FL (ref 78–100)
MONOCYTES # BLD: 0.47 K/UL (ref 0.05–1.2)
MONOCYTES NFR BLD: 5.8 % (ref 3–10)
NEUTS SEG # BLD: 7.12 K/UL (ref 1.8–8)
NEUTS SEG NFR BLD: 88.1 % (ref 40–73)
NRBC # BLD: 0 K/UL (ref 0–0.01)
NRBC BLD-RTO: 0 PER 100 WBC
PCO2 BLD: 45.1 MMHG (ref 35–48)
PH BLD: 7.49 (ref 7.35–7.45)
PLATELET # BLD AUTO: 139 K/UL (ref 135–420)
PMV BLD AUTO: 9.8 FL (ref 9.2–11.8)
PO2 BLD: 74 MMHG (ref 83–108)
POTASSIUM BLD-SCNC: 2.7 MMOL/L (ref 3.5–5.1)
POTASSIUM SERPL-SCNC: 3 MMOL/L (ref 3.5–5.5)
POTASSIUM SERPL-SCNC: 4 MMOL/L (ref 3.5–5.5)
PROT SERPL-MCNC: 6.9 G/DL (ref 6.4–8.2)
RBC # BLD AUTO: 5.11 M/UL (ref 4.35–5.65)
RESPIRATORY RATE, POC: 23 (ref 5–40)
SAO2 % BLD: 96 % (ref 94–98)
SERVICE CMNT-IMP: ABNORMAL
SODIUM BLD-SCNC: 138 MMOL/L (ref 136–145)
SODIUM SERPL-SCNC: 143 MMOL/L (ref 136–145)
SPECIMEN SITE: ABNORMAL
TROPONIN T SERPL HS-MCNC: 28.3 NG/L (ref 0–22)
TROPONIN T SERPL HS-MCNC: 35.3 NG/L (ref 0–22)
WBC # BLD AUTO: 8.1 K/UL (ref 4.6–13.2)

## 2025-07-08 PROCEDURE — 82330 ASSAY OF CALCIUM: CPT

## 2025-07-08 PROCEDURE — 6370000000 HC RX 637 (ALT 250 FOR IP): Performed by: HOSPITALIST

## 2025-07-08 PROCEDURE — 71045 X-RAY EXAM CHEST 1 VIEW: CPT

## 2025-07-08 PROCEDURE — 6360000002 HC RX W HCPCS: Performed by: INTERNAL MEDICINE

## 2025-07-08 PROCEDURE — 84295 ASSAY OF SERUM SODIUM: CPT

## 2025-07-08 PROCEDURE — 2500000003 HC RX 250 WO HCPCS: Performed by: INTERNAL MEDICINE

## 2025-07-08 PROCEDURE — 84132 ASSAY OF SERUM POTASSIUM: CPT

## 2025-07-08 PROCEDURE — 97110 THERAPEUTIC EXERCISES: CPT

## 2025-07-08 PROCEDURE — 74230 X-RAY XM SWLNG FUNCJ C+: CPT

## 2025-07-08 PROCEDURE — 6360000002 HC RX W HCPCS: Performed by: HOSPITALIST

## 2025-07-08 PROCEDURE — 80053 COMPREHEN METABOLIC PANEL: CPT

## 2025-07-08 PROCEDURE — 94669 MECHANICAL CHEST WALL OSCILL: CPT

## 2025-07-08 PROCEDURE — 85014 HEMATOCRIT: CPT

## 2025-07-08 PROCEDURE — 82947 ASSAY GLUCOSE BLOOD QUANT: CPT

## 2025-07-08 PROCEDURE — 36415 COLL VENOUS BLD VENIPUNCTURE: CPT

## 2025-07-08 PROCEDURE — 97530 THERAPEUTIC ACTIVITIES: CPT

## 2025-07-08 PROCEDURE — 84484 ASSAY OF TROPONIN QUANT: CPT

## 2025-07-08 PROCEDURE — 82803 BLOOD GASES ANY COMBINATION: CPT

## 2025-07-08 PROCEDURE — 85025 COMPLETE CBC W/AUTO DIFF WBC: CPT

## 2025-07-08 PROCEDURE — 2700000000 HC OXYGEN THERAPY PER DAY

## 2025-07-08 PROCEDURE — 94668 MNPJ CHEST WALL SBSQ: CPT

## 2025-07-08 PROCEDURE — 2000000000 HC ICU R&B

## 2025-07-08 PROCEDURE — 6370000000 HC RX 637 (ALT 250 FOR IP): Performed by: INTERNAL MEDICINE

## 2025-07-08 PROCEDURE — 82962 GLUCOSE BLOOD TEST: CPT

## 2025-07-08 PROCEDURE — 83605 ASSAY OF LACTIC ACID: CPT

## 2025-07-08 PROCEDURE — 93005 ELECTROCARDIOGRAM TRACING: CPT | Performed by: INTERNAL MEDICINE

## 2025-07-08 PROCEDURE — 94640 AIRWAY INHALATION TREATMENT: CPT

## 2025-07-08 RX ORDER — LIDOCAINE 4 G/G
1 PATCH TOPICAL DAILY
Status: DISCONTINUED | OUTPATIENT
Start: 2025-07-08 | End: 2025-07-22 | Stop reason: HOSPADM

## 2025-07-08 RX ORDER — FUROSEMIDE 10 MG/ML
40 INJECTION INTRAMUSCULAR; INTRAVENOUS ONCE
Status: COMPLETED | OUTPATIENT
Start: 2025-07-08 | End: 2025-07-08

## 2025-07-08 RX ORDER — FUROSEMIDE 10 MG/ML
60 INJECTION INTRAMUSCULAR; INTRAVENOUS ONCE
Status: DISCONTINUED | OUTPATIENT
Start: 2025-07-08 | End: 2025-07-08

## 2025-07-08 RX ORDER — METOPROLOL TARTRATE 1 MG/ML
2.5 INJECTION, SOLUTION INTRAVENOUS EVERY 6 HOURS
Status: DISCONTINUED | OUTPATIENT
Start: 2025-07-08 | End: 2025-07-16

## 2025-07-08 RX ORDER — FUROSEMIDE 10 MG/ML
20 INJECTION INTRAMUSCULAR; INTRAVENOUS ONCE
Status: DISCONTINUED | OUTPATIENT
Start: 2025-07-08 | End: 2025-07-08

## 2025-07-08 RX ORDER — ASPIRIN 300 MG/1
150 SUPPOSITORY RECTAL DAILY
Status: DISCONTINUED | OUTPATIENT
Start: 2025-07-08 | End: 2025-07-10

## 2025-07-08 RX ADMIN — POTASSIUM CHLORIDE 10 MEQ: 7.46 INJECTION, SOLUTION INTRAVENOUS at 22:57

## 2025-07-08 RX ADMIN — SODIUM CHLORIDE, PRESERVATIVE FREE 10 ML: 5 INJECTION INTRAVENOUS at 09:26

## 2025-07-08 RX ADMIN — SODIUM CHLORIDE, PRESERVATIVE FREE 10 ML: 5 INJECTION INTRAVENOUS at 19:44

## 2025-07-08 RX ADMIN — DIAZEPAM 5 MG: 5 INJECTION, SOLUTION INTRAMUSCULAR; INTRAVENOUS at 15:04

## 2025-07-08 RX ADMIN — IPRATROPIUM BROMIDE 0.5 MG: 0.5 SOLUTION RESPIRATORY (INHALATION) at 08:03

## 2025-07-08 RX ADMIN — WATER 20 MG: 1 INJECTION INTRAMUSCULAR; INTRAVENOUS; SUBCUTANEOUS at 19:48

## 2025-07-08 RX ADMIN — FOLIC ACID 1 MG: 5 INJECTION, SOLUTION INTRAMUSCULAR; INTRAVENOUS; SUBCUTANEOUS at 10:08

## 2025-07-08 RX ADMIN — ASPIRIN 150 MG: 300 SUPPOSITORY RECTAL at 17:56

## 2025-07-08 RX ADMIN — ARFORMOTEROL TARTRATE 15 MCG: 15 SOLUTION RESPIRATORY (INHALATION) at 08:03

## 2025-07-08 RX ADMIN — METOPROLOL TARTRATE 2.5 MG: 5 INJECTION INTRAVENOUS at 23:36

## 2025-07-08 RX ADMIN — IPRATROPIUM BROMIDE 0.5 MG: 0.5 SOLUTION RESPIRATORY (INHALATION) at 14:37

## 2025-07-08 RX ADMIN — THIAMINE HYDROCHLORIDE 100 MG: 100 INJECTION, SOLUTION INTRAMUSCULAR; INTRAVENOUS at 09:24

## 2025-07-08 RX ADMIN — IPRATROPIUM BROMIDE 0.5 MG: 0.5 SOLUTION RESPIRATORY (INHALATION) at 01:08

## 2025-07-08 RX ADMIN — BUDESONIDE 500 MCG: 0.5 INHALANT RESPIRATORY (INHALATION) at 08:03

## 2025-07-08 RX ADMIN — SODIUM CHLORIDE, PRESERVATIVE FREE 40 MG: 5 INJECTION INTRAVENOUS at 09:25

## 2025-07-08 RX ADMIN — WATER 20 MG: 1 INJECTION INTRAMUSCULAR; INTRAVENOUS; SUBCUTANEOUS at 09:25

## 2025-07-08 RX ADMIN — IPRATROPIUM BROMIDE 0.5 MG: 0.5 SOLUTION RESPIRATORY (INHALATION) at 20:16

## 2025-07-08 RX ADMIN — ENOXAPARIN SODIUM 40 MG: 100 INJECTION SUBCUTANEOUS at 09:25

## 2025-07-08 RX ADMIN — ARFORMOTEROL TARTRATE 15 MCG: 15 SOLUTION RESPIRATORY (INHALATION) at 20:16

## 2025-07-08 RX ADMIN — METOPROLOL TARTRATE 2.5 MG: 5 INJECTION INTRAVENOUS at 17:56

## 2025-07-08 RX ADMIN — FUROSEMIDE 40 MG: 10 INJECTION, SOLUTION INTRAMUSCULAR; INTRAVENOUS at 09:25

## 2025-07-08 RX ADMIN — FUROSEMIDE 40 MG: 10 INJECTION, SOLUTION INTRAMUSCULAR; INTRAVENOUS at 21:12

## 2025-07-08 RX ADMIN — BUDESONIDE 500 MCG: 0.5 INHALANT RESPIRATORY (INHALATION) at 20:16

## 2025-07-08 RX ADMIN — METOPROLOL TARTRATE 2.5 MG: 5 INJECTION INTRAVENOUS at 11:44

## 2025-07-08 ASSESSMENT — PAIN SCALES - GENERAL
PAINLEVEL_OUTOF10: 0

## 2025-07-08 ASSESSMENT — PAIN SCALES - WONG BAKER
WONGBAKER_NUMERICALRESPONSE: NO HURT

## 2025-07-08 NOTE — PROGRESS NOTES
Palliative Medicine    CODE STATUS: FULL CODE    AMD Status: none on file. His sister, Afshan Al, is, per [place] hierarchy of decision making, the default decision maker     1015 Seen today in room 104.  Lying supine with head of bed elevated.  Awake, alert. Oriented to person, year, and president. Required coaching for his current location. He did recall living at the [facility].  Respirations unlabored at rest Oxygen on at 8 lpm per Ammy ORTA.  Able to speak in full  sentences. Pain -- states having back pain. Eager to eat. Explained rationale for NPO before MBS can be completed.     There have been significant difficulties reaching Ms Al and discussing her brother's health issues. 1015: phone message left for Ms Al requesting a call back.    Patient did acknowledge having a son but said they hadn't been in touch for a long time. He acknowledged having a sister ut said she was dead. When told Afshan was alive and we had spoken with her, he said \"maybe it's my brother Sukhi who \".    He is not currently able to make his own medical decisions. Per ROGE Barraza RN's notes on 7/3/2525: Talked with Afshan Al (pt's sister) discussed if she was in agreement with being patient's decision maker Pt's sister confirmed she was okay with being primary decision maker     Would like to have code status/reintubation conversation with her and clarify information about child(juaquin).     He did have a flexible laryngoscopy yesterday which normal vocal cord exam and mobility    Disposition plan: to be determined based on response to medical treatment, medical recommendations,  and patient/family decisions    Palliative Medicine will continue to follow Beny Knapp  and his family during his hospitalization and support them as they make healthcare decisions and define goals of care.    Sherley Frias RN, MSN  Palliative Medicine  P: [phone]

## 2025-07-08 NOTE — INTERDISCIPLINARY ROUNDS
Rounds completed with primary RN, MD, RT, Nutritionist, Pharmacy and  plan for barium swallow eval, monitor EKG, plan for cardiology consult.

## 2025-07-08 NOTE — PROGRESS NOTES
Speech-Language Pathology    MBSS attempted. Pt reported feeling unwell, unable to hold his body upright for the procedure. Pt w/ elevated RR and SpO2 dropping to the low 90s. Pt quickly moved back to bed and returned to the ICU. D/w MD Car. Pt remains at high risk for aspiration. Recommend consideration of short-term/long-term alternative means of nutrition/hydration vs comfort feeds w/ re attempt of MBSS at a later date.    Thank you for this referral,     BING Dunham.S., CCC-SLP

## 2025-07-08 NOTE — PROGRESS NOTES
Hospitalist Progress Note    Patient: Beny Knapp MRN: [id number]  CSN: [id number]    YOB: 1965  Age: 59 y.o.  Sex: male    DOA: 6/14/2025 LOS:  LOS: 24 days          Chief Complain :  Foreign body, shortness of breath.  59 y.o.  male w/ PMH of COPD, ETOH-use and polysubstance abuse who was BIBA and presents with subjective difficulty breathing and swallowing with throat pain. Brought in by EMS for possible forign body (food) with bolus within region of velecula. ENT saw pt and ED intubated for airway protection. ENT was consulted and performed scope on pt with findings of extensive esophageal candidiasis. Nephrology was consulted for severe hyponatremia with sodium of 117 recommending NS at 75cc/hr. Intensivist was contacted as well d/t pt being intubated, on vent for airway protection. Prolong intubation, extubated 07/05/2025  Subjective:   Patient sitting on bed, watching TV, on high flow oxygen    Assessment/Plan     Active Hospital Problems    Diagnosis     Moderate malnutrition [E44.0]     Aspiration pneumonia (HCC) [J69.0]     Cocaine abuse (HCC) [F14.10]     Hyponatremia [E87.1]     Stridor [R06.1]     Obstructed, larynx [J38.6]     Acute respiratory failure with hypoxia and hypercapnia (HCC) [J96.01, J96.02]     ETOH abuse [F10.10]     Polysubstance abuse (HCC) [F19.10]     Candidiasis of esophagus (HCC) [B37.81]     Tobacco abuse [Z72.0]     HTN (hypertension) [I10]     COPD (chronic obstructive pulmonary disease) (HCC) [J44.9]     Chronic hepatitis C (HCC) [B18.2]           CRITICAL CARE PLAN    Resp   Acute resp failure  On oxygen by NC  On admission, Question of foreign body, seen by ENT initially

## 2025-07-08 NOTE — WOUND CARE
ICU Rounding  Wound care nurse rounded on patient for skin issues.  Patient has a Chauncey score of 13.  Does patient have any pressure injury?no per visual assessment with primary nurse PUMA Mohan     Skin Care & Pressure Relief Recommendations  Minimize layers of linen  Pads under patient to optimize support surface  Turn/reposition approximately every 2 hours  Use pillow wedges to maintain positioning but do not put pillow directly over wounds  Manage incontinence   Promote continence; Skin Protective lotion/cream to buttocks and sacrum daily and as needed with incontinence care  Offload heels pillows    Consult wound care if any wounds noted during admission.  Wound Care nurse will continue to follow during ICU admission.    pt chooses not to vaccinate pt does not vaccinate

## 2025-07-08 NOTE — PROGRESS NOTES
Care Mgmt Assistant, assisting Ladonna Cohn RN with Discharge Planning needs for patient.  Updates sent to [facility] ([facility]) - patient's preference and area Skilled Nursing Facilities (SNF's) for review and consideration for placement.     Will follow for further needs.    Referrals sent to area Skilled Nursing Facilities (SNF's) in [place], [place], [place], [place], [facility] areas for review and consideration for placement.    Will continue to follow along for further needs.

## 2025-07-08 NOTE — PROGRESS NOTES
X2 Attempts at placing an NG tube, prn valium given. Valium unsuccessful at calming patient down.

## 2025-07-08 NOTE — PROGRESS NOTES
Department.  See the Patient Chart for specific details.      Imaging guidance utilized by the IR Department.    XR CHEST PORTABLE  Result Date: 6/30/2025  EXAM:  XR CHEST PORTABLE INDICATION: Intubated. Small pleural effusions. COMPARISON: 6/29/2025 TECHNIQUE: Portable AP semiupright chest view at 0503 hours FINDINGS: The endotracheal tube, enteric tube, and right IJ catheter are stable. The cardiomediastinal contours are stable. The pulmonary vasculature is within normal limits. There are slightly increased small pleural effusions and bibasilar atelectasis. There is no pneumothorax. The bones and upper abdomen are stable stable support     Stable support devices. Slightly increased small pleural effusions with bibasilar atelectasis. Electronically signed by David Porter    XR CHEST PORTABLE  Result Date: 6/29/2025  EXAM:  XR CHEST PORTABLE INDICATION: Acute hypoxic respiratory failure, basilar infiltrates, ET tube position Comparison to 6/28/2025. Portable exam obtained at 625 demonstrates little change in the small bilateral pleural effusions and underlying atelectasis compared to prior examination. Life-support lines and tubes are stable.     No interval change. Electronically signed by CARLOS SCHMIDT    XR CHEST PORTABLE  Result Date: 6/28/2025  EXAM:  XR CHEST PORTABLE INDICATION: Acute hypoxic respiratory failure, basilar infiltrates, ET tube position COMPARISON: 6/27/2025 TECHNIQUE: 0636 hours portable chest AP view FINDINGS: The ET tube is in satisfactory position. NG tube tip is in the stomach. Right IJ catheter overlies the SVC. There is persistent left pleural effusion and left lower lobe atelectasis. Atelectasis left lower lobe has increased and is now severe. Mild atelectasis at the right lung base has decreased..     1. Persistent left pleural effusion and left lower lobe atelectasis. 2. Mild atelectasis at the right lung base has decreased Electronically signed by Jorge Cornelius    XR CHEST  orogastric tube descending below the diaphragm to terminate outside the field-of-view; and right jugular access central venous catheter projecting over the SVC, similar to prior study. External monitor leads project over the right lower chest and precordium/epigastric area. Lungs/Pleura: Stable bibasilar and has slightly increased right lower lung opacities. No pleural effusion or pneumothorax. Mediastinum: Cardiomediastinal silhouette is within normal limits. MSK: No acute osseous abnormalities. Upper abdomen: Unremarkable.     Line and tube in satisfactory position as noted. Worsening right lower lung opacity. Electronically signed by KAYLEIGH FRIAS    XR CHEST 1 VIEW  Result Date: 6/15/2025  EXAM: XR CHEST 1 VIEW INDICATION: swelling COMPARISON: 6/15/2025 at 1016 hours FINDINGS: A portable AP radiograph of the chest was obtained at 1333 hours hours. The patient is on a cardiac monitor. There are small bilateral pleural effusions.. The cardiac and mediastinal contours and pulmonary vascularity are otherwise normal.  The bones and soft tissues are grossly within normal limits. The NG tube extends into the stomach. Endotracheal tube terminates at the thoracic inlet. The right IJ line tip overlies the SVC.     Small bilateral pleural effusions. Lines and tubes as described.. Electronically signed by Sheridan Biswas    XR CHEST PORTABLE  Result Date: 6/15/2025  EXAM:  XR CHEST PORTABLE INDICATION: et tube COMPARISON: Prior day TECHNIQUE: portable chest AP view at 1013 hours FINDINGS: The cardiac silhouette is within normal limits. The pulmonary vasculature is within normal limits. Endotracheal tube terminates at the thoracic inlet. NG tube extends below the hemidiaphragm. The right IJ line terminates in the SVC. There is no pneumothorax. Small bilateral pleural effusions. The lungs are clear. The visualized bones and upper abdomen are age-appropriate.     Satisfactory endotracheal tube position. Electronically signed by

## 2025-07-08 NOTE — CONSULTS
[facility] Consult Note      Patient: Beny Knapp MRN: [id number]  SSN: [id number]    YOB: 1965  Age: 59 y.o.  Sex: male    Date of Consultation: 07/08/2025  Referring Physician:   Reason for Consultation: Abnormal EKG    Chief complain: Shortness of breath     HPI: 59-year-old gentleman admitted with shortness of breath and difficulty in swallowing with throat pain.  Patient was intubated for airway protection in emergency room.  ENT was consulted for possibility of foreign body and scope revealed findings of extensive esophageal candidiasis.  Patient was extubated on 07/05/2025.  On arrival electrocardiogram was normal however for last few days cardiac monitor revealed ST-T changes and EKG done today revealed significantly abnormal.  Currently he denies any chest pain or shortness of breath.  Denies any orthopnea.  Denies any PND.  Denies any dizziness, palpitation, presyncope or syncope.    No past medical history on file.  No past surgical history on file.  Current Facility-Administered Medications   Medication Dose Route Frequency    lidocaine 4 % external patch 1 patch  1 patch TransDERmal Daily    metoprolol (LOPRESSOR) injection 2.5 mg  2.5 mg IntraVENous Q6H    aspirin suppository 150 mg  150 mg Rectal Daily    enoxaparin (LOVENOX) injection 40 mg  40 mg SubCUTAneous Daily    furosemide (LASIX) injection 40 mg  40 mg IntraVENous Daily    methylPREDNISolone sodium succ (SOLU-MEDROL) 20 mg in sterile water 0.5 mL injection  20 mg IntraVENous Q12H    diazePAM (VALIUM) injection 5 mg  5 mg IntraVENous Q3H PRN    bisacodyl (DULCOLAX) suppository 10 mg  10 mg Rectal Daily PRN    ipratropium (ATROVENT) 0.02 % nebulizer solution 0.5 mg  0.5 mg Nebulization Q6H    thiamine (B-1) injection 100 mg  100 mg IntraVENous Daily    folic acid injection 1 mg  1 mg IntraVENous Daily    hydrALAZINE (APRESOLINE) injection 10 mg  10 mg IntraVENous Q6H PRN    [Held by provider] amLODIPine (NORVASC)

## 2025-07-08 NOTE — PROGRESS NOTES
ICU    Status post MBS-patient was feeling unwell and procedure abandoned  Recommend NG tube  Tube feeding-nutrition consult reviewed-vital 1.2 and advance to goal  Aspiration precautions    Casey Car MD

## 2025-07-09 ENCOUNTER — APPOINTMENT (OUTPATIENT)
Facility: HOSPITAL | Age: 60
DRG: 368 | End: 2025-07-09
Payer: MEDICARE

## 2025-07-09 ENCOUNTER — APPOINTMENT (OUTPATIENT)
Facility: HOSPITAL | Age: 60
DRG: 368 | End: 2025-07-09
Attending: INTERNAL MEDICINE
Payer: MEDICARE

## 2025-07-09 LAB
ALBUMIN SERPL-MCNC: 3.6 G/DL (ref 3.4–5)
ALBUMIN/GLOB SERPL: 0.9 (ref 0.8–1.7)
ALP SERPL-CCNC: 139 U/L (ref 45–117)
ALT SERPL-CCNC: 649 U/L (ref 10–50)
AMMONIA PLAS-SCNC: 29 UMOL/L (ref 11–60)
ANION GAP SERPL CALC-SCNC: 15 MMOL/L (ref 3–18)
ANION GAP SERPL CALC-SCNC: 20 MMOL/L (ref 3–18)
AST SERPL-CCNC: 183 U/L (ref 10–38)
BASOPHILS # BLD: 0.01 K/UL (ref 0–0.1)
BASOPHILS NFR BLD: 0.1 % (ref 0–2)
BILIRUB SERPL-MCNC: 1.9 MG/DL (ref 0.2–1)
BUN SERPL-MCNC: 44 MG/DL (ref 6–23)
BUN SERPL-MCNC: 45 MG/DL (ref 6–23)
BUN/CREAT SERPL: 86 (ref 12–20)
BUN/CREAT SERPL: 90 (ref 12–20)
CA-I BLD-SCNC: 1.15 MMOL/L (ref 1.12–1.32)
CALCIUM SERPL-MCNC: 10.2 MG/DL (ref 8.5–10.1)
CALCIUM SERPL-MCNC: 9.8 MG/DL (ref 8.5–10.1)
CHLORIDE SERPL-SCNC: 98 MMOL/L (ref 98–107)
CHLORIDE SERPL-SCNC: 99 MMOL/L (ref 98–107)
CO2 SERPL-SCNC: 25 MMOL/L (ref 21–32)
CO2 SERPL-SCNC: 29 MMOL/L (ref 21–32)
CREAT SERPL-MCNC: 0.49 MG/DL (ref 0.6–1.3)
CREAT SERPL-MCNC: 0.51 MG/DL (ref 0.6–1.3)
DIFFERENTIAL METHOD BLD: ABNORMAL
EOSINOPHIL # BLD: 0 K/UL (ref 0–0.4)
EOSINOPHIL NFR BLD: 0 % (ref 0–5)
ERYTHROCYTE [DISTWIDTH] IN BLOOD BY AUTOMATED COUNT: 14 % (ref 11.6–14.5)
GLOBULIN SER CALC-MCNC: 3.8 G/DL (ref 2–4)
GLUCOSE BLD STRIP.AUTO-MCNC: 125 MG/DL (ref 70–110)
GLUCOSE BLD STRIP.AUTO-MCNC: 126 MG/DL (ref 70–110)
GLUCOSE BLD STRIP.AUTO-MCNC: 170 MG/DL (ref 70–110)
GLUCOSE SERPL-MCNC: 139 MG/DL (ref 74–108)
GLUCOSE SERPL-MCNC: 165 MG/DL (ref 74–108)
HCT VFR BLD AUTO: 44.3 % (ref 36–48)
HGB BLD-MCNC: 15.1 G/DL (ref 13–16)
IMM GRANULOCYTES # BLD AUTO: 0.04 K/UL (ref 0–0.04)
IMM GRANULOCYTES NFR BLD AUTO: 0.5 % (ref 0–0.5)
LYMPHOCYTES # BLD: 0.57 K/UL (ref 0.9–3.3)
LYMPHOCYTES NFR BLD: 7 % (ref 21–52)
MAGNESIUM SERPL-MCNC: 2.3 MG/DL (ref 1.6–2.6)
MCH RBC QN AUTO: 29.5 PG (ref 24–34)
MCHC RBC AUTO-ENTMCNC: 34.1 G/DL (ref 31–37)
MCV RBC AUTO: 86.7 FL (ref 78–100)
MONOCYTES # BLD: 0.59 K/UL (ref 0.05–1.2)
MONOCYTES NFR BLD: 7.3 % (ref 3–10)
NEUTS SEG # BLD: 6.91 K/UL (ref 1.8–8)
NEUTS SEG NFR BLD: 85.1 % (ref 40–73)
NRBC # BLD: 0 K/UL (ref 0–0.01)
NRBC BLD-RTO: 0 PER 100 WBC
PHOSPHATE SERPL-MCNC: 3 MG/DL (ref 2.5–4.9)
PLATELET # BLD AUTO: 148 K/UL (ref 135–420)
PMV BLD AUTO: 9.7 FL (ref 9.2–11.8)
POTASSIUM SERPL-SCNC: 3.7 MMOL/L (ref 3.5–5.5)
POTASSIUM SERPL-SCNC: 3.8 MMOL/L (ref 3.5–5.5)
PROCALCITONIN SERPL-MCNC: 0.1 NG/ML
PROT SERPL-MCNC: 7.4 G/DL (ref 6.4–8.2)
RBC # BLD AUTO: 5.11 M/UL (ref 4.35–5.65)
SODIUM SERPL-SCNC: 142 MMOL/L (ref 136–145)
SODIUM SERPL-SCNC: 143 MMOL/L (ref 136–145)
T4 FREE SERPL-MCNC: 1.1 NG/DL (ref 0.9–1.7)
TSH, 3RD GENERATION: 0.7 UIU/ML (ref 0.27–4.2)
WBC # BLD AUTO: 8.1 K/UL (ref 4.6–13.2)

## 2025-07-09 PROCEDURE — 36415 COLL VENOUS BLD VENIPUNCTURE: CPT

## 2025-07-09 PROCEDURE — 84145 PROCALCITONIN (PCT): CPT

## 2025-07-09 PROCEDURE — 84100 ASSAY OF PHOSPHORUS: CPT

## 2025-07-09 PROCEDURE — 6360000002 HC RX W HCPCS: Performed by: INTERNAL MEDICINE

## 2025-07-09 PROCEDURE — 76604 US EXAM CHEST: CPT

## 2025-07-09 PROCEDURE — 71045 X-RAY EXAM CHEST 1 VIEW: CPT

## 2025-07-09 PROCEDURE — 94640 AIRWAY INHALATION TREATMENT: CPT

## 2025-07-09 PROCEDURE — 84443 ASSAY THYROID STIM HORMONE: CPT

## 2025-07-09 PROCEDURE — C8929 TTE W OR WO FOL WCON,DOPPLER: HCPCS

## 2025-07-09 PROCEDURE — 2500000003 HC RX 250 WO HCPCS: Performed by: INTERNAL MEDICINE

## 2025-07-09 PROCEDURE — 82140 ASSAY OF AMMONIA: CPT

## 2025-07-09 PROCEDURE — 2000000000 HC ICU R&B

## 2025-07-09 PROCEDURE — 80053 COMPREHEN METABOLIC PANEL: CPT

## 2025-07-09 PROCEDURE — 6360000004 HC RX CONTRAST MEDICATION: Performed by: INTERNAL MEDICINE

## 2025-07-09 PROCEDURE — 70450 CT HEAD/BRAIN W/O DYE: CPT

## 2025-07-09 PROCEDURE — 94668 MNPJ CHEST WALL SBSQ: CPT

## 2025-07-09 PROCEDURE — 6370000000 HC RX 637 (ALT 250 FOR IP): Performed by: INTERNAL MEDICINE

## 2025-07-09 PROCEDURE — 2700000000 HC OXYGEN THERAPY PER DAY

## 2025-07-09 PROCEDURE — 6370000000 HC RX 637 (ALT 250 FOR IP): Performed by: HOSPITALIST

## 2025-07-09 PROCEDURE — 84439 ASSAY OF FREE THYROXINE: CPT

## 2025-07-09 PROCEDURE — 82330 ASSAY OF CALCIUM: CPT

## 2025-07-09 PROCEDURE — 83735 ASSAY OF MAGNESIUM: CPT

## 2025-07-09 PROCEDURE — 82962 GLUCOSE BLOOD TEST: CPT

## 2025-07-09 PROCEDURE — 85025 COMPLETE CBC W/AUTO DIFF WBC: CPT

## 2025-07-09 PROCEDURE — 92526 ORAL FUNCTION THERAPY: CPT

## 2025-07-09 RX ORDER — CYCLOBENZAPRINE HCL 10 MG
10 TABLET ORAL 3 TIMES DAILY PRN
Status: DISCONTINUED | OUTPATIENT
Start: 2025-07-09 | End: 2025-07-16

## 2025-07-09 RX ORDER — DEXTROSE MONOHYDRATE, SODIUM CHLORIDE, AND POTASSIUM CHLORIDE 50; 1.49; 9 G/1000ML; G/1000ML; G/1000ML
INJECTION, SOLUTION INTRAVENOUS CONTINUOUS
Status: DISCONTINUED | OUTPATIENT
Start: 2025-07-09 | End: 2025-07-10

## 2025-07-09 RX ADMIN — METOPROLOL TARTRATE 2.5 MG: 5 INJECTION INTRAVENOUS at 11:31

## 2025-07-09 RX ADMIN — POTASSIUM CHLORIDE, DEXTROSE MONOHYDRATE AND SODIUM CHLORIDE: 150; 5; 900 INJECTION, SOLUTION INTRAVENOUS at 18:21

## 2025-07-09 RX ADMIN — SODIUM CHLORIDE, PRESERVATIVE FREE 40 MG: 5 INJECTION INTRAVENOUS at 10:02

## 2025-07-09 RX ADMIN — BUDESONIDE 500 MCG: 0.5 INHALANT RESPIRATORY (INHALATION) at 19:23

## 2025-07-09 RX ADMIN — CYCLOBENZAPRINE HYDROCHLORIDE 10 MG: 10 TABLET, FILM COATED ORAL at 20:39

## 2025-07-09 RX ADMIN — ARFORMOTEROL TARTRATE 15 MCG: 15 SOLUTION RESPIRATORY (INHALATION) at 08:34

## 2025-07-09 RX ADMIN — FOLIC ACID 1 MG: 5 INJECTION, SOLUTION INTRAMUSCULAR; INTRAVENOUS; SUBCUTANEOUS at 10:01

## 2025-07-09 RX ADMIN — SODIUM CHLORIDE, PRESERVATIVE FREE 10 ML: 5 INJECTION INTRAVENOUS at 10:02

## 2025-07-09 RX ADMIN — POTASSIUM CHLORIDE 10 MEQ: 7.46 INJECTION, SOLUTION INTRAVENOUS at 00:13

## 2025-07-09 RX ADMIN — POTASSIUM CHLORIDE 10 MEQ: 7.46 INJECTION, SOLUTION INTRAVENOUS at 02:15

## 2025-07-09 RX ADMIN — IPRATROPIUM BROMIDE 0.5 MG: 0.5 SOLUTION RESPIRATORY (INHALATION) at 08:34

## 2025-07-09 RX ADMIN — ARFORMOTEROL TARTRATE 15 MCG: 15 SOLUTION RESPIRATORY (INHALATION) at 19:23

## 2025-07-09 RX ADMIN — IPRATROPIUM BROMIDE 0.5 MG: 0.5 SOLUTION RESPIRATORY (INHALATION) at 13:13

## 2025-07-09 RX ADMIN — SULFUR HEXAFLUORIDE 2 ML: 60.7; .19; .19 INJECTION, POWDER, LYOPHILIZED, FOR SUSPENSION INTRAVENOUS; INTRAVESICAL at 11:06

## 2025-07-09 RX ADMIN — SODIUM CHLORIDE, PRESERVATIVE FREE 10 ML: 5 INJECTION INTRAVENOUS at 20:38

## 2025-07-09 RX ADMIN — ASPIRIN 150 MG: 300 SUPPOSITORY RECTAL at 10:01

## 2025-07-09 RX ADMIN — METOPROLOL TARTRATE 2.5 MG: 5 INJECTION INTRAVENOUS at 05:28

## 2025-07-09 RX ADMIN — WATER 20 MG: 1 INJECTION INTRAMUSCULAR; INTRAVENOUS; SUBCUTANEOUS at 10:01

## 2025-07-09 RX ADMIN — DIAZEPAM 5 MG: 5 INJECTION, SOLUTION INTRAMUSCULAR; INTRAVENOUS at 23:43

## 2025-07-09 RX ADMIN — METOPROLOL TARTRATE 2.5 MG: 5 INJECTION INTRAVENOUS at 23:42

## 2025-07-09 RX ADMIN — IPRATROPIUM BROMIDE 0.5 MG: 0.5 SOLUTION RESPIRATORY (INHALATION) at 19:23

## 2025-07-09 RX ADMIN — POTASSIUM CHLORIDE 10 MEQ: 7.46 INJECTION, SOLUTION INTRAVENOUS at 01:14

## 2025-07-09 RX ADMIN — FUROSEMIDE 40 MG: 10 INJECTION, SOLUTION INTRAMUSCULAR; INTRAVENOUS at 10:02

## 2025-07-09 RX ADMIN — ENOXAPARIN SODIUM 40 MG: 100 INJECTION SUBCUTANEOUS at 10:01

## 2025-07-09 RX ADMIN — CYCLOBENZAPRINE HYDROCHLORIDE 10 MG: 10 TABLET, FILM COATED ORAL at 12:58

## 2025-07-09 RX ADMIN — THIAMINE HYDROCHLORIDE 100 MG: 100 INJECTION, SOLUTION INTRAMUSCULAR; INTRAVENOUS at 10:01

## 2025-07-09 RX ADMIN — IPRATROPIUM BROMIDE 0.5 MG: 0.5 SOLUTION RESPIRATORY (INHALATION) at 01:34

## 2025-07-09 RX ADMIN — METOPROLOL TARTRATE 2.5 MG: 5 INJECTION INTRAVENOUS at 18:18

## 2025-07-09 RX ADMIN — POTASSIUM CHLORIDE 10 MEQ: 7.46 INJECTION, SOLUTION INTRAVENOUS at 03:23

## 2025-07-09 RX ADMIN — WATER 20 MG: 1 INJECTION INTRAMUSCULAR; INTRAVENOUS; SUBCUTANEOUS at 20:36

## 2025-07-09 RX ADMIN — POTASSIUM CHLORIDE 10 MEQ: 7.46 INJECTION, SOLUTION INTRAVENOUS at 04:24

## 2025-07-09 RX ADMIN — BUDESONIDE 500 MCG: 0.5 INHALANT RESPIRATORY (INHALATION) at 08:34

## 2025-07-09 ASSESSMENT — PAIN SCALES - GENERAL
PAINLEVEL_OUTOF10: 0

## 2025-07-09 ASSESSMENT — PAIN SCALES - WONG BAKER
WONGBAKER_NUMERICALRESPONSE: NO HURT
WONGBAKER_NUMERICALRESPONSE: NO HURT

## 2025-07-09 NOTE — PROGRESS NOTES
[id number]- Called into ICU room by spiritual care, Patient was talking to her and then notified her he didn't feel good and to call for help. And began leaning to the right side and and staring at the wall unable to speak. As I entered into the room patient was slightly jerking and didn't respond until sternal rubbed. Patient then woke up and was able to follow commands and a&ox4 Md at bedside stat Head CT placed and stat labs drawn.

## 2025-07-09 NOTE — INTERDISCIPLINARY ROUNDS
Rounds completed w/ Dr. Car & Yumiko, CM, RT, Pharmacy, primary nurse (Kimberlee)    Bedside swallow eval to try and feed patient.   Not diabetic- discontinue FSBS  ST abnormalities- May need cath will f/u with cardiology   ENT- No obstruction, Dr. Mcgovern to continue to follow  Back pain- flexeril   Transfer out of the ICU tomorrow  [place] considering patient- Will need POA

## 2025-07-09 NOTE — PROGRESS NOTES
1600- Patient refused blood sugar stick, verbalized he's tired of being stuck   1920-Patient on 4 Lnc satting in low 80's Sulter placed back on patient a 10 L patient still 87-89 RT notified.

## 2025-07-09 NOTE — PLAN OF CARE
Problem: SLP Adult - Impaired Swallowing  Goal: By Discharge: Advance to least restrictive diet without signs or symptoms of aspiration for planned discharge setting.  See evaluation for individualized goals.  Description: Patient will:  1. Tolerate PO trials with 0 s/s overt distress in 4/5 trials  2. Utilize compensatory swallow strategies/maneuvers (decrease bite/sip, size/rate, alt. liq/sol) with min cues in 4/5 trials  3. Perform oral-motor/laryngeal exercises to increase oropharyngeal swallow function with min cues  4. Complete an objective swallow study (i.e., MBSS) to assess swallow integrity, r/o aspiration, and determine of safest LRD, min A as indicated/ordered by MD     Rec:     NPO w/ consideration of short-term/long-term alternative means of nutrition/hydration vs comfort feeds  Strict aspiration precautions  HOB >45 during po intake, remain >30 for 30-45 minutes after po   Oral care TID w/ suction  Meds via IV/non orally  MBSS to further assess oropharyngeal swallow fxn once more stable  Outcome: Progressing  SPEECH LANGUAGE PATHOLOGY DYSPHAGIA TREATMENT    Patient: Beny Knapp (59 y.o. male)  Date: 7/9/2025  Diagnosis: Hyponatremia [E87.1]  Laryngeal edema [J38.4]  Candida laryngitis [B37.89] Acute respiratory failure with hypoxia and hypercapnia (HCC)  Procedure(s) (LRB):  COLONOSCOPY DIAGNOSTIC (N/A)    Precautions: Standard, aspiration  PLOF: As per H&P      ASSESSMENT:  Pt seen today for a dysphagia management follow up. Pt w/ increased O2 needs overnight, back on HFNC at 55L. Currently on 8L O2 via salter. Attempts at placement of NGT yesterday unsuccessful, w/ pt declining further attempts for placement. Oral care performed w/ suction. Education provided to pt on aspiration and aspiration PNA, and concerns for continued weakness without a source of nutrition. The benefits and risks of short-term and long-term alternative means of nutrition/hydration were discussed; pt demonstrated

## 2025-07-09 NOTE — PROGRESS NOTES
Care Mgmt Assistant, assisting Care Mgr Ladonna Cohn RN with Discharge Planning needs for patient.  Updates sent to area Skilled Nursing Facilities (SNF's) for review and consideration for placement.    Will follow for further discharge planning needs.

## 2025-07-09 NOTE — CARE COORDINATION
CM manager made contact with patients sister Afshan. Afshan will reach out to a mutual friend to get the phone number for Phoebe Valdivia, patients niece/preferred POA, and will call CM at [phone] with an update this afternoon. CM will continue to follow.

## 2025-07-09 NOTE — PROGRESS NOTES
@2009 pt spo2 continues to be in the 80's Salter increased to 12 L . RT was called and came to bedside ,patient continues to fluctuate 85 - 88 increased to 15 L.     @2047 pt was placed on heated high flow by Respiratory therapist. Dr. Kumar was contacted by previous nurse and ordered chest x ray and lasix 40 mg IV which were carried out. Pt oxygen level  stabilized.     @ 2057 Dr. Bharat Law was called and updated on pt , ABG was ordered and done care continues.       @2115 pt spo2 100% breathing unlabored nursing observation and management continues.

## 2025-07-09 NOTE — CARE COORDINATION
CM spoke with patients sister Afshan. Afshan stated she will come to [facility] tomorrow at 10 am to see her brother and meet with the [facility] team. Patients sister stated she is experiencing grief due to losing her son, brother, and mother, all in the past year, and could not handle coming to see the patient while he was intubated.

## 2025-07-09 NOTE — PROGRESS NOTES
met patient at bedside for a follow-up visit.    Patient was awake and orientated. Patient listened to the . Patient then told  that he needed help so  called the nurse.     provided presence and support for patient.    Chaplains will provide follow-up care for patient and family as needed.    Spiritual Health History and Assessment/Progress Note  [facility]    Palliative Care,  ,  ,      Name: Beny Knapp MRN: [id number]    Age: 59 y.o.     Sex: male   Language: English   Catholic: Tenriism   Acute respiratory failure with hypoxia and hypercapnia (HCC)     Date: 7/9/2025            Total Time Calculated: 5 min              Spiritual Assessment continued in [facility] 1 INTENSIVE CARE        Referral/Consult From: Palliative Care   Encounter Overview/Reason: Palliative Care  Service Provided For: Patient    Love, Belief, Meaning:   Patient identifies as spiritual, is connected with a love tradition or spiritual practice, and has beliefs or practices that help with coping during difficult times  Family/Friends No family/friends present      Importance and Influence:  Patient unable to assess at this time  Family/Friends No family/friends present    Community:  Patient feels well-supported. Support system includes: Extended family  Family/Friends No family/friends present    Assessment and Plan of Care:     Patient Interventions include: Facilitated expression of thoughts and feelings  Family/Friends Interventions include: No family/friends present    Patient Plan of Care: No spiritual needs identified for follow-up  Family/Friends Plan of Care: No family/friends present    Electronically signed by Chaplain eKvin on 7/9/2025 at 2:42 PM

## 2025-07-09 NOTE — PROGRESS NOTES
Hospitalist Progress Note    Patient: Beny Knapp MRN: [id number]  CSN: [id number]    YOB: 1965  Age: 59 y.o.  Sex: male    DOA: 6/14/2025 LOS:  LOS: 25 days          Chief Complain :  Foreign body, shortness of breath.  59 y.o.  male w/ PMH of COPD, ETOH-use and polysubstance abuse who was BIBA and presents with subjective difficulty breathing and swallowing with throat pain. Brought in by EMS for possible forign body (food) with bolus within region of velecula. ENT saw pt and ED intubated for airway protection. ENT was consulted and performed scope on pt with findings of extensive esophageal candidiasis. Nephrology was consulted for severe hyponatremia with sodium of 117 recommending NS at 75cc/hr. Intensivist was contacted as well d/t pt being intubated, on vent for airway protection. Prolong intubation, extubated 07/05/2025. Has been uncooperative, did not cooperate with MBS and did not cooperate with placing NG tube.  Subjective:   Patient sitting on bed, watching TV, on high flow oxygen    Assessment/Plan     Active Hospital Problems    Diagnosis     Moderate malnutrition [E44.0]     Aspiration pneumonia (HCC) [J69.0]     Cocaine abuse (HCC) [F14.10]     Hyponatremia [E87.1]     Stridor [R06.1]     Obstructed, larynx [J38.6]     Acute respiratory failure with hypoxia and hypercapnia (HCC) [J96.01, J96.02]     ETOH abuse [F10.10]     Polysubstance abuse (HCC) [F19.10]     Candidiasis of esophagus (HCC) [B37.81]     Tobacco abuse [Z72.0]     HTN (hypertension) [I10]     COPD (chronic obstructive pulmonary disease) (HCC) [J44.9]     Chronic hepatitis C (HCC) [B18.2]           CRITICAL CARE PLAN    Resp   Acute

## 2025-07-09 NOTE — PROGRESS NOTES
Occupational Therapy Evaluation/Treatment Attempt    Chart reviewed. Attempted Occupational Therapy Evaluation/Treatment, however, patient unable to be seen due to:    []  Other : patient with change in status , on way to CT    Will follow up later as patient's schedule allows.   Thank you for this referral.  Jeannette Arana OTROGE, OTR/L

## 2025-07-09 NOTE — PROGRESS NOTES
Pt O2 sats consistently staying at 85% on 15 LPM salter even after attemtps at [facility] bronchial hygiene therapy and nebulizer txs. Pt is also tachypneic with a RR of 25/minute. Pt subsequently placed on HFNC O2. RN notified.       07/08/25 2047   Oxygen Therapy/Pulse Ox   O2 Therapy Oxygen humidified   O2 Device (S)  Heated high flow cannula   O2 Flow Rate (L/min) (S)  55 L/min   FiO2  (S)  93 %   Pulse 87   Respirations 23   SpO2 90 %   Humidification Source Heated wire   Humidification Temp 34

## 2025-07-09 NOTE — PROGRESS NOTES
Cardiology Progress Note        Patient: Beny Knapp        Sex: male          DOA: 6/14/2025  YOB: 1965      Age:  59 y.o.        LOS:  LOS: 25 days     Patient seen and examined, chart reviewed.    Assessment/Plan     Patient Active Problem List   Diagnosis    HTN (hypertension)    Tobacco abuse    COPD (chronic obstructive pulmonary disease) (HCC)    Back pain    Depression    Chronic hepatitis C (HCC)    Thrombocytopenia    Hyponatremia    Stridor    Obstructed, larynx    Acute respiratory failure with hypoxia and hypercapnia (HCC)    ETOH abuse    Polysubstance abuse (HCC)    Candidiasis of esophagus (HCC)    Aspiration pneumonia (HCC)    Cocaine abuse (HCC)    Moderate malnutrition         Abnormal EKG suggestive of ischemia  Abnormal troponin       Echocardiogram was done in June 2025 reported       Image quality is fair.    Left Ventricle: Normal left ventricular systolic function with a visually estimated EF of 55 - 60%. EF by 2D Simpsons Biplane is 57%. Left ventricle size is normal. Normal wall thickness. Normal wall motion. Normal diastolic function.    Mitral Valve: There is mild posterior annular calcification noted. Thickened leaflets. No stenosis noted.    Tricuspid Valve : Unable to assess RVSP due to inadequate or insignificant tricuspid regurgitation.    Pericardium : No pericardial effusion.    IVC/SVC : Patient is ventilated, cannot use IVC diameter to estimate right atrial pressure. IVC size is normal.    Echocardiogram revealed       Contrast used: Lumason. Technically difficult study due to patient's body habitus and patient's ability to tolerate test.    Left Ventricle: Normal left ventricular systolic function with a visually estimated EF of 60 - 65%. Left ventricle size is normal. Normal wall thickness. Unable to assess wall motion. Indeterminate diastolic function. Technically difficult with poor endocadial visualization.    This study has

## 2025-07-09 NOTE — CARE COORDINATION
Patient is requesting his niece, Henrry Valdivia, to be his POA. He does not have her phone number. Per patient, his niece lives in [place].    Request sent to [facility] legal team to perform an advanced search.

## 2025-07-09 NOTE — PROGRESS NOTES
ICU    Sudden change in mentation-patient became briefly poorly responsive.  Went to bedside along with RN-awake and more responsive now; seems to be oriented to place and person as well; lethargic; stable oxygenation and respirations; mild right upper extremity weakness as seen before.  Plan  CT head without contrast  Check electrolytes, TSH, ammonia  Check chest x-ray    Casey Car MD

## 2025-07-09 NOTE — PROGRESS NOTES
[facility]  Pulmonary, Critical Care, and Sleep Medicine    Name: Beny Knapp MRN: [id number]   : 1965 Hospital: [facility]    Date: 2025  Room: [address] [place] [address] Note                                              Consult requesting physician: Dr. Mark   Reason for Consult: Respiratory failure, hyponatremia, upper airway    IMPRESSION:     Acute respiratory failure with hypoxemia and hypercarbia   J96.01, J96.02  Stridor  Obstructed larynx  Polysubstance abuse   Candidiasis of esophagus   Aspiration pneumonia   COPD/emphysema  EtOH use  Cocaine abuse  Hepatitis C  Cirrhosis of liver  Hyponatremia, resolved      Active Hospital Problems    Diagnosis Date Noted    Moderate malnutrition [E44.0] 2025    Aspiration pneumonia (HCC) [J69.0] 2025    Cocaine abuse (HCC) [F14.10] 2025    Hyponatremia [E87.1] 2025    Stridor [R06.1] 2025    Obstructed, larynx [J38.6] 2025    Acute respiratory failure with hypoxia and hypercapnia (HCC) [J96.01, J96.02] 2025    ETOH abuse [F10.10] 2025    Polysubstance abuse (HCC) [F19.10] 2025    Candidiasis of esophagus (HCC) [B37.81] 2025    Tobacco abuse [Z72.0] 2015    HTN (hypertension) [I10] 2015    COPD (chronic obstructive pulmonary disease) (HCC) [J44.9] 2015    Chronic hepatitis C (HCC) [B18.2] 2015        Code status: Full Code       RECOMMENDATIONS:     Respiratory: 59-year-old male with COPD, smoker presents with acute upper airway distress, fullness in the laryngeal area, questionable foreign body. History of aspirations with feeding and bolus feeling on presentation in ER this admission. Patient was intubated 25 for airway protection and hypercarbic and hypoxemic respiratory failure, with ENT at bedside.  Prior to intubation, fiberoptic nasolaryngoscopy endoscopy by ENT did not show any foreign body, but swollen airway/larynx and  minutes.    Name Relation Mobile   Afshan Al Sister [phone]       Subjective/History of Present Illness:     Patient is a 59 y.o. male with PMHx significant for hypertension, EtOH use, COPD, smoker, history of polysubstance abuse including cocaine, obesity, hepatitis C, presents with respiratory distress, possible foreign body in the back of the throat, severe esophageal laryngitis, hypercarbia and hypoxemia.  Patient was intubated for airway protection, had stridor, was found to have hyponatremia, ear nose and throat scope findings some laryngeal abnormalities possible soft tissue lesion and severe candidiasis.  Patient has also severe hyponatremia, history is very limited.      7/9/2025 :     Patient seen in ICU  Awake, alert and more oriented  Patient not cooperative  No chest pains or worsening shortness of breath  Productive cough-able to expectorate well  No vomiting or diarrhea  On nasal cannula oxygen-fluctuates between salter and high flow; patient is a mouth breather  Telemetry-sinus rhythm  Blood pressure-stable  EKG-LVH with ST depression in anterior leads  Urine output-good    Drips-none      Review of Systems:  -No fever or chills  - No chest pain or palpitation  - No hemoptysis  - No vomiting or diarrhea  - No hematemesis or rectal bleeding  - No rash         Allergies   Allergen Reactions    Codeine Nausea And Vomiting    Hydrocodone Nausea And Vomiting        No past medical history on file.     No past surgical history on file.     Social History     Tobacco Use    Smoking status: Not on file    Smokeless tobacco: Not on file   Substance Use Topics    Alcohol use: Not on file      No family history on file.     Prior to Admission medications    Not on File     Current Facility-Administered Medications   Medication Dose Route Frequency Provider Last Rate Last Admin    cyclobenzaprine (FLEXERIL) tablet 10 mg  10 mg Oral TID PRN Ba Calderon MD   10 mg at 07/09/25 1696

## 2025-07-09 NOTE — PROGRESS NOTES
PALLIATIVE MEDICINE    NO AMD ON FILE    1030: Left voicemail for patients brother-in-law, Newton Houston [phone].  1035: Left voicemail for  Afshan Al, patient sister, [phone].  Several messages left for Afshan Al.   Patient verbalized he had a son that lived in [place].     1400 Unable to speak with patient further about son as he was sent for STAT CT due to seizure like activity.    CODE STATUS: FULL CODE    Seen today in Rm 104, along with Sherley Frias RN.   Patient lying in bed supine with head of bed raised.  Holding suction catheter.  AAOX4.  Placed on HF after sats in 80's. Currently on 8L HF NC.     Discussed possibility of re-intubation. Patient stated he only wants ventilation if he is asleep.     Patient stated he wanted to eat but he was not willing to complete Modified Barium Swallow. Mr. Gtz stated \"I do not want another tube down my throat.\"    Palliative Medicine will continue to follow Beny Knapp and his family during his hospitalization and support them as they make healthcare decisions and define goals of care.     Maura Kimball RN  Palliative Medicine  [phone]

## 2025-07-09 NOTE — PROGRESS NOTES
PT session held due to:  [x]  Change in status (Off unit for CT)   []  RN Communication/ suggestion  []  Extreme Pain  []  Dialysis treatment in progress.  Will f/u later as pt's schedule allows. Thank you.  Nick Rachel, PTA

## 2025-07-10 ENCOUNTER — APPOINTMENT (OUTPATIENT)
Facility: HOSPITAL | Age: 60
DRG: 368 | End: 2025-07-10
Payer: MEDICARE

## 2025-07-10 PROBLEM — J90 PLEURAL EFFUSION ON LEFT: Status: ACTIVE | Noted: 2025-07-10

## 2025-07-10 PROBLEM — J98.11 PULMONARY ATELECTASIS: Status: ACTIVE | Noted: 2025-07-10

## 2025-07-10 LAB
ALBUMIN SERPL-MCNC: 3.3 G/DL (ref 3.4–5)
ALBUMIN/GLOB SERPL: 0.9 (ref 0.8–1.7)
ALP SERPL-CCNC: 138 U/L (ref 45–117)
ALT SERPL-CCNC: 611 U/L (ref 10–50)
ANION GAP SERPL CALC-SCNC: 16 MMOL/L (ref 3–18)
APTT PPP: 23 SEC (ref 21.7–34.2)
ARTERIAL PATENCY WRIST A: POSITIVE
ARTERIAL PATENCY WRIST A: POSITIVE
AST SERPL-CCNC: 146 U/L (ref 10–38)
BASE EXCESS BLD CALC-SCNC: 5.5 MMOL/L
BASE EXCESS BLD CALC-SCNC: 5.8 MMOL/L
BASOPHILS # BLD: 0.02 K/UL (ref 0–0.1)
BASOPHILS NFR BLD: 0.2 % (ref 0–2)
BDY SITE: ABNORMAL
BDY SITE: ABNORMAL
BILIRUB SERPL-MCNC: 2.1 MG/DL (ref 0.2–1)
BUN SERPL-MCNC: 42 MG/DL (ref 6–23)
BUN/CREAT SERPL: 107 (ref 12–20)
CA-I BLD-SCNC: 1.14 MMOL/L (ref 1.12–1.32)
CALCIUM SERPL-MCNC: 9.8 MG/DL (ref 8.5–10.1)
CHLORIDE SERPL-SCNC: 102 MMOL/L (ref 98–107)
CO2 SERPL-SCNC: 28 MMOL/L (ref 21–32)
CREAT SERPL-MCNC: 0.4 MG/DL (ref 0.6–1.3)
DIFFERENTIAL METHOD BLD: ABNORMAL
ECHO AV MEAN GRADIENT: 2 MMHG
ECHO AV MEAN VELOCITY: 0.6 M/S
ECHO AV PEAK GRADIENT: 3 MMHG
ECHO AV PEAK VELOCITY: 0.9 M/S
ECHO AV VELOCITY RATIO: 1
ECHO AV VTI: 15.6 CM
ECHO BSA: 2.15 M2
ECHO EST RA PRESSURE: 3 MMHG
ECHO LV EF PHYSICIAN: 60 %
ECHO LV FRACTIONAL SHORTENING: 38 % (ref 28–44)
ECHO LV INTERNAL DIMENSION DIASTOLE INDEX: 1.97 CM/M2
ECHO LV INTERNAL DIMENSION DIASTOLIC: 4.2 CM (ref 4.2–5.9)
ECHO LV INTERNAL DIMENSION SYSTOLIC INDEX: 1.22 CM/M2
ECHO LV INTERNAL DIMENSION SYSTOLIC: 2.6 CM
ECHO LV IVSD: 0.9 CM (ref 0.6–1)
ECHO LV MASS 2D: 118.7 G (ref 88–224)
ECHO LV MASS INDEX 2D: 55.7 G/M2 (ref 49–115)
ECHO LV POSTERIOR WALL DIASTOLIC: 0.9 CM (ref 0.6–1)
ECHO LV RELATIVE WALL THICKNESS RATIO: 0.43
ECHO LVOT AV VTI INDEX: 0.79
ECHO LVOT MEAN GRADIENT: 2 MMHG
ECHO LVOT PEAK GRADIENT: 3 MMHG
ECHO LVOT PEAK VELOCITY: 0.9 M/S
ECHO LVOT VTI: 12.4 CM
ECHO RV FREE WALL PEAK S': 15.3 CM/S
ECHO RV TAPSE: 2.7 CM (ref 1.7–?)
EOSINOPHIL # BLD: 0 K/UL (ref 0–0.4)
EOSINOPHIL NFR BLD: 0 % (ref 0–5)
ERYTHROCYTE [DISTWIDTH] IN BLOOD BY AUTOMATED COUNT: 14.2 % (ref 11.6–14.5)
GAS FLOW.O2 O2 DELIVERY SYS: ABNORMAL
GAS FLOW.O2 O2 DELIVERY SYS: ABNORMAL
GAS FLOW.O2 SETTING OXYMISER: 14 BPM
GAS FLOW.O2 SETTING OXYMISER: 14 BPM
GLOBULIN SER CALC-MCNC: 3.8 G/DL (ref 2–4)
GLUCOSE SERPL-MCNC: 184 MG/DL (ref 74–108)
HCO3 BLD-SCNC: 29.2 MMOL/L (ref 21–28)
HCO3 BLD-SCNC: 29.8 MMOL/L (ref 21–28)
HCT VFR BLD AUTO: 47.1 % (ref 36–48)
HGB BLD-MCNC: 15.8 G/DL (ref 13–16)
IMM GRANULOCYTES # BLD AUTO: 0.03 K/UL (ref 0–0.04)
IMM GRANULOCYTES NFR BLD AUTO: 0.3 % (ref 0–0.5)
INR PPP: 1.2 (ref 0.9–1.2)
LYMPHOCYTES # BLD: 0.35 K/UL (ref 0.9–3.3)
LYMPHOCYTES NFR BLD: 3.5 % (ref 21–52)
MAGNESIUM SERPL-MCNC: 2.3 MG/DL (ref 1.6–2.6)
MCH RBC QN AUTO: 29.4 PG (ref 24–34)
MCHC RBC AUTO-ENTMCNC: 33.5 G/DL (ref 31–37)
MCV RBC AUTO: 87.7 FL (ref 78–100)
MONOCYTES # BLD: 0.63 K/UL (ref 0.05–1.2)
MONOCYTES NFR BLD: 6.3 % (ref 3–10)
NEUTS SEG # BLD: 8.95 K/UL (ref 1.8–8)
NEUTS SEG NFR BLD: 89.7 % (ref 40–73)
NRBC # BLD: 0 K/UL (ref 0–0.01)
NRBC BLD-RTO: 0 PER 100 WBC
O2/TOTAL GAS SETTING VFR VENT: 60 %
O2/TOTAL GAS SETTING VFR VENT: 90 %
PCO2 BLD: 38.6 MMHG (ref 35–48)
PCO2 BLD: 39.8 MMHG (ref 35–48)
PEEP RESPIRATORY: 5 CMH2O
PEEP RESPIRATORY: 5 CMH2O
PH BLD: 7.48 (ref 7.35–7.45)
PH BLD: 7.49 (ref 7.35–7.45)
PHOSPHATE SERPL-MCNC: 2.7 MG/DL (ref 2.5–4.9)
PLATELET # BLD AUTO: 137 K/UL (ref 135–420)
PMV BLD AUTO: 10 FL (ref 9.2–11.8)
PO2 BLD: 60 MMHG (ref 83–108)
PO2 BLD: 71 MMHG (ref 83–108)
POTASSIUM SERPL-SCNC: 3.9 MMOL/L (ref 3.5–5.5)
PROT SERPL-MCNC: 7.1 G/DL (ref 6.4–8.2)
PROTHROMBIN TIME: 15.1 SEC (ref 12–15.1)
RBC # BLD AUTO: 5.37 M/UL (ref 4.35–5.65)
SAO2 % BLD: 92.4 % (ref 92–97)
SAO2 % BLD: 95.1 % (ref 92–97)
SERVICE CMNT-IMP: ABNORMAL
SERVICE CMNT-IMP: ABNORMAL
SODIUM SERPL-SCNC: 145 MMOL/L (ref 136–145)
SPECIMEN TYPE: ABNORMAL
SPECIMEN TYPE: ABNORMAL
TROPONIN T SERPL HS-MCNC: 23.2 NG/L (ref 0–22)
VENTILATION MODE VENT: ABNORMAL
VENTILATION MODE VENT: ABNORMAL
VT SETTING VENT: 500 ML
VT SETTING VENT: 520 ML
WBC # BLD AUTO: 10 K/UL (ref 4.6–13.2)

## 2025-07-10 PROCEDURE — 6370000000 HC RX 637 (ALT 250 FOR IP): Performed by: HOSPITALIST

## 2025-07-10 PROCEDURE — 87181 SC STD AGAR DILUTION PER AGT: CPT

## 2025-07-10 PROCEDURE — 83735 ASSAY OF MAGNESIUM: CPT

## 2025-07-10 PROCEDURE — 2500000003 HC RX 250 WO HCPCS: Performed by: INTERNAL MEDICINE

## 2025-07-10 PROCEDURE — 6360000002 HC RX W HCPCS: Performed by: INTERNAL MEDICINE

## 2025-07-10 PROCEDURE — 87077 CULTURE AEROBIC IDENTIFY: CPT

## 2025-07-10 PROCEDURE — 6370000000 HC RX 637 (ALT 250 FOR IP): Performed by: INTERNAL MEDICINE

## 2025-07-10 PROCEDURE — 31500 INSERT EMERGENCY AIRWAY: CPT

## 2025-07-10 PROCEDURE — 80053 COMPREHEN METABOLIC PANEL: CPT

## 2025-07-10 PROCEDURE — 84484 ASSAY OF TROPONIN QUANT: CPT

## 2025-07-10 PROCEDURE — 82330 ASSAY OF CALCIUM: CPT

## 2025-07-10 PROCEDURE — 85610 PROTHROMBIN TIME: CPT

## 2025-07-10 PROCEDURE — 84100 ASSAY OF PHOSPHORUS: CPT

## 2025-07-10 PROCEDURE — 87070 CULTURE OTHR SPECIMN AEROBIC: CPT

## 2025-07-10 PROCEDURE — 2700000000 HC OXYGEN THERAPY PER DAY

## 2025-07-10 PROCEDURE — 85025 COMPLETE CBC W/AUTO DIFF WBC: CPT

## 2025-07-10 PROCEDURE — 71045 X-RAY EXAM CHEST 1 VIEW: CPT

## 2025-07-10 PROCEDURE — 87205 SMEAR GRAM STAIN: CPT

## 2025-07-10 PROCEDURE — 82803 BLOOD GASES ANY COMBINATION: CPT

## 2025-07-10 PROCEDURE — 94640 AIRWAY INHALATION TREATMENT: CPT

## 2025-07-10 PROCEDURE — 2000000000 HC ICU R&B

## 2025-07-10 PROCEDURE — 87186 SC STD MICRODIL/AGAR DIL: CPT

## 2025-07-10 PROCEDURE — 2580000003 HC RX 258: Performed by: INTERNAL MEDICINE

## 2025-07-10 PROCEDURE — 36600 WITHDRAWAL OF ARTERIAL BLOOD: CPT

## 2025-07-10 PROCEDURE — 94003 VENT MGMT INPAT SUBQ DAY: CPT

## 2025-07-10 PROCEDURE — 85730 THROMBOPLASTIN TIME PARTIAL: CPT

## 2025-07-10 RX ORDER — 0.9 % SODIUM CHLORIDE 0.9 %
250 INTRAVENOUS SOLUTION INTRAVENOUS ONCE
Status: COMPLETED | OUTPATIENT
Start: 2025-07-10 | End: 2025-07-10

## 2025-07-10 RX ORDER — MIDAZOLAM HYDROCHLORIDE 1 MG/ML
1-10 INJECTION, SOLUTION INTRAVENOUS CONTINUOUS
Status: DISCONTINUED | OUTPATIENT
Start: 2025-07-10 | End: 2025-07-15

## 2025-07-10 RX ORDER — CHLORHEXIDINE GLUCONATE ORAL RINSE 1.2 MG/ML
15 SOLUTION DENTAL 2 TIMES DAILY
Status: DISCONTINUED | OUTPATIENT
Start: 2025-07-10 | End: 2025-07-16

## 2025-07-10 RX ORDER — DIAZEPAM 10 MG/2ML
5 INJECTION, SOLUTION INTRAMUSCULAR; INTRAVENOUS ONCE
Status: COMPLETED | OUTPATIENT
Start: 2025-07-10 | End: 2025-07-10

## 2025-07-10 RX ORDER — FENTANYL CITRATE 50 UG/ML
50 INJECTION, SOLUTION INTRAMUSCULAR; INTRAVENOUS EVERY 4 HOURS PRN
Status: DISCONTINUED | OUTPATIENT
Start: 2025-07-10 | End: 2025-07-16

## 2025-07-10 RX ORDER — ASPIRIN 81 MG/1
81 TABLET, CHEWABLE ORAL DAILY
Status: DISCONTINUED | OUTPATIENT
Start: 2025-07-10 | End: 2025-07-16

## 2025-07-10 RX ORDER — DIAZEPAM 10 MG/2ML
5 INJECTION, SOLUTION INTRAMUSCULAR; INTRAVENOUS EVERY 4 HOURS PRN
Status: DISCONTINUED | OUTPATIENT
Start: 2025-07-10 | End: 2025-07-16

## 2025-07-10 RX ORDER — DEXMEDETOMIDINE HYDROCHLORIDE 4 UG/ML
.1-1.5 INJECTION, SOLUTION INTRAVENOUS CONTINUOUS
Status: DISCONTINUED | OUTPATIENT
Start: 2025-07-10 | End: 2025-07-10

## 2025-07-10 RX ADMIN — DIAZEPAM 5 MG: 5 INJECTION INTRAMUSCULAR; INTRAVENOUS at 22:31

## 2025-07-10 RX ADMIN — SODIUM CHLORIDE 250 ML: 0.9 INJECTION, SOLUTION INTRAVENOUS at 10:28

## 2025-07-10 RX ADMIN — DIAZEPAM 5 MG: 5 INJECTION, SOLUTION INTRAMUSCULAR; INTRAVENOUS at 08:04

## 2025-07-10 RX ADMIN — CHLORHEXIDINE GLUCONATE 15 ML: 1.2 RINSE ORAL at 09:49

## 2025-07-10 RX ADMIN — DIAZEPAM 5 MG: 5 INJECTION INTRAMUSCULAR; INTRAVENOUS at 18:00

## 2025-07-10 RX ADMIN — DEXMEDETOMIDINE 0.1 MCG/KG/HR: 100 INJECTION, SOLUTION INTRAVENOUS at 07:09

## 2025-07-10 RX ADMIN — WATER 20 MG: 1 INJECTION INTRAMUSCULAR; INTRAVENOUS; SUBCUTANEOUS at 19:48

## 2025-07-10 RX ADMIN — METOPROLOL TARTRATE 2.5 MG: 5 INJECTION INTRAVENOUS at 22:31

## 2025-07-10 RX ADMIN — ARFORMOTEROL TARTRATE 15 MCG: 15 SOLUTION RESPIRATORY (INHALATION) at 08:18

## 2025-07-10 RX ADMIN — ENOXAPARIN SODIUM 40 MG: 100 INJECTION SUBCUTANEOUS at 09:44

## 2025-07-10 RX ADMIN — SODIUM CHLORIDE, PRESERVATIVE FREE 40 MG: 5 INJECTION INTRAVENOUS at 09:45

## 2025-07-10 RX ADMIN — IPRATROPIUM BROMIDE 0.5 MG: 0.5 SOLUTION RESPIRATORY (INHALATION) at 19:42

## 2025-07-10 RX ADMIN — FENTANYL CITRATE 50 MCG: 50 INJECTION INTRAMUSCULAR; INTRAVENOUS at 16:15

## 2025-07-10 RX ADMIN — CHLORHEXIDINE GLUCONATE 15 ML: 1.2 RINSE ORAL at 20:02

## 2025-07-10 RX ADMIN — IPRATROPIUM BROMIDE 0.5 MG: 0.5 SOLUTION RESPIRATORY (INHALATION) at 15:12

## 2025-07-10 RX ADMIN — THIAMINE HYDROCHLORIDE 100 MG: 100 INJECTION, SOLUTION INTRAMUSCULAR; INTRAVENOUS at 09:46

## 2025-07-10 RX ADMIN — WATER 20 MG: 1 INJECTION INTRAMUSCULAR; INTRAVENOUS; SUBCUTANEOUS at 09:45

## 2025-07-10 RX ADMIN — SODIUM CHLORIDE 8 MG/HR: 900 INJECTION, SOLUTION INTRAVENOUS at 22:06

## 2025-07-10 RX ADMIN — SODIUM CHLORIDE, PRESERVATIVE FREE 10 ML: 5 INJECTION INTRAVENOUS at 10:27

## 2025-07-10 RX ADMIN — METOPROLOL TARTRATE 2.5 MG: 5 INJECTION INTRAVENOUS at 05:59

## 2025-07-10 RX ADMIN — FOLIC ACID 1 MG: 5 INJECTION, SOLUTION INTRAMUSCULAR; INTRAVENOUS; SUBCUTANEOUS at 10:16

## 2025-07-10 RX ADMIN — BUDESONIDE 500 MCG: 0.5 INHALANT RESPIRATORY (INHALATION) at 08:18

## 2025-07-10 RX ADMIN — SODIUM CHLORIDE 2 MG/HR: 900 INJECTION, SOLUTION INTRAVENOUS at 08:05

## 2025-07-10 RX ADMIN — DORNASE ALFA 2.5 MG: 1 SOLUTION RESPIRATORY (INHALATION) at 19:42

## 2025-07-10 RX ADMIN — SODIUM CHLORIDE, PRESERVATIVE FREE 10 ML: 5 INJECTION INTRAVENOUS at 19:44

## 2025-07-10 RX ADMIN — IPRATROPIUM BROMIDE 0.5 MG: 0.5 SOLUTION RESPIRATORY (INHALATION) at 08:18

## 2025-07-10 RX ADMIN — CYCLOBENZAPRINE HYDROCHLORIDE 10 MG: 10 TABLET, FILM COATED ORAL at 20:07

## 2025-07-10 RX ADMIN — ASPIRIN 81 MG: 81 TABLET, CHEWABLE ORAL at 10:16

## 2025-07-10 RX ADMIN — BUDESONIDE 500 MCG: 0.5 INHALANT RESPIRATORY (INHALATION) at 19:42

## 2025-07-10 RX ADMIN — ARFORMOTEROL TARTRATE 15 MCG: 15 SOLUTION RESPIRATORY (INHALATION) at 19:42

## 2025-07-10 ASSESSMENT — PAIN SCALES - GENERAL
PAINLEVEL_OUTOF10: 0

## 2025-07-10 ASSESSMENT — PULMONARY FUNCTION TESTS
PIF_VALUE: 22
PIF_VALUE: 22
PIF_VALUE: 23
PIF_VALUE: 21
PIF_VALUE: 21

## 2025-07-10 NOTE — PROGRESS NOTES
met nurse in ICU.    Nurse said patient was intubated earlier in the morning. Nurse thanked  for the visit.     provided presence and support for staff.    Chaplains will provide follow-up care for patient, family, and staff as needed.    Spiritual Health History and Assessment/Progress Note  [facility]    Palliative Care,  ,  ,      Name: Beny Knapp MRN: [id number]    Age: 59 y.o.     Sex: male   Language: English   Hoahaoism: Adventism   Acute respiratory failure with hypoxia (HCC)     Date: 7/10/2025            Total Time Calculated: 5 min              Spiritual Assessment continued in [facility] INTENSIVE CARE        Referral/Consult From: Palliative Care   Encounter Overview/Reason: Palliative Care  Service Provided For: Patient    Love, Belief, Meaning:   Patient identifies as spiritual, is connected with a love tradition or spiritual practice, and has beliefs or practices that help with coping during difficult times  Family/Friends No family/friends present      Importance and Influence:  Patient unable to assess at this time  Family/Friends No family/friends present    Community:  Patient feels well-supported. Support system includes: Extended family  Family/Friends No family/friends present    Assessment and Plan of Care:     Patient Interventions include: Other: Unable to assess.  Family/Friends Interventions include: No family/friends present    Patient Plan of Care: No spiritual needs identified for follow-up  Family/Friends Plan of Care: No family/friends present    Electronically signed by Chaplain Kevin on 7/10/2025 at 10:02 AM

## 2025-07-10 NOTE — PROGRESS NOTES
Hospitalist Progress Note    Patient: Beny Knapp MRN: [id number]  CSN: [id number]    YOB: 1965  Age: 59 y.o.  Sex: male    DOA: 6/14/2025 LOS:  LOS: 26 days          Chief Complain :  Foreign body, shortness of breath.  59 y.o.  male w/ PMH of COPD, ETOH-use and polysubstance abuse who was BIBA and presents with subjective difficulty breathing and swallowing with throat pain. Brought in by EMS for possible forign body (food) with bolus within region of velecula. ENT saw pt and ED intubated for airway protection. ENT was consulted and performed scope on pt with findings of extensive esophageal candidiasis. Nephrology was consulted for severe hyponatremia with sodium of 117 recommending NS at 75cc/hr. Intensivist was contacted as well d/t pt being intubated, on vent for airway protection. Prolong intubation, extubated 07/05/2025. Has been uncooperative, did not cooperate with MBS and did not cooperate with placing NG tube.  07/10/2025 SNVT episode early morning, went into respiratory distress. Required intubation. Thick copious secretions from ET tube.   Subjective:   Patient orally intubated and sedated.    Assessment/Plan     Active Hospital Problems    Diagnosis     Pleural effusion on left [J90]     Pulmonary atelectasis [J98.11]     Moderate malnutrition [E44.0]     Aspiration pneumonia (HCC) [J69.0]     Cocaine abuse (HCC) [F14.10]     Hyponatremia [E87.1]     Stridor [R06.1]     Obstructed, larynx [J38.6]     Acute respiratory failure with hypoxia (HCC) [J96.01]     ETOH abuse [F10.10]     Polysubstance abuse (HCC) [F19.10]     Candidiasis of esophagus (HCC) [B37.81]     Tobacco abuse [Z72.0]     HTN  be a communication between medical professionals.  Additionally, portion of this note were created using voice recognition function, syntax and phonetic over may have escaped proofreading.

## 2025-07-10 NOTE — PLAN OF CARE
RN)  Absence of Physical Injury: Implement safety measures based on patient assessment     Problem: Behavior  Goal: Pt/Family maintain appropriate behavior and adhere to behavioral management agreement, if implemented  Description: INTERVENTIONS:  1. Assess patient/family's coping skills and  non-compliant behavior (including use of illegal substances)  2. Notify security of behavior or suspected illegal substances which indicate the need for search of the family and/or belongings  3. Encourage verbalization of thoughts and concerns in a socially appropriate manner  4. Utilize positive, consistent limit setting strategies supporting safety of patient, staff and others  5. Encourage participation in the decision making process about the behavioral management agreement  6. If a visitor's behavior poses a threat to safety call refer to organization policy.  7. Initiate consult with , Psychosocial CNS, Spiritual Care as appropriate  Outcome: Progressing  Flowsheets (Taken 7/9/2025 2000)  Patient/family maintains appropriate behavior and adheres to behavioral management agreement, if implemented:   Assess patient/family’s coping skills and  non-compliant behavior (including use of illegal substances)   Utilize positive, consistent limit setting strategies supporting safety of patient, staff and others   Notify security of behavior or suspected illegal substances which indicate the need for search of the patient and/or belongings   Encourage verbalization of thoughts and concerns in a socially appropriate manner   Encourage participation in the decision making process about the behavioral management agreement   Implement a Health Care Agreement if patient meets criteria   If a patient’s behavior jeopardizes the safety of the patient, staff, or others refer to organization policy. If a visitor’s behavior poses a threat to safety refer to organization policy   Initiate consult with , Psychosocial

## 2025-07-10 NOTE — PLAN OF CARE
Problem: Discharge Planning  Goal: Discharge to home or other facility with appropriate resources  7/10/2025 1144 by Carolann Figueroa, RN  Outcome: Progressing  Flowsheets (Taken 7/10/2025 0800)  Discharge to home or other facility with appropriate resources:   Identify barriers to discharge with patient and caregiver   Arrange for needed discharge resources and transportation as appropriate   Identify discharge learning needs (meds, wound care, etc)   Arrange for interpreters to assist at discharge as needed   Refer to discharge planning if patient needs post-hospital services based on physician order or complex needs related to functional status, cognitive ability or social support system  7/10/2025 0012 by Candace Kidd, RN  Outcome: Progressing  Flowsheets (Taken 7/9/2025 2000)  Discharge to home or other facility with appropriate resources:   Identify barriers to discharge with patient and caregiver   Arrange for needed discharge resources and transportation as appropriate   Identify discharge learning needs (meds, wound care, etc)   Arrange for interpreters to assist at discharge as needed   Refer to discharge planning if patient needs post-hospital services based on physician order or complex needs related to functional status, cognitive ability or social support system     Problem: Pain  Goal: Verbalizes/displays adequate comfort level or baseline comfort level  7/10/2025 1144 by Carolann Figueroa RN  Outcome: Progressing  Flowsheets (Taken 7/10/2025 0400 by Candace Kidd RN)  Verbalizes/displays adequate comfort level or baseline comfort level:   Encourage patient to monitor pain and request assistance   Assess pain using appropriate pain scale   Implement non-pharmacological measures as appropriate and evaluate response   Consider cultural and social influences on pain and pain management   Administer analgesics based on type and severity of pain and evaluate response   Notify Licensed Independent

## 2025-07-10 NOTE — CARE COORDINATION
Patients sister, Afshan Al, did not arrive at [facility] at the agreed upon time of 1000. Today,  has made several attempts to reach out to patients sister. Patients sister has not answered the call or returned the call to . Several voicemail's have been left with a request to call  at [phone].  contacted patients brother-in-law with a request to have Afshan contact CM. No response has been received.    1200 -  telephoned [facility] [employer] for a wellness check to the patients sisters last known address, [address] [place] [address], and to request for Afshan to come to [facility] to speak with the patients care team. [facility] will update VALERIE after wellness attempt is made.    1229 - [facility], Deputy Mantilla, notified  of his attempt to make contact with patients sister at [address] [place] [address]. Deputy Mantilla stated he made several attempts to knock on the door, there was no answer, no vehicle in the driveway, and it appears the residence is vacant. CM manager updated.

## 2025-07-10 NOTE — CONSULTS
Comprehensive High Risk Nutrition Assessment Follow-Up    Type and Reason for Visit:  Reassess, Nutrition support, Consult  Nutrition consulted for TF; appreciate consult.  Nutrition Recommendations/Plan:   Re-intubated today, inadequate nutrition most admit   Make NPO and cont TF with Vital 1.2 @ 15ml/hr adv 10ml/hr [id number] to 65ml/hr goal as tr via OGT provides 1872kcal, 117g pro, 1265ml FW  If w/o IVF suggest 150ml Q6hrs adjust defer per MD  May be at risk for Refeeding Syndrome given prolonged inadequate nutrition  Cont to check Phos, Mg, and K and replete as needed  Cont but ?folate and thiamine via OGT instead of inj and consider add liq centrum/certa-azucena w/min as well via OGT  Cont to monitor POC/NCP, wt trends diuresing, renal fx, lytes, UOP, bowel fx, skin integrity/wound healing and adjust recs as needed     Malnutrition Assessment:  Malnutrition Status:  Moderate malnutrition (07/07/25 1411)    Context:  Acute Illness     Findings of the 6 clinical characteristics of malnutrition:  Energy Intake:  50% or less of estimated energy requirements for 5 or more days  Weight Loss:  Greater than 7.5% over 3 months     Body Fat Loss:  Moderate body fat loss Orbital, Triceps   Muscle Mass Loss:  Moderate muscle mass loss Calf (gastrocnemius), Thigh (quadriceps)  Fluid Accumulation:  Unable to assess     Strength:  Not Performed    Nutrition Assessment:    58yo M remains in ICU reintubated today 7/10 sedated on vent with versed gtt, +OGT, s/p TIA -CT head nge, no n/v/d or aspirations overnight, no reports of change in mentation prior to intubation, no hematemesis or rectal bleeding, BP stable, good UOP, afreble per MD. previously extubated 7/5, failed swallow SLP 7/6 pend MBSS unable to complete MBSS, NPO since 7/5.  not consistently on TF never at goal since admit r/t GRVs and was on TF 15-25ml/hr from 7/4-7/5 remains overall w/inadequate nutrition since admit x 26 days. good 2300ml UOP 7/9. lasix held

## 2025-07-10 NOTE — PROGRESS NOTES
0345 - 32 beat run of V-TACH noted on monitor. Patient alert, no signs of distress. Attempted to draw labs but patient refusing, will attempt again in 15 minutes. Dr. Kumar notified.     0630 - Patient tachypniec between 30-40 breaths a minute, visible distress, tachycardic, oxygen saturations in 80s. No longer hearing lung sounds on left side, 2nd nurse, YAMILE Perez RN verified findings. Intensivist paged.     2869 - Dr. Car called back, based on patient's condition, orders received for emergent intubation. Anesthesia paged.     1235 - Patient successfully intubated.

## 2025-07-10 NOTE — WOUND CARE
ICU Rounding  Wound care nurse rounded on patient for skin issues.  Patient has a Chauncey score of  15.  Does patient have any pressure injury?no per primary nurse PUMA Ramirez     Skin Care & Pressure Relief Recommendations  Minimize layers of linen  Pads under patient to optimize support surface  Turn/reposition approximately every 2 hours  Use pillow wedges to maintain positioning but do not put pillow directly over wounds  Manage incontinence   Promote continence; Skin Protective lotion/cream to buttocks and sacrum daily and as needed with incontinence care  Offload heels pillows    Consult wound care if any wounds noted during admission.  Wound Care nurse will continue to follow during ICU admission.

## 2025-07-10 NOTE — PROGRESS NOTES
Occupational Therapy  Per chart review, pt now intubated and sedated.  Will complete OT orders at this time- please re consult once off sedation and able to participate in therapy services.   Jeannette Arana OTD, OTR/L

## 2025-07-10 NOTE — PROGRESS NOTES
utilized. FINDINGS: NASOPHARYNX: Normal. SUPRAHYOID NECK: ET tube and NG tube in place. There is ethmoid and maxillary sinus disease.. INFRAHYOID NECK: Normal larynx, hypopharynx and supraglottis. PAROTID GLANDS: Normal. SUBMANDIBULAR GLANDS: Normal. THYROID GLAND: No suspicious nodule.. MEDIASTINUM: ET tube and NG tube in place. Patulous esophagus. No hilar or mediastinal lymphadenopathy. No esophageal mass. No endotracheal or endobronchial mass. PLEURA/LUNGS:: Left greater than right basilar atelectasis/pneumonia. Bullous emphysematous change. SOFT TISSUE/ AXILLA:  No mass or lymphadenopathy.  CORONARY CALCIUM: Not visualized. LIVER/GALLBLADDER:  : Hepatic steatosis and cholelithiasis. There is no intrahepatic duct dilatation. There is no hepatic parenchymal mass. Hepatic enhancement pattern is within normal limits. Portal vein is patent. SPLEEN/PANCREAS: No mass.  There is no pancreatic duct dilatation. There is no evidence of splenomegaly. ADRENALS/KIDNEYS: Punctate nonobstructive left renal calculus. There is no hydronephrosis. There is no renal mass. There is no perinephric mass. STOMACH: No dilatation or wall thickening. COLON AND SMALL BOWEL: Colonic diverticulosis. There is no free intraperitoneal air. There is no evidence of incarceration or obstruction. No mesenteric adenopathy. PERITONEUM: Unremarkable. APPENDIX: Unremarkable. BLADDER/REPRODUCTIVE ORGANS: No mass or calculus. RETROPERITONEUM: Unremarkable. The abdominal aorta is normal in caliber. No aneurysm. No retroperitoneal adenopathy. OSSEOUS STRUCTURES: No destructive bone lesion.     Bibasilar atelectasis/pneumonia left greater than right. Bullous emphysematous change. No additional acute intraperitoneal/intrathoracic process is identified. Please see above for additional nonemergent incidental findings. Electronically signed by MELITON HABIB    CT HEAD WO CONTRAST  Result Date: 6/14/2025  CLINICAL HISTORY: Foreign body ingestion INDICATION: Foreign  body ingestion COMPARISON: 6/29/2023. CT dose reduction was achieved through use of a standardized protocol tailored for this examination and automatic exposure control for dose modulation. TECHNIQUE: Serial axial images with a collimation of 5 mm were obtained from the skull base through the vertex.  Serial axial images with a collimation of 5 mm were obtained from the skull base through the vertex.  Sagittal and coronal reformatted images also obtained.  FINDINGS: The sulci and ventricles are within normal limits for patient age. There is no evidence of an acute infarction, hemorrhage, or mass-effect. There is no evidence of midline shift or hydrocephalus. Posterior fossa structures are unremarkable. No extra-axial collections are seen. Mastoid air cells are well pneumatized and clear.  There is no evidence of depressed skull fractures of soft tissue swelling. ET tube and NG tube are noted.    No acute intracranial process. There is no intracranial mass or hemorrhage. Electronically signed by MELITON GARRETT    XR CHEST 1 VIEW  Result Date: 6/14/2025  EXAM: XR CHEST 1 VIEW INDICATION: line placement COMPARISON: 6/14/2025 FINDINGS: A portable AP radiograph of the chest was obtained at 523 hours. Intubated. Endotracheal tube is approximately 6.3 cm above the justine. Right IJ catheter has been placed with the tip in the proximal SVC. There is no pneumothorax.. Bilateral pleural effusions and bibasilar volume loss with interval increase.. Mild pulmonary edema. Heart size is enlarged..  The bones and soft tissues are grossly within normal limits.     Tubes and lines as above. No pneumothorax. Increased bilateral effusions and bibasilar volume loss. Electronically signed by Eliseo Espinosa    XR CHEST PORTABLE  Result Date: 6/14/2025  EXAM: XR CHEST PORTABLE INDICATION: Chest Pain COMPARISON: 6/29/2023 FINDINGS: A portable AP radiograph of the chest was obtained at 244 hours. . Bibasilar scarring and atelectasis. Possible

## 2025-07-10 NOTE — PROCEDURES
PROCEDURE NOTE  Date: 7/10/2025   Name: Beny Knapp  YOB: 1965    Airway  Date/Time: 7/10/2025 8:04 AM  Urgency: urgent    Airway not difficult    General Information and Staff    Patient location during procedure: ICU  Anesthesiologist: Carlton Vaca MD  Resident/CRNA: Pio Tan APRN - CRNA  Performed: resident/CRNA/CAA   Performed by: Pio Tan APRN - CRNA  Authorized by: Pio Tan APRN - CRNA      Indications and Patient Condition  Indications for airway management: hypoxemia and respiratory distress  Spontaneous ventilation: present  Sedation level: no sedation  Preoxygenated: yes  Patient position: sniffing  MILS not maintained throughout  Mask difficulty assessment: vent by bag mask    Final Airway Details  Final airway type: endotracheal airway      Successful airway: ETT     Successful intubation technique: video laryngoscopy  Endotracheal tube insertion site: oral  Blade: Rueda  Blade size: #3  ETT size (mm): 7.5  Cormack-Lehane Classification: grade I - full view of glottis  Placement verified by: chest auscultation, capnometry and palpation of cuff   Measured from: lips  ETT to lips (cm): 23  Number of attempts at approach: 1  Number of other approaches attempted: 0    Additional Comments  Called to intubate pt for resp failure and non tolerance of high flow nasal cannula.  Obtained verbal consent from pt, given 150 mg of propofol followed by 100mg of succ, pt easily intubated, given 50 mg of propofol following intubation to allow for time for ICU rn to obtain sedation medications, 100mcg of neosynephrine given for low b/p quickly resolved, Report to ICU RN  no

## 2025-07-10 NOTE — PROGRESS NOTES
Speech-Language Pathology    Per chart review, pt now intubated. Will complete orders at this time- please re consult once extubated as appropriate.    Thank you for this referral,     Shea Roberts M.S., CCC-SLP

## 2025-07-10 NOTE — CARE COORDINATION
Telephone called placed to patients sister Afshan Al. CM called to assist patients  with locating the ICU upon scheduled arrival time of 1000. No answer, LVM for a return call to [phone].

## 2025-07-10 NOTE — PROGRESS NOTES
PALLIATIVE MEDICINE    NO AMD ON FILE    CODE STATUS: FULL CODE    Seen today in Rm 104, along with Magdalene Ramírez MSW.   Patient lying in bed supine with head of bed raised.  Emergently intubated at 0655 today. Vent Settings: 90% FiO2, PEEP 8.0. Versed gtt.  Pt had run of Vtac and respiratory distress prior to intubation.        1000: Maura Kimball RN and Magdalene Ramírez MSW present for family meeting with patient sister, Afshan Al.     1015: Pt sisterAfshan has not arrived to ICU for family meeting. Left voicemail for Afshan Al and Newton Houston.    1020: Contacted Halina, [employer]. Halina also left message with Afshan Al.     On 7/9/2025, Mr. Gtz stated he has a son who lives in [place]. Relative search was unable to find son.   Afshan said her daughter, Patients niece Phoebe, is close to patient. Phoebe also unable to be reached. Afshan was supposed to provide telephone number for Phoebe. Neither sister or niece was found on relative search.     Palliative Medicine will continue to follow Beny Gtz  and his family during his hospitalization and support them as they make healthcare decisions and define goals of care.     Maura Kimball RN  Palliative Medicine  [phone]

## 2025-07-11 ENCOUNTER — APPOINTMENT (OUTPATIENT)
Facility: HOSPITAL | Age: 60
DRG: 368 | End: 2025-07-11
Payer: MEDICARE

## 2025-07-11 LAB
ALBUMIN SERPL-MCNC: 2.6 G/DL (ref 3.4–5)
ALBUMIN/GLOB SERPL: 0.8 (ref 0.8–1.7)
ALP SERPL-CCNC: 100 U/L (ref 45–117)
ALT SERPL-CCNC: 406 U/L (ref 10–50)
ANION GAP BLD CALC-SCNC: ABNORMAL MMOL/L (ref 10–20)
ANION GAP SERPL CALC-SCNC: 13 MMOL/L (ref 3–18)
APTT PPP: 24.4 SEC (ref 21.7–34.2)
ARTERIAL PATENCY WRIST A: POSITIVE
AST SERPL-CCNC: 75 U/L (ref 10–38)
BASE EXCESS BLD CALC-SCNC: 8.2 MMOL/L
BASOPHILS # BLD: 0.01 K/UL (ref 0–0.1)
BASOPHILS NFR BLD: 0.2 % (ref 0–2)
BDY SITE: ABNORMAL
BILIRUB DIRECT SERPL-MCNC: 0.9 MG/DL (ref 0–0.2)
BILIRUB SERPL-MCNC: 1.6 MG/DL (ref 0.2–1)
BUN SERPL-MCNC: 34 MG/DL (ref 6–23)
BUN/CREAT SERPL: 77 (ref 12–20)
CA-I BLD-MCNC: 1.29 MMOL/L (ref 1.15–1.33)
CA-I BLD-SCNC: 1.26 MMOL/L (ref 1.12–1.32)
CALCIUM SERPL-MCNC: 9.4 MG/DL (ref 8.5–10.1)
CHLORIDE BLD-SCNC: 106 MMOL/L (ref 98–107)
CHLORIDE SERPL-SCNC: 107 MMOL/L (ref 98–107)
CO2 BLD-SCNC: 31 MMOL/L (ref 22–29)
CO2 SERPL-SCNC: 27 MMOL/L (ref 21–32)
CREAT BLD-MCNC: 0.53 MG/DL (ref 0.6–1.3)
CREAT SERPL-MCNC: 0.44 MG/DL (ref 0.6–1.3)
DIFFERENTIAL METHOD BLD: ABNORMAL
EOSINOPHIL # BLD: 0 K/UL (ref 0–0.4)
EOSINOPHIL NFR BLD: 0 % (ref 0–5)
ERYTHROCYTE [DISTWIDTH] IN BLOOD BY AUTOMATED COUNT: 14.2 % (ref 11.6–14.5)
FIO2 ON VENT: 60 %
GAS FLOW.O2 O2 DELIVERY SYS: ABNORMAL
GLOBULIN SER CALC-MCNC: 3.3 G/DL (ref 2–4)
GLUCOSE BLD STRIP.AUTO-MCNC: 173 MG/DL (ref 70–110)
GLUCOSE BLD-MCNC: 191 MG/DL (ref 74–99)
GLUCOSE SERPL-MCNC: 187 MG/DL (ref 74–108)
HCO3 BLD-SCNC: 32.7 MMOL/L (ref 21–28)
HCT VFR BLD AUTO: 39.4 % (ref 36–48)
HGB BLD-MCNC: 13.1 G/DL (ref 13–16)
IMM GRANULOCYTES # BLD AUTO: 0.01 K/UL (ref 0–0.04)
IMM GRANULOCYTES NFR BLD AUTO: 0.2 % (ref 0–0.5)
INR PPP: 1.3 (ref 0.9–1.2)
INSPIRATION.DURATION SETTING TIME VENT: 1 SEC
IPAP/PIP: 19
LACTATE BLD-SCNC: 1.55 MMOL/L (ref 0.4–2)
LYMPHOCYTES # BLD: 0.51 K/UL (ref 0.9–3.3)
LYMPHOCYTES NFR BLD: 9.7 % (ref 21–52)
MAGNESIUM SERPL-MCNC: 2.2 MG/DL (ref 1.6–2.6)
MCH RBC QN AUTO: 29.4 PG (ref 24–34)
MCHC RBC AUTO-ENTMCNC: 33.2 G/DL (ref 31–37)
MCV RBC AUTO: 88.3 FL (ref 78–100)
MONOCYTES # BLD: 0.25 K/UL (ref 0.05–1.2)
MONOCYTES NFR BLD: 4.7 % (ref 3–10)
NEUTS SEG # BLD: 4.52 K/UL (ref 1.8–8)
NEUTS SEG NFR BLD: 85.2 % (ref 40–73)
NRBC # BLD: 0 K/UL (ref 0–0.01)
NRBC BLD-RTO: 0 PER 100 WBC
PAW @ MEAN EXP FLOW ON VENT: 11 CMH2O
PCO2 BLD: 43.9 MMHG (ref 35–48)
PEEP RESPIRATORY: 8
PH BLD: 7.48 (ref 7.35–7.45)
PHOSPHATE SERPL-MCNC: 2.4 MG/DL (ref 2.5–4.9)
PHOSPHATE SERPL-MCNC: 2.9 MG/DL (ref 2.5–4.9)
PLATELET # BLD AUTO: 113 K/UL (ref 135–420)
PLATELET COMMENT: ABNORMAL
PMV BLD AUTO: 10.4 FL (ref 9.2–11.8)
PO2 BLD: 84 MMHG (ref 83–108)
POTASSIUM BLD-SCNC: 3.4 MMOL/L (ref 3.5–5.1)
POTASSIUM SERPL-SCNC: 3.7 MMOL/L (ref 3.5–5.5)
PROT SERPL-MCNC: 5.9 G/DL (ref 6.4–8.2)
PROTHROMBIN TIME: 16.9 SEC (ref 12–15.1)
RBC # BLD AUTO: 4.46 M/UL (ref 4.35–5.65)
RBC MORPH BLD: ABNORMAL
SAO2 % BLD: 97 % (ref 94–98)
SERVICE CMNT-IMP: ABNORMAL
SODIUM BLD-SCNC: 141 MMOL/L (ref 136–145)
SODIUM SERPL-SCNC: 147 MMOL/L (ref 136–145)
SPECIMEN SITE: ABNORMAL
VENTILATION MODE VENT: ABNORMAL
VT SETTING VENT: 447
WBC # BLD AUTO: 5.3 K/UL (ref 4.6–13.2)

## 2025-07-11 PROCEDURE — 82947 ASSAY GLUCOSE BLOOD QUANT: CPT

## 2025-07-11 PROCEDURE — 85014 HEMATOCRIT: CPT

## 2025-07-11 PROCEDURE — 6360000002 HC RX W HCPCS: Performed by: INTERNAL MEDICINE

## 2025-07-11 PROCEDURE — 85025 COMPLETE CBC W/AUTO DIFF WBC: CPT

## 2025-07-11 PROCEDURE — 80076 HEPATIC FUNCTION PANEL: CPT

## 2025-07-11 PROCEDURE — 2500000003 HC RX 250 WO HCPCS: Performed by: INTERNAL MEDICINE

## 2025-07-11 PROCEDURE — 84100 ASSAY OF PHOSPHORUS: CPT

## 2025-07-11 PROCEDURE — 2000000000 HC ICU R&B

## 2025-07-11 PROCEDURE — 84295 ASSAY OF SERUM SODIUM: CPT

## 2025-07-11 PROCEDURE — 82330 ASSAY OF CALCIUM: CPT

## 2025-07-11 PROCEDURE — 71045 X-RAY EXAM CHEST 1 VIEW: CPT

## 2025-07-11 PROCEDURE — 2580000003 HC RX 258: Performed by: INTERNAL MEDICINE

## 2025-07-11 PROCEDURE — 82962 GLUCOSE BLOOD TEST: CPT

## 2025-07-11 PROCEDURE — 84132 ASSAY OF SERUM POTASSIUM: CPT

## 2025-07-11 PROCEDURE — 80048 BASIC METABOLIC PNL TOTAL CA: CPT

## 2025-07-11 PROCEDURE — 83735 ASSAY OF MAGNESIUM: CPT

## 2025-07-11 PROCEDURE — 85610 PROTHROMBIN TIME: CPT

## 2025-07-11 PROCEDURE — 94640 AIRWAY INHALATION TREATMENT: CPT

## 2025-07-11 PROCEDURE — 82803 BLOOD GASES ANY COMBINATION: CPT

## 2025-07-11 PROCEDURE — 83605 ASSAY OF LACTIC ACID: CPT

## 2025-07-11 PROCEDURE — 85730 THROMBOPLASTIN TIME PARTIAL: CPT

## 2025-07-11 PROCEDURE — 6370000000 HC RX 637 (ALT 250 FOR IP): Performed by: INTERNAL MEDICINE

## 2025-07-11 PROCEDURE — 2700000000 HC OXYGEN THERAPY PER DAY

## 2025-07-11 PROCEDURE — 94003 VENT MGMT INPAT SUBQ DAY: CPT

## 2025-07-11 PROCEDURE — 36415 COLL VENOUS BLD VENIPUNCTURE: CPT

## 2025-07-11 PROCEDURE — 6370000000 HC RX 637 (ALT 250 FOR IP): Performed by: HOSPITALIST

## 2025-07-11 RX ORDER — PREDNISONE 10 MG/1
10 TABLET ORAL DAILY
Status: DISCONTINUED | OUTPATIENT
Start: 2025-07-12 | End: 2025-07-15

## 2025-07-11 RX ORDER — DIAZEPAM 10 MG/2ML
5 INJECTION, SOLUTION INTRAMUSCULAR; INTRAVENOUS ONCE
Status: COMPLETED | OUTPATIENT
Start: 2025-07-11 | End: 2025-07-11

## 2025-07-11 RX ADMIN — ASPIRIN 81 MG: 81 TABLET, CHEWABLE ORAL at 08:54

## 2025-07-11 RX ADMIN — FENTANYL CITRATE 50 MCG: 50 INJECTION INTRAMUSCULAR; INTRAVENOUS at 17:22

## 2025-07-11 RX ADMIN — FENTANYL CITRATE 50 MCG: 50 INJECTION INTRAMUSCULAR; INTRAVENOUS at 21:25

## 2025-07-11 RX ADMIN — IPRATROPIUM BROMIDE 0.5 MG: 0.5 SOLUTION RESPIRATORY (INHALATION) at 07:51

## 2025-07-11 RX ADMIN — DIAZEPAM 5 MG: 5 INJECTION INTRAMUSCULAR; INTRAVENOUS at 21:42

## 2025-07-11 RX ADMIN — SODIUM CHLORIDE, PRESERVATIVE FREE 10 ML: 5 INJECTION INTRAVENOUS at 20:35

## 2025-07-11 RX ADMIN — FOLIC ACID 1 MG: 5 INJECTION, SOLUTION INTRAMUSCULAR; INTRAVENOUS; SUBCUTANEOUS at 08:53

## 2025-07-11 RX ADMIN — FENTANYL CITRATE 50 MCG: 50 INJECTION INTRAMUSCULAR; INTRAVENOUS at 07:51

## 2025-07-11 RX ADMIN — ENOXAPARIN SODIUM 40 MG: 100 INJECTION SUBCUTANEOUS at 09:00

## 2025-07-11 RX ADMIN — DORNASE ALFA 2.5 MG: 1 SOLUTION RESPIRATORY (INHALATION) at 07:52

## 2025-07-11 RX ADMIN — ARFORMOTEROL TARTRATE 15 MCG: 15 SOLUTION RESPIRATORY (INHALATION) at 19:37

## 2025-07-11 RX ADMIN — DORNASE ALFA 2.5 MG: 1 SOLUTION RESPIRATORY (INHALATION) at 19:38

## 2025-07-11 RX ADMIN — MEROPENEM 1000 MG: 1 INJECTION INTRAVENOUS at 13:02

## 2025-07-11 RX ADMIN — SODIUM CHLORIDE, PRESERVATIVE FREE 40 MG: 5 INJECTION INTRAVENOUS at 08:54

## 2025-07-11 RX ADMIN — SODIUM CHLORIDE 10 MG/HR: 900 INJECTION, SOLUTION INTRAVENOUS at 21:11

## 2025-07-11 RX ADMIN — POLYETHYLENE GLYCOL 3350 17 G: 17 POWDER, FOR SOLUTION ORAL at 08:54

## 2025-07-11 RX ADMIN — CHLORHEXIDINE GLUCONATE 15 ML: 1.2 RINSE ORAL at 20:33

## 2025-07-11 RX ADMIN — CYCLOBENZAPRINE HYDROCHLORIDE 10 MG: 10 TABLET, FILM COATED ORAL at 21:22

## 2025-07-11 RX ADMIN — SODIUM CHLORIDE 9 MG/HR: 900 INJECTION, SOLUTION INTRAVENOUS at 10:30

## 2025-07-11 RX ADMIN — METOPROLOL TARTRATE 2.5 MG: 5 INJECTION INTRAVENOUS at 11:20

## 2025-07-11 RX ADMIN — IPRATROPIUM BROMIDE 0.5 MG: 0.5 SOLUTION RESPIRATORY (INHALATION) at 03:21

## 2025-07-11 RX ADMIN — SODIUM CHLORIDE, PRESERVATIVE FREE 10 ML: 5 INJECTION INTRAVENOUS at 09:00

## 2025-07-11 RX ADMIN — DIAZEPAM 5 MG: 5 INJECTION INTRAMUSCULAR; INTRAVENOUS at 17:44

## 2025-07-11 RX ADMIN — IPRATROPIUM BROMIDE 0.5 MG: 0.5 SOLUTION RESPIRATORY (INHALATION) at 19:37

## 2025-07-11 RX ADMIN — WATER 20 MG: 1 INJECTION INTRAMUSCULAR; INTRAVENOUS; SUBCUTANEOUS at 08:54

## 2025-07-11 RX ADMIN — ARFORMOTEROL TARTRATE 15 MCG: 15 SOLUTION RESPIRATORY (INHALATION) at 07:52

## 2025-07-11 RX ADMIN — BUDESONIDE 500 MCG: 0.5 INHALANT RESPIRATORY (INHALATION) at 19:37

## 2025-07-11 RX ADMIN — DIAZEPAM 5 MG: 5 INJECTION, SOLUTION INTRAMUSCULAR; INTRAVENOUS at 22:54

## 2025-07-11 RX ADMIN — METOPROLOL TARTRATE 2.5 MG: 5 INJECTION INTRAVENOUS at 05:16

## 2025-07-11 RX ADMIN — THIAMINE HYDROCHLORIDE 100 MG: 100 INJECTION, SOLUTION INTRAMUSCULAR; INTRAVENOUS at 08:54

## 2025-07-11 RX ADMIN — BUDESONIDE 500 MCG: 0.5 INHALANT RESPIRATORY (INHALATION) at 07:52

## 2025-07-11 RX ADMIN — CYCLOBENZAPRINE HYDROCHLORIDE 10 MG: 10 TABLET, FILM COATED ORAL at 07:51

## 2025-07-11 RX ADMIN — METOPROLOL TARTRATE 2.5 MG: 5 INJECTION INTRAVENOUS at 16:17

## 2025-07-11 RX ADMIN — SODIUM PHOSPHATE, MONOBASIC, MONOHYDRATE AND SODIUM PHOSPHATE, DIBASIC, ANHYDROUS 15 MMOL: 142; 276 INJECTION, SOLUTION INTRAVENOUS at 05:32

## 2025-07-11 RX ADMIN — METOPROLOL TARTRATE 2.5 MG: 5 INJECTION INTRAVENOUS at 22:54

## 2025-07-11 RX ADMIN — MEROPENEM 1000 MG: 1 INJECTION INTRAVENOUS at 20:35

## 2025-07-11 RX ADMIN — IPRATROPIUM BROMIDE 0.5 MG: 0.5 SOLUTION RESPIRATORY (INHALATION) at 15:22

## 2025-07-11 RX ADMIN — CHLORHEXIDINE GLUCONATE 15 ML: 1.2 RINSE ORAL at 08:54

## 2025-07-11 ASSESSMENT — PAIN SCALES - WONG BAKER
WONGBAKER_NUMERICALRESPONSE: HURTS A LITTLE BIT
WONGBAKER_NUMERICALRESPONSE: NO HURT
WONGBAKER_NUMERICALRESPONSE: NO HURT

## 2025-07-11 ASSESSMENT — PULMONARY FUNCTION TESTS
PIF_VALUE: 19
PIF_VALUE: 21
PIF_VALUE: 18
PIF_VALUE: 22
PIF_VALUE: 21
PIF_VALUE: 19

## 2025-07-11 ASSESSMENT — PAIN SCALES - GENERAL
PAINLEVEL_OUTOF10: 0
PAINLEVEL_OUTOF10: 2
PAINLEVEL_OUTOF10: 0
PAINLEVEL_OUTOF10: 0

## 2025-07-11 NOTE — CARE COORDINATION
Telephone call placed to Afshan, patients sister, at phone number: [phone]. No answer, left voicemail with a request for a return call to discuss consent for care over the phone. VALERIE requested a return call to [phone].

## 2025-07-11 NOTE — PROGRESS NOTES
Pt remains intubated and sedated. PT will now sign off. New orders for PT will be needed, when pt becomes appropriate.     Thank you,  Nick Rachel, PTA

## 2025-07-11 NOTE — PROGRESS NOTES
Pharmacy Note     Meropenem 1 gram IV q8h x 7 days   Indication: nosocomial pneumonia     Infusion rate adjustment to infuse over 180 minutes, per [facility] extended infusion policy.     Estimated Creatinine Clearance: Estimated Creatinine Clearance: 198 mL/min (A) (based on SCr of 0.44 mg/dL (L)).  BMI:  Body mass index is 27.26 kg/m².    Rationale for Adjustment:  [facility] B-Lactam extended infusion policy    Pharmacy will continue to monitor and adjust dose as necessary.      Thank you,  Gladis Christine, PharmD

## 2025-07-11 NOTE — PROGRESS NOTES
[facility]  (A Division of [facility])  Renee Stanford MD   Office Tel:  [phone] Option #8                                                               Date of Admission: 6/14/2025       ID FU for antimicrobial management suspected esophageal candidiasis   PCP: Gaby Monroe MD    C/C: stridor/SOB    Current Antimicrobials:    Prior Antimicrobials:      None from  7/5 Zithro 6/16 to 20  CAX 6/16 to 20    Cefepime 6/20 to date # 7 days  Fluconazole 6/14-> Micafungin  150 mg daily   #14    Metronidazoli 500 mg IV Q8 hours-6/27 to 7/4       Allergy to antibiotics- NA     Assessment:     Acutely  ill patient with:    Possible HAP- GNR from resp cultures 7/10        CXR- mild pul edema. Small pleural effusion, with improvement on imaging today- 7/11        Procal low 0.1    Recurrent respiratory failure- re-intubated 7/10      Underlying COPD      CXR infiltrate clear 6/24-- vent setting improving with abx treatment. Concern for PCP low at this time.       Fungitell X2-normal    Infection with Serratia/Klebsiella -resp cultures- 6/16 - treated X7 Days    Esophagitis- presumed candidal--Underlying immunodeficiency?  Fungitell < 31, galactomannan normal( 0.03)= 6/14--makes systemci fungemia/PCP less likley-> repeat testing pending  Stridor/ candidal esophagitis/intubated- 10/2024 in [facility] as well- new info from his Primary MD    Colitis Vs Ileus- on CT scan 6/26-Non distended abd    Transaminitis- recurrning, inclining up--management per others    6/14 - blood cutlure X2: NGSF, resp culture- mixed GNR- normal resp kenia        -UA no pyuria, urine cx-Neg        -X-ray-bibasilar scarring/atelectasis, neck x-ray- no acute pathology        -CT CAP: Bibasilar atelectasis/pneumonia left greater than right.  Bullous emphysematous change.  Procal 0.03    6/16- KOH from mouth- no yeast on stain         - respiratory culture- Klebsiella and Serratia growth--SS to

## 2025-07-11 NOTE — PROGRESS NOTES
Cardiology Progress Note        Patient: Beny Knapp        Sex: male          DOA: 6/14/2025  YOB: 1965      Age:  59 y.o.        LOS:  LOS: 26 days     Patient seen and examined, chart reviewed.    Assessment/Plan     Patient Active Problem List   Diagnosis    HTN (hypertension)    Tobacco abuse    COPD (chronic obstructive pulmonary disease) (HCC)    Back pain    Depression    Chronic hepatitis C (HCC)    Thrombocytopenia    Hyponatremia    Stridor    Obstructed, larynx    Acute respiratory failure with hypoxia (HCC)    ETOH abuse    Polysubstance abuse (HCC)    Candidiasis of esophagus (HCC)    Aspiration pneumonia (HCC)    Cocaine abuse (HCC)    Moderate malnutrition    Pleural effusion on left    Pulmonary atelectasis         Abnormal EKG suggestive of ischemia  Abnormal troponin  NSVT       Echocardiogram was done in June 2025 reported       Image quality is fair.    Left Ventricle: Normal left ventricular systolic function with a visually estimated EF of 55 - 60%. EF by 2D Simpsons Biplane is 57%. Left ventricle size is normal. Normal wall thickness. Normal wall motion. Normal diastolic function.    Mitral Valve: There is mild posterior annular calcification noted. Thickened leaflets. No stenosis noted.    Tricuspid Valve : Unable to assess RVSP due to inadequate or insignificant tricuspid regurgitation.    Pericardium : No pericardial effusion.    IVC/SVC : Patient is ventilated, cannot use IVC diameter to estimate right atrial pressure. IVC size is normal.    Echocardiogram revealed       Contrast used: Lumason. Technically difficult study due to patient's body habitus and patient's ability to tolerate test.    Left Ventricle: Normal left ventricular systolic function with a visually estimated EF of 60 - 65%. Left ventricle size is normal. Normal wall thickness. Unable to assess wall motion. Indeterminate diastolic function. Technically difficult with poor

## 2025-07-11 NOTE — PROGRESS NOTES
Hospitalist Progress Note    Patient: Beny Knapp MRN: [id number]  CSN: [id number]    YOB: 1965  Age: 59 y.o.  Sex: male    DOA: 6/14/2025 LOS:  LOS: 27 days          Chief Complain :  Foreign body, shortness of breath.  59 y.o.  male w/ PMH of COPD, ETOH-use and polysubstance abuse who was BIBA and presents with subjective difficulty breathing and swallowing with throat pain. Brought in by EMS for possible forign body (food) with bolus within region of velecula. ENT saw pt and ED intubated for airway protection. ENT was consulted and performed scope on pt with findings of extensive esophageal candidiasis. Nephrology was consulted for severe hyponatremia with sodium of 117 recommending NS at 75cc/hr. Intensivist was contacted as well d/t pt being intubated, on vent for airway protection. Prolong intubation, extubated 07/05/2025. Has been uncooperative, did not cooperate with MBS and did not cooperate with placing NG tube.  07/10/2025 SNVT episode early morning, went into respiratory distress. Required intubation. Thick copious secretions from ET tube.  Subjective:   Patient orally intubated and sedated.    Assessment/Plan     Active Hospital Problems    Diagnosis     Pleural effusion on left [J90]     Pulmonary atelectasis [J98.11]     Moderate malnutrition [E44.0]     Aspiration pneumonia (HCC) [J69.0]     Cocaine abuse (HCC) [F14.10]     Hyponatremia [E87.1]     Stridor [R06.1]     Obstructed, larynx [J38.6]     Acute respiratory failure with hypoxia (HCC) [J96.01]     ETOH abuse [F10.10]     Polysubstance abuse (HCC) [F19.10]     Candidiasis of esophagus (HCC) [B37.81]     Tobacco abuse [Z72.0]     HTN

## 2025-07-11 NOTE — PROGRESS NOTES
Cardiology Progress Note        Patient: Beny Knapp        Sex: male          DOA: 6/14/2025  YOB: 1965      Age:  59 y.o.        LOS:  LOS: 27 days     Patient seen and examined, chart reviewed.    Assessment/Plan     Patient Active Problem List   Diagnosis    HTN (hypertension)    Tobacco abuse    COPD (chronic obstructive pulmonary disease) (HCC)    Back pain    Depression    Chronic hepatitis C (HCC)    Thrombocytopenia    Hyponatremia    Stridor    Obstructed, larynx    Acute respiratory failure with hypoxia (HCC)    ETOH abuse    Polysubstance abuse (HCC)    Candidiasis of esophagus (HCC)    Aspiration pneumonia (HCC)    Cocaine abuse (HCC)    Moderate malnutrition    Pleural effusion on left    Pulmonary atelectasis         Abnormal EKG suggestive of ischemia  Abnormal troponin       Echocardiogram was done in June 2025 reported       Image quality is fair.    Left Ventricle: Normal left ventricular systolic function with a visually estimated EF of 55 - 60%. EF by 2D Simpsons Biplane is 57%. Left ventricle size is normal. Normal wall thickness. Normal wall motion. Normal diastolic function.    Mitral Valve: There is mild posterior annular calcification noted. Thickened leaflets. No stenosis noted.    Tricuspid Valve : Unable to assess RVSP due to inadequate or insignificant tricuspid regurgitation.    Pericardium : No pericardial effusion.    IVC/SVC : Patient is ventilated, cannot use IVC diameter to estimate right atrial pressure. IVC size is normal.    Echocardiogram revealed       Contrast used: Lumason. Technically difficult study due to patient's body habitus and patient's ability to tolerate test.    Left Ventricle: Normal left ventricular systolic function with a visually estimated EF of 60 - 65%. Left ventricle size is normal. Normal wall thickness. Unable to assess wall motion. Indeterminate diastolic function. Technically difficult with poor  5.3   HGB 15.1 15.8 13.1   HCT 44.3 47.1 39.4    137 113*     Recent Labs     07/09/25  1423 07/10/25  0500 07/11/25  0358    145 147*   K 3.7 3.9 3.7   CL 99 102 107   CO2 25 28 27   BUN 45* 42* 34*     45 minutes of critical care time spent in the direct evaluation and treatment of this high risk patient. The reason for providing this level of medical care for this critically ill patient was due a critical illness that impaired one or more vital organ systems such that there was a high probability of imminent or life threatening deterioration in the patient’s condition. This care involved high complexity decision making to assess, manipulate, and support vital system functions, to treat this degree vital organ system failure and to prevent further life threatening deterioration of the patient’s condition.     Signed By: Francisco Javier Verma MD     July 11, 2025

## 2025-07-11 NOTE — WOUND CARE
ICU Rounding  Wound care nurse rounded on patient for skin issues.  Patient has a Chauncey score of 15.  Does patient have any pressure injury?no per primary nurse PUMA Vuong     Skin Care & Pressure Relief Recommendations  Minimize layers of linen  Pads under patient to optimize support surface  Turn/reposition approximately every 2 hours  Use pillow wedges to maintain positioning but do not put pillow directly over wounds  Manage incontinence   Promote continence; Skin Protective lotion/cream to buttocks and sacrum daily and as needed with incontinence care  Offload heels pillows    Consult wound care if any wounds noted during admission.  Wound Care nurse will continue to follow during ICU admission.

## 2025-07-11 NOTE — PLAN OF CARE
hydration   Administer IV fluids as ordered to ensure adequate hydration   Administer ordered medications as needed   Encourage mobilization and activity   Nutrition consult to assist patient with appropriate food choices  Goal: Maintains adequate nutritional intake  Outcome: Progressing  Flowsheets (Taken 7/11/2025 0800)  Maintains adequate nutritional intake:   Identify factors contributing to decreased intake, treat as appropriate   Monitor percentage of each meal consumed   Assist with meals as needed   Monitor intake and output, weight and lab values     Problem: Genitourinary - Adult  Goal: Absence of urinary retention  Outcome: Progressing  Flowsheets (Taken 7/11/2025 0800)  Absence of urinary retention:   Assess patient’s ability to void and empty bladder   Monitor intake/output and perform bladder scan as needed   Place urinary catheter per Licensed Independent Practitioner order if needed   Discuss catheterization for long term situations as appropriate     Problem: Infection - Adult  Goal: Absence of infection at discharge  Outcome: Progressing  Flowsheets (Taken 7/11/2025 0800)  Absence of infection at discharge:   Assess and monitor for signs and symptoms of infection   Monitor lab/diagnostic results   Monitor all insertion sites i.e., indwelling lines, tubes and drains   Monitor endotracheal (as able) and nasal secretions for changes in amount and color   [place] appropriate cooling/warming therapies per order   Administer medications as ordered   Instruct and encourage patient and family to use good hand hygiene technique   Identify and instruct in appropriate isolation precautions for identified infection/condition  Goal: Absence of infection during hospitalization  Outcome: Progressing  Flowsheets (Taken 7/11/2025 0800)  Absence of infection during hospitalization:   Assess and monitor for signs and symptoms of infection   Monitor lab/diagnostic results   Monitor all insertion sites i.e.,  indwelling lines, tubes and drains   Monitor endotracheal (as able) and nasal secretions for changes in amount and color   [place] appropriate cooling/warming therapies per order   Administer medications as ordered   Instruct and encourage patient and family to use good hand hygiene technique   Identify and instruct in appropriate isolation precautions for identified infection/condition     Problem: Metabolic/Fluid and Electrolytes - Adult  Goal: Electrolytes maintained within normal limits  Outcome: Progressing  Flowsheets (Taken 7/11/2025 0800)  Electrolytes maintained within normal limits:   Monitor labs and assess patient for signs and symptoms of electrolyte imbalances   Administer electrolyte replacement as ordered   Monitor response to electrolyte replacements, including repeat lab results as appropriate   Fluid restriction as ordered   Instruct patient on fluid and nutrition restrictions as appropriate  Goal: Hemodynamic stability and optimal renal function maintained  Outcome: Progressing  Flowsheets (Taken 7/11/2025 0800)  Hemodynamic stability and optimal renal function maintained:   Monitor labs and assess for signs and symptoms of volume excess or deficit   Monitor intake, output and patient weight   Monitor urine specific gravity, serum osmolarity and serum sodium as indicated or ordered   Monitor response to interventions for patient's volume status, including labs, urine output, blood pressure (other measures as available)   Encourage oral intake as appropriate   Instruct patient on fluid and nutrition restrictions as appropriate  Goal: Glucose maintained within prescribed range  Outcome: Progressing  Flowsheets (Taken 7/11/2025 0800)  Glucose maintained within prescribed range:   Monitor blood glucose as ordered   Assess for signs and symptoms of hyperglycemia and hypoglycemia   Administer ordered medications to maintain glucose within target range   Assess barriers to adequate nutritional intake

## 2025-07-11 NOTE — PROGRESS NOTES
ICU    Discussed in MDR-lack of family available over the last several weeks; emphasized the importance of court appointed guardianship with case management; emphasized need for consent for procedures, which are otherwise on hold.  Procedures such as bronchoscopy [left lower chest suspected lung mass], COURTNEY are on hold [poor transthoracic echo].  Emphasized the need for ethics committee consultation on patient chart.    Casey Car MD

## 2025-07-11 NOTE — PROGRESS NOTES
view FINDINGS: ET tube is 7 cm above the justine. This could be advanced 2 cm. NG tube courses into the stomach. The distal tip is not seen. Small bilateral pleural effusions and bibasilar atelectasis persist unchanged.     1. No change bibasal atelectasis. 2. The ET tube is 7 cm above the justine. This could be advanced 2 cm. Electronically signed by Jorge ORTA IR TECHNOLOGIST [employer]ICE  Result Date: 7/1/2025  Automatic Administrative Result. Imaging Guidance used by the IR Department.  See the Patient Chart for specific details.      Imaging guidance utilized by the IR Department.    XR CHEST PORTABLE  Result Date: 7/1/2025  EXAM:  XR CHEST PORTABLE INDICATION: Ventilator COMPARISON: 6/30/2025 TECHNIQUE: portable chest AP view FINDINGS: Endotracheal tube and nasogastric tube are stable in position. Right internal jugular central venous catheter has been removed. The cardiac silhouette is within normal limits. There is increased bibasilar airspace disease. Small bilateral pleural effusions are noted. The visualized bones and upper abdomen are age-appropriate.     Increased bibasilar airspace disease may represent atelectasis or pneumonia. Small bilateral pleural effusions. Electronically signed by Dougie Dave    US LIVER  Result Date: 6/30/2025  EXAM:  US LIVER INDICATION: Elevated liver function tests COMPARISON: CT 6/26/2025 TECHNIQUE:  Limited abdominal ultrasound. FINDINGS: Liver: Length: 19.5 cm, enlarged. Diffusely echogenic and heterogeneous.  No focal liver lesion. Main portal vein flow: Toward the liver with a velocity of 12 cm/s. Diameter 1.3 cm. Fluid: No ascites. Gallbladder: A gallstone measures 1.9 cm. No gallbladder wall thickening or pericholecystic fluid. Bile ducts: There is no intra or extrahepatic biliary ductal dilatation.  The common bile duct measures 4 mm. Pancreas: The visualized portions are within normal limits. Kidneys: Right length: 13.2 cm. No hydronephrosis.     1. Hepatomegaly.  standardized protocol tailored for this examination and automatic exposure control for dose modulation. Adaptive statistical iterative reconstruction (ASIR) was utilized. FINDINGS: NASOPHARYNX: Normal. SUPRAHYOID NECK: ET tube and NG tube in place. There is ethmoid and maxillary sinus disease.. INFRAHYOID NECK: Normal larynx, hypopharynx and supraglottis. PAROTID GLANDS: Normal. SUBMANDIBULAR GLANDS: Normal. THYROID GLAND: No suspicious nodule.. MEDIASTINUM: ET tube and NG tube in place. Patulous esophagus. No hilar or mediastinal lymphadenopathy. No esophageal mass. No endotracheal or endobronchial mass. PLEURA/LUNGS:: Left greater than right basilar atelectasis/pneumonia. Bullous emphysematous change. SOFT TISSUE/ AXILLA:  No mass or lymphadenopathy.  CORONARY CALCIUM: Not visualized. LIVER/GALLBLADDER:  : Hepatic steatosis and cholelithiasis. There is no intrahepatic duct dilatation. There is no hepatic parenchymal mass. Hepatic enhancement pattern is within normal limits. Portal vein is patent. SPLEEN/PANCREAS: No mass.  There is no pancreatic duct dilatation. There is no evidence of splenomegaly. ADRENALS/KIDNEYS: Punctate nonobstructive left renal calculus. There is no hydronephrosis. There is no renal mass. There is no perinephric mass. STOMACH: No dilatation or wall thickening. COLON AND SMALL BOWEL: Colonic diverticulosis. There is no free intraperitoneal air. There is no evidence of incarceration or obstruction. No mesenteric adenopathy. PERITONEUM: Unremarkable. APPENDIX: Unremarkable. BLADDER/REPRODUCTIVE ORGANS: No mass or calculus. RETROPERITONEUM: Unremarkable. The abdominal aorta is normal in caliber. No aneurysm. No retroperitoneal adenopathy. OSSEOUS STRUCTURES: No destructive bone lesion.     Bibasilar atelectasis/pneumonia left greater than right. Bullous emphysematous change. No additional acute intraperitoneal/intrathoracic process is identified. Please see above for additional nonemergent

## 2025-07-12 ENCOUNTER — APPOINTMENT (OUTPATIENT)
Facility: HOSPITAL | Age: 60
DRG: 368 | End: 2025-07-12
Payer: MEDICARE

## 2025-07-12 LAB
ALBUMIN SERPL-MCNC: 2.4 G/DL (ref 3.4–5)
ALBUMIN/GLOB SERPL: 0.6 (ref 0.8–1.7)
ALP SERPL-CCNC: 97 U/L (ref 45–117)
ALT SERPL-CCNC: 400 U/L (ref 10–50)
ANION GAP BLD CALC-SCNC: ABNORMAL MMOL/L (ref 10–20)
ANION GAP SERPL CALC-SCNC: 12 MMOL/L (ref 3–18)
APTT PPP: 21.6 SEC (ref 21.7–34.2)
ARTERIAL PATENCY WRIST A: POSITIVE
AST SERPL-CCNC: 135 U/L (ref 10–38)
BASE EXCESS BLD CALC-SCNC: 6.1 MMOL/L
BASOPHILS # BLD: 0.01 K/UL (ref 0–0.1)
BASOPHILS NFR BLD: 0.2 % (ref 0–2)
BDY SITE: ABNORMAL
BILIRUB DIRECT SERPL-MCNC: 0.7 MG/DL (ref 0–0.2)
BILIRUB SERPL-MCNC: 1.2 MG/DL (ref 0.2–1)
BUN SERPL-MCNC: 30 MG/DL (ref 6–23)
BUN/CREAT SERPL: 79 (ref 12–20)
CA-I BLD-MCNC: 1.27 MMOL/L (ref 1.15–1.33)
CA-I BLD-SCNC: 1.22 MMOL/L (ref 1.12–1.32)
CALCIUM SERPL-MCNC: 9.3 MG/DL (ref 8.5–10.1)
CHLORIDE BLD-SCNC: 111 MMOL/L (ref 98–107)
CHLORIDE SERPL-SCNC: 106 MMOL/L (ref 98–107)
CO2 BLD-SCNC: 28 MMOL/L (ref 22–29)
CO2 SERPL-SCNC: 29 MMOL/L (ref 21–32)
CREAT BLD-MCNC: 0.47 MG/DL (ref 0.6–1.3)
CREAT SERPL-MCNC: 0.38 MG/DL (ref 0.6–1.3)
DIFFERENTIAL METHOD BLD: ABNORMAL
EOSINOPHIL # BLD: 0.02 K/UL (ref 0–0.4)
EOSINOPHIL NFR BLD: 0.4 % (ref 0–5)
ERYTHROCYTE [DISTWIDTH] IN BLOOD BY AUTOMATED COUNT: 14.3 % (ref 11.6–14.5)
FIO2 ON VENT: 40 %
GAS FLOW.O2 O2 DELIVERY SYS: ABNORMAL
GLOBULIN SER CALC-MCNC: 3.8 G/DL (ref 2–4)
GLUCOSE BLD-MCNC: 135 MG/DL (ref 74–99)
GLUCOSE SERPL-MCNC: 117 MG/DL (ref 74–108)
HCO3 BLD-SCNC: 29.9 MMOL/L (ref 21–28)
HCT VFR BLD AUTO: 37.9 % (ref 36–48)
HGB BLD-MCNC: 12.5 G/DL (ref 13–16)
IMM GRANULOCYTES # BLD AUTO: 0.07 K/UL (ref 0–0.04)
IMM GRANULOCYTES NFR BLD AUTO: 1.4 % (ref 0–0.5)
INR PPP: 1.2 (ref 0.9–1.2)
LACTATE BLD-SCNC: 1.2 MMOL/L (ref 0.4–2)
LYMPHOCYTES # BLD: 0.75 K/UL (ref 0.9–3.3)
LYMPHOCYTES NFR BLD: 15.4 % (ref 21–52)
MAGNESIUM SERPL-MCNC: 2.1 MG/DL (ref 1.6–2.6)
MCH RBC QN AUTO: 29.5 PG (ref 24–34)
MCHC RBC AUTO-ENTMCNC: 33 G/DL (ref 31–37)
MCV RBC AUTO: 89.4 FL (ref 78–100)
MONOCYTES # BLD: 0.28 K/UL (ref 0.05–1.2)
MONOCYTES NFR BLD: 5.7 % (ref 3–10)
NEUTS SEG # BLD: 3.75 K/UL (ref 1.8–8)
NEUTS SEG NFR BLD: 76.9 % (ref 40–73)
NRBC # BLD: 0 K/UL (ref 0–0.01)
NRBC BLD-RTO: 0 PER 100 WBC
PCO2 BLD: 39.4 MMHG (ref 35–48)
PEEP RESPIRATORY: 8
PH BLD: 7.49 (ref 7.35–7.45)
PHOSPHATE SERPL-MCNC: 2.4 MG/DL (ref 2.5–4.9)
PLATELET # BLD AUTO: 100 K/UL (ref 135–420)
PMV BLD AUTO: 11.1 FL (ref 9.2–11.8)
PO2 BLD: 66 MMHG (ref 83–108)
POTASSIUM BLD-SCNC: 2.9 MMOL/L (ref 3.5–5.1)
POTASSIUM SERPL-SCNC: 3.4 MMOL/L (ref 3.5–5.5)
PROCALCITONIN SERPL-MCNC: 0.06 NG/ML
PROT SERPL-MCNC: 6.2 G/DL (ref 6.4–8.2)
PROTHROMBIN TIME: 15.6 SEC (ref 12–15.1)
RBC # BLD AUTO: 4.24 M/UL (ref 4.35–5.65)
SAO2 % BLD: 94 % (ref 94–98)
SERVICE CMNT-IMP: ABNORMAL
SODIUM BLD-SCNC: 140 MMOL/L (ref 136–145)
SODIUM SERPL-SCNC: 146 MMOL/L (ref 136–145)
SPECIMEN SITE: ABNORMAL
VENTILATION MODE VENT: ABNORMAL
VT SETTING VENT: 500
WBC # BLD AUTO: 4.9 K/UL (ref 4.6–13.2)

## 2025-07-12 PROCEDURE — 84295 ASSAY OF SERUM SODIUM: CPT

## 2025-07-12 PROCEDURE — 2580000003 HC RX 258: Performed by: INTERNAL MEDICINE

## 2025-07-12 PROCEDURE — 6370000000 HC RX 637 (ALT 250 FOR IP): Performed by: HOSPITALIST

## 2025-07-12 PROCEDURE — 2700000000 HC OXYGEN THERAPY PER DAY

## 2025-07-12 PROCEDURE — 36415 COLL VENOUS BLD VENIPUNCTURE: CPT

## 2025-07-12 PROCEDURE — 6360000002 HC RX W HCPCS: Performed by: INTERNAL MEDICINE

## 2025-07-12 PROCEDURE — 82803 BLOOD GASES ANY COMBINATION: CPT

## 2025-07-12 PROCEDURE — 82330 ASSAY OF CALCIUM: CPT

## 2025-07-12 PROCEDURE — 36600 WITHDRAWAL OF ARTERIAL BLOOD: CPT

## 2025-07-12 PROCEDURE — 94640 AIRWAY INHALATION TREATMENT: CPT

## 2025-07-12 PROCEDURE — 2000000000 HC ICU R&B

## 2025-07-12 PROCEDURE — 85014 HEMATOCRIT: CPT

## 2025-07-12 PROCEDURE — 83735 ASSAY OF MAGNESIUM: CPT

## 2025-07-12 PROCEDURE — 94669 MECHANICAL CHEST WALL OSCILL: CPT

## 2025-07-12 PROCEDURE — 83605 ASSAY OF LACTIC ACID: CPT

## 2025-07-12 PROCEDURE — 02HV33Z INSERTION OF INFUSION DEVICE INTO SUPERIOR VENA CAVA, PERCUTANEOUS APPROACH: ICD-10-PCS | Performed by: INTERNAL MEDICINE

## 2025-07-12 PROCEDURE — 85025 COMPLETE CBC W/AUTO DIFF WBC: CPT

## 2025-07-12 PROCEDURE — 6370000000 HC RX 637 (ALT 250 FOR IP): Performed by: INTERNAL MEDICINE

## 2025-07-12 PROCEDURE — 94668 MNPJ CHEST WALL SBSQ: CPT

## 2025-07-12 PROCEDURE — 84100 ASSAY OF PHOSPHORUS: CPT

## 2025-07-12 PROCEDURE — 80048 BASIC METABOLIC PNL TOTAL CA: CPT

## 2025-07-12 PROCEDURE — 80076 HEPATIC FUNCTION PANEL: CPT

## 2025-07-12 PROCEDURE — 82947 ASSAY GLUCOSE BLOOD QUANT: CPT

## 2025-07-12 PROCEDURE — 94003 VENT MGMT INPAT SUBQ DAY: CPT

## 2025-07-12 PROCEDURE — 36569 INSJ PICC 5 YR+ W/O IMAGING: CPT

## 2025-07-12 PROCEDURE — 84145 PROCALCITONIN (PCT): CPT

## 2025-07-12 PROCEDURE — 71045 X-RAY EXAM CHEST 1 VIEW: CPT

## 2025-07-12 PROCEDURE — 85610 PROTHROMBIN TIME: CPT

## 2025-07-12 PROCEDURE — 85730 THROMBOPLASTIN TIME PARTIAL: CPT

## 2025-07-12 PROCEDURE — 84132 ASSAY OF SERUM POTASSIUM: CPT

## 2025-07-12 PROCEDURE — B548ZZA ULTRASONOGRAPHY OF SUPERIOR VENA CAVA, GUIDANCE: ICD-10-PCS | Performed by: INTERNAL MEDICINE

## 2025-07-12 PROCEDURE — 2500000003 HC RX 250 WO HCPCS: Performed by: INTERNAL MEDICINE

## 2025-07-12 RX ORDER — FENTANYL CITRATE 50 UG/ML
50 INJECTION, SOLUTION INTRAMUSCULAR; INTRAVENOUS ONCE
Status: DISCONTINUED | OUTPATIENT
Start: 2025-07-12 | End: 2025-07-15 | Stop reason: SDUPTHER

## 2025-07-12 RX ADMIN — CYCLOBENZAPRINE HYDROCHLORIDE 10 MG: 10 TABLET, FILM COATED ORAL at 09:21

## 2025-07-12 RX ADMIN — SODIUM CHLORIDE: 0.9 INJECTION, SOLUTION INTRAVENOUS at 21:04

## 2025-07-12 RX ADMIN — DIAZEPAM 5 MG: 5 INJECTION INTRAMUSCULAR; INTRAVENOUS at 02:09

## 2025-07-12 RX ADMIN — DIAZEPAM 5 MG: 5 INJECTION INTRAMUSCULAR; INTRAVENOUS at 13:59

## 2025-07-12 RX ADMIN — SODIUM CHLORIDE 10 MG/HR: 900 INJECTION, SOLUTION INTRAVENOUS at 17:12

## 2025-07-12 RX ADMIN — CHLORHEXIDINE GLUCONATE 15 ML: 1.2 RINSE ORAL at 09:14

## 2025-07-12 RX ADMIN — CHLORHEXIDINE GLUCONATE 15 ML: 1.2 RINSE ORAL at 21:07

## 2025-07-12 RX ADMIN — IPRATROPIUM BROMIDE 0.5 MG: 0.5 SOLUTION RESPIRATORY (INHALATION) at 02:57

## 2025-07-12 RX ADMIN — SODIUM CHLORIDE 10 MG/HR: 900 INJECTION, SOLUTION INTRAVENOUS at 06:51

## 2025-07-12 RX ADMIN — POLYETHYLENE GLYCOL 3350 17 G: 17 POWDER, FOR SOLUTION ORAL at 08:55

## 2025-07-12 RX ADMIN — MEROPENEM 1000 MG: 1 INJECTION INTRAVENOUS at 21:06

## 2025-07-12 RX ADMIN — DIBASIC SODIUM PHOSPHATE, MONOBASIC POTASSIUM PHOSPHATE AND MONOBASIC SODIUM PHOSPHATE 1 TABLET: 852; 155; 130 TABLET ORAL at 10:54

## 2025-07-12 RX ADMIN — IPRATROPIUM BROMIDE 0.5 MG: 0.5 SOLUTION RESPIRATORY (INHALATION) at 08:18

## 2025-07-12 RX ADMIN — FENTANYL CITRATE 50 MCG: 50 INJECTION INTRAMUSCULAR; INTRAVENOUS at 01:26

## 2025-07-12 RX ADMIN — DORNASE ALFA 2.5 MG: 1 SOLUTION RESPIRATORY (INHALATION) at 08:32

## 2025-07-12 RX ADMIN — IPRATROPIUM BROMIDE 0.5 MG: 0.5 SOLUTION RESPIRATORY (INHALATION) at 19:44

## 2025-07-12 RX ADMIN — METOPROLOL TARTRATE 2.5 MG: 5 INJECTION INTRAVENOUS at 22:36

## 2025-07-12 RX ADMIN — CYCLOBENZAPRINE HYDROCHLORIDE 10 MG: 10 TABLET, FILM COATED ORAL at 22:36

## 2025-07-12 RX ADMIN — METOPROLOL TARTRATE 2.5 MG: 5 INJECTION INTRAVENOUS at 18:55

## 2025-07-12 RX ADMIN — ASPIRIN 81 MG: 81 TABLET, CHEWABLE ORAL at 08:55

## 2025-07-12 RX ADMIN — ARFORMOTEROL TARTRATE 15 MCG: 15 SOLUTION RESPIRATORY (INHALATION) at 08:18

## 2025-07-12 RX ADMIN — SODIUM CHLORIDE, PRESERVATIVE FREE 10 ML: 5 INJECTION INTRAVENOUS at 21:08

## 2025-07-12 RX ADMIN — DORNASE ALFA 2.5 MG: 1 SOLUTION RESPIRATORY (INHALATION) at 19:44

## 2025-07-12 RX ADMIN — SODIUM CHLORIDE, PRESERVATIVE FREE 10 ML: 5 INJECTION INTRAVENOUS at 09:12

## 2025-07-12 RX ADMIN — ARFORMOTEROL TARTRATE 15 MCG: 15 SOLUTION RESPIRATORY (INHALATION) at 19:44

## 2025-07-12 RX ADMIN — FENTANYL CITRATE 50 MCG: 50 INJECTION INTRAMUSCULAR; INTRAVENOUS at 10:55

## 2025-07-12 RX ADMIN — MEROPENEM 1000 MG: 1 INJECTION INTRAVENOUS at 14:57

## 2025-07-12 RX ADMIN — FENTANYL CITRATE 50 MCG: 50 INJECTION INTRAMUSCULAR; INTRAVENOUS at 06:13

## 2025-07-12 RX ADMIN — FENTANYL CITRATE 50 MCG: 50 INJECTION INTRAMUSCULAR; INTRAVENOUS at 21:11

## 2025-07-12 RX ADMIN — FOLIC ACID 1 MG: 5 INJECTION, SOLUTION INTRAMUSCULAR; INTRAVENOUS; SUBCUTANEOUS at 08:23

## 2025-07-12 RX ADMIN — FENTANYL CITRATE 50 MCG: 50 INJECTION INTRAMUSCULAR; INTRAVENOUS at 16:15

## 2025-07-12 RX ADMIN — METOPROLOL TARTRATE 2.5 MG: 5 INJECTION INTRAVENOUS at 06:14

## 2025-07-12 RX ADMIN — POTASSIUM PHOSPHATE, MONOBASIC AND POTASSIUM PHOSPHATE, DIBASIC 20 MMOL: 224; 236 INJECTION, SOLUTION, CONCENTRATE INTRAVENOUS at 15:00

## 2025-07-12 RX ADMIN — SODIUM CHLORIDE, PRESERVATIVE FREE 40 MG: 5 INJECTION INTRAVENOUS at 08:48

## 2025-07-12 RX ADMIN — CYCLOBENZAPRINE HYDROCHLORIDE 10 MG: 10 TABLET, FILM COATED ORAL at 14:17

## 2025-07-12 RX ADMIN — BUDESONIDE 500 MCG: 0.5 INHALANT RESPIRATORY (INHALATION) at 08:18

## 2025-07-12 RX ADMIN — DIBASIC SODIUM PHOSPHATE, MONOBASIC POTASSIUM PHOSPHATE AND MONOBASIC SODIUM PHOSPHATE 1 TABLET: 852; 155; 130 TABLET ORAL at 21:07

## 2025-07-12 RX ADMIN — THIAMINE HYDROCHLORIDE 100 MG: 100 INJECTION, SOLUTION INTRAMUSCULAR; INTRAVENOUS at 09:08

## 2025-07-12 RX ADMIN — PREDNISONE 10 MG: 10 TABLET ORAL at 08:55

## 2025-07-12 RX ADMIN — IPRATROPIUM BROMIDE 0.5 MG: 0.5 SOLUTION RESPIRATORY (INHALATION) at 14:00

## 2025-07-12 RX ADMIN — DIAZEPAM 5 MG: 5 INJECTION INTRAMUSCULAR; INTRAVENOUS at 08:31

## 2025-07-12 RX ADMIN — BUDESONIDE 500 MCG: 0.5 INHALANT RESPIRATORY (INHALATION) at 19:44

## 2025-07-12 RX ADMIN — DIAZEPAM 5 MG: 5 INJECTION INTRAMUSCULAR; INTRAVENOUS at 23:34

## 2025-07-12 ASSESSMENT — PAIN SCALES - GENERAL
PAINLEVEL_OUTOF10: 0

## 2025-07-12 ASSESSMENT — PULMONARY FUNCTION TESTS
PIF_VALUE: 19
PIF_VALUE: 18
PIF_VALUE: 19
PIF_VALUE: 18

## 2025-07-12 ASSESSMENT — PAIN SCALES - WONG BAKER
WONGBAKER_NUMERICALRESPONSE: HURTS A LITTLE BIT
WONGBAKER_NUMERICALRESPONSE: HURTS A LITTLE BIT

## 2025-07-12 NOTE — PROGRESS NOTES
ICU    Discussed with Dr. Calderon, hospitalist regarding medical necessity of PICC line, who is in agreement.  Picc line team at bedside.      Casey Car MD

## 2025-07-12 NOTE — PROGRESS NOTES
I arrived at 1340 hours to place requested PICC. At that time, I was advised the patient has no family to give consent and the patient is unable due to being sedated with versed. I requested a provider to sign the consent. At this time, still awaiting provider arrival. Patient has been prepped and site identified for PICC placement.     Addendum: MD arrived and consent signed at 1428 hours. PICC placement completed at 1443 hours.

## 2025-07-12 NOTE — PROGRESS NOTES
Discussed with Dr. Car, discussed medical necessity of PICC line. Agree with need of PICC placement.    Ba Calderon MD

## 2025-07-12 NOTE — PROGRESS NOTES
Pt currently requiring sedation and ventilator support. Lack of IV access due to pt having poor veins and current IV access is leaking. No medic available today to help with IV access. At this time pt only has one working IV access point. Only having one IV access site is limiting the medications he is receiving as continuous sedation medication and antibiotics are incompatible.

## 2025-07-12 NOTE — PROGRESS NOTES
Imaging Guidance used by the IR Department.  See the Patient Chart for specific details.      Imaging guidance utilized by the IR Department.    XR CHEST PORTABLE  Result Date: 6/30/2025  EXAM:  XR CHEST PORTABLE INDICATION: Intubated. Small pleural effusions. COMPARISON: 6/29/2025 TECHNIQUE: Portable AP semiupright chest view at 0503 hours FINDINGS: The endotracheal tube, enteric tube, and right IJ catheter are stable. The cardiomediastinal contours are stable. The pulmonary vasculature is within normal limits. There are slightly increased small pleural effusions and bibasilar atelectasis. There is no pneumothorax. The bones and upper abdomen are stable stable support     Stable support devices. Slightly increased small pleural effusions with bibasilar atelectasis. Electronically signed by David Porter    XR CHEST PORTABLE  Result Date: 6/29/2025  EXAM:  XR CHEST PORTABLE INDICATION: Acute hypoxic respiratory failure, basilar infiltrates, ET tube position Comparison to 6/28/2025. Portable exam obtained at 625 demonstrates little change in the small bilateral pleural effusions and underlying atelectasis compared to prior examination. Life-support lines and tubes are stable.     No interval change. Electronically signed by CARLOS SCHMIDT    XR CHEST PORTABLE  Result Date: 6/28/2025  EXAM:  XR CHEST PORTABLE INDICATION: Acute hypoxic respiratory failure, basilar infiltrates, ET tube position COMPARISON: 6/27/2025 TECHNIQUE: 0636 hours portable chest AP view FINDINGS: The ET tube is in satisfactory position. NG tube tip is in the stomach. Right IJ catheter overlies the SVC. There is persistent left pleural effusion and left lower lobe atelectasis. Atelectasis left lower lobe has increased and is now severe. Mild atelectasis at the right lung base has decreased..     1. Persistent left pleural effusion and left lower lobe atelectasis. 2. Mild atelectasis at the right lung base has decreased Electronically signed by  hydronephrosis or large shadowing calculus. Lower pole obscured by bowel gas.     Coarsened echotexture suggests cirrhosis. Gallstone. Electronically signed by Julio Alegria    XR CHEST PORTABLE  Result Date: 6/25/2025  EXAM: XR CHEST PORTABLE INDICATION: Acute hypoxic respiratory failure, basilar infiltrates, ET tube position COMPARISON: 6/24/2025 FINDINGS: A portable AP radiograph of the chest was obtained at 542 hours. Nasogastric tube courses into the stomach. Endotracheal tube is 5.3 cm above the justine. Right IJ central venous catheter.. Left pleural effusion.. Mild pulmonary edema with some increase..  The bones and soft tissues are grossly within normal limits.     Mild pulmonary edema and left effusion. Electronically signed by Eliseo Espinosa    XR ABDOMEN (KUB) (SINGLE AP VIEW)  Result Date: 6/24/2025  EXAM:  XR ABDOMEN (KUB) (SINGLE AP VIEW) INDICATION: Aspiration pneumonia COMPARISON: None. TECHNIQUE: Supine frontal abdomen (KUB). FINDINGS: Nasogastric tube in the gastric lumen. No dilated small bowel. Stool and gas are present in the colon. No pathologic calcification. Osseous structures are age appropriate.     No acute pathology Electronically signed by [employer]    XR CHEST PORTABLE  Result Date: 6/24/2025  PORTABLE CHEST RADIOGRAPH/S: 6/24/2025 6:11 AM INDICATION: Acute hypoxic respiratory failure, basilar infiltrates, ET tube position COMPARISON: 6/23/2025. TECHNIQUE: Portable frontal semiupright radiograph/s of the chest. FINDINGS: The lungs are clear. The radiograph is rotated to the left, obscuring the central airways. An NG tube extends below the diaphragm.     Clear lungs. Electronically signed by Julio Alegria    XR CHEST PORTABLE  Result Date: 6/23/2025  INDICATION: Ventilated patient COMPARISON: 6/22/2025 FINDINGS: Single AP portable view of the chest demonstrates no change in position of the lines and tubes. The cardiomediastinal silhouette is unchanged. Improved depth of

## 2025-07-12 NOTE — PROGRESS NOTES
Cardiology Progress Note        Patient: Beny Knapp        Sex: male          DOA: 6/14/2025  YOB: 1965      Age:  59 y.o.        LOS:  LOS: 28 days     Patient seen and examined, chart reviewed.    Assessment/Plan     Patient Active Problem List   Diagnosis    HTN (hypertension)    Tobacco abuse    COPD (chronic obstructive pulmonary disease) (HCC)    Back pain    Depression    Chronic hepatitis C (HCC)    Thrombocytopenia    Hyponatremia    Stridor    Obstructed, larynx    Acute respiratory failure with hypoxia (HCC)    ETOH abuse    Polysubstance abuse (HCC)    Candidiasis of esophagus (HCC)    Aspiration pneumonia (HCC)    Cocaine abuse (HCC)    Moderate malnutrition    Pleural effusion on left    Pulmonary atelectasis         Abnormal EKG suggestive of ischemia  Abnormal troponin no clear evidence of acute coronary syndrome, could be demand ischemia   NSVT       Echocardiogram was done in June 2025 reported       Image quality is fair.    Left Ventricle: Normal left ventricular systolic function with a visually estimated EF of 55 - 60%. EF by 2D Simpsons Biplane is 57%. Left ventricle size is normal. Normal wall thickness. Normal wall motion. Normal diastolic function.    Mitral Valve: There is mild posterior annular calcification noted. Thickened leaflets. No stenosis noted.    Tricuspid Valve : Unable to assess RVSP due to inadequate or insignificant tricuspid regurgitation.    Pericardium : No pericardial effusion.    IVC/SVC : Patient is ventilated, cannot use IVC diameter to estimate right atrial pressure. IVC size is normal.    Echocardiogram revealed       Contrast used: Lumason. Technically difficult study due to patient's body habitus and patient's ability to tolerate test.    Left Ventricle: Normal left ventricular systolic function with a visually estimated EF of 60 - 65%. Left ventricle size is normal. Normal wall thickness. Unable to assess wall  (FLEXERIL) tablet 10 mg  10 mg Oral TID PRN    lidocaine 4 % external patch 1 patch  1 patch TransDERmal Daily    metoprolol (LOPRESSOR) injection 2.5 mg  2.5 mg IntraVENous Q6H    [Held by provider] enoxaparin (LOVENOX) injection 40 mg  40 mg SubCUTAneous Daily    bisacodyl (DULCOLAX) suppository 10 mg  10 mg Rectal Daily PRN    ipratropium (ATROVENT) 0.02 % nebulizer solution 0.5 mg  0.5 mg Nebulization Q6H    thiamine (B-1) injection 100 mg  100 mg IntraVENous Daily    folic acid injection 1 mg  1 mg IntraVENous Daily    hydrALAZINE (APRESOLINE) injection 10 mg  10 mg IntraVENous Q6H PRN    albuterol (PROVENTIL) (2.5 MG/3ML) 0.083% nebulizer solution 2.5 mg  2.5 mg Nebulization Q4H PRN    pantoprazole (PROTONIX) 40 mg in sodium chloride (PF) 0.9 % 10 mL injection  40 mg IntraVENous Daily    polyethylene glycol (GLYCOLAX) packet 17 g  17 g Oral Daily    sodium chloride flush 0.9 % injection 5-40 mL  5-40 mL IntraVENous 2 times per day    sodium chloride flush 0.9 % injection 5-40 mL  5-40 mL IntraVENous PRN    0.9 % sodium chloride infusion   IntraVENous PRN    potassium chloride 10 mEq/100 mL IVPB (Peripheral Line)  10 mEq IntraVENous PRN    magnesium sulfate 2000 mg in 50 mL IVPB premix  2,000 mg IntraVENous PRN    polyethylene glycol (GLYCOLAX) packet 17 g  17 g Oral Daily PRN    ondansetron (ZOFRAN) injection 4 mg  4 mg IntraVENous Q6H PRN    arformoterol tartrate (BROVANA) nebulizer solution 15 mcg  15 mcg Nebulization BID RT    budesonide (PULMICORT) nebulizer suspension 500 mcg  0.5 mg Nebulization BID RT    glucose chewable tablet 16 g  4 tablet Oral PRN    dextrose bolus 10% 125 mL  125 mL IntraVENous PRN    Or    dextrose bolus 10% 250 mL  250 mL IntraVENous PRN    glucagon injection 1 mg  1 mg SubCUTAneous PRN    dextrose 10 % infusion   IntraVENous Continuous PRN               Lab/Data Reviewed:       Recent Labs     07/10/25  0500 07/11/25  0358 07/12/25  0547   WBC 10.0 5.3 4.9   HGB 15.8 13.1 12.5*

## 2025-07-12 NOTE — PLAN OF CARE
Problem: Discharge Planning  Goal: Discharge to home or other facility with appropriate resources  7/12/2025 0048 by Kedar Theodore, RN  Outcome: Progressing  Flowsheets (Taken 7/11/2025 2000)  Discharge to home or other facility with appropriate resources:   Identify barriers to discharge with patient and caregiver   Arrange for needed discharge resources and transportation as appropriate   Identify discharge learning needs (meds, wound care, etc)   Arrange for interpreters to assist at discharge as needed   Refer to discharge planning if patient needs post-hospital services based on physician order or complex needs related to functional status, cognitive ability or social support system  7/11/2025 1320 by Carolann Figueroa, RN  Outcome: Progressing  Flowsheets (Taken 7/11/2025 0800)  Discharge to home or other facility with appropriate resources:   Identify barriers to discharge with patient and caregiver   Arrange for interpreters to assist at discharge as needed   Identify discharge learning needs (meds, wound care, etc)   Arrange for needed discharge resources and transportation as appropriate   Refer to discharge planning if patient needs post-hospital services based on physician order or complex needs related to functional status, cognitive ability or social support system     Problem: Pain  Goal: Verbalizes/displays adequate comfort level or baseline comfort level  7/12/2025 0048 by Kedar Theodore, RN  Outcome: Progressing  7/11/2025 1320 by Carolann Figueroa RN  Outcome: Progressing  Flowsheets (Taken 7/11/2025 0400 by Karen Barajas, RN)  Verbalizes/displays adequate comfort level or baseline comfort level:   Encourage patient to monitor pain and request assistance   Assess pain using appropriate pain scale   Implement non-pharmacological measures as appropriate and evaluate response   Administer analgesics based on type and severity of pain and evaluate response   Consider cultural and social influences  family and/or belongings  3. Encourage verbalization of thoughts and concerns in a socially appropriate manner  4. Utilize positive, consistent limit setting strategies supporting safety of patient, staff and others  5. Encourage participation in the decision making process about the behavioral management agreement  6. If a visitor's behavior poses a threat to safety call refer to organization policy.  7. Initiate consult with , Psychosocial CNS, Spiritual Care as appropriate  7/12/2025 0048 by Kedar Theodore, RN  Outcome: Progressing  Flowsheets (Taken 7/11/2025 2000)  Patient/family maintains appropriate behavior and adheres to behavioral management agreement, if implemented:   Assess patient/family’s coping skills and  non-compliant behavior (including use of illegal substances)   Notify security of behavior or suspected illegal substances which indicate the need for search of the patient and/or belongings   Encourage verbalization of thoughts and concerns in a socially appropriate manner   Utilize positive, consistent limit setting strategies supporting safety of patient, staff and others   Encourage participation in the decision making process about the behavioral management agreement   If a patient’s behavior jeopardizes the safety of the patient, staff, or others refer to organization policy. If a visitor’s behavior poses a threat to safety refer to organization policy   Implement a Health Care Agreement if patient meets criteria  7/11/2025 1320 by Carolann Figueroa, RN  Outcome: Progressing  Flowsheets (Taken 7/10/2025 2000 by Karen Barajas, RN)  Patient/family maintains appropriate behavior and adheres to behavioral management agreement, if implemented:   Assess patient/family’s coping skills and  non-compliant behavior (including use of illegal substances)   Notify security of behavior or suspected illegal substances which indicate the need for search of the patient and/or belongings

## 2025-07-12 NOTE — PROCEDURES
PROCEDURE NOTE  Date: 7/12/2025   Name: Beny Knapp  YOB: 1965    PICC    Date/Time: 7/12/2025 2:52 PM    Performed by: Willa Lynne RN  Authorized by: Ba Calderon MD  Consent: The procedure was performed in an emergent situation. Written consent obtained.  Risks and benefits: risks, benefits and alternatives were discussed  Consent given by: MD.  Procedure consent: procedure consent matches procedure scheduled  Relevant documents: relevant documents present and verified  Test results: test results available and properly labeled  Site marked: the operative site was marked  Imaging studies: imaging studies available  Required items: required blood products, implants, devices, and special equipment available  Patient identity confirmed: arm band and hospital-assigned identification number  Time out: Immediately prior to procedure a \"time out\" was called to verify the correct patient, procedure, equipment, support staff and site/side marked as required.  Indications: vascular access  Anesthesia: local infiltration    Anesthesia:  Local Anesthetic: lidocaine 1% without epinephrine  Anesthetic total: 3 mL    Sedation:  Patient sedated: yes  Sedatives: see MAR for details  Vitals: Vital signs were monitored during sedation.    Preparation: skin prepped with ChloraPrep  Skin prep agent dried: skin prep agent completely dried prior to procedure  Sterile barriers: all five maximum sterile barriers used - cap, mask, sterile gown, sterile gloves, and large sterile sheet  Hand hygiene: hand hygiene performed prior to central venous catheter insertion  Location: right brachial.  Site selection rationale: <45% CVR  Patient position: Unchanged at ~20 degrees, per bed setting.  Catheter type: double lumen  Catheter size: 5 Fr  Pre-procedure: landmarks identified  Ultrasound guidance: yes  Sterile ultrasound techniques: sterile gel and sterile probe covers were used  Number of attempts:

## 2025-07-12 NOTE — PLAN OF CARE
Problem: Discharge Planning  Goal: Discharge to home or other facility with appropriate resources  Outcome: Progressing  Flowsheets (Taken 7/12/2025 0800)  Discharge to home or other facility with appropriate resources:   Identify barriers to discharge with patient and caregiver   Arrange for needed discharge resources and transportation as appropriate   Identify discharge learning needs (meds, wound care, etc)     Problem: Pain  Goal: Verbalizes/displays adequate comfort level or baseline comfort level  Outcome: Progressing     Problem: Chronic Conditions and Co-morbidities  Goal: Patient's chronic conditions and co-morbidity symptoms are monitored and maintained or improved  Outcome: Progressing  Flowsheets (Taken 7/12/2025 0800)  Care Plan - Patient's Chronic Conditions and Co-Morbidity Symptoms are Monitored and Maintained or Improved:   Monitor and assess patient's chronic conditions and comorbid symptoms for stability, deterioration, or improvement   Collaborate with multidisciplinary team to address chronic and comorbid conditions and prevent exacerbation or deterioration   Update acute care plan with appropriate goals if chronic or comorbid symptoms are exacerbated and prevent overall improvement and discharge     Problem: Neurosensory - Adult  Goal: Achieves stable or improved neurological status  Outcome: Progressing  Flowsheets (Taken 7/12/2025 0800)  Achieves stable or improved neurological status:   Assess for and report changes in neurological status   Initiate measures to prevent increased intracranial pressure   Maintain blood pressure and fluid volume within ordered parameters to optimize cerebral perfusion and minimize risk of hemorrhage     Problem: Respiratory - Adult  Goal: Achieves optimal ventilation and oxygenation  Outcome: Progressing  Flowsheets (Taken 7/12/2025 0800)  Achieves optimal ventilation and oxygenation:   Assess for changes in respiratory status   Assess for changes in  and adhere to behavioral management agreement, if implemented  Description: INTERVENTIONS:  1. Assess patient/family's coping skills and  non-compliant behavior (including use of illegal substances)  2. Notify security of behavior or suspected illegal substances which indicate the need for search of the family and/or belongings  3. Encourage verbalization of thoughts and concerns in a socially appropriate manner  4. Utilize positive, consistent limit setting strategies supporting safety of patient, staff and others  5. Encourage participation in the decision making process about the behavioral management agreement  6. If a visitor's behavior poses a threat to safety call refer to organization policy.  7. Initiate consult with , Psychosocial CNS, Spiritual Care as appropriate  Outcome: Progressing  Flowsheets (Taken 7/12/2025 0800)  Patient/family maintains appropriate behavior and adheres to behavioral management agreement, if implemented:   Assess patient/family’s coping skills and  non-compliant behavior (including use of illegal substances)   Notify security of behavior or suspected illegal substances which indicate the need for search of the patient and/or belongings   Encourage verbalization of thoughts and concerns in a socially appropriate manner     Problem: Safety - Medical Restraint  Goal: Remains free of injury from restraints (Restraint for Interference with Medical Device)  Description: INTERVENTIONS:  1. Determine that other, less restrictive measures have been tried or would not be effective before applying the restraint  2. Evaluate the patient's condition at the time of restraint application  3. Inform patient/family regarding the reason for restraint  4. Q2H: Monitor safety, psychosocial status, comfort, nutrition and hydration  Outcome: Progressing  Flowsheets  Taken 7/12/2025 0858 by Kris Barraza RN  Remains free of injury from restraints (restraint for interference with medical

## 2025-07-12 NOTE — PROGRESS NOTES
Hospitalist Progress Note    Patient: Beny Knapp MRN: [id number]  CSN: [id number]    YOB: 1965  Age: 59 y.o.  Sex: male    DOA: 6/14/2025 LOS:  LOS: 28 days          Chief Complain :  Foreign body, shortness of breath.  59 y.o.  male w/ PMH of COPD, ETOH-use and polysubstance abuse who was BIBA and presents with subjective difficulty breathing and swallowing with throat pain. Brought in by EMS for possible forign body (food) with bolus within region of velecula. ENT saw pt and ED intubated for airway protection. ENT was consulted and performed scope on pt with findings of extensive esophageal candidiasis. Nephrology was consulted for severe hyponatremia with sodium of 117 recommending NS at 75cc/hr. Intensivist was contacted as well d/t pt being intubated, on vent for airway protection. Prolong intubation, extubated 07/05/2025. Has been uncooperative, did not cooperate with MBS and did not cooperate with placing NG tube.  07/10/2025 SNVT episode early morning, went into respiratory distress. Required intubation. Thick copious secretions from ET tube.    Subjective:   Patient orally intubated and sedated.    Assessment/Plan     Active Hospital Problems    Diagnosis     Pleural effusion on left [J90]     Pulmonary atelectasis [J98.11]     Moderate malnutrition [E44.0]     Aspiration pneumonia (HCC) [J69.0]     Cocaine abuse (HCC) [F14.10]     Hyponatremia [E87.1]     Stridor [R06.1]     Obstructed, larynx [J38.6]     Acute respiratory failure with hypoxia (HCC) [J96.01]     ETOH abuse [F10.10]     Polysubstance abuse (HCC) [F19.10]     Candidiasis of esophagus (HCC) [B37.81]     Tobacco abuse [Z72.0]     HTN

## 2025-07-13 ENCOUNTER — APPOINTMENT (OUTPATIENT)
Facility: HOSPITAL | Age: 60
DRG: 368 | End: 2025-07-13
Payer: MEDICARE

## 2025-07-13 LAB
ALBUMIN SERPL-MCNC: 2.3 G/DL (ref 3.4–5)
ALBUMIN/GLOB SERPL: 0.8 (ref 0.8–1.7)
ALP SERPL-CCNC: 98 U/L (ref 45–117)
ALT SERPL-CCNC: 381 U/L (ref 10–50)
ANION GAP BLD CALC-SCNC: ABNORMAL MMOL/L (ref 10–20)
ANION GAP SERPL CALC-SCNC: 11 MMOL/L (ref 3–18)
ARTERIAL PATENCY WRIST A: POSITIVE
AST SERPL-CCNC: 135 U/L (ref 10–38)
BASE EXCESS BLD CALC-SCNC: 6 MMOL/L
BASOPHILS # BLD: 0.01 K/UL (ref 0–0.1)
BASOPHILS NFR BLD: 0.2 % (ref 0–2)
BDY SITE: ABNORMAL
BILIRUB DIRECT SERPL-MCNC: 0.7 MG/DL (ref 0–0.2)
BILIRUB SERPL-MCNC: 1.3 MG/DL (ref 0.2–1)
BUN SERPL-MCNC: 22 MG/DL (ref 6–23)
BUN/CREAT SERPL: 81 (ref 12–20)
CA-I BLD-MCNC: 1.2 MMOL/L (ref 1.15–1.33)
CA-I BLD-SCNC: 1.23 MMOL/L (ref 1.12–1.32)
CALCIUM SERPL-MCNC: 8.5 MG/DL (ref 8.5–10.1)
CHLORIDE BLD-SCNC: 106 MMOL/L (ref 98–107)
CHLORIDE SERPL-SCNC: 105 MMOL/L (ref 98–107)
CO2 BLD-SCNC: 29 MMOL/L (ref 22–29)
CO2 SERPL-SCNC: 28 MMOL/L (ref 21–32)
CREAT BLD-MCNC: 0.45 MG/DL (ref 0.6–1.3)
CREAT SERPL-MCNC: 0.27 MG/DL (ref 0.6–1.3)
DIFFERENTIAL METHOD BLD: ABNORMAL
EOSINOPHIL # BLD: 0.03 K/UL (ref 0–0.4)
EOSINOPHIL NFR BLD: 0.7 % (ref 0–5)
ERYTHROCYTE [DISTWIDTH] IN BLOOD BY AUTOMATED COUNT: 14.2 % (ref 11.6–14.5)
FIO2 ON VENT: 40 %
GAS FLOW.O2 O2 DELIVERY SYS: ABNORMAL
GLOBULIN SER CALC-MCNC: 3 G/DL (ref 2–4)
GLUCOSE BLD STRIP.AUTO-MCNC: 103 MG/DL (ref 70–110)
GLUCOSE BLD STRIP.AUTO-MCNC: 85 MG/DL (ref 70–110)
GLUCOSE BLD-MCNC: 118 MG/DL (ref 74–99)
GLUCOSE SERPL-MCNC: 112 MG/DL (ref 74–108)
HCO3 BLD-SCNC: 30.6 MMOL/L (ref 21–28)
HCT VFR BLD AUTO: 33.8 % (ref 36–48)
HGB BLD-MCNC: 11.1 G/DL (ref 13–16)
IMM GRANULOCYTES # BLD AUTO: 0.22 K/UL (ref 0–0.04)
IMM GRANULOCYTES NFR BLD AUTO: 5.2 % (ref 0–0.5)
LACTATE BLD-SCNC: 1.02 MMOL/L (ref 0.4–2)
LYMPHOCYTES # BLD: 0.78 K/UL (ref 0.9–3.3)
LYMPHOCYTES NFR BLD: 18.3 % (ref 21–52)
MAGNESIUM SERPL-MCNC: 1.7 MG/DL (ref 1.6–2.6)
MCH RBC QN AUTO: 29 PG (ref 24–34)
MCHC RBC AUTO-ENTMCNC: 32.8 G/DL (ref 31–37)
MCV RBC AUTO: 88.3 FL (ref 78–100)
MONOCYTES # BLD: 0.23 K/UL (ref 0.05–1.2)
MONOCYTES NFR BLD: 5.4 % (ref 3–10)
NEUTS SEG # BLD: 3 K/UL (ref 1.8–8)
NEUTS SEG NFR BLD: 70.2 % (ref 40–73)
NRBC # BLD: 0 K/UL (ref 0–0.01)
NRBC BLD-RTO: 0 PER 100 WBC
PCO2 BLD: 43.6 MMHG (ref 35–48)
PEEP RESPIRATORY: 6
PH BLD: 7.46 (ref 7.35–7.45)
PHOSPHATE SERPL-MCNC: 3 MG/DL (ref 2.5–4.9)
PLATELET # BLD AUTO: 87 K/UL (ref 135–420)
PMV BLD AUTO: 10.7 FL (ref 9.2–11.8)
PO2 BLD: 63 MMHG (ref 83–108)
POTASSIUM BLD-SCNC: 2.8 MMOL/L (ref 3.5–5.1)
POTASSIUM SERPL-SCNC: 3.2 MMOL/L (ref 3.5–5.5)
PROCALCITONIN SERPL-MCNC: 0.08 NG/ML
PROT SERPL-MCNC: 5.3 G/DL (ref 6.4–8.2)
RBC # BLD AUTO: 3.83 M/UL (ref 4.35–5.65)
SAO2 % BLD: 93 % (ref 94–98)
SERVICE CMNT-IMP: ABNORMAL
SODIUM BLD-SCNC: 138 MMOL/L (ref 136–145)
SODIUM SERPL-SCNC: 143 MMOL/L (ref 136–145)
SPECIMEN SITE: ABNORMAL
VENTILATION MODE VENT: ABNORMAL
VT SETTING VENT: 479
WBC # BLD AUTO: 4.3 K/UL (ref 4.6–13.2)

## 2025-07-13 PROCEDURE — 83735 ASSAY OF MAGNESIUM: CPT

## 2025-07-13 PROCEDURE — 84100 ASSAY OF PHOSPHORUS: CPT

## 2025-07-13 PROCEDURE — 6360000002 HC RX W HCPCS: Performed by: INTERNAL MEDICINE

## 2025-07-13 PROCEDURE — 82962 GLUCOSE BLOOD TEST: CPT

## 2025-07-13 PROCEDURE — 85025 COMPLETE CBC W/AUTO DIFF WBC: CPT

## 2025-07-13 PROCEDURE — 74018 RADEX ABDOMEN 1 VIEW: CPT

## 2025-07-13 PROCEDURE — 85014 HEMATOCRIT: CPT

## 2025-07-13 PROCEDURE — 2580000003 HC RX 258: Performed by: INTERNAL MEDICINE

## 2025-07-13 PROCEDURE — 80048 BASIC METABOLIC PNL TOTAL CA: CPT

## 2025-07-13 PROCEDURE — 94669 MECHANICAL CHEST WALL OSCILL: CPT

## 2025-07-13 PROCEDURE — 84132 ASSAY OF SERUM POTASSIUM: CPT

## 2025-07-13 PROCEDURE — 80076 HEPATIC FUNCTION PANEL: CPT

## 2025-07-13 PROCEDURE — 36600 WITHDRAWAL OF ARTERIAL BLOOD: CPT

## 2025-07-13 PROCEDURE — 82330 ASSAY OF CALCIUM: CPT

## 2025-07-13 PROCEDURE — 84295 ASSAY OF SERUM SODIUM: CPT

## 2025-07-13 PROCEDURE — 2500000003 HC RX 250 WO HCPCS: Performed by: INTERNAL MEDICINE

## 2025-07-13 PROCEDURE — 83605 ASSAY OF LACTIC ACID: CPT

## 2025-07-13 PROCEDURE — 71045 X-RAY EXAM CHEST 1 VIEW: CPT

## 2025-07-13 PROCEDURE — 94003 VENT MGMT INPAT SUBQ DAY: CPT

## 2025-07-13 PROCEDURE — 82803 BLOOD GASES ANY COMBINATION: CPT

## 2025-07-13 PROCEDURE — 94668 MNPJ CHEST WALL SBSQ: CPT

## 2025-07-13 PROCEDURE — 2000000000 HC ICU R&B

## 2025-07-13 PROCEDURE — 6370000000 HC RX 637 (ALT 250 FOR IP): Performed by: HOSPITALIST

## 2025-07-13 PROCEDURE — 82947 ASSAY GLUCOSE BLOOD QUANT: CPT

## 2025-07-13 PROCEDURE — 94640 AIRWAY INHALATION TREATMENT: CPT

## 2025-07-13 PROCEDURE — 6370000000 HC RX 637 (ALT 250 FOR IP): Performed by: INTERNAL MEDICINE

## 2025-07-13 PROCEDURE — 2700000000 HC OXYGEN THERAPY PER DAY

## 2025-07-13 PROCEDURE — 84145 PROCALCITONIN (PCT): CPT

## 2025-07-13 RX ORDER — FENTANYL CITRATE-0.9 % NACL/PF 10 MCG/ML
25-100 PLASTIC BAG, INJECTION (ML) INTRAVENOUS
Status: DISCONTINUED | OUTPATIENT
Start: 2025-07-13 | End: 2025-07-15

## 2025-07-13 RX ORDER — LANOLIN ALCOHOL/MO/W.PET/CERES
400 CREAM (GRAM) TOPICAL 2 TIMES DAILY
Status: DISCONTINUED | OUTPATIENT
Start: 2025-07-13 | End: 2025-07-16

## 2025-07-13 RX ORDER — DIAZEPAM 10 MG/2ML
5 INJECTION, SOLUTION INTRAMUSCULAR; INTRAVENOUS ONCE
Status: COMPLETED | OUTPATIENT
Start: 2025-07-13 | End: 2025-07-13

## 2025-07-13 RX ADMIN — DIAZEPAM 5 MG: 5 INJECTION INTRAMUSCULAR; INTRAVENOUS at 05:38

## 2025-07-13 RX ADMIN — ARFORMOTEROL TARTRATE 15 MCG: 15 SOLUTION RESPIRATORY (INHALATION) at 21:45

## 2025-07-13 RX ADMIN — IPRATROPIUM BROMIDE 0.5 MG: 0.5 SOLUTION RESPIRATORY (INHALATION) at 03:16

## 2025-07-13 RX ADMIN — SODIUM CHLORIDE 10 MG/HR: 900 INJECTION, SOLUTION INTRAVENOUS at 10:22

## 2025-07-13 RX ADMIN — POTASSIUM CHLORIDE 10 MEQ: 7.46 INJECTION, SOLUTION INTRAVENOUS at 08:53

## 2025-07-13 RX ADMIN — POTASSIUM CHLORIDE 10 MEQ: 7.46 INJECTION, SOLUTION INTRAVENOUS at 10:02

## 2025-07-13 RX ADMIN — POTASSIUM CHLORIDE 10 MEQ: 7.46 INJECTION, SOLUTION INTRAVENOUS at 06:19

## 2025-07-13 RX ADMIN — DIAZEPAM 5 MG: 5 INJECTION, SOLUTION INTRAMUSCULAR; INTRAVENOUS at 01:10

## 2025-07-13 RX ADMIN — THIAMINE HYDROCHLORIDE 100 MG: 100 INJECTION, SOLUTION INTRAMUSCULAR; INTRAVENOUS at 08:37

## 2025-07-13 RX ADMIN — DORNASE ALFA 2.5 MG: 1 SOLUTION RESPIRATORY (INHALATION) at 08:30

## 2025-07-13 RX ADMIN — SODIUM CHLORIDE, PRESERVATIVE FREE 10 ML: 5 INJECTION INTRAVENOUS at 22:39

## 2025-07-13 RX ADMIN — DIBASIC SODIUM PHOSPHATE, MONOBASIC POTASSIUM PHOSPHATE AND MONOBASIC SODIUM PHOSPHATE 1 TABLET: 852; 155; 130 TABLET ORAL at 08:24

## 2025-07-13 RX ADMIN — SODIUM CHLORIDE 10 MG/HR: 900 INJECTION, SOLUTION INTRAVENOUS at 19:54

## 2025-07-13 RX ADMIN — SODIUM CHLORIDE 10 MG/HR: 900 INJECTION, SOLUTION INTRAVENOUS at 02:56

## 2025-07-13 RX ADMIN — MEROPENEM 1000 MG: 1 INJECTION INTRAVENOUS at 13:49

## 2025-07-13 RX ADMIN — Medication 400 MG: at 10:52

## 2025-07-13 RX ADMIN — BUDESONIDE 500 MCG: 0.5 INHALANT RESPIRATORY (INHALATION) at 08:29

## 2025-07-13 RX ADMIN — MEROPENEM 1000 MG: 1 INJECTION INTRAVENOUS at 22:13

## 2025-07-13 RX ADMIN — IPRATROPIUM BROMIDE 0.5 MG: 0.5 SOLUTION RESPIRATORY (INHALATION) at 08:29

## 2025-07-13 RX ADMIN — DIBASIC SODIUM PHOSPHATE, MONOBASIC POTASSIUM PHOSPHATE AND MONOBASIC SODIUM PHOSPHATE 1 TABLET: 852; 155; 130 TABLET ORAL at 22:14

## 2025-07-13 RX ADMIN — CYCLOBENZAPRINE HYDROCHLORIDE 10 MG: 10 TABLET, FILM COATED ORAL at 22:31

## 2025-07-13 RX ADMIN — CHLORHEXIDINE GLUCONATE 15 ML: 1.2 RINSE ORAL at 08:41

## 2025-07-13 RX ADMIN — POTASSIUM CHLORIDE 10 MEQ: 7.46 INJECTION, SOLUTION INTRAVENOUS at 05:17

## 2025-07-13 RX ADMIN — DORNASE ALFA 2.5 MG: 1 SOLUTION RESPIRATORY (INHALATION) at 21:45

## 2025-07-13 RX ADMIN — FENTANYL CITRATE 50 MCG: 50 INJECTION INTRAMUSCULAR; INTRAVENOUS at 06:55

## 2025-07-13 RX ADMIN — POLYETHYLENE GLYCOL 3350 17 G: 17 POWDER, FOR SOLUTION ORAL at 08:31

## 2025-07-13 RX ADMIN — FOLIC ACID 1 MG: 5 INJECTION, SOLUTION INTRAMUSCULAR; INTRAVENOUS; SUBCUTANEOUS at 08:39

## 2025-07-13 RX ADMIN — DIAZEPAM 5 MG: 5 INJECTION INTRAMUSCULAR; INTRAVENOUS at 23:16

## 2025-07-13 RX ADMIN — PREDNISONE 10 MG: 10 TABLET ORAL at 08:24

## 2025-07-13 RX ADMIN — BUDESONIDE 500 MCG: 0.5 INHALANT RESPIRATORY (INHALATION) at 21:45

## 2025-07-13 RX ADMIN — FENTANYL CITRATE 50 MCG: 50 INJECTION INTRAMUSCULAR; INTRAVENOUS at 01:13

## 2025-07-13 RX ADMIN — SODIUM CHLORIDE, PRESERVATIVE FREE 40 MG: 5 INJECTION INTRAVENOUS at 08:46

## 2025-07-13 RX ADMIN — SODIUM CHLORIDE, PRESERVATIVE FREE 10 ML: 5 INJECTION INTRAVENOUS at 08:47

## 2025-07-13 RX ADMIN — DIAZEPAM 5 MG: 5 INJECTION INTRAMUSCULAR; INTRAVENOUS at 10:50

## 2025-07-13 RX ADMIN — CHLORHEXIDINE GLUCONATE 15 ML: 1.2 RINSE ORAL at 22:14

## 2025-07-13 RX ADMIN — IPRATROPIUM BROMIDE 0.5 MG: 0.5 SOLUTION RESPIRATORY (INHALATION) at 21:45

## 2025-07-13 RX ADMIN — Medication 400 MG: at 22:19

## 2025-07-13 RX ADMIN — FENTANYL CITRATE 50 MCG/HR: 0.05 INJECTION, SOLUTION INTRAMUSCULAR; INTRAVENOUS at 10:25

## 2025-07-13 RX ADMIN — MEROPENEM 1000 MG: 1 INJECTION INTRAVENOUS at 05:16

## 2025-07-13 RX ADMIN — CYCLOBENZAPRINE HYDROCHLORIDE 10 MG: 10 TABLET, FILM COATED ORAL at 10:52

## 2025-07-13 RX ADMIN — METOPROLOL TARTRATE 2.5 MG: 5 INJECTION INTRAVENOUS at 05:15

## 2025-07-13 RX ADMIN — IPRATROPIUM BROMIDE 0.5 MG: 0.5 SOLUTION RESPIRATORY (INHALATION) at 14:30

## 2025-07-13 RX ADMIN — ARFORMOTEROL TARTRATE 15 MCG: 15 SOLUTION RESPIRATORY (INHALATION) at 08:28

## 2025-07-13 ASSESSMENT — PULMONARY FUNCTION TESTS
PIF_VALUE: 18
PIF_VALUE: 19
PIF_VALUE: 23
PIF_VALUE: 18
PIF_VALUE: 20
PIF_VALUE: 18

## 2025-07-13 ASSESSMENT — PAIN SCALES - GENERAL
PAINLEVEL_OUTOF10: 0

## 2025-07-13 NOTE — PROGRESS NOTES
Hospitalist Progress Note    Patient: Beny Knapp MRN: [id number]  CSN: [id number]    YOB: 1965  Age: 59 y.o.  Sex: male    DOA: 6/14/2025 LOS:  LOS: 29 days          Chief Complain :  Foreign body, shortness of breath.  59 y.o.  male w/ PMH of COPD, ETOH-use and polysubstance abuse who was BIBA and presents with subjective difficulty breathing and swallowing with throat pain. Brought in by EMS for possible forign body (food) with bolus within region of velecula. ENT saw pt and ED intubated for airway protection. ENT was consulted and performed scope on pt with findings of extensive esophageal candidiasis. Nephrology was consulted for severe hyponatremia with sodium of 117 recommending NS at 75cc/hr. Intensivist was contacted as well d/t pt being intubated, on vent for airway protection. Prolong intubation, extubated 07/05/2025. Has been uncooperative, did not cooperate with MBS and did not cooperate with placing NG tube.  07/10/2025 SNVT episode early morning, went into respiratory distress. Required intubation. Thick copious secretions from ET tube.    Subjective:   Patient orally intubated, awake, follows commands. Was agitated last night    Assessment/Plan     Active Hospital Problems    Diagnosis     Pleural effusion on left [J90]     Pulmonary atelectasis [J98.11]     Moderate malnutrition [E44.0]     Aspiration pneumonia (HCC) [J69.0]     Cocaine abuse (HCC) [F14.10]     Hyponatremia [E87.1]     Stridor [R06.1]     Obstructed, larynx [J38.6]     Acute respiratory failure with hypoxia (HCC) [J96.01]     ETOH abuse [F10.10]     Polysubstance abuse (HCC) [F19.10]     Candidiasis of esophagus (HCC) [B37.81]   07/12/25  0547 07/13/25  0406   WBC 5.3 4.9 4.3*   RBC 4.46 4.24* 3.83*   HGB 13.1 12.5* 11.1*   HCT 39.4 37.9 33.8*   * 100* 87*      Cardiac Enzymes Lab Results   Component Value Date    TROPHS 6 06/29/2023   ,No results found for: \"BNP\"   Coagulation Recent Labs     07/11/25  0358 07/12/25  0547   INR 1.3* 1.2   APTT 24.4 21.6*         Lipid Panel Lab Results   Component Value Date/Time    CHOL 132 07/04/2025 05:07 AM    HDL 43 07/04/2025 05:07 AM    LDL 59 07/04/2025 05:07 AM    VLDL 30 07/04/2025 05:07 AM      Pancreas No results for input(s): \"LIPASE\" in the last 72 hours.,No results for input(s): \"AMYLASE\" in the last 72 hours.   Liver Enzymes No results for input(s): \"TP\" in the last 72 hours.    Invalid input(s): \"ALB\", \"TBIL\", \"AP\", \"SGOT\", \"GPT\", \"DBIL\"   Thyroid Studies Lab Results   Component Value Date/Time    TSH 0.698 07/09/2025 02:23 PM        Procedures/imaging: see electronic medical records for all procedures/Xrays and details which were not copied into this note but were reviewed prior to creation of Plan    TIME: E/M Time spent with patient and patient care issues: 55 mins.     This time also includes physician non-face-to-face service time visit on the date of service such as  Preparing to see the patient (eg, review of tests)  Obtaining and/or reviewing separately obtained history  Performing a medically necessary appropriate examination and/or evaluation  Counseling and educating the patient/family/caregiver  Ordering medications, tests, or procedures  Referring and communicating with other health care professionals as needed  Documenting clinical information in the electronic or other health record  Independently interpreting results (not reported separately) and communicating results to the patient/family/caregiver  Care coordination and discharge planning with Case Management.    Elements of old note were copied to maintain continuity and comprehensiveness of patient details. The

## 2025-07-13 NOTE — PROGRESS NOTES
0101:Patient agitated ,biting tube and pulling at restraints. RASS of +1. Current orders for sedation already given but no improvement to behavior. MD Soto notified. Telephone order received  for 1x dose of 5mg  IV Valium.       0530: Patient found with increased amount of tube feed present in his mouth.   OG tube now noted to be 51 cm at the lip.   Tube feed held.   Lung sounds remain clear diminished.   Oral and ET suction performed.  MD Rousseau notified.   OG tube advanced to 61 cm at lip.   Orders entered for KUB to verify placement.    Closely monitor patient. Refocus patient on things they can control. Pt. autonomy promoted to reestablish feeling of control over their life.

## 2025-07-13 NOTE — PROGRESS NOTES
[facility]  Pulmonary, Critical Care, and Sleep Medicine    Name: Beny Knapp MRN: [id number]   : 1965 Hospital: [facility]    Date: 2025  Room: [address] [place] [address] Note                                              Consult requesting physician: Dr. Mark   Reason for Consult: Respiratory failure, hyponatremia, upper airway    IMPRESSION:     Acute respiratory failure with hypoxemia  Pleural effusion-left  Pulmonary atelectasis  Obstructed larynx  Polysubstance abuse   Candidiasis of esophagus   Aspiration pneumonia   COPD/emphysema  EtOH use  Cocaine abuse  Hepatitis C  Cirrhosis of liver  Hyponatremia  Malnutrition      Active Hospital Problems    Diagnosis Date Noted    Pleural effusion on left [J90] 07/10/2025    Pulmonary atelectasis [J98.11] 07/10/2025    Moderate malnutrition [E44.0] 2025    Aspiration pneumonia (HCC) [J69.0] 2025    Cocaine abuse (HCC) [F14.10] 2025    Hyponatremia [E87.1] 2025    Stridor [R06.1] 2025    Obstructed, larynx [J38.6] 2025    Acute respiratory failure with hypoxia (HCC) [J96.01] 2025    ETOH abuse [F10.10] 2025    Polysubstance abuse (HCC) [F19.10] 2025    Candidiasis of esophagus (HCC) [B37.81] 2025    Tobacco abuse [Z72.0] 2015    HTN (hypertension) [I10] 2015    COPD (chronic obstructive pulmonary disease) (HCC) [J44.9] 2015    Chronic hepatitis C (HCC) [B18.2] 2015        Code status: Full Code       RECOMMENDATIONS:     Respiratory: 59-year-old male with COPD, smoker, cocaine abuse-presented on 2025 with acute upper airway distress, fullness in the laryngeal area, questionable foreign body.  Prolonged intubation, and extubated 2025.  Patient had frequent oxygen desaturations while on ventilator support.  Prior to intubation, fiberoptic nasolaryngoscopy by ENT Dr. Mcgovern did not show any foreign body, but swollen  Date: 7/2/2025  EXAM:  XR CHEST PORTABLE INDICATION: Ventilator COMPARISON: 7/1/2025 TECHNIQUE: 0550 hours portable chest AP view FINDINGS: ET tube is 7 cm above the justine. This could be advanced 2 cm. NG tube courses into the stomach. The distal tip is not seen. Small bilateral pleural effusions and bibasilar atelectasis persist unchanged.     1. No change bibasal atelectasis. 2. The ET tube is 7 cm above the justine. This could be advanced 2 cm. Electronically signed by Jorge ORTA IR TECHNOLOGIST SERVICE  Result Date: 7/1/2025  Automatic Administrative Result. Imaging Guidance used by the IR Department.  See the Patient Chart for specific details.      Imaging guidance utilized by the IR Department.    XR CHEST PORTABLE  Result Date: 7/1/2025  EXAM:  XR CHEST PORTABLE INDICATION: Ventilator COMPARISON: 6/30/2025 TECHNIQUE: portable chest AP view FINDINGS: Endotracheal tube and nasogastric tube are stable in position. Right internal jugular central venous catheter has been removed. The cardiac silhouette is within normal limits. There is increased bibasilar airspace disease. Small bilateral pleural effusions are noted. The visualized bones and upper abdomen are age-appropriate.     Increased bibasilar airspace disease may represent atelectasis or pneumonia. Small bilateral pleural effusions. Electronically signed by Dougie Dave    US LIVER  Result Date: 6/30/2025  EXAM:  US LIVER INDICATION: Elevated liver function tests COMPARISON: CT 6/26/2025 TECHNIQUE:  Limited abdominal ultrasound. FINDINGS: Liver: Length: 19.5 cm, enlarged. Diffusely echogenic and heterogeneous.  No focal liver lesion. Main portal vein flow: Toward the liver with a velocity of 12 cm/s. Diameter 1.3 cm. Fluid: No ascites. Gallbladder: A gallstone measures 1.9 cm. No gallbladder wall thickening or pericholecystic fluid. Bile ducts: There is no intra or extrahepatic biliary ductal dilatation.  The common bile duct measures 4 mm.

## 2025-07-13 NOTE — PROGRESS NOTES
2125: Patient agitated and biting tube. RASS +1. PRN given see MAR for details.     2142:Patient remains agitated and continues biting tube. RASS +1. PRN given see MAR for details.     2235:Patient remains restless and agitated despite current sedation order. Notified MD Car. Telephone order received for a one time dose of IV Valium 5mg.

## 2025-07-13 NOTE — PLAN OF CARE
Problem: Discharge Planning  Goal: Discharge to home or other facility with appropriate resources  7/12/2025 2250 by Kedar Theodore RN  Outcome: Progressing  Flowsheets (Taken 7/12/2025 2000)  Discharge to home or other facility with appropriate resources:   Identify barriers to discharge with patient and caregiver   Arrange for needed discharge resources and transportation as appropriate   Identify discharge learning needs (meds, wound care, etc)   Arrange for interpreters to assist at discharge as needed   Refer to discharge planning if patient needs post-hospital services based on physician order or complex needs related to functional status, cognitive ability or social support system  7/12/2025 1601 by Kris Barraza RN  Outcome: Progressing  Flowsheets (Taken 7/12/2025 0800)  Discharge to home or other facility with appropriate resources:   Identify barriers to discharge with patient and caregiver   Arrange for needed discharge resources and transportation as appropriate   Identify discharge learning needs (meds, wound care, etc)     Problem: Pain  Goal: Verbalizes/displays adequate comfort level or baseline comfort level  7/12/2025 2250 by Kedar Theodore RN  Outcome: Progressing  7/12/2025 1601 by Kris Barraza RN  Outcome: Progressing     Problem: Chronic Conditions and Co-morbidities  Goal: Patient's chronic conditions and co-morbidity symptoms are monitored and maintained or improved  7/12/2025 2250 by Kedar Theodore RN  Outcome: Progressing  Flowsheets (Taken 7/12/2025 2000)  Care Plan - Patient's Chronic Conditions and Co-Morbidity Symptoms are Monitored and Maintained or Improved:   Monitor and assess patient's chronic conditions and comorbid symptoms for stability, deterioration, or improvement   Collaborate with multidisciplinary team to address chronic and comorbid conditions and prevent exacerbation or deterioration   Update acute care plan with appropriate goals if chronic or comorbid  supporting safety of patient, staff and others  5. Encourage participation in the decision making process about the behavioral management agreement  6. If a visitor's behavior poses a threat to safety call refer to organization policy.  7. Initiate consult with , Psychosocial CNS, Spiritual Care as appropriate  7/12/2025 2250 by Kedar Theodore, RN  Outcome: Progressing  Flowsheets (Taken 7/12/2025 2000)  Patient/family maintains appropriate behavior and adheres to behavioral management agreement, if implemented:   Assess patient/family’s coping skills and  non-compliant behavior (including use of illegal substances)   Encourage verbalization of thoughts and concerns in a socially appropriate manner   Notify security of behavior or suspected illegal substances which indicate the need for search of the patient and/or belongings   Utilize positive, consistent limit setting strategies supporting safety of patient, staff and others   Encourage participation in the decision making process about the behavioral management agreement   If a patient’s behavior jeopardizes the safety of the patient, staff, or others refer to organization policy. If a visitor’s behavior poses a threat to safety refer to organization policy   Implement a Health Care Agreement if patient meets criteria   Initiate consult with , Psychosocial Clinical Nurse Specialist, Spiritual Care as appropriate  7/12/2025 1601 by Kris Barraza, RN  Outcome: Progressing  Flowsheets (Taken 7/12/2025 0800)  Patient/family maintains appropriate behavior and adheres to behavioral management agreement, if implemented:   Assess patient/family’s coping skills and  non-compliant behavior (including use of illegal substances)   Notify security of behavior or suspected illegal substances which indicate the need for search of the patient and/or belongings   Encourage verbalization of thoughts and concerns in a socially appropriate manner

## 2025-07-13 NOTE — PROGRESS NOTES
Cardiology Progress Note        Patient: Beny Knapp        Sex: male          DOA: 6/14/2025  YOB: 1965      Age:  59 y.o.        LOS:  LOS: 29 days     Patient seen and examined, chart reviewed.    Assessment/Plan     Patient Active Problem List   Diagnosis    HTN (hypertension)    Tobacco abuse    COPD (chronic obstructive pulmonary disease) (HCC)    Back pain    Depression    Chronic hepatitis C (HCC)    Thrombocytopenia    Hyponatremia    Stridor    Obstructed, larynx    Acute respiratory failure with hypoxia (HCC)    ETOH abuse    Polysubstance abuse (HCC)    Candidiasis of esophagus (HCC)    Aspiration pneumonia (HCC)    Cocaine abuse (HCC)    Moderate malnutrition    Pleural effusion on left    Pulmonary atelectasis         Abnormal EKG suggestive of ischemia  Abnormal troponin no clear evidence of acute coronary syndrome, could be demand ischemia   NSVT       Echocardiogram was done in June 2025 reported       Image quality is fair.    Left Ventricle: Normal left ventricular systolic function with a visually estimated EF of 55 - 60%. EF by 2D Simpsons Biplane is 57%. Left ventricle size is normal. Normal wall thickness. Normal wall motion. Normal diastolic function.    Mitral Valve: There is mild posterior annular calcification noted. Thickened leaflets. No stenosis noted.    Tricuspid Valve : Unable to assess RVSP due to inadequate or insignificant tricuspid regurgitation.    Pericardium : No pericardial effusion.    IVC/SVC : Patient is ventilated, cannot use IVC diameter to estimate right atrial pressure. IVC size is normal.    Echocardiogram revealed       Contrast used: Lumason. Technically difficult study due to patient's body habitus and patient's ability to tolerate test.    Left Ventricle: Normal left ventricular systolic function with a visually estimated EF of 60 - 65%. Left ventricle size is normal. Normal wall thickness. Unable to assess wall

## 2025-07-14 ENCOUNTER — APPOINTMENT (OUTPATIENT)
Facility: HOSPITAL | Age: 60
DRG: 368 | End: 2025-07-14
Payer: MEDICARE

## 2025-07-14 PROBLEM — Z51.5 PALLIATIVE CARE ENCOUNTER: Status: ACTIVE | Noted: 2025-07-14

## 2025-07-14 PROBLEM — Z71.89 GOALS OF CARE, COUNSELING/DISCUSSION: Status: ACTIVE | Noted: 2025-07-14

## 2025-07-14 LAB
ALBUMIN SERPL-MCNC: 2.1 G/DL (ref 3.4–5)
ALBUMIN/GLOB SERPL: 0.8 (ref 0.8–1.7)
ALP SERPL-CCNC: 88 U/L (ref 45–117)
ALT SERPL-CCNC: 324 U/L (ref 10–50)
ANION GAP BLD CALC-SCNC: ABNORMAL MMOL/L (ref 10–20)
ANION GAP SERPL CALC-SCNC: 8 MMOL/L (ref 3–18)
ARTERIAL PATENCY WRIST A: POSITIVE
AST SERPL-CCNC: 102 U/L (ref 10–38)
BASE EXCESS BLD CALC-SCNC: 7.6 MMOL/L
BASOPHILS # BLD: 0.01 K/UL (ref 0–0.1)
BASOPHILS NFR BLD: 0.3 % (ref 0–2)
BDY SITE: ABNORMAL
BILIRUB SERPL-MCNC: 0.9 MG/DL (ref 0.2–1)
BUN SERPL-MCNC: 16 MG/DL (ref 6–23)
BUN/CREAT SERPL: 88 (ref 12–20)
CA-I BLD-MCNC: 1.26 MMOL/L (ref 1.15–1.33)
CA-I SERPL-SCNC: 1.13 MMOL/L (ref 1.12–1.32)
CALCIUM SERPL-MCNC: 8.2 MG/DL (ref 8.5–10.1)
CHLORIDE BLD-SCNC: 102 MMOL/L (ref 98–107)
CHLORIDE SERPL-SCNC: 104 MMOL/L (ref 98–107)
CO2 BLD-SCNC: 31 MMOL/L (ref 22–29)
CO2 SERPL-SCNC: 28 MMOL/L (ref 21–32)
CREAT BLD-MCNC: 0.41 MG/DL (ref 0.6–1.3)
CREAT SERPL-MCNC: 0.19 MG/DL (ref 0.6–1.3)
DIFFERENTIAL METHOD BLD: ABNORMAL
DIFFERENTIAL: NORMAL
EKG ATRIAL RATE: 92 BPM
EKG DIAGNOSIS: NORMAL
EKG P AXIS: 83 DEGREES
EKG P-R INTERVAL: 132 MS
EKG Q-T INTERVAL: 324 MS
EKG QRS DURATION: 88 MS
EKG QTC CALCULATION (BAZETT): 400 MS
EKG R AXIS: 63 DEGREES
EKG T AXIS: 257 DEGREES
EKG VENTRICULAR RATE: 92 BPM
EOSINOPHIL # BLD: 0.03 K/UL (ref 0–0.4)
EOSINOPHIL NFR BLD: 0.8 % (ref 0–5)
EOSINOPHIL NFR BRONCH MANUAL: 1 %
ERYTHROCYTE [DISTWIDTH] IN BLOOD BY AUTOMATED COUNT: 14.4 % (ref 11.6–14.5)
FIO2 ON VENT: 40 %
GAS FLOW.O2 O2 DELIVERY SYS: ABNORMAL
GLOBULIN SER CALC-MCNC: 2.8 G/DL (ref 2–4)
GLUCOSE BLD STRIP.AUTO-MCNC: 139 MG/DL (ref 70–110)
GLUCOSE BLD STRIP.AUTO-MCNC: 92 MG/DL (ref 70–110)
GLUCOSE BLD STRIP.AUTO-MCNC: 96 MG/DL (ref 70–110)
GLUCOSE BLD-MCNC: 101 MG/DL (ref 74–99)
GLUCOSE SERPL-MCNC: 93 MG/DL (ref 74–108)
HCO3 BLD-SCNC: 32.2 MMOL/L (ref 21–28)
HCT VFR BLD AUTO: 31.6 % (ref 36–48)
HGB BLD-MCNC: 10.3 G/DL (ref 13–16)
IMM GRANULOCYTES # BLD AUTO: 0.18 K/UL (ref 0–0.04)
IMM GRANULOCYTES NFR BLD AUTO: 4.7 % (ref 0–0.5)
LACTATE BLD-SCNC: 0.89 MMOL/L (ref 0.4–2)
LYMPHOCYTES # BLD: 0.69 K/UL (ref 0.9–3.3)
LYMPHOCYTES NFR BLD: 18.1 % (ref 21–52)
LYMPHOCYTES NFR BRONCH MANUAL: 5 %
MACROPHAGES NFR BRONCH MANUAL: 0 %
MAGNESIUM SERPL-MCNC: 1.7 MG/DL (ref 1.6–2.6)
MCH RBC QN AUTO: 28.8 PG (ref 24–34)
MCHC RBC AUTO-ENTMCNC: 32.6 G/DL (ref 31–37)
MCV RBC AUTO: 88.3 FL (ref 78–100)
MONOCYTES # BLD: 0.18 K/UL (ref 0.05–1.2)
MONOCYTES NFR BLD: 4.7 % (ref 3–10)
NEUTROPHILS NFR BRONCH MANUAL: 94 %
NEUTS SEG # BLD: 2.72 K/UL (ref 1.8–8)
NEUTS SEG NFR BLD: 71.4 % (ref 40–73)
NRBC # BLD: 0 K/UL (ref 0–0.01)
NRBC BLD-RTO: 0 PER 100 WBC
PCO2 BLD: 44.9 MMHG (ref 35–48)
PEEP RESPIRATORY: 6
PH BLD: 7.46 (ref 7.35–7.45)
PHOSPHATE SERPL-MCNC: 2.3 MG/DL (ref 2.5–4.9)
PLATELET # BLD AUTO: 83 K/UL (ref 135–420)
PMV BLD AUTO: 10.5 FL (ref 9.2–11.8)
PO2 BLD: 83 MMHG (ref 83–108)
POTASSIUM BLD-SCNC: 3.6 MMOL/L (ref 3.5–5.1)
POTASSIUM SERPL-SCNC: 3.4 MMOL/L (ref 3.5–5.5)
PROT SERPL-MCNC: 4.9 G/DL (ref 6.4–8.2)
RBC # BLD AUTO: 3.58 M/UL (ref 4.35–5.65)
SAO2 % BLD: 97 % (ref 94–98)
SERVICE CMNT-IMP: ABNORMAL
SODIUM BLD-SCNC: 136 MMOL/L (ref 136–145)
SODIUM SERPL-SCNC: 140 MMOL/L (ref 136–145)
SPECIMEN SITE: ABNORMAL
VENTILATION MODE VENT: ABNORMAL
VT SETTING VENT: 505
WBC # BLD AUTO: 3.8 K/UL (ref 4.6–13.2)

## 2025-07-14 PROCEDURE — 87102 FUNGUS ISOLATION CULTURE: CPT

## 2025-07-14 PROCEDURE — 71045 X-RAY EXAM CHEST 1 VIEW: CPT

## 2025-07-14 PROCEDURE — 6370000000 HC RX 637 (ALT 250 FOR IP): Performed by: INTERNAL MEDICINE

## 2025-07-14 PROCEDURE — 6360000002 HC RX W HCPCS: Performed by: INTERNAL MEDICINE

## 2025-07-14 PROCEDURE — 6370000000 HC RX 637 (ALT 250 FOR IP): Performed by: HOSPITALIST

## 2025-07-14 PROCEDURE — 87186 SC STD MICRODIL/AGAR DIL: CPT

## 2025-07-14 PROCEDURE — 94668 MNPJ CHEST WALL SBSQ: CPT

## 2025-07-14 PROCEDURE — 84100 ASSAY OF PHOSPHORUS: CPT

## 2025-07-14 PROCEDURE — 2709999900 HC NON-CHARGEABLE SUPPLY: Performed by: INTERNAL MEDICINE

## 2025-07-14 PROCEDURE — 82947 ASSAY GLUCOSE BLOOD QUANT: CPT

## 2025-07-14 PROCEDURE — 83605 ASSAY OF LACTIC ACID: CPT

## 2025-07-14 PROCEDURE — 87205 SMEAR GRAM STAIN: CPT

## 2025-07-14 PROCEDURE — 88305 TISSUE EXAM BY PATHOLOGIST: CPT

## 2025-07-14 PROCEDURE — 85014 HEMATOCRIT: CPT

## 2025-07-14 PROCEDURE — 87116 MYCOBACTERIA CULTURE: CPT

## 2025-07-14 PROCEDURE — 82803 BLOOD GASES ANY COMBINATION: CPT

## 2025-07-14 PROCEDURE — 2500000003 HC RX 250 WO HCPCS: Performed by: INTERNAL MEDICINE

## 2025-07-14 PROCEDURE — 84132 ASSAY OF SERUM POTASSIUM: CPT

## 2025-07-14 PROCEDURE — 89051 BODY FLUID CELL COUNT: CPT

## 2025-07-14 PROCEDURE — 84295 ASSAY OF SERUM SODIUM: CPT

## 2025-07-14 PROCEDURE — 94669 MECHANICAL CHEST WALL OSCILL: CPT

## 2025-07-14 PROCEDURE — 3600007502: Performed by: INTERNAL MEDICINE

## 2025-07-14 PROCEDURE — 99153 MOD SED SAME PHYS/QHP EA: CPT | Performed by: INTERNAL MEDICINE

## 2025-07-14 PROCEDURE — 74018 RADEX ABDOMEN 1 VIEW: CPT

## 2025-07-14 PROCEDURE — 94640 AIRWAY INHALATION TREATMENT: CPT

## 2025-07-14 PROCEDURE — 87206 SMEAR FLUORESCENT/ACID STAI: CPT

## 2025-07-14 PROCEDURE — 3600007512: Performed by: INTERNAL MEDICINE

## 2025-07-14 PROCEDURE — 88112 CYTOPATH CELL ENHANCE TECH: CPT

## 2025-07-14 PROCEDURE — 94003 VENT MGMT INPAT SUBQ DAY: CPT

## 2025-07-14 PROCEDURE — 2580000003 HC RX 258: Performed by: INTERNAL MEDICINE

## 2025-07-14 PROCEDURE — 0B9J8ZX DRAINAGE OF LEFT LOWER LUNG LOBE, VIA NATURAL OR ARTIFICIAL OPENING ENDOSCOPIC, DIAGNOSTIC: ICD-10-PCS | Performed by: INTERNAL MEDICINE

## 2025-07-14 PROCEDURE — 87070 CULTURE OTHR SPECIMN AEROBIC: CPT

## 2025-07-14 PROCEDURE — 2000000000 HC ICU R&B

## 2025-07-14 PROCEDURE — 82330 ASSAY OF CALCIUM: CPT

## 2025-07-14 PROCEDURE — 85025 COMPLETE CBC W/AUTO DIFF WBC: CPT

## 2025-07-14 PROCEDURE — 2700000000 HC OXYGEN THERAPY PER DAY

## 2025-07-14 PROCEDURE — 99233 SBSQ HOSP IP/OBS HIGH 50: CPT | Performed by: INTERNAL MEDICINE

## 2025-07-14 PROCEDURE — 99152 MOD SED SAME PHYS/QHP 5/>YRS: CPT | Performed by: INTERNAL MEDICINE

## 2025-07-14 PROCEDURE — 87077 CULTURE AEROBIC IDENTIFY: CPT

## 2025-07-14 PROCEDURE — 36600 WITHDRAWAL OF ARTERIAL BLOOD: CPT

## 2025-07-14 PROCEDURE — 80053 COMPREHEN METABOLIC PANEL: CPT

## 2025-07-14 PROCEDURE — 82962 GLUCOSE BLOOD TEST: CPT

## 2025-07-14 PROCEDURE — 83735 ASSAY OF MAGNESIUM: CPT

## 2025-07-14 RX ORDER — POTASSIUM CHLORIDE 7.45 MG/ML
10 INJECTION INTRAVENOUS PRN
Status: DISCONTINUED | OUTPATIENT
Start: 2025-07-14 | End: 2025-07-15 | Stop reason: SDUPTHER

## 2025-07-14 RX ORDER — PROPOFOL 10 MG/ML
5-20 INJECTION, EMULSION INTRAVENOUS CONTINUOUS
Status: DISCONTINUED | OUTPATIENT
Start: 2025-07-14 | End: 2025-07-14

## 2025-07-14 RX ORDER — SODIUM CHLORIDE 0.9 % (FLUSH) 0.9 %
5-40 SYRINGE (ML) INJECTION EVERY 12 HOURS SCHEDULED
Status: CANCELLED | OUTPATIENT
Start: 2025-07-14

## 2025-07-14 RX ORDER — CLONAZEPAM 0.5 MG/1
0.5 TABLET ORAL NIGHTLY
Status: DISCONTINUED | OUTPATIENT
Start: 2025-07-14 | End: 2025-07-16

## 2025-07-14 RX ORDER — POTASSIUM CHLORIDE 1500 MG/1
40 TABLET, EXTENDED RELEASE ORAL PRN
Status: DISCONTINUED | OUTPATIENT
Start: 2025-07-14 | End: 2025-07-16

## 2025-07-14 RX ORDER — SODIUM CHLORIDE 9 MG/ML
INJECTION, SOLUTION INTRAVENOUS CONTINUOUS
Status: CANCELLED | OUTPATIENT
Start: 2025-07-14

## 2025-07-14 RX ORDER — SODIUM CHLORIDE 9 MG/ML
INJECTION, SOLUTION INTRAVENOUS PRN
Status: CANCELLED | OUTPATIENT
Start: 2025-07-14

## 2025-07-14 RX ORDER — SODIUM CHLORIDE 0.9 % (FLUSH) 0.9 %
5-40 SYRINGE (ML) INJECTION PRN
Status: CANCELLED | OUTPATIENT
Start: 2025-07-14

## 2025-07-14 RX ADMIN — Medication 400 MG: at 21:07

## 2025-07-14 RX ADMIN — CYCLOBENZAPRINE HYDROCHLORIDE 10 MG: 10 TABLET, FILM COATED ORAL at 11:05

## 2025-07-14 RX ADMIN — IPRATROPIUM BROMIDE 0.5 MG: 0.5 SOLUTION RESPIRATORY (INHALATION) at 20:32

## 2025-07-14 RX ADMIN — DIAZEPAM 5 MG: 5 INJECTION INTRAMUSCULAR; INTRAVENOUS at 17:25

## 2025-07-14 RX ADMIN — CLONAZEPAM 0.5 MG: 0.5 TABLET ORAL at 21:07

## 2025-07-14 RX ADMIN — POLYETHYLENE GLYCOL 3350 17 G: 17 POWDER, FOR SOLUTION ORAL at 08:32

## 2025-07-14 RX ADMIN — SODIUM CHLORIDE, PRESERVATIVE FREE 40 MG: 5 INJECTION INTRAVENOUS at 08:31

## 2025-07-14 RX ADMIN — FENTANYL CITRATE 50 MCG: 50 INJECTION INTRAMUSCULAR; INTRAVENOUS at 02:14

## 2025-07-14 RX ADMIN — DIBASIC SODIUM PHOSPHATE, MONOBASIC POTASSIUM PHOSPHATE AND MONOBASIC SODIUM PHOSPHATE 1 TABLET: 852; 155; 130 TABLET ORAL at 21:07

## 2025-07-14 RX ADMIN — POTASSIUM BICARBONATE 40 MEQ: 782 TABLET, EFFERVESCENT ORAL at 11:05

## 2025-07-14 RX ADMIN — CHLORHEXIDINE GLUCONATE 15 ML: 1.2 RINSE ORAL at 08:33

## 2025-07-14 RX ADMIN — SODIUM CHLORIDE 10 MG/HR: 900 INJECTION, SOLUTION INTRAVENOUS at 05:59

## 2025-07-14 RX ADMIN — FENTANYL CITRATE 75 MCG/HR: 0.05 INJECTION, SOLUTION INTRAMUSCULAR; INTRAVENOUS at 21:30

## 2025-07-14 RX ADMIN — FENTANYL CITRATE 50 MCG/HR: 0.05 INJECTION, SOLUTION INTRAMUSCULAR; INTRAVENOUS at 03:34

## 2025-07-14 RX ADMIN — FENTANYL CITRATE 50 MCG: 50 INJECTION INTRAMUSCULAR; INTRAVENOUS at 19:30

## 2025-07-14 RX ADMIN — SODIUM CHLORIDE, PRESERVATIVE FREE 10 ML: 5 INJECTION INTRAVENOUS at 08:34

## 2025-07-14 RX ADMIN — DORNASE ALFA 2.5 MG: 1 SOLUTION RESPIRATORY (INHALATION) at 20:32

## 2025-07-14 RX ADMIN — METOPROLOL TARTRATE 2.5 MG: 5 INJECTION INTRAVENOUS at 11:00

## 2025-07-14 RX ADMIN — DORNASE ALFA 2.5 MG: 1 SOLUTION RESPIRATORY (INHALATION) at 08:45

## 2025-07-14 RX ADMIN — METOPROLOL TARTRATE 2.5 MG: 5 INJECTION INTRAVENOUS at 06:35

## 2025-07-14 RX ADMIN — MEROPENEM 1000 MG: 1 INJECTION INTRAVENOUS at 06:36

## 2025-07-14 RX ADMIN — THIAMINE HYDROCHLORIDE 100 MG: 100 INJECTION, SOLUTION INTRAMUSCULAR; INTRAVENOUS at 08:35

## 2025-07-14 RX ADMIN — PREDNISONE 10 MG: 10 TABLET ORAL at 08:36

## 2025-07-14 RX ADMIN — MEROPENEM 1000 MG: 1 INJECTION INTRAVENOUS at 21:07

## 2025-07-14 RX ADMIN — FOLIC ACID 1 MG: 5 INJECTION, SOLUTION INTRAMUSCULAR; INTRAVENOUS; SUBCUTANEOUS at 11:00

## 2025-07-14 RX ADMIN — BUDESONIDE 500 MCG: 0.5 INHALANT RESPIRATORY (INHALATION) at 08:45

## 2025-07-14 RX ADMIN — Medication 400 MG: at 08:33

## 2025-07-14 RX ADMIN — BUDESONIDE 500 MCG: 0.5 INHALANT RESPIRATORY (INHALATION) at 20:32

## 2025-07-14 RX ADMIN — IPRATROPIUM BROMIDE 0.5 MG: 0.5 SOLUTION RESPIRATORY (INHALATION) at 14:25

## 2025-07-14 RX ADMIN — SODIUM CHLORIDE, PRESERVATIVE FREE 10 ML: 5 INJECTION INTRAVENOUS at 21:00

## 2025-07-14 RX ADMIN — IPRATROPIUM BROMIDE 0.5 MG: 0.5 SOLUTION RESPIRATORY (INHALATION) at 03:12

## 2025-07-14 RX ADMIN — ARFORMOTEROL TARTRATE 15 MCG: 15 SOLUTION RESPIRATORY (INHALATION) at 08:45

## 2025-07-14 RX ADMIN — DIBASIC SODIUM PHOSPHATE, MONOBASIC POTASSIUM PHOSPHATE AND MONOBASIC SODIUM PHOSPHATE 1 TABLET: 852; 155; 130 TABLET ORAL at 08:30

## 2025-07-14 RX ADMIN — MEROPENEM 1000 MG: 1 INJECTION INTRAVENOUS at 14:53

## 2025-07-14 RX ADMIN — PROPOFOL 20 MCG/KG/MIN: 10 INJECTION, EMULSION INTRAVENOUS at 13:04

## 2025-07-14 RX ADMIN — SODIUM CHLORIDE 10 MG/HR: 900 INJECTION, SOLUTION INTRAVENOUS at 16:05

## 2025-07-14 RX ADMIN — ASPIRIN 81 MG: 81 TABLET, CHEWABLE ORAL at 08:33

## 2025-07-14 RX ADMIN — CHLORHEXIDINE GLUCONATE 15 ML: 1.2 RINSE ORAL at 21:06

## 2025-07-14 RX ADMIN — ARFORMOTEROL TARTRATE 15 MCG: 15 SOLUTION RESPIRATORY (INHALATION) at 20:32

## 2025-07-14 RX ADMIN — IPRATROPIUM BROMIDE 0.5 MG: 0.5 SOLUTION RESPIRATORY (INHALATION) at 08:45

## 2025-07-14 ASSESSMENT — PULMONARY FUNCTION TESTS
PIF_VALUE: 17
PIF_VALUE: 16
PIF_VALUE: 17
PIF_VALUE: 17
PIF_VALUE: 18
PIF_VALUE: 17

## 2025-07-14 ASSESSMENT — PAIN SCALES - GENERAL
PAINLEVEL_OUTOF10: 0

## 2025-07-14 ASSESSMENT — PAIN - FUNCTIONAL ASSESSMENT
PAIN_FUNCTIONAL_ASSESSMENT: ADULT NONVERBAL PAIN SCALE (NPVS)

## 2025-07-14 ASSESSMENT — PAIN SCALES - WONG BAKER: WONGBAKER_NUMERICALRESPONSE: NO HURT

## 2025-07-14 NOTE — CARE COORDINATION
Telephone call placed to the sister/NOK of the patient, Afshan Al, regarding assistance with medical care decisions for the patient. [place] answer[place] [facility] [place] to  at [phone].

## 2025-07-14 NOTE — PROGRESS NOTES
PALLIATIVE MEDICINE    AMD STATUS: NO AMD ON FILE    Sister Afshan Al according to [place] hierarchy of decision making is legal next of kin, however Beny stated in the past he did not want his sister making decisions on his behalf but did not name another person. Patient needs consent for procedures including possible trach/peg/bronchoscopy.   Afshan stated patient does have a son but has not been in contact with patient since son was about 5 years old. Afshan stated the sons mother did not want son to have contact with patient. According to Afshan son has since been adopted by stepfather and no one in family has spoken to or knows any contact information for patient son.       CODE STATUS: FULL CODE    1000: Seen today in Rm 104 with Dr. Charly Law. Patient lying in bed supine with head of bed raised.  Eyes opened. Two point restraints. Intubated FiO2 30%, PEEP 6. Sedated, Fentanly and Versed gtt.   Shook head no when asked about pain.    1211: Spoke with sister Afshan. Gave brief medical update. Afshan states she would like to speak with physician before making any decisions.       Palliative medicine will  continue to follow Beny Knapp and his family during his admission.     Maura Kimball RN  Palliative Medicine  [phone]

## 2025-07-14 NOTE — PROGRESS NOTES
CPT completed via [place] and [facility] without complications.    0324- ABG obtained, FIO2 weaned to 30%.

## 2025-07-14 NOTE — PROGRESS NOTES
Palliative Medicine Progress Note  [facility]: [phone]     Patient Name: Beny Knapp  YOB: 1965    Date of Service: 2025  Provider: Charly Law MD  Admit Date: 2025  Referring Provider:  Jocelyne Richmond MD    Reason for Consult: goals of care       HISTORY OF PRESENT ILLNESS:     Beny Knapp is a 59 y.o. with a past history of COPD on chronic/frequent steroid therapy, HTN, HCV cirrhosis (infection treated and HCV quant negative), tobacco use disorder (1/3- PPD), bipolar 1 disorder, and alcohol and cocaine use disorder recently at [facility] in 2025 who was admitted on 2025 when he presented to ED with sensation of foreign body in the throat with stridor. Found to have severe candidiasis and required intubation. Patient was also found to have hyponatremia. He remains in ICU on ventilator with fluctuating oxygen requirements and challenging sedation. Today is vent day 17 and patient will require court order for tracheostomy and PEG.    Psychosocial: Patient has no emergency contacts on file. A relative search is underway with Case Management to identify surrogate decision-makers and verify personal history. Phone numbers for a mother, Rose Al, have not been working. Welfare check for a brother in [place], [place] determined he is . Previous medical records consistently state patient has never been . One medical record states he has one child, but lists no name or contact information.    Functional: Patient's baseline functional status was presumably independent with ADLs and IADLs, but with significant psychosocial morbidity. He is currently intubated/sedated and RASS +2 requiring escalating sedation protocol.      2025: Patient was seen in presence of ICU staff and palliative care nurse.  Patient is currently on Versed drip.  He is awake and has good handgrips.  Denies for having any pain by nodding his head.  He

## 2025-07-14 NOTE — PROGRESS NOTES
I spoke to sister Afshan Al we got consent for bronchoscopy.  Patient is on maximal sedation fentanyl and Versed awake following commands agitated, we used temporarily propofol just for the procedure for conscious sedation.  Monitoring heart rate and blood pressure  KEELY Richmond MD

## 2025-07-14 NOTE — CARE COORDINATION
CM contacted the Adult Protective Services Hotline - [phone] for assistance due to the patient being incapacitated and his NOK, Afshanhaider Al, being unwilling to assist [facility] with medical care decisions. Report # [id number].   Once case is assigned, a representative from [facility] - [phone] - will reach out to .

## 2025-07-14 NOTE — PROGRESS NOTES
[facility]  (A Division of [facility])    Follow-up Note      Date of Admission: 6/14/2025       Date of Note:  7/14/2025    Summary:  Mr Beny Knapp is a 59 y.o. male White (non-) pmh hypertension tobacco and polysubstance abuse COPD chronic hepatitis was admitted to [facility] 6/14/2025 . He was brought in by EMS with difficulty breathing and possible foreign body obstruction of airway. He was intubated in the ED for airway protection chest x-ray showed mild pulmonary edema x-ray of soft tissue of the neck had no acute pathology noted at the time of intubation esophageal coating consistent with candidiasis was noted and infectious disease consultation was requested. He was started at that time on fluconazole 400 mg IV followed by 14 days of 200 mg. White blood cell count on admission was 7.4 drug screen was positive for fentanyl, benzodiazepines cocaine and THC. AST 77 ALT 52 HIV 1 and 2 antibody were negative HIV PCR was not done as yet. Blood cultures x 2 and urine culture remain no growth respiratory cultures in progress empiric ceftriaxone was given on the day of admission doxycycline was started on 615 dose of ampicillin given on 616. Patient remains afebrile with no elevation in white blood cell count.     Today:  Chart reviewed - pt seen  Remains intubated, but FiO2 improved since LLL mucus plugging removed earlier today.  Agitated.    Tm <100  WBC 3.8k  FiO2 0.30      Current Antimicrobials:    Prior Antimicrobials:  Meropenem IV (7/11-) #3 Zithro 6/16 to 20  CAX 6/16 to 20     Cefepime 6/20 to date # 7 days  Fluconazole 6/14-> Micafungin  150 mg daily   #14     Metronidazoli 500 mg IV Q8 hours-6/27 to 7/4       Assessment: Rec / Plan:   Recurrent respiratory failure- re-intubated 7/10  The 2 GNRs recovered from his phlegm of late/this month are both real SLIMERS/mucoid that it explains his mucus plugging/increased ventilation of

## 2025-07-14 NOTE — PROGRESS NOTES
Hospitalist Progress Note    Patient: Beny Knapp MRN: [id number]  CSN: [id number]    YOB: 1965  Age: 59 y.o.  Sex: male    DOA: 6/14/2025 LOS:  LOS: 30 days          Chief Complain :  Foreign body, shortness of breath.  59 y.o.  male w/ PMH of COPD, ETOH-use and polysubstance abuse who was BIBA and presents with subjective difficulty breathing and swallowing with throat pain. Brought in by EMS for possible forign body (food) with bolus within region of velecula. ENT saw pt and ED intubated for airway protection. ENT was consulted and performed scope on pt with findings of extensive esophageal candidiasis. Nephrology was consulted for severe hyponatremia with sodium of 117 recommending NS at 75cc/hr. Intensivist was contacted as well d/t pt being intubated, on vent for airway protection. Prolong intubation, extubated 07/05/2025. Has been uncooperative, did not cooperate with MBS and did not cooperate with placing NG tube.  07/10/2025 SNVT episode early morning, went into respiratory distress. Required intubation. Thick copious secretions from ET tube.    Subjective:   Patient orally intubated, awake, follows commands.  Assessment/Plan     Active Hospital Problems    Diagnosis     Palliative care encounter [Z51.5]     Goals of care, counseling/discussion [Z71.89]     Pleural effusion on left [J90]     Pulmonary atelectasis [J98.11]     Moderate malnutrition [E44.0]     Aspiration pneumonia (HCC) [J69.0]     Cocaine abuse (HCC) [F14.10]     Hyponatremia [E87.1]     Stridor [R06.1]     Obstructed, larynx [J38.6]     Acute respiratory failure with hypoxia (HCC) [J96.01]     ETOH abuse [F10.10]     Polysubstance abuse

## 2025-07-14 NOTE — PROGRESS NOTES
I called Afshan Al after discussion with case management to get consent for bronch and possibkle trach, PEG but no answer I left BECKIE Martinez MD

## 2025-07-14 NOTE — OP NOTE
Operative Note      Patient: Beny Knapp  YOB: 1965  MRN: [id number]    Date of Procedure: 7/14/2025    Pre-Op Diagnosis Codes:      * Aspiration pneumonia, unspecified aspiration pneumonia type, unspecified laterality, unspecified part of lung (HCC) [J69.0]                  [facility]                  Pulmonary, Critical Care, and Sleep Medicine     Bronchoscopy with BAL.    Pre-procedure diagnosis, Indication  Pulmonary infiltrates. R91.8  Pneumonia. J18.9    Mucous plugging     Post procedure diagnosis  Same  Severe mucous plugging     Procedures Performed  Diagnostic bronchoscopy.  Flexible Fiberoptic Diagnostic Bronchoscopy.    BAL (bronchoalveolar lavage) from LLL lobe.      Consent/Treatment: Informed consent was obtained from the  family after risks, benefits and alternatives were explained. Timeout verified the correct patient and correct procedure.     Assistant: none    Anesthesia:   Moderate sedation with propofol drip  was used  Conscious sedation     Procedure Details:   -- The bronchoscope was introduced through an endotracheal tube.   -- The vocal cords when not visualized due to ETT.  .  -- The trachea and justine were completely inspected and were found to be normal.  -- The right-sided endobronchial anatomy was completely inspected and was found to be normal.  -- The left-sided endobronchial anatomy with severe mucous plugging   Specimens:   The bronchoscope was wedged in the BAL and bronchoalveolar lavage was performed; material was sent for  AFB smear and culture, microbiology , cytology,     -- The bronchoscope was wedged in the LLL and bronchoalveolar lavage was performed; material was sent for  microbiology. BAL, AFB, cytology         Implants: none, NA    Complications: none  Vital signs remained stable throughout the procedure. Patient was woken up in endoscopy suite and then transferred to recovery area in a stable condition.  Family members were

## 2025-07-14 NOTE — PROGRESS NOTES
ALFREDO reached out Afshan regarding update. Afshan stated that she lives at [address] [facility] in [place] [address] with Roommate. Afshan stated that she is in agreement with trach and is willing to do verbal consent today via phone. Afshan stated that she is able to come to acute setting at 6pm and is able to be transported by her roommate. CM manager aware and will reach out to MD regarding consent needed verbally. ALFREDO to follow.

## 2025-07-14 NOTE — PLAN OF CARE
Problem: Discharge Planning  Goal: Discharge to home or other facility with appropriate resources  Outcome: Progressing     Problem: Pain  Goal: Verbalizes/displays adequate comfort level or baseline comfort level  Outcome: Progressing     Problem: Chronic Conditions and Co-morbidities  Goal: Patient's chronic conditions and co-morbidity symptoms are monitored and maintained or improved  Outcome: Progressing     Problem: Neurosensory - Adult  Goal: Achieves stable or improved neurological status  Outcome: Progressing  Flowsheets (Taken 7/13/2025 0800)  Achieves stable or improved neurological status:   Assess for and report changes in neurological status   Initiate measures to prevent increased intracranial pressure   Maintain blood pressure and fluid volume within ordered parameters to optimize cerebral perfusion and minimize risk of hemorrhage   Monitor temperature, glucose, and sodium. Initiate appropriate interventions as ordered     Problem: Respiratory - Adult  Goal: Achieves optimal ventilation and oxygenation  Outcome: Progressing  Flowsheets (Taken 7/13/2025 0800)  Achieves optimal ventilation and oxygenation:   Assess for changes in respiratory status   Assess for changes in mentation and behavior   Position to facilitate oxygenation and minimize respiratory effort     Problem: Cardiovascular - Adult  Goal: Maintains optimal cardiac output and hemodynamic stability  Outcome: Progressing  Flowsheets (Taken 7/13/2025 0800)  Maintains optimal cardiac output and hemodynamic stability:   Monitor blood pressure and heart rate   Monitor urine output and notify Licensed Independent Practitioner for values outside of normal range   Assess for signs of decreased cardiac output     Problem: Skin/Tissue Integrity - Adult  Goal: Skin integrity remains intact  Description: 1.  Monitor for areas of redness and/or skin breakdown  2.  Assess vascular access sites hourly  3.  Every 4-6 hours minimum:  Change oxygen

## 2025-07-14 NOTE — PERIOP NOTE
Report given to [facility] Doan RN, VSS, report included findings.   
SPECIMENS DROPPED OFF IN THE LAB AND VERIFIED  
[facility]

## 2025-07-14 NOTE — PROGRESS NOTES
left-sided pleural effusion is again noted. There is persistent bibasilar atelectasis. The cardiomediastinal configuration is within normal limits. No acute bony abnormalities.     Small left-sided pleural effusion and bibasilar atelectasis are again noted. Electronically signed by ASHLIE Morton    XR ABDOMEN (KUB) (SINGLE AP VIEW)  Result Date: 7/13/2025  EXAM:  XR ABDOMEN (KUB) (SINGLE AP VIEW) INDICATION: Gastric tube COMPARISON: None. TECHNIQUE: Supine frontal abdomen (KUB). FINDINGS: No dilated small bowel. Stool and gas are present in the colon. No pathologic calcification. Osseous structures are age appropriate. Gastric tube tip projects with its sidehole and tip over the left upper quadrant in the expected location of the gastric fundus/body. Left basilar infiltrate and possible pleural effusion.     Gastric tube as described. Electronically signed by SANTIAGO PIKE    XR CHEST PORTABLE  Result Date: 7/13/2025  EXAM:  XR CHEST PORTABLE INDICATION: intubated COMPARISON: 7/12/2025 TECHNIQUE: portable chest AP view FINDINGS: Heart size is stable. ET tube and gastric tube appear stable, although the tip of the gastric tube is not seen. Right upper extremity PICC line now projects with its tip over the superior vena cava. The pulmonary vasculature is within normal limits. Lung volumes are moderate with stable bibasilar atelectasis and left pleural effusion. The visualized bones and upper abdomen are age-appropriate.     Stable bibasilar atelectasis and left pleural effusion. Right upper extremity PICC line. Otherwise stable lines and tubes. Electronically signed by SANTIAGO PIKE    XR CHEST PORTABLE  Result Date: 7/12/2025  INDICATION:    intubated EXAMINATION:  AP CHEST, PORTABLE COMPARISON: 7/11/2025 FINDINGS: Single AP portable view of the chest at 536 hours demonstrates no change in position of the lines and tubes. The cardiomediastinal silhouette is stable. There is no new airspace disease. There is  respiratory failure, basilar infiltrates, ET tube position COMPARISON: 6/26/2025 FINDINGS: A portable AP radiograph of the chest was obtained at 530 hours. Endotracheal tube is 5.2 cm above the justine. Nasogastric tube courses in the stomach. Right IJ catheter is stable.. Small left effusion and bibasilar volume loss. Cardiomegaly.  The bones and soft tissues are grossly within normal limits.     Improved aeration of the lungs with persistent left effusion and bibasilar volume loss. Electronically signed by Eliseo Espinosa    CT CHEST ABDOMEN PELVIS W CONTRAST Additional Contrast? None (pt has residual PO contrast)  Result Date: 6/26/2025  INDICATION: Poor gastric emptying, R/o ileus COMPARISON: 6/20/2025 TECHNIQUE:  Following the uneventful intravenous administration of 100 cc Isovue-300, 5 mm axial images were obtained through the chest, abdomen, and pelvis.  Coronal and sagittal reconstructions were generated. CT dose reduction was achieved through use of a standardized protocol tailored for this examination and automatic exposure control for dose modulation. ORAL CONTRAST: None. FINDINGS: MEDIASTINUM:No mass or lymphadenopathy. LINDSEY: No mass or lymphadenopathy. THORACIC AORTA: No dissection or aneurysm. MAIN PULMONARY ARTERY: Normal in caliber. TRACHEA/BRONCHI: Left lower lobe bronchus is fluid-filled. Endotracheal tube terminates about 4 cm above the justine. ESOPHAGUS: No wall thickening or dilatation. HEART: Normal in size.  Coronary artery calcium:  absent. PLEURA: No effusion or pneumothorax. LUNGS: Bilateral lower lobe atelectasis. LIVER: No mass or biliary dilatation. GALLBLADDER: Multiple gallstones. SPLEEN: No mass. PANCREAS: No mass or ductal dilatation. ADRENALS: Unremarkable. KIDNEYS: No mass, calculus, or hydronephrosis. STOMACH: Unremarkable. NG tube extends into the stomach. SMALL BOWEL: No dilatation or wall thickening. COLON: No dilatation or wall thickening. Rectum distended with stool. Two

## 2025-07-15 ENCOUNTER — APPOINTMENT (OUTPATIENT)
Facility: HOSPITAL | Age: 60
DRG: 368 | End: 2025-07-15
Payer: MEDICARE

## 2025-07-15 LAB
ALBUMIN SERPL-MCNC: 2 G/DL (ref 3.4–5)
ALBUMIN/GLOB SERPL: 0.6 (ref 0.8–1.7)
ALP SERPL-CCNC: 85 U/L (ref 45–117)
ALT SERPL-CCNC: 267 U/L (ref 10–50)
ANION GAP SERPL CALC-SCNC: 10 MMOL/L (ref 3–18)
AST SERPL-CCNC: 88 U/L (ref 10–38)
BASOPHILS # BLD: 0 K/UL (ref 0–0.1)
BASOPHILS NFR BLD: 0 % (ref 0–2)
BILIRUB SERPL-MCNC: 0.8 MG/DL (ref 0.2–1)
BUN SERPL-MCNC: 14 MG/DL (ref 6–23)
BUN/CREAT SERPL: 53 (ref 12–20)
CALCIUM SERPL-MCNC: 8.4 MG/DL (ref 8.5–10.1)
CHLORIDE SERPL-SCNC: 102 MMOL/L (ref 98–107)
CO2 SERPL-SCNC: 27 MMOL/L (ref 21–32)
CREAT SERPL-MCNC: 0.26 MG/DL (ref 0.6–1.3)
DIFFERENTIAL METHOD BLD: ABNORMAL
EOSINOPHIL # BLD: 0 K/UL (ref 0–0.4)
EOSINOPHIL NFR BLD: 0 % (ref 0–5)
ERYTHROCYTE [DISTWIDTH] IN BLOOD BY AUTOMATED COUNT: 14.3 % (ref 11.6–14.5)
GLOBULIN SER CALC-MCNC: 3.3 G/DL (ref 2–4)
GLUCOSE BLD STRIP.AUTO-MCNC: 127 MG/DL (ref 70–110)
GLUCOSE SERPL-MCNC: 87 MG/DL (ref 74–108)
HCT VFR BLD AUTO: 34.3 % (ref 36–48)
HGB BLD-MCNC: 11.3 G/DL (ref 13–16)
IMM GRANULOCYTES # BLD AUTO: 0 K/UL
IMM GRANULOCYTES NFR BLD AUTO: 0 %
LYMPHOCYTES # BLD: 0.64 K/UL (ref 0.9–3.6)
LYMPHOCYTES NFR BLD: 16 % (ref 21–52)
MAGNESIUM SERPL-MCNC: 1.7 MG/DL (ref 1.6–2.6)
MCH RBC QN AUTO: 29.2 PG (ref 24–34)
MCHC RBC AUTO-ENTMCNC: 32.9 G/DL (ref 31–37)
MCV RBC AUTO: 88.6 FL (ref 78–100)
METAMYELOCYTES NFR BLD MANUAL: 3 %
MONOCYTES # BLD: 0.08 K/UL (ref 0.05–1.2)
MONOCYTES NFR BLD: 2 % (ref 3–10)
MYELOCYTES NFR BLD MANUAL: 1 %
NEUTS BAND NFR BLD MANUAL: 2 % (ref 0–5)
NEUTS SEG # BLD: 3.12 K/UL (ref 1.8–8)
NEUTS SEG NFR BLD: 76 % (ref 40–73)
NRBC # BLD: 0 K/UL (ref 0–0.01)
NRBC BLD-RTO: 0 PER 100 WBC
PHOSPHATE SERPL-MCNC: 1.8 MG/DL (ref 2.5–4.9)
PLATELET # BLD AUTO: 103 K/UL (ref 135–420)
PLATELET COMMENT: ABNORMAL
PMV BLD AUTO: 10.5 FL (ref 9.2–11.8)
POTASSIUM SERPL-SCNC: 3.8 MMOL/L (ref 3.5–5.5)
PROT SERPL-MCNC: 5.3 G/DL (ref 6.4–8.2)
RBC # BLD AUTO: 3.87 M/UL (ref 4.35–5.65)
RBC MORPH BLD: ABNORMAL
SODIUM SERPL-SCNC: 138 MMOL/L (ref 136–145)
WBC # BLD AUTO: 4 K/UL (ref 4.6–13.2)

## 2025-07-15 PROCEDURE — 6360000002 HC RX W HCPCS: Performed by: INTERNAL MEDICINE

## 2025-07-15 PROCEDURE — 93005 ELECTROCARDIOGRAM TRACING: CPT | Performed by: INTERNAL MEDICINE

## 2025-07-15 PROCEDURE — 6370000000 HC RX 637 (ALT 250 FOR IP): Performed by: INTERNAL MEDICINE

## 2025-07-15 PROCEDURE — 80053 COMPREHEN METABOLIC PANEL: CPT

## 2025-07-15 PROCEDURE — 74018 RADEX ABDOMEN 1 VIEW: CPT

## 2025-07-15 PROCEDURE — 2500000003 HC RX 250 WO HCPCS: Performed by: INTERNAL MEDICINE

## 2025-07-15 PROCEDURE — 36415 COLL VENOUS BLD VENIPUNCTURE: CPT

## 2025-07-15 PROCEDURE — 2580000003 HC RX 258: Performed by: INTERNAL MEDICINE

## 2025-07-15 PROCEDURE — 94640 AIRWAY INHALATION TREATMENT: CPT

## 2025-07-15 PROCEDURE — 82962 GLUCOSE BLOOD TEST: CPT

## 2025-07-15 PROCEDURE — 2700000000 HC OXYGEN THERAPY PER DAY

## 2025-07-15 PROCEDURE — 94668 MNPJ CHEST WALL SBSQ: CPT

## 2025-07-15 PROCEDURE — 85025 COMPLETE CBC W/AUTO DIFF WBC: CPT

## 2025-07-15 PROCEDURE — 82542 COL CHROMOTOGRAPHY QUAL/QUAN: CPT

## 2025-07-15 PROCEDURE — 84100 ASSAY OF PHOSPHORUS: CPT

## 2025-07-15 PROCEDURE — 86022 PLATELET ANTIBODIES: CPT

## 2025-07-15 PROCEDURE — 83735 ASSAY OF MAGNESIUM: CPT

## 2025-07-15 PROCEDURE — 94003 VENT MGMT INPAT SUBQ DAY: CPT

## 2025-07-15 PROCEDURE — 71045 X-RAY EXAM CHEST 1 VIEW: CPT

## 2025-07-15 PROCEDURE — 6370000000 HC RX 637 (ALT 250 FOR IP): Performed by: HOSPITALIST

## 2025-07-15 PROCEDURE — 94669 MECHANICAL CHEST WALL OSCILL: CPT

## 2025-07-15 PROCEDURE — 2000000000 HC ICU R&B

## 2025-07-15 RX ORDER — DEXMEDETOMIDINE HYDROCHLORIDE 4 UG/ML
.1-1.5 INJECTION, SOLUTION INTRAVENOUS CONTINUOUS
Status: DISCONTINUED | OUTPATIENT
Start: 2025-07-15 | End: 2025-07-16

## 2025-07-15 RX ORDER — MAGNESIUM SULFATE IN WATER 40 MG/ML
2000 INJECTION, SOLUTION INTRAVENOUS PRN
Status: DISCONTINUED | OUTPATIENT
Start: 2025-07-15 | End: 2025-07-15 | Stop reason: SDUPTHER

## 2025-07-15 RX ORDER — POTASSIUM CHLORIDE 7.45 MG/ML
10 INJECTION INTRAVENOUS PRN
Status: DISCONTINUED | OUTPATIENT
Start: 2025-07-15 | End: 2025-07-15 | Stop reason: SDUPTHER

## 2025-07-15 RX ORDER — DEXTROSE MONOHYDRATE AND SODIUM CHLORIDE 5; .9 G/100ML; G/100ML
INJECTION, SOLUTION INTRAVENOUS CONTINUOUS
Status: DISCONTINUED | OUTPATIENT
Start: 2025-07-15 | End: 2025-07-16

## 2025-07-15 RX ORDER — OXYMETAZOLINE HYDROCHLORIDE 0.05 G/100ML
2 SPRAY NASAL 2 TIMES DAILY
Status: COMPLETED | OUTPATIENT
Start: 2025-07-15 | End: 2025-07-15

## 2025-07-15 RX ORDER — POTASSIUM CHLORIDE 7.45 MG/ML
10 INJECTION INTRAVENOUS PRN
Status: DISCONTINUED | OUTPATIENT
Start: 2025-07-15 | End: 2025-07-16

## 2025-07-15 RX ORDER — POTASSIUM CHLORIDE 29.8 MG/ML
20 INJECTION INTRAVENOUS PRN
Status: DISCONTINUED | OUTPATIENT
Start: 2025-07-15 | End: 2025-07-15 | Stop reason: SDUPTHER

## 2025-07-15 RX ORDER — POTASSIUM CHLORIDE 29.8 MG/ML
20 INJECTION INTRAVENOUS PRN
Status: DISCONTINUED | OUTPATIENT
Start: 2025-07-15 | End: 2025-07-16

## 2025-07-15 RX ORDER — ALBUTEROL SULFATE 5 MG/ML
2.5 SOLUTION RESPIRATORY (INHALATION) EVERY 6 HOURS PRN
Status: DISCONTINUED | OUTPATIENT
Start: 2025-07-15 | End: 2025-07-16

## 2025-07-15 RX ADMIN — ARFORMOTEROL TARTRATE 15 MCG: 15 SOLUTION RESPIRATORY (INHALATION) at 20:05

## 2025-07-15 RX ADMIN — Medication 400 MG: at 21:39

## 2025-07-15 RX ADMIN — Medication 400 MG: at 12:16

## 2025-07-15 RX ADMIN — METOPROLOL TARTRATE 2.5 MG: 5 INJECTION INTRAVENOUS at 06:00

## 2025-07-15 RX ADMIN — IPRATROPIUM BROMIDE 0.5 MG: 0.5 SOLUTION RESPIRATORY (INHALATION) at 08:37

## 2025-07-15 RX ADMIN — DEXTROSE AND SODIUM CHLORIDE: 5; .9 INJECTION, SOLUTION INTRAVENOUS at 12:12

## 2025-07-15 RX ADMIN — BUDESONIDE 500 MCG: 0.5 INHALANT RESPIRATORY (INHALATION) at 08:37

## 2025-07-15 RX ADMIN — WATER 20 MG: 1 INJECTION INTRAMUSCULAR; INTRAVENOUS; SUBCUTANEOUS at 21:38

## 2025-07-15 RX ADMIN — DIBASIC SODIUM PHOSPHATE, MONOBASIC POTASSIUM PHOSPHATE AND MONOBASIC SODIUM PHOSPHATE 1 TABLET: 852; 155; 130 TABLET ORAL at 12:16

## 2025-07-15 RX ADMIN — SODIUM CHLORIDE 10 MG/HR: 900 INJECTION, SOLUTION INTRAVENOUS at 02:37

## 2025-07-15 RX ADMIN — METOPROLOL TARTRATE 2.5 MG: 5 INJECTION INTRAVENOUS at 12:42

## 2025-07-15 RX ADMIN — WATER 20 MG: 1 INJECTION INTRAMUSCULAR; INTRAVENOUS; SUBCUTANEOUS at 12:43

## 2025-07-15 RX ADMIN — CHLORHEXIDINE GLUCONATE 15 ML: 1.2 RINSE ORAL at 21:38

## 2025-07-15 RX ADMIN — CEFEPIME 2000 MG: 2 INJECTION, POWDER, FOR SOLUTION INTRAVENOUS at 12:50

## 2025-07-15 RX ADMIN — THIAMINE HYDROCHLORIDE 100 MG: 100 INJECTION, SOLUTION INTRAMUSCULAR; INTRAVENOUS at 09:47

## 2025-07-15 RX ADMIN — CEFEPIME 2000 MG: 2 INJECTION, POWDER, FOR SOLUTION INTRAVENOUS at 21:38

## 2025-07-15 RX ADMIN — Medication 2 SPRAY: at 12:26

## 2025-07-15 RX ADMIN — Medication 2 SPRAY: at 21:44

## 2025-07-15 RX ADMIN — DORNASE ALFA 2.5 MG: 1 SOLUTION RESPIRATORY (INHALATION) at 08:37

## 2025-07-15 RX ADMIN — BUDESONIDE 500 MCG: 0.5 INHALANT RESPIRATORY (INHALATION) at 20:05

## 2025-07-15 RX ADMIN — CHLORHEXIDINE GLUCONATE 15 ML: 1.2 RINSE ORAL at 09:47

## 2025-07-15 RX ADMIN — SODIUM CHLORIDE, PRESERVATIVE FREE 10 ML: 5 INJECTION INTRAVENOUS at 09:48

## 2025-07-15 RX ADMIN — METOPROLOL TARTRATE 2.5 MG: 5 INJECTION INTRAVENOUS at 23:53

## 2025-07-15 RX ADMIN — DIBASIC SODIUM PHOSPHATE, MONOBASIC POTASSIUM PHOSPHATE AND MONOBASIC SODIUM PHOSPHATE 1 TABLET: 852; 155; 130 TABLET ORAL at 21:39

## 2025-07-15 RX ADMIN — DORNASE ALFA 2.5 MG: 1 SOLUTION RESPIRATORY (INHALATION) at 20:05

## 2025-07-15 RX ADMIN — ARFORMOTEROL TARTRATE 15 MCG: 15 SOLUTION RESPIRATORY (INHALATION) at 08:37

## 2025-07-15 RX ADMIN — DEXMEDETOMIDINE 0.2 MCG/KG/HR: 100 INJECTION, SOLUTION INTRAVENOUS at 08:27

## 2025-07-15 RX ADMIN — DIAZEPAM 5 MG: 5 INJECTION INTRAMUSCULAR; INTRAVENOUS at 18:41

## 2025-07-15 RX ADMIN — FOLIC ACID 1 MG: 5 INJECTION, SOLUTION INTRAMUSCULAR; INTRAVENOUS; SUBCUTANEOUS at 09:47

## 2025-07-15 RX ADMIN — DIAZEPAM 5 MG: 5 INJECTION INTRAMUSCULAR; INTRAVENOUS at 01:12

## 2025-07-15 RX ADMIN — IPRATROPIUM BROMIDE 0.5 MG: 0.5 SOLUTION RESPIRATORY (INHALATION) at 03:11

## 2025-07-15 RX ADMIN — IPRATROPIUM BROMIDE 0.5 MG: 0.5 SOLUTION RESPIRATORY (INHALATION) at 15:09

## 2025-07-15 RX ADMIN — METOPROLOL TARTRATE 2.5 MG: 5 INJECTION INTRAVENOUS at 16:57

## 2025-07-15 RX ADMIN — IPRATROPIUM BROMIDE 0.5 MG: 0.5 SOLUTION RESPIRATORY (INHALATION) at 20:05

## 2025-07-15 RX ADMIN — CLONAZEPAM 0.5 MG: 0.5 TABLET ORAL at 21:30

## 2025-07-15 RX ADMIN — MEROPENEM 1000 MG: 1 INJECTION INTRAVENOUS at 05:51

## 2025-07-15 RX ADMIN — SODIUM CHLORIDE, PRESERVATIVE FREE 40 MG: 5 INJECTION INTRAVENOUS at 09:47

## 2025-07-15 RX ADMIN — ASPIRIN 81 MG: 81 TABLET, CHEWABLE ORAL at 12:15

## 2025-07-15 RX ADMIN — POLYETHYLENE GLYCOL 3350 17 G: 17 POWDER, FOR SOLUTION ORAL at 12:16

## 2025-07-15 RX ADMIN — SODIUM CHLORIDE, PRESERVATIVE FREE 10 ML: 5 INJECTION INTRAVENOUS at 21:00

## 2025-07-15 RX ADMIN — DIAZEPAM 5 MG: 5 INJECTION INTRAMUSCULAR; INTRAVENOUS at 23:09

## 2025-07-15 ASSESSMENT — PAIN SCALES - WONG BAKER
WONGBAKER_NUMERICALRESPONSE: NO HURT
WONGBAKER_NUMERICALRESPONSE: NO HURT

## 2025-07-15 ASSESSMENT — PULMONARY FUNCTION TESTS
PIF_VALUE: 19
PIF_VALUE: 18
PIF_VALUE: 15

## 2025-07-15 ASSESSMENT — PAIN SCALES - GENERAL
PAINLEVEL_OUTOF10: 0
PAINLEVEL_OUTOF10: 0

## 2025-07-15 NOTE — PROGRESS NOTES
Hospitalist Progress Note    Patient: Beny Knapp MRN: [id number]  CSN: [id number]    YOB: 1965  Age: 59 y.o.  Sex: male    DOA: 6/14/2025 LOS:  LOS: 31 days          Chief Complain :  Foreign body, shortness of breath.  59 y.o.  male w/ PMH of COPD, ETOH-use and polysubstance abuse who was BIBA and presents with subjective difficulty breathing and swallowing with throat pain. Brought in by EMS for possible forign body (food) with bolus within region of velecula. ENT saw pt and ED intubated for airway protection. ENT was consulted and performed scope on pt with findings of extensive esophageal candidiasis. Nephrology was consulted for severe hyponatremia with sodium of 117 recommending NS at 75cc/hr. Intensivist was contacted as well d/t pt being intubated, on vent for airway protection. Prolong intubation, extubated 07/05/2025. Has been uncooperative, did not cooperate with MBS and did not cooperate with placing NG tube.  07/10/2025 SNVT episode early morning, went into respiratory distress. Required intubation. Thick copious secretions from ET tube.    Subjective:   Patient laying in bed, extubated today.  Assessment/Plan     Active Hospital Problems    Diagnosis     Palliative care encounter [Z51.5]     Goals of care, counseling/discussion [Z71.89]     Pleural effusion on left [J90]     Pulmonary atelectasis [J98.11]     Moderate malnutrition [E44.0]     Aspiration pneumonia (HCC) [J69.0]     Cocaine abuse (HCC) [F14.10]     Hyponatremia [E87.1]     Stridor [R06.1]     Obstructed, larynx [J38.6]     Acute respiratory failure with hypoxia (HCC) [J96.01]     ETOH abuse [F10.10]     Polysubstance abuse (HCC)

## 2025-07-15 NOTE — PROGRESS NOTES
Cardiology Progress Note        Patient: Beny Knapp        Sex: male          DOA: 6/14/2025  YOB: 1965      Age:  59 y.o.        LOS:  LOS: 31 days     Patient seen and examined, chart reviewed.    Assessment/Plan     Patient Active Problem List   Diagnosis    HTN (hypertension)    Tobacco abuse    COPD (chronic obstructive pulmonary disease) (HCC)    Back pain    Depression    Chronic hepatitis C (HCC)    Thrombocytopenia    Hyponatremia    Stridor    Obstructed, larynx    Acute respiratory failure with hypoxia (HCC)    ETOH abuse    Polysubstance abuse (HCC)    Candidiasis of esophagus (HCC)    Aspiration pneumonia (HCC)    Cocaine abuse (HCC)    Moderate malnutrition    Pleural effusion on left    Pulmonary atelectasis    Palliative care encounter    Goals of care, counseling/discussion         Abnormal EKG suggestive of ischemia  Abnormal troponin no clear evidence of acute coronary syndrome, could be demand ischemia   NSVT       Echocardiogram was done in June 2025 reported       Image quality is fair.    Left Ventricle: Normal left ventricular systolic function with a visually estimated EF of 55 - 60%. EF by 2D Simpsons Biplane is 57%. Left ventricle size is normal. Normal wall thickness. Normal wall motion. Normal diastolic function.    Mitral Valve: There is mild posterior annular calcification noted. Thickened leaflets. No stenosis noted.    Tricuspid Valve : Unable to assess RVSP due to inadequate or insignificant tricuspid regurgitation.    Pericardium : No pericardial effusion.    IVC/SVC : Patient is ventilated, cannot use IVC diameter to estimate right atrial pressure. IVC size is normal.    Echocardiogram revealed       Contrast used: Lumason. Technically difficult study due to patient's body habitus and patient's ability to tolerate test.    Left Ventricle: Normal left ventricular systolic function with a visually estimated EF of 60 - 65%. Left

## 2025-07-15 NOTE — WOUND CARE
ICU Rounding  Wound care nurse rounded on patient for skin issues.  Patient has a Chauncey score of 15  Does patient have any pressure injury?no per visual assessment with primary nurse PUMA Humphreys     Skin Care & Pressure Relief Recommendations  Minimize layers of linen  Pads under patient to optimize support surface  Turn/reposition approximately every 2 hours  Use pillow wedges to maintain positioning but do not put pillow directly over wounds  Manage incontinence   Promote continence; Skin Protective lotion/cream to buttocks and sacrum daily and as needed with incontinence care  Offload heels pillows    Consult wound care if any wounds noted during admission.  Wound Care nurse will continue to follow during ICU admission.

## 2025-07-15 NOTE — PROGRESS NOTES
Renal Dosing Adjustment     Cefepime was automatically dose-adjusted per [facility] P&T approved Protocols, with respect to renal function.    Pt Weight:   Wt Readings from Last 1 Encounters:   07/13/25 88.9 kg (195 lb 15.8 oz)       Previous Regimen                                            Cefepime 2 grams IV q12h x 3 days   Creatinine Clearance Estimated Creatinine Clearance: 336 mL/min (A) (based on SCr of 0.26 mg/dL (L)).   BUN Lab Results   Component Value Date/Time    BUN 14 07/15/2025 05:45 AM         Assessment/Plan  Dose adjusted to Cefepime 2 grams IV q8h x 3 days  (each dose to infuse over 240 minutes) ICU extended dosing protocol  Pharmacy to continue to monitor patient daily.   Will make dosage adjustments based upon changing renal function.    Signed Gladis Christine RPH  Contact: [phone]

## 2025-07-15 NOTE — PROGRESS NOTES
[facility]  Pulmonary, Critical Care, and Sleep Medicine    Name: Beny Knapp MRN: [id number]   : 1965 Hospital: [facility]    Date: 7/15/2025  Room: [address] [place] [address] Note                                              Consult requesting physician: Dr. Mark   Reason for Consult: Respiratory failure, hyponatremia, upper airway    IMPRESSION:     Acute respiratory failure with hypoxemia  Pleural effusion-left  Pulmonary atelectasis  Obstructed larynx  Polysubstance abuse   Candidiasis of esophagus   Aspiration pneumonia   COPD/emphysema  EtOH use  Cocaine abuse  Hepatitis C  Cirrhosis of liver  Hyponatremia  Malnutrition      Active Hospital Problems    Diagnosis Date Noted    Palliative care encounter [Z51.5] 2025    Goals of care, counseling/discussion [Z71.89] 2025    Pleural effusion on left [J90] 07/10/2025    Pulmonary atelectasis [J98.11] 07/10/2025    Moderate malnutrition [E44.0] 2025    Aspiration pneumonia (HCC) [J69.0] 2025    Cocaine abuse (HCC) [F14.10] 2025    Hyponatremia [E87.1] 2025    Stridor [R06.1] 2025    Obstructed, larynx [J38.6] 2025    Acute respiratory failure with hypoxia (HCC) [J96.01] 2025    ETOH abuse [F10.10] 2025    Polysubstance abuse (HCC) [F19.10] 2025    Candidiasis of esophagus (HCC) [B37.81] 2025    Tobacco abuse [Z72.0] 2015    HTN (hypertension) [I10] 2015    COPD (chronic obstructive pulmonary disease) (HCC) [J44.9] 2015    Chronic hepatitis C (HCC) [B18.2] 2015        Code status: Full Code       RECOMMENDATIONS:     Respiratory: 59-year-old male with COPD, smoker, cocaine abuse-presented on 2025 with acute upper airway distress, fullness in the laryngeal area, questionable foreign body.  Prolonged intubation, and extubated 2025.  Patient had frequent oxygen desaturations while on ventilator support.  Prior to  consolidations; no significant pleural effusions; right basal cavitary/cystic lesion no longer evident.  CT Chest w/ contrast 6/26/25 report and images reviewed: Bibasilar atelectasis, asymmetric to the left. Left lower lobe bronchus is fluid filled.  Ultrasound chest 7/9/2025-Collapsed lung is more likely than complex effusion, mass, or empyema.  Discussed with Dr. Charly Law from palliative care earlier this week; recommended against bronchoscopy unless life-threatening situation or consent from family available.    CVS: Monitor hemodynamics; telemetry-sinus rhythm.  NSVT episode 7/10 morning.    EKG-LVH with strain pattern.  Troponins-improved.   Echo 7/9/2025-EF 60-65%; no pericardial effusion; overall poor study.  Discussed with Dr. Verma-cardiology not able to do COURTNEY due to lack of consent available.  Echo 6/14/25: normal LV systolic function; EF 55 to 60%; normal diastolic function; no significant valvular heart disease; pulmonary pressures not reported; no pericardial effusion. No inter-atrial shunt.  Aspirin daily.  Beta-blocker-IV metoprolol 2.5 mg every 6 hours as tolerated by blood pressure.  History of cocaine use, but patient hospitalized since 6/14.  Diuretic-Lasix prn.      ID: Patient completed antibiotic therapy.  Blood cultures 6/14/2025: No growth, final.  Urine culture 6/14/2025: No growth, final  Respiratory culture 6/16/25: Serratia, Klebsiella-sensitive to ceftriaxone.  Respiratory culture 6/14/2025: Mixed gram-negative rods  MRSA culture 6/16/2025: MRSA not present  Oral fungal culture 6/16/2025: heavy Candida Albicans     HIV testing-negative.  CD4 132-low.  Immunoglobulins-IgG, IgM normal; IgA high.  Hep C-positive.  Urine Legionella and pneumococcal antigen-negative.  Serum Fungitell and Aspergillus galactomannan-negative.   Repeat Fungitell 6/25/25: <31.25.    Antimicrobial: ID started Merrem 7/11 for positive respiratory culture.  Tracheal aspirates 7/10-gram-negative rods-Klebsiella,

## 2025-07-15 NOTE — CONSULTS
[facility]  Phone: [phone]  Paging : ([place]) [phone] (Hospital) [phone]   ([place]) [phone] (Hospital) [phone]      Hematology / Oncology Progress Note      Assessment:   Acute respiratory failure with hypoxemia  Pleural effusion-left  Pulmonary atelectasis  Obstructed larynx  Polysubstance abuse   Candidiasis of esophagus   Aspiration pneumonia   COPD/emphysema  EtOH use  Cocaine abuse  Hepatitis C  Cirrhosis of liver  Hyponatremia  Malnutrition  Aspiration pneumonia  Hypertension  Back pain  Depression  Bipolar    Plan:     Thrombocytopenia - likely from meropenem  today is day 5 of treatment,  Plt started falling when this was started,  Consider changing to another antibiotic.  Plt count is safe but will likely continue to fall. There are several other antibiotics including quinolones and cephalosporins that can cover the 2 Gram neg rods. Serratia and klebsiela.  Will let ID and the primary team decide. No ative bleeding.  No evidence of dic, but last coags checked 7/12.  No signs of active infection.   For us trend cbc daily.  Peg tube pulled out yest  Continue supportive care  Multiple other co-morbidities which explain his anemia of inflammation, cirrhosis, malnutrition,  all of those will exacerbate his thrombocytopenia.   Will follow .  Will ask lab to make a smear for review of slide.     Admit Date: 6/14/2025    Subjective:   Chart reviewed.  Patient intubated.  Admitted 6/14/2025 with an obstructed larynx secondary to severe candidiasis.  The patient has a history of COPD, smoking, polysubstance abuse who presented on 614 with acute upper airway distress and fullness in his laryngeal area question of a foreign body or food stuck.  Evaluation revealed severe candidiasis and he was started on antifungal therapy.  He had a prolonged intubation and was extubated on 7/5/2025.  While on a ventilator he had frequent desaturations.  Nasolaryngoscopy by Dr. Mcgovern ENT

## 2025-07-15 NOTE — PROGRESS NOTES
Physical Therapy     Orders received and chart reviewed.  Pt extubated this a.m. and pt in soft restraints.  Pt is moving B LE voluntarily, following limited commands.  Discussed w/ Nurse Petr and requesting restraints not be removed.  Will cont to follow pt for appropriateness for participation w/ PT.      Eli Gan PT

## 2025-07-15 NOTE — PLAN OF CARE
Problem: Discharge Planning  Goal: Discharge to home or other facility with appropriate resources  Outcome: Progressing  Flowsheets (Taken 7/14/2025 2000 by Karen Barajas, RN)  Discharge to home or other facility with appropriate resources:   Identify barriers to discharge with patient and caregiver   Arrange for needed discharge resources and transportation as appropriate   Identify discharge learning needs (meds, wound care, etc)   Arrange for interpreters to assist at discharge as needed   Refer to discharge planning if patient needs post-hospital services based on physician order or complex needs related to functional status, cognitive ability or social support system     Problem: Pain  Goal: Verbalizes/displays adequate comfort level or baseline comfort level  7/15/2025 1822 by Petr Pelaez RN  Flowsheets (Taken 7/15/2025 0133 by Karen Barajas, RN)  Verbalizes/displays adequate comfort level or baseline comfort level:   Encourage patient to monitor pain and request assistance   Assess pain using appropriate pain scale   Administer analgesics based on type and severity of pain and evaluate response   Implement non-pharmacological measures as appropriate and evaluate response   Consider cultural and social influences on pain and pain management   Notify Licensed Independent Practitioner if interventions unsuccessful or patient reports new pain  7/15/2025 1821 by Petr Pelaez RN  Outcome: Progressing     Problem: Chronic Conditions and Co-morbidities  Goal: Patient's chronic conditions and co-morbidity symptoms are monitored and maintained or improved  7/15/2025 1822 by Petr Pelaez RN  Flowsheets (Taken 7/14/2025 2000 by Karen Barajas, RN)  Care Plan - Patient's Chronic Conditions and Co-Morbidity Symptoms are Monitored and Maintained or Improved:   Monitor and assess patient's chronic conditions and comorbid symptoms for stability, deterioration, or improvement   Collaborate with multidisciplinary

## 2025-07-15 NOTE — PROGRESS NOTES
Cardiology Progress Note        Patient: Beny Knapp        Sex: male          DOA: 6/14/2025  YOB: 1965      Age:  59 y.o.        LOS:  LOS: 31 days     Patient seen and examined, chart reviewed.    Assessment/Plan     Patient Active Problem List   Diagnosis    HTN (hypertension)    Tobacco abuse    COPD (chronic obstructive pulmonary disease) (HCC)    Back pain    Depression    Chronic hepatitis C (HCC)    Thrombocytopenia    Hyponatremia    Stridor    Obstructed, larynx    Acute respiratory failure with hypoxia (HCC)    ETOH abuse    Polysubstance abuse (HCC)    Candidiasis of esophagus (HCC)    Aspiration pneumonia (HCC)    Cocaine abuse (HCC)    Moderate malnutrition    Pleural effusion on left    Pulmonary atelectasis    Palliative care encounter    Goals of care, counseling/discussion         Abnormal EKG suggestive of ischemia  Abnormal troponin no clear evidence of acute coronary syndrome, could be demand ischemia   NSVT       Echocardiogram was done in June 2025 reported       Image quality is fair.    Left Ventricle: Normal left ventricular systolic function with a visually estimated EF of 55 - 60%. EF by 2D Simpsons Biplane is 57%. Left ventricle size is normal. Normal wall thickness. Normal wall motion. Normal diastolic function.    Mitral Valve: There is mild posterior annular calcification noted. Thickened leaflets. No stenosis noted.    Tricuspid Valve : Unable to assess RVSP due to inadequate or insignificant tricuspid regurgitation.    Pericardium : No pericardial effusion.    IVC/SVC : Patient is ventilated, cannot use IVC diameter to estimate right atrial pressure. IVC size is normal.    Echocardiogram revealed       Contrast used: Lumason. Technically difficult study due to patient's body habitus and patient's ability to tolerate test.    Left Ventricle: Normal left ventricular systolic function with a visually estimated EF of 60 - 65%. Left  ventricle size is normal. Normal wall thickness. Unable to assess wall motion. Indeterminate diastolic function. Technically difficult with poor endocadial visualization.    This study has very limited diagnostic value          Plan:     Continue Aspirin   Add nitro paste if blood pressure remains elevated   Continue ventilator  Plan discussed with patient's nurse                Subjective:    cc:   Orally intubated on mechanical ventilator      REVIEW OF SYSTEMS:      Can not obtain due to patient's condition    Objective:      Visit Vitals  BP (!) 142/80   Pulse 66   Temp 97.8 °F (36.6 °C) (Axillary)   Resp 24   Ht 1.829 m (6')   Wt 88.9 kg (195 lb 15.8 oz)   SpO2 99%   BMI 26.58 kg/m²     Body mass index is 26.58 kg/m².    Physical Exam:  General Appearance: Comfortable, orally intubated on mechanical ventilator, not using accessory muscles of respiration.  HEENT: DIEGO.   HEAD: Atraumatic  NECK: No JVD, no thyroidomeglay. CAROTIDS: No bruit  LUNGS: Clear bilaterally.   HEART: S1+S2 audible, no murmur, no pericardial rub.     ABD: Non-tender, BS Audible    EXT: No edema, and no cyanosis.      medication:  Current Facility-Administered Medications   Medication Dose Route Frequency    [Held by provider] dexmedeTOMIDine (PRECEDEX) 400 mcg in sodium chloride 0.9 % 100 mL infusion  0.1-1.5 mcg/kg/hr IntraVENous Continuous    oxymetazoline (AFRIN) 0.05 % nasal spray 2 spray  2 spray Each Nostril BID    methylPREDNISolone sodium succ (SOLU-MEDROL) 20 mg in sterile water 0.5 mL injection  20 mg IntraVENous Q12H    potassium chloride 20 mEq/50 mL IVPB (Central Line)  20 mEq IntraVENous PRN    Or    potassium chloride 10 mEq/100 mL IVPB (Peripheral Line)  10 mEq IntraVENous PRN    sodium phosphate 15 mmol in sodium chloride 0.9 % 250 mL IVPB  15 mmol IntraVENous PRN    dextrose 5 % and 0.9 % sodium chloride infusion   IntraVENous Continuous    ceFEPIme (MAXIPIME) 2,000 mg in sodium chloride 0.9 % 100 mL IVPB (addEASE)

## 2025-07-15 NOTE — PROGRESS NOTES
Comprehensive High Risk Nutrition Assessment Follow-Up    Type and Reason for Visit:  Reassess    Nutrition Recommendations/Plan:   Mostly inadequate nutrition since admit x ~30 days  At high risk for Refeeding Syndrome  Cont check Phos, Mg, K replete as needed  Consider increase KPhos suppl QID - defer to MD  If extubated doubt pt will meet needs PO intakes alone even if passes SLP eval would consider cont EN/TF if consistent w/POC  When able re-start TF with Vital 1.2 @ 15ml/hr adv 10ml/hr Q12hrs to 65ml/hr goal as tr via OGT provides 1872kcal, 117g pro, 1265ml FW  If w/o IVF suggest 150ml Q6hrs adjust defer per MD  When able consider folate and thiamine via NGT instead of inj and consider add liq centrum/certa-azucena w/min   Monitor for BM on Mg ox and Miralax  Cont to monitor POC/NCP, wt trends diuresing, renal fx, lytes, UOP, bowel fx, skin integrity/wound healing and adjust recs as needed     Malnutrition Assessment:  Malnutrition Status:  Severe malnutrition (07/15/25 1423)    Context:  Acute Illness     Findings of the 6 clinical characteristics of malnutrition:  Energy Intake:  50% or less of estimated energy requirements for 5 or more days  Weight Loss:  Greater than 7.5% over 3 months     Body Fat Loss:  Moderate body fat loss Orbital, Triceps likely now w/severe loss  Muscle Mass Loss:  Moderate muscle mass loss Calf (gastrocnemius), Thigh (quadriceps) likely now w/severe loss  Fluid Accumulation:  Unable to assess     Strength:  Not Performed    Nutrition Assessment:    60yo M remains in ICU re-intubated 7/10, new NGT in AM, oxygenation stable post bronch, wean to extubate, no n/v/d, tr TF now on hold, BP stable per MD. remains intubated now on versed gtt. was on precedex. couldn't tr MBSS 7/9 previously. OGT dislodged 7/13 adv noted. noted NGT removed 7/14. overall mostly inadequate nutrition since admit off/on trickle feeds and TF recently never at goal. ongoing severe ~2% wt loss x 1 wk since

## 2025-07-16 ENCOUNTER — APPOINTMENT (OUTPATIENT)
Facility: HOSPITAL | Age: 60
DRG: 368 | End: 2025-07-16
Payer: MEDICARE

## 2025-07-16 PROBLEM — J96.01 ACUTE HYPOXIC ON CHRONIC HYPERCAPNIC RESPIRATORY FAILURE (HCC): Status: ACTIVE | Noted: 2025-07-16

## 2025-07-16 PROBLEM — F31.9 BIPOLAR 1 DISORDER (HCC): Status: ACTIVE | Noted: 2025-07-16

## 2025-07-16 PROBLEM — J96.12 ACUTE HYPOXIC ON CHRONIC HYPERCAPNIC RESPIRATORY FAILURE (HCC): Status: ACTIVE | Noted: 2025-07-16

## 2025-07-16 LAB
ALBUMIN SERPL-MCNC: 2.4 G/DL (ref 3.4–5)
ALBUMIN/GLOB SERPL: 0.6 (ref 0.8–1.7)
ALP SERPL-CCNC: 100 U/L (ref 45–117)
ALT SERPL-CCNC: 279 U/L (ref 10–50)
ANION GAP SERPL CALC-SCNC: 14 MMOL/L (ref 3–18)
AST SERPL-CCNC: 98 U/L (ref 10–38)
BASOPHILS # BLD: 0.02 K/UL (ref 0–0.1)
BASOPHILS NFR BLD: 0.4 % (ref 0–2)
BILIRUB SERPL-MCNC: 0.8 MG/DL (ref 0.2–1)
BUN SERPL-MCNC: 9 MG/DL (ref 6–23)
BUN/CREAT SERPL: 33 (ref 12–20)
CALCIUM SERPL-MCNC: 8.8 MG/DL (ref 8.5–10.1)
CHLORIDE SERPL-SCNC: 97 MMOL/L (ref 98–107)
CO2 SERPL-SCNC: 25 MMOL/L (ref 21–32)
CREAT SERPL-MCNC: 0.27 MG/DL (ref 0.6–1.3)
DIFFERENTIAL METHOD BLD: ABNORMAL
EKG ATRIAL RATE: 65 BPM
EKG DIAGNOSIS: NORMAL
EKG P AXIS: 36 DEGREES
EKG P-R INTERVAL: 142 MS
EKG Q-T INTERVAL: 410 MS
EKG QRS DURATION: 96 MS
EKG QTC CALCULATION (BAZETT): 426 MS
EKG R AXIS: 35 DEGREES
EKG T AXIS: 71 DEGREES
EKG VENTRICULAR RATE: 65 BPM
EOSINOPHIL # BLD: 0 K/UL (ref 0–0.4)
EOSINOPHIL NFR BLD: 0 % (ref 0–5)
ERYTHROCYTE [DISTWIDTH] IN BLOOD BY AUTOMATED COUNT: 13.6 % (ref 11.6–14.5)
GLOBULIN SER CALC-MCNC: 3.8 G/DL (ref 2–4)
GLUCOSE BLD STRIP.AUTO-MCNC: 107 MG/DL (ref 70–110)
GLUCOSE BLD STRIP.AUTO-MCNC: 111 MG/DL (ref 70–110)
GLUCOSE SERPL-MCNC: 111 MG/DL (ref 74–108)
HCT VFR BLD AUTO: 36.3 % (ref 36–48)
HGB BLD-MCNC: 12.2 G/DL (ref 13–16)
IMM GRANULOCYTES # BLD AUTO: 0.27 K/UL (ref 0–0.04)
IMM GRANULOCYTES NFR BLD AUTO: 5.9 % (ref 0–0.5)
INR PPP: 1.2 (ref 0.9–1.2)
LYMPHOCYTES # BLD: 0.3 K/UL (ref 0.9–3.3)
LYMPHOCYTES NFR BLD: 6.5 % (ref 21–52)
MAGNESIUM SERPL-MCNC: 1.9 MG/DL (ref 1.6–2.6)
MCH RBC QN AUTO: 29.3 PG (ref 24–34)
MCHC RBC AUTO-ENTMCNC: 33.6 G/DL (ref 31–37)
MCV RBC AUTO: 87.1 FL (ref 78–100)
MONOCYTES # BLD: 0.08 K/UL (ref 0.05–1.2)
MONOCYTES NFR BLD: 1.7 % (ref 3–10)
NEUTS SEG # BLD: 3.94 K/UL (ref 1.8–8)
NEUTS SEG NFR BLD: 85.5 % (ref 40–73)
NRBC # BLD: 0 K/UL (ref 0–0.01)
NRBC BLD-RTO: 0 PER 100 WBC
PF4 HEPARIN CMPLX AB SER-ACNC: 0.09 OD (ref 0–0.4)
PHOSPHATE SERPL-MCNC: 2.4 MG/DL (ref 2.5–4.9)
PHOSPHATE SERPL-MCNC: 2.5 MG/DL (ref 2.5–4.9)
PLATELET # BLD AUTO: 141 K/UL (ref 135–420)
PMV BLD AUTO: 10.2 FL (ref 9.2–11.8)
POTASSIUM SERPL-SCNC: 4.2 MMOL/L (ref 3.5–5.5)
PROT SERPL-MCNC: 6.1 G/DL (ref 6.4–8.2)
PROTHROMBIN TIME: 15.7 SEC (ref 12–15.1)
RBC # BLD AUTO: 4.17 M/UL (ref 4.35–5.65)
SODIUM SERPL-SCNC: 136 MMOL/L (ref 136–145)
WBC # BLD AUTO: 4.6 K/UL (ref 4.6–13.2)

## 2025-07-16 PROCEDURE — 6360000002 HC RX W HCPCS: Performed by: INTERNAL MEDICINE

## 2025-07-16 PROCEDURE — 6370000000 HC RX 637 (ALT 250 FOR IP): Performed by: INTERNAL MEDICINE

## 2025-07-16 PROCEDURE — 2500000003 HC RX 250 WO HCPCS: Performed by: INTERNAL MEDICINE

## 2025-07-16 PROCEDURE — 92526 ORAL FUNCTION THERAPY: CPT

## 2025-07-16 PROCEDURE — 94669 MECHANICAL CHEST WALL OSCILL: CPT

## 2025-07-16 PROCEDURE — 94640 AIRWAY INHALATION TREATMENT: CPT

## 2025-07-16 PROCEDURE — 82962 GLUCOSE BLOOD TEST: CPT

## 2025-07-16 PROCEDURE — 83735 ASSAY OF MAGNESIUM: CPT

## 2025-07-16 PROCEDURE — 80053 COMPREHEN METABOLIC PANEL: CPT

## 2025-07-16 PROCEDURE — 93010 ELECTROCARDIOGRAM REPORT: CPT | Performed by: INTERNAL MEDICINE

## 2025-07-16 PROCEDURE — 2580000003 HC RX 258: Performed by: INTERNAL MEDICINE

## 2025-07-16 PROCEDURE — 1100000000 HC RM PRIVATE

## 2025-07-16 PROCEDURE — 97163 PT EVAL HIGH COMPLEX 45 MIN: CPT

## 2025-07-16 PROCEDURE — 84100 ASSAY OF PHOSPHORUS: CPT

## 2025-07-16 PROCEDURE — 6370000000 HC RX 637 (ALT 250 FOR IP): Performed by: HOSPITALIST

## 2025-07-16 PROCEDURE — 97164 PT RE-EVAL EST PLAN CARE: CPT

## 2025-07-16 PROCEDURE — 99233 SBSQ HOSP IP/OBS HIGH 50: CPT

## 2025-07-16 PROCEDURE — 2700000000 HC OXYGEN THERAPY PER DAY

## 2025-07-16 PROCEDURE — 85610 PROTHROMBIN TIME: CPT

## 2025-07-16 PROCEDURE — 36415 COLL VENOUS BLD VENIPUNCTURE: CPT

## 2025-07-16 PROCEDURE — 71045 X-RAY EXAM CHEST 1 VIEW: CPT

## 2025-07-16 PROCEDURE — 85025 COMPLETE CBC W/AUTO DIFF WBC: CPT

## 2025-07-16 PROCEDURE — 92610 EVALUATE SWALLOWING FUNCTION: CPT

## 2025-07-16 RX ORDER — MORPHINE SULFATE 2 MG/ML
2 INJECTION, SOLUTION INTRAMUSCULAR; INTRAVENOUS
Refills: 0 | Status: DISCONTINUED | OUTPATIENT
Start: 2025-07-16 | End: 2025-07-19

## 2025-07-16 RX ORDER — DIAZEPAM 10 MG/2ML
5 INJECTION, SOLUTION INTRAMUSCULAR; INTRAVENOUS
Status: DISCONTINUED | OUTPATIENT
Start: 2025-07-16 | End: 2025-07-19

## 2025-07-16 RX ORDER — METOPROLOL TARTRATE 25 MG/1
25 TABLET, FILM COATED ORAL DAILY
Status: DISCONTINUED | OUTPATIENT
Start: 2025-07-16 | End: 2025-07-16

## 2025-07-16 RX ORDER — MORPHINE SULFATE 4 MG/ML
4 INJECTION, SOLUTION INTRAMUSCULAR; INTRAVENOUS
Refills: 0 | Status: DISCONTINUED | OUTPATIENT
Start: 2025-07-16 | End: 2025-07-19

## 2025-07-16 RX ORDER — SCOPOLAMINE 1 MG/3D
1 PATCH, EXTENDED RELEASE TRANSDERMAL
Status: DISCONTINUED | OUTPATIENT
Start: 2025-07-17 | End: 2025-07-20

## 2025-07-16 RX ORDER — DIAZEPAM 10 MG/2ML
2.5 INJECTION, SOLUTION INTRAMUSCULAR; INTRAVENOUS EVERY 8 HOURS PRN
Status: DISCONTINUED | OUTPATIENT
Start: 2025-07-16 | End: 2025-07-16

## 2025-07-16 RX ADMIN — THIAMINE HYDROCHLORIDE 100 MG: 100 INJECTION, SOLUTION INTRAMUSCULAR; INTRAVENOUS at 09:20

## 2025-07-16 RX ADMIN — SODIUM CHLORIDE, PRESERVATIVE FREE 10 ML: 5 INJECTION INTRAVENOUS at 09:19

## 2025-07-16 RX ADMIN — CEFEPIME 2000 MG: 2 INJECTION, POWDER, FOR SOLUTION INTRAVENOUS at 05:22

## 2025-07-16 RX ADMIN — METOPROLOL TARTRATE 2.5 MG: 5 INJECTION INTRAVENOUS at 05:21

## 2025-07-16 RX ADMIN — SODIUM CHLORIDE, PRESERVATIVE FREE 40 MG: 5 INJECTION INTRAVENOUS at 09:19

## 2025-07-16 RX ADMIN — DIBASIC SODIUM PHOSPHATE, MONOBASIC POTASSIUM PHOSPHATE AND MONOBASIC SODIUM PHOSPHATE 1 TABLET: 852; 155; 130 TABLET ORAL at 09:20

## 2025-07-16 RX ADMIN — IPRATROPIUM BROMIDE 0.5 MG: 0.5 SOLUTION RESPIRATORY (INHALATION) at 08:09

## 2025-07-16 RX ADMIN — ASPIRIN 81 MG: 81 TABLET, CHEWABLE ORAL at 09:20

## 2025-07-16 RX ADMIN — DIAZEPAM 5 MG: 5 INJECTION, SOLUTION INTRAMUSCULAR; INTRAVENOUS at 23:51

## 2025-07-16 RX ADMIN — DORNASE ALFA 2.5 MG: 1 SOLUTION RESPIRATORY (INHALATION) at 08:09

## 2025-07-16 RX ADMIN — SODIUM PHOSPHATE, MONOBASIC, MONOHYDRATE AND SODIUM PHOSPHATE, DIBASIC, ANHYDROUS 15 MMOL: 142; 276 INJECTION, SOLUTION INTRAVENOUS at 09:25

## 2025-07-16 RX ADMIN — Medication 400 MG: at 09:20

## 2025-07-16 RX ADMIN — WATER 20 MG: 1 INJECTION INTRAMUSCULAR; INTRAVENOUS; SUBCUTANEOUS at 11:19

## 2025-07-16 RX ADMIN — IPRATROPIUM BROMIDE 0.5 MG: 0.5 SOLUTION RESPIRATORY (INHALATION) at 20:17

## 2025-07-16 RX ADMIN — IPRATROPIUM BROMIDE 0.5 MG: 0.5 SOLUTION RESPIRATORY (INHALATION) at 02:46

## 2025-07-16 RX ADMIN — CEFEPIME 2000 MG: 2 INJECTION, POWDER, FOR SOLUTION INTRAVENOUS at 12:39

## 2025-07-16 RX ADMIN — ARFORMOTEROL TARTRATE 15 MCG: 15 SOLUTION RESPIRATORY (INHALATION) at 20:17

## 2025-07-16 RX ADMIN — IPRATROPIUM BROMIDE 0.5 MG: 0.5 SOLUTION RESPIRATORY (INHALATION) at 14:26

## 2025-07-16 RX ADMIN — ARFORMOTEROL TARTRATE 15 MCG: 15 SOLUTION RESPIRATORY (INHALATION) at 08:09

## 2025-07-16 RX ADMIN — CHLORHEXIDINE GLUCONATE 15 ML: 1.2 RINSE ORAL at 09:19

## 2025-07-16 RX ADMIN — FOLIC ACID 1 MG: 5 INJECTION, SOLUTION INTRAMUSCULAR; INTRAVENOUS; SUBCUTANEOUS at 09:20

## 2025-07-16 RX ADMIN — BUDESONIDE 500 MCG: 0.5 INHALANT RESPIRATORY (INHALATION) at 20:17

## 2025-07-16 RX ADMIN — METOPROLOL TARTRATE 25 MG: 25 TABLET, FILM COATED ORAL at 11:19

## 2025-07-16 RX ADMIN — BUDESONIDE 500 MCG: 0.5 INHALANT RESPIRATORY (INHALATION) at 08:09

## 2025-07-16 ASSESSMENT — PAIN SCALES - WONG BAKER
WONGBAKER_NUMERICALRESPONSE: NO HURT
WONGBAKER_NUMERICALRESPONSE: NO HURT

## 2025-07-16 ASSESSMENT — PAIN SCALES - GENERAL
PAINLEVEL_OUTOF10: 0

## 2025-07-16 NOTE — PLAN OF CARE
mucosa and hygiene practices   Implement preventative oral hygiene regimen   Implement oral medicated treatments as ordered  7/16/2025 0305 by Karen Barajas RN  Outcome: Progressing  Flowsheets (Taken 7/15/2025 2000)  Oral Mucous Membranes Remain Intact:   Assess oral mucosa and hygiene practices   Implement preventative oral hygiene regimen   Implement oral medicated treatments as ordered     Problem: Musculoskeletal - Adult  Goal: Return mobility to safest level of function  7/16/2025 1225 by Carolann Figueroa RN  Outcome: Progressing  Flowsheets (Taken 7/15/2025 2000 by Karen Barajas RN)  Return Mobility to Safest Level of Function:   Assess patient stability and activity tolerance for standing, transferring and ambulating with or without assistive devices   Assist with transfers and ambulation using safe patient handling equipment as needed   Ensure adequate protection for wounds/incisions during mobilization   Obtain physical therapy/occupational therapy consults as needed   Instruct patient/family in ordered activity level   Apply continuous passive motion per provider or physical therapy orders to increase flexion toward goal  7/16/2025 0305 by Karen Barajas RN  Outcome: Progressing  Flowsheets (Taken 7/15/2025 2000)  Return Mobility to Safest Level of Function:   Assess patient stability and activity tolerance for standing, transferring and ambulating with or without assistive devices   Assist with transfers and ambulation using safe patient handling equipment as needed   Ensure adequate protection for wounds/incisions during mobilization   Obtain physical therapy/occupational therapy consults as needed   Instruct patient/family in ordered activity level   Apply continuous passive motion per provider or physical therapy orders to increase flexion toward goal     Problem: Gastrointestinal - Adult  Goal: Maintains or returns to baseline bowel function  7/16/2025 1225 by Carolann Figueroa RN  Outcome:  Progressing  Flowsheets (Taken 7/16/2025 0800)  Maintains or returns to baseline bowel function:   Assess bowel function   Encourage oral fluids to ensure adequate hydration   Administer IV fluids as ordered to ensure adequate hydration   Administer ordered medications as needed   Encourage mobilization and activity  7/16/2025 0305 by Karen Barajas RN  Outcome: Progressing  Goal: Maintains adequate nutritional intake  7/16/2025 1225 by Carolann Figueroa RN  Outcome: Progressing  Flowsheets (Taken 7/16/2025 0800)  Maintains adequate nutritional intake:   Monitor percentage of each meal consumed   Identify factors contributing to decreased intake, treat as appropriate   Assist with meals as needed   Monitor intake and output, weight and lab values   Obtain nutritional consult as needed  7/16/2025 0305 by Karen Barajas RN  Outcome: Progressing  Flowsheets (Taken 7/15/2025 2000)  Maintains adequate nutritional intake:   Monitor percentage of each meal consumed   Assist with meals as needed   Identify factors contributing to decreased intake, treat as appropriate   Monitor intake and output, weight and lab values   Obtain nutritional consult as needed     Problem: Genitourinary - Adult  Goal: Absence of urinary retention  7/16/2025 1225 by Carolann Figueroa RN  Outcome: Progressing  Flowsheets (Taken 7/16/2025 0800)  Absence of urinary retention:   Assess patient’s ability to void and empty bladder   Monitor intake/output and perform bladder scan as needed   Place urinary catheter per Licensed Independent Practitioner order if needed   Discuss catheterization for long term situations as appropriate  7/16/2025 0305 by Karen Barajas RN  Outcome: Progressing  Flowsheets (Taken 7/15/2025 2000)  Absence of urinary retention:   Assess patient’s ability to void and empty bladder   Monitor intake/output and perform bladder scan as needed   Place urinary catheter per Licensed Independent Practitioner order if needed

## 2025-07-16 NOTE — PROGRESS NOTES
[facility]  (A Division of [facility] [place] [facility])    Follow-up Note      Date of Admission: 6/14/2025       Date of Note:  7/16/2025    Summary:  Mr Beny Knapp is a 59 y.o. male White (non-) pmh hypertension tobacco and polysubstance abuse COPD chronic hepatitis was admitted to [facility] 6/14/2025 . He was brought in by EMS with difficulty breathing and possible foreign body obstruction of airway. He was intubated in the ED for airway protection chest x-ray showed mild pulmonary edema x-ray of soft tissue of the neck had no acute pathology noted at the time of intubation esophageal coating consistent with candidiasis was noted and infectious disease consultation was requested. He was started at that time on fluconazole 400 mg IV followed by 14 days of 200 mg. White blood cell count on admission was 7.4 drug screen was positive for fentanyl, benzodiazepines cocaine and THC. AST 77 ALT 52 HIV 1 and 2 antibody were negative HIV PCR was not done as yet. Blood cultures x 2 and urine culture remain no growth respiratory cultures in progress empiric ceftriaxone was given on the day of admission doxycycline was started on 615 dose of ampicillin given on 616. Patient remains afebrile with no elevation in white blood cell count.      Today:  Events last few days tracked/reviewed - pt seen  Extubated now, but weak  Very phlegmatic   On 3L/m NC now     Tm <100  WBC 4.61. k  FiO2 3L/m NC      Current Antimicrobials:    Prior Antimicrobials:  Cefepime IV (6/20-26; 7/15-) #2 Zithro 6/16 to 20  CAX 6/16 to 20  Fluconazole 6/14-> Micafungin  150 mg daily   #14  Metronidazoli 500 mg IV Q8 hours-6/27 to 7/4  Meropenem IV (7/11-15) #5       Assessment: Rec / Plan:   Recurrent respiratory failure- re-intubated 7/10  Platelets are better off the meropenem and back on cefepime.  Even though he has underlying cholelithiasis, will switch out his cefepime to CAX again ---

## 2025-07-16 NOTE — PLAN OF CARE
Problem: SLP Adult - Impaired Swallowing  Goal: By Discharge: Advance to least restrictive diet without signs or symptoms of aspiration for planned discharge setting.  See evaluation for individualized goals.  Description: Patient will:  1. Tolerate PO trials with 0 s/s overt distress in 4/5 trials  2. Utilize compensatory swallow strategies/maneuvers (decrease bite/sip, size/rate, alt. liq/sol) with min cues in 4/5 trials  3. Perform oral-motor/laryngeal exercises to increase oropharyngeal swallow function with min cues  4. Complete an objective swallow study (i.e., MBSS) to assess swallow integrity, r/o aspiration, and determine of safest LRD, min A as indicated/ordered by MD     Rec:     NPO w/ consideration of short-term/long-term alternative means of nutrition/hydration vs comfort feeds  Strict aspiration precautions  HOB >45 during po intake, remain >30 for 30-45 minutes after po   Oral care TID w/ suction  Meds via IV/non orally  MBSS to further assess oropharyngeal swallow fxn once more stable  Outcome: Progressing  SPEECH LANGUAGE PATHOLOGY BEDSIDE SWALLOW EVALUATION/TREATMENT    Patient: Beny Knapp (59 y.o. male)  Date: 7/16/2025  Primary Diagnosis: Hyponatremia [E87.1]  Laryngeal edema [J38.4]  Candida laryngitis [B37.89]  Procedure(s) (LRB):  BRONCHOSCOPY (N/A) 2 Days Post-Op   Precautions: Aspiration  PLOF: As per H&P  ASSESSMENT:  Based on the objective data described below, the pt presents with severe oropharyngeal dysphagia that requires further evaluation. Pt seen for a bedside swallow re evaluation s/p extubation 7/15. Pt w/ 2 intubations this hospitalization, w/ the first being 20 days and this most recent being 5 days. Currently on 3L O2 via NC w/ NGT in situ. Pt initially declining to participate in evaluation, becoming agreeable following gentle encouragement. A cursory OME found reduced strength, coordination, and rate of movement of the orofacial musculature. Pt consistently mouth

## 2025-07-16 NOTE — PROGRESS NOTES
Palliative Medicine Progress Note  [facility]: [phone]     Patient Name: Beny Knapp  YOB: 1965    Date of Service: 2025  Provider: RADHA Lim  Admit Date: 2025  Referring Provider:  Jocelyne Richmond MD    Reason for Consult: goals of care       HISTORY OF PRESENT ILLNESS:     Beny Knapp is a 59 y.o. with a past history of COPD on chronic/frequent steroid therapy, HTN, HCV cirrhosis (infection treated and HCV quant negative), tobacco use disorder (1/3- PPD), bipolar 1 disorder, and alcohol and cocaine use disorder recently at [facility] in 2025 who was admitted on 2025 when he presented to ED with sensation of foreign body in the throat with stridor. Found to have severe candidiasis and required intubation. Patient was also found to have hyponatremia. He remains in ICU on ventilator with fluctuating oxygen requirements and challenging sedation. Today is  day 17 and patient will require court order for tracheostomy and PEG.    Psychosocial: Patient has no emergency contacts on file. A relative search is underway with Case Management to identify surrogate decision-makers and verify personal history. Phone numbers for a mother, Rose Al, have not been working. Welfare check for a brother in [place], [place] determined he is . Previous medical records consistently state patient has never been . One medical record states he has one child, but lists no name or contact information.    Functional: Patient's baseline functional status was presumably independent with ADLs and IADLs, but with significant psychosocial morbidity. He is currently intubated/sedated and RASS +2 requiring escalating sedation protocol.    2025 Patient seen in ICU, extubated yesterday, on 4L NC, SBFT in nare, oriented, fatigued appearing; Dr. Be and nursing at bedside for assessment.    2025: Patient was seen in presence of

## 2025-07-16 NOTE — PROGRESS NOTES
[facility]  Pulmonary, Critical Care, and Sleep Medicine    Name: Beny Knapp MRN: [id number]   : 1965 Hospital: [facility]    Date: 2025  Room: [address] [place] [address] Note                                              Consult requesting physician: Dr. Mark   Reason for Consult: Respiratory failure, hyponatremia, upper airway    IMPRESSION:     Acute respiratory failure with hypoxemia  Pleural effusion-left  Pulmonary atelectasis  Obstructed larynx  Polysubstance abuse   Candidiasis of esophagus   Aspiration pneumonia   COPD/emphysema  EtOH use  Cocaine abuse  Hepatitis C  Cirrhosis of liver  Hyponatremia  Malnutrition      Active Hospital Problems    Diagnosis Date Noted    Palliative care encounter [Z51.5] 2025    Goals of care, counseling/discussion [Z71.89] 2025    Pleural effusion on left [J90] 07/10/2025    Pulmonary atelectasis [J98.11] 07/10/2025    Moderate malnutrition [E44.0] 2025    Aspiration pneumonia (HCC) [J69.0] 2025    Cocaine abuse (HCC) [F14.10] 2025    Hyponatremia [E87.1] 2025    Stridor [R06.1] 2025    Obstructed, larynx [J38.6] 2025    Acute respiratory failure with hypoxia (HCC) [J96.01] 2025    ETOH abuse [F10.10] 2025    Polysubstance abuse (HCC) [F19.10] 2025    Candidiasis of esophagus (HCC) [B37.81] 2025    Tobacco abuse [Z72.0] 2015    HTN (hypertension) [I10] 2015    COPD (chronic obstructive pulmonary disease) (HCC) [J44.9] 2015    Chronic hepatitis C (HCC) [B18.2] 2015        Code status: Limited       RECOMMENDATIONS:     Respiratory: 59-year-old male with COPD, smoker, cocaine abuse-presented on 2025 with acute upper airway distress, fullness in the laryngeal area, questionable foreign body.  Prolonged intubation, and extubated 2025.  Patient had frequent oxygen desaturations while on ventilator support.  Prior to  CHEST PORTABLE INDICATION: et tube COMPARISON: 6/18/2025 FINDINGS: A portable AP radiograph of the chest was obtained at 512 hours. Endotracheal tube is 5.2 cm above the justine. Nasogastric tube courses in the stomach. Right IJ central venous catheter.. Cardiac monitoring leads. Left effusion and bibasilar volume loss unchanged. Cardiomegaly..  The bones and soft tissues are grossly within normal limits.     Left effusion and bibasilar volume loss unchanged. Electronically signed by Eliseo Espinosa    XR CHEST PORTABLE  Result Date: 6/18/2025  EXAM: XR CHEST PORTABLE INDICATION: et tube COMPARISON: 6/17/2025 FINDINGS: A portable AP radiograph of the chest was obtained at 434 hours. Endotracheal tube is 8 cm above the justine. Nasogastric tube courses in the stomach. Right IJ catheter is noted.. Small left effusion. Improved aeration of the lung bases.. Large..  The bones and soft tissues are grossly within normal limits.     Endotracheal tube is 8 cm above the justine. Left effusion. There is improved aeration in the lung bases.. Electronically signed by Eliseo Espinosa    XR CHEST PORTABLE  Result Date: 6/17/2025  EXAM:  XR CHEST PORTABLE INDICATION: et tube Comparison to 6/16/2025. Portable exam obtained at 614 demonstrates persistent but improved bilateral lower lobe infiltrates. Small bilateral pleural effusions again noted. Stable life-support lines and tubes.     Persistent but improved bilateral lower lobe infiltrates. Persistent bilateral pleural effusions. Electronically signed by CARLOS SCHMIDT           Please note: Voice-recognition software may have been used to generate this report, which may have resulted in some phonetic-based errors in grammar and contents. Even though attempts were made to correct all the mistakes, some may have been missed, and remained in the body of the document.      Jocelyne Richmond MD  7/16/2025

## 2025-07-16 NOTE — PROGRESS NOTES
[facility]  Phone: [phone]  Paging : (Moustapha) [phone] (Hospital) [phone]   ([place]) [phone] (Hospital) [phone]      Hematology / Oncology Progress Note      Assessment:   Acute respiratory failure with hypoxemia  Pleural effusion-left  Pulmonary atelectasis  Obstructed larynx  Polysubstance abuse   Candidiasis of esophagus   Aspiration pneumonia   COPD/emphysema  EtOH use  Cocaine abuse  Hepatitis C  Cirrhosis of liver  Hyponatremia  Malnutrition  Aspiration pneumonia  Hypertension  Back pain  Depression  Bipolar  thrombocytopenia  Plan:     extubated yesterday  On steroids and meropenem changed to  cefepine  Platelets yest improved to 103 todays oending  No bleeding anc 3.12  Await todays labs  Remains agitated, not following commands, feeding tube in place, in restraints    Admit Date: 2025    Subjective:     Agitted, moving arms against restraints, not following commands, eyes mostly closed, no bleeding from mouth, older sores on palate not yeast    Objective:     Temp (24hrs), Av.2 °F (36.8 °C), Min:97.5 °F (36.4 °C), Max:98.8 °F (37.1 °C)     ROS - unobtainable      HEENT exam negative.  No jaundice is seen.  No bleeding  extubated  Heart regular rate and rhythm  Lungs decreased breath sounds , upper airway noises while breathing   Abdomen soft nontender positive bowel sounds  Extremities no edema erythema cyanosis or clubbing  Neurologic exam agitated will not follow commands   berry in place       Current Facility-Administered Medications   Medication Dose Route Frequency Provider Last Rate Last Admin    [Held by provider] dexmedeTOMIDine (PRECEDEX) 400 mcg in sodium chloride 0.9 % 100 mL infusion  0.1-1.5 mcg/kg/hr IntraVENous Continuous Jocelyne Richmond MD 4.4 mL/hr at 07/15/25 1144 0.2 mcg/kg/hr at 07/15/25 1144    methylPREDNISolone sodium succ (SOLU-MEDROL) 20 mg in sterile water 0.5 mL injection  20 mg IntraVENous Q12H Jocelyne Richmond MD

## 2025-07-16 NOTE — PROGRESS NOTES
Bedside and Verbal shift change report given to Alana RN (oncoming nurse) by Rubina RN (offgoing nurse). Report included the following information Nurse Handoff Report, Adult Overview, Intake/Output, MAR, Recent Results, and Med Rec Status.

## 2025-07-16 NOTE — PROGRESS NOTES
Palliative team called to patient's room as patient has failed speech evaluation and insists on eating/drinking. Patient was A/O, on nasal cannula, SBFT in nare. In no acute distress.  Discuss two pathways of treatment-feeding tube for all medications and nutritional needs and nothing by mouth versus comfort measures and eating by mouth, taking risks of aspiration and even death involved with eating/drinking. Discuss hospice at discharge if comfort measures is chosen. Patient stated 'there's no way out' of hospice. Continued to discuss options. Patient was emphatic that he wanted to \"eat!\" Discuss completing POLST form to document his wishes. Discuss code status again. Discussed the benefits and burdens of CPR in the event of cardiopulmonary arrest. Discussed the benefits and burdens of intubation in the event of respiratory decline pre-arrest. Patient understands risk of death with aspiration and agrees to DNR. POLST form completed. Dr. Be and PUMA Montemayor along with  updated. Comfort orders placed. Patient is now DNR/DNI.

## 2025-07-16 NOTE — ACP (ADVANCE CARE PLANNING)
Advance Care Planning     Palliative Team Advance Care Planning (ACP) Conversation    Date of Conversation: 06/16/25    Individuals present for the conversation: Patient, Dr. Charly Law, Maura Kimball, RN     ACP documents on file prior to discussion:  -None      Healthcare Decision Maker: NONE ON FILE  Chart review yielded patients motherRose.  Attempted to call [phone] and [phone]. Neither number in service.   Case Management completed a Relative Search.   Adolph Knapp [phone], [phone].   Unable to leave voicemail for [phone]  Phone number not in service for [phone].  Contacted Case Management.     Conversation Summary:      Seen today in Rm 104, along with Dr. Charly Law. Patient lying in bed supine with head of bed raised.  Intubated (day 3) Sedated: Precedex, Fentanyl, Versed gtt.   Levophed gtt.    Arrived to ED via EMS from [facility]. Patient has been residing at the [facility]. Reported a foreign body sensation. Per chart review he stated he ate a sandwich earlier in the day and felt like it never went down. It became difficult to breathe so he called 911.     ENT completed an (copy/paste) \"emergent fiberoptic nasal and laryngeal scope. Large amount of Candida throughout the posterior oral naso pharynx as well as in the airway causing some epiglottal and laryngeal swelling and edema.  Patient was emergently intubated.\"  Positive for Cocaine.     PMH: BiPolar 1, Polysubstance abuse, COPD, Hep C (dx 1994), Lumbar and sacral arthritis, Candida laryngitis (dx one week ago)     Lives alone. History of living at [facility] and rehab stays at [facility] for Drug and Alcohol dependence.       Resuscitation Status:   Code Status: Full Code     Documentation Completed:  -No new documents completed.    Palliative Medicine will continue to follow Beny Knapp  and his family during his hospitalization and support them as they make healthcare 
Advance Care Planning     Palliative Team Advance Care Planning (ACP) Conversation    Date of Conversation: 07/16/25    Individuals present for the conversation: Patient Dr. Richmond, Josephine Dumont, AMBROSE, Maura Kimball, RN.      ACP documents on file prior to discussion:  -None      Healthcare Decision Maker: NONE ON FILE   When asked if patient wanted sisterAfshan to be his MPOA patient emphatically stated \"NO.\"  He named a brother Kishan Alcala. Patient stated, \"He's easy to find. He lives in [place].\" CM aware.   Many relatiVe searCHes, seVeral  welfare CHeCks by law enforCement HaVe not proVided a seCure MPOA.      Conversation Summary:      Seen today in Rm 104, along with Josephine Lowe NP. Patient lying in bed supine with head of bed raised.  AAOX4.   Respirations even and unlabored on 4LO2 NC. NGT. Tolerating feeds.     Arrived to ED via EMS from [facility]. Patient has been residing at the [facility]. Reported a foreign body sensation. Per chart review he stated he ate a sandwich earlier in the day and felt like it never went down. It became difficult to breathe so he called 911.      ENT completed an (copy/paste) \"emergent fiberoptic nasal and laryngeal scope. Large amount of Candida throughout the posterior oral naso pharynx as well as in the airway causing some epiglottal and laryngeal swelling and edema.  Patient was emergently intubated.\"       PMH: BiPolar 1, Polysubstance abuse, COPD, Hep C (dx 1994), Lumbar and sacral arthritis, Candida laryngitis.    First intubated from 6/14/2025-7/5/2025. Patient reintubated on 7/10/2025 post TIA. He was extubated 7/14/2025.      With Dr. Richmond at bed side Palliative medicine team (Maura Foreman) introduced concept of Code Status. FULL CODE vs DNR/DNI and Limited Code. Described four possible interventions including CPR, shock, medication and intubation. Pt verbalized choice NOT TO BE INTUBATED. He agreed to compressions, shocks and medication but NO INTUBATION. 
0

## 2025-07-16 NOTE — PROGRESS NOTES
Hospitalist Progress Note    Patient: Beny Knapp MRN: [id number]  CSN: [id number]    YOB: 1965  Age: 59 y.o.  Sex: male    DOA: 6/14/2025 LOS:  LOS: 32 days          Chief Complain :  Foreign body, shortness of breath.  59 y.o.  male w/ PMH of COPD, ETOH-use and polysubstance abuse who was BIBA and presents with subjective difficulty breathing and swallowing with throat pain. Brought in by EMS for possible forign body (food) with bolus within region of velecula. ENT saw pt and ED intubated for airway protection. ENT was consulted and performed scope on pt with findings of extensive esophageal candidiasis. Nephrology was consulted for severe hyponatremia with sodium of 117 recommending NS at 75cc/hr. Intensivist was contacted as well d/t pt being intubated, on vent for airway protection. Prolong intubation, extubated 07/05/2025. Has been uncooperative, did not cooperate with MBS and did not cooperate with placing NG tube.  07/10/2025 SNVT episode early morning, went into respiratory distress. Required intubation. Thick copious secretions from ET tube.  07/15/2025, extubated. Remains confused and requiring soft restraints.    Subjective:   Patient laying in bed, awake, confused, requiring soft restraints.  Assessment/Plan     Active Hospital Problems    Diagnosis     Palliative care encounter [Z51.5]     Goals of care, counseling/discussion [Z71.89]     Pleural effusion on left [J90]     Pulmonary atelectasis [J98.11]     Moderate malnutrition [E44.0]     Aspiration pneumonia (HCC) [J69.0]     Cocaine abuse (HCC) [F14.10]     Hyponatremia [E87.1]     Stridor [R06.1]     Obstructed, larynx [J38.6]     Acute

## 2025-07-16 NOTE — PROGRESS NOTES
Speech-Language Pathology    Orders received and chart reviewed. Pt currently too lethargic for skilled SLP evaluation at this time. Pt is currently NPO w/ NGT. ST will follow up for SLP eval as pt's medical condition and schedule allow.    Thank you for this referral,     Shea Roberts M.S., CCC-SLP

## 2025-07-16 NOTE — PROGRESS NOTES
TRANSFER - IN REPORT:    Verbal report received from Cat BARTHOLOMEW on Beny Knapp  being received from ICU for routine progression of patient care      Report consisted of patient's Situation, Background, Assessment and   Recommendations(SBAR).     Information from the following report(s) Nurse Handoff Report, Adult Overview, Intake/Output, MAR, Recent Results, and Med Rec Status was reviewed with the receiving nurse.    Opportunity for questions and clarification was provided.      Assessment completed upon patient's arrival to unit and care assumed.

## 2025-07-16 NOTE — PROGRESS NOTES
Physical Therapy Goals:  Initiated 7/16/2025 to be met within 3 days.  Short Term Goals  Short Term Goal 1: Pt will be able to reposition self safely and roll L and R min A to prevent pressure areas.  Short Term Goal 2: Pt will be able to transfer supine<>sit min A to improve functional mobility.  Short Term Goal 3: Pt will be able to sit EOB unsupported w/ fair+ balance x10' to improve activity tolerance.  Short Term Goal 4: Pt will be able to transfer sit<>stand min A to improve independence.  Short Term Goal 5: Pt will be able to ambulate >50' w/ RW, min A to improve ability to negotiate environment.      PHYSICAL THERAPY Re-EVALUATION    Patient: Beny Knapp (59 y.o. male)  Date: 7/16/2025  Primary Diagnosis: Hyponatremia [E87.1]  Laryngeal edema [J38.4]  Candida laryngitis [B37.89]  Procedure(s) (LRB):  BRONCHOSCOPY (N/A) 2 Days Post-Op   Precautions: Fall Risk,  ,  ,  ,  ,  ,  ,      ASSESSMENT :  Introduced to pt and pt w/ NS trying to get cleaned up from BM.  Pt supine in bed upon arrival.  Pt has chronic back pain but appears frustrated regarding inability to get OOB. Pt A&O to self.  Pt does not follow commands and requires initiation of activity w/ inconsistencies w/ follow through requiring max A/total A.  Rolling L and R w/ initiation mod A.  Pt w/ dec cognition as not able to follow cohesive conversation and is highly distractible.  Required mod A for rolling for brief BM clean up.  NS able to clean pt w/ therapist A.  Pt used momentum after initiation still requiring therapist mod A for supine>sit.  Sitting balance remains poor w/ only brief periods (<30 sec) to maintain balance and needs constant presence for safety due to poor trunk control.  Pt w/ spontaneous erratic movement x4 extremities/trunk requiring additional support for safety.  Pt attempted to stand (due to desire to get to bathroom despite ed) and when given opportunity flexes at trunk, leaning heavily into forward support and

## 2025-07-17 LAB
BACTERIA SPEC CULT: ABNORMAL
GRAM STN SPEC: ABNORMAL
SERVICE CMNT-IMP: ABNORMAL

## 2025-07-17 PROCEDURE — 94640 AIRWAY INHALATION TREATMENT: CPT

## 2025-07-17 PROCEDURE — 6360000002 HC RX W HCPCS: Performed by: INTERNAL MEDICINE

## 2025-07-17 PROCEDURE — 2700000000 HC OXYGEN THERAPY PER DAY

## 2025-07-17 PROCEDURE — 94669 MECHANICAL CHEST WALL OSCILL: CPT

## 2025-07-17 PROCEDURE — 6370000000 HC RX 637 (ALT 250 FOR IP): Performed by: HOSPITALIST

## 2025-07-17 PROCEDURE — 94668 MNPJ CHEST WALL SBSQ: CPT

## 2025-07-17 PROCEDURE — 1100000000 HC RM PRIVATE

## 2025-07-17 RX ADMIN — MORPHINE SULFATE 4 MG: 4 INJECTION, SOLUTION INTRAMUSCULAR; INTRAVENOUS at 15:09

## 2025-07-17 RX ADMIN — IPRATROPIUM BROMIDE 0.5 MG: 0.5 SOLUTION RESPIRATORY (INHALATION) at 14:15

## 2025-07-17 RX ADMIN — ARFORMOTEROL TARTRATE 15 MCG: 15 SOLUTION RESPIRATORY (INHALATION) at 20:15

## 2025-07-17 RX ADMIN — IPRATROPIUM BROMIDE 0.5 MG: 0.5 SOLUTION RESPIRATORY (INHALATION) at 07:49

## 2025-07-17 RX ADMIN — MORPHINE SULFATE 4 MG: 4 INJECTION, SOLUTION INTRAMUSCULAR; INTRAVENOUS at 11:40

## 2025-07-17 RX ADMIN — DIAZEPAM 5 MG: 5 INJECTION, SOLUTION INTRAMUSCULAR; INTRAVENOUS at 14:10

## 2025-07-17 RX ADMIN — DORNASE ALFA 2.5 MG: 1 SOLUTION RESPIRATORY (INHALATION) at 20:28

## 2025-07-17 RX ADMIN — BUDESONIDE 500 MCG: 0.5 INHALANT RESPIRATORY (INHALATION) at 07:49

## 2025-07-17 RX ADMIN — IPRATROPIUM BROMIDE 0.5 MG: 0.5 SOLUTION RESPIRATORY (INHALATION) at 20:15

## 2025-07-17 RX ADMIN — MORPHINE SULFATE 2 MG: 2 INJECTION, SOLUTION INTRAMUSCULAR; INTRAVENOUS at 02:47

## 2025-07-17 RX ADMIN — ARFORMOTEROL TARTRATE 15 MCG: 15 SOLUTION RESPIRATORY (INHALATION) at 07:49

## 2025-07-17 RX ADMIN — MORPHINE SULFATE 2 MG: 2 INJECTION, SOLUTION INTRAMUSCULAR; INTRAVENOUS at 06:57

## 2025-07-17 RX ADMIN — MORPHINE SULFATE 4 MG: 4 INJECTION, SOLUTION INTRAMUSCULAR; INTRAVENOUS at 22:13

## 2025-07-17 RX ADMIN — BUDESONIDE 500 MCG: 0.5 INHALANT RESPIRATORY (INHALATION) at 20:15

## 2025-07-17 RX ADMIN — DIAZEPAM 5 MG: 5 INJECTION, SOLUTION INTRAMUSCULAR; INTRAVENOUS at 16:11

## 2025-07-17 RX ADMIN — MORPHINE SULFATE 4 MG: 4 INJECTION, SOLUTION INTRAMUSCULAR; INTRAVENOUS at 17:33

## 2025-07-17 RX ADMIN — DIAZEPAM 5 MG: 5 INJECTION, SOLUTION INTRAMUSCULAR; INTRAVENOUS at 10:05

## 2025-07-17 ASSESSMENT — PAIN DESCRIPTION - DESCRIPTORS: DESCRIPTORS: ACHING

## 2025-07-17 ASSESSMENT — PAIN DESCRIPTION - ORIENTATION: ORIENTATION: LOWER

## 2025-07-17 ASSESSMENT — PAIN SCALES - GENERAL
PAINLEVEL_OUTOF10: 8
PAINLEVEL_OUTOF10: 8

## 2025-07-17 ASSESSMENT — PAIN DESCRIPTION - LOCATION
LOCATION: BACK
LOCATION: BACK

## 2025-07-17 NOTE — CARE COORDINATION
Report from ALFREDO/VALERIE Abdalla this AM. [facility] liaison Shanel will conduct a onsite eval, this AM, of the patient for placement.    0856-Per Johnie BARTHOLOMEW/VALERIE, [facility] is starting Auth for Mr. Knapp.    1040-Per ALFREDO/VALERIE Abdalla, patient will have a hospice consult.     1545-Per Leslie from [facility], the patient was not able to focus and did not have the concentration to understand hospice care and/or to sign the forms to enroll. Per Leslie, she will return 07/18/25 at 0900 to eval the patient prior to receiving multiple doses of medications that may be effecting his cognition.  Charge RN Caryn kapoor.

## 2025-07-17 NOTE — PROGRESS NOTES
Care Mgmt Assistant, assisting Viv Rodriges RN Care Mgr with Discharge Planning needs for patient.  Referral sent to [facility] for review and consideration for acceptance for Hospice needs.    Updates sent to [facility] for review and consideration for placement.    Will follow along for further discharge plans.

## 2025-07-17 NOTE — PROGRESS NOTES
[facility] assisting  Viv Rodriges. The Important Message for Medicare was not given to the patient because he is currently incapacitated and under Palliative Care.  is aware.

## 2025-07-17 NOTE — PROGRESS NOTES
Nutrition Note Brief:    Noted pt now on comfort care ordered 7/16/25.  RD will s/o.  If with any nutrition questions and/or concerns please consult dietitian for recs.    RD available as needed.    Carlota Santacruz MS, RD  Clinical Dietitian  E: [email or web address]  O:  [phone]  C:  [phone]

## 2025-07-17 NOTE — PROGRESS NOTES
Hospitalist Progress Note    Patient: Beny Knapp MRN: [id number]  CSN: [id number]    YOB: 1965  Age: 59 y.o.  Sex: male    DOA: 6/14/2025 LOS:  LOS: 33 days          Chief Complain :  Foreign body, shortness of breath.  59 y.o.  male w/ PMH of COPD, ETOH-use and polysubstance abuse who was BIBA and presents with subjective difficulty breathing and swallowing with throat pain. Brought in by EMS for possible forign body (food) with bolus within region of velecula. ENT saw pt and ED intubated for airway protection. ENT was consulted and performed scope on pt with findings of extensive esophageal candidiasis. Nephrology was consulted for severe hyponatremia with sodium of 117 recommending NS at 75cc/hr. Intensivist was contacted as well d/t pt being intubated, on vent for airway protection. Prolong intubation, extubated 07/05/2025. Has been uncooperative, did not cooperate with MBS and did not cooperate with placing NG tube.  07/10/2025 SNVT episode early morning, went into respiratory distress. Required intubation. Thick copious secretions from ET tube.  07/15/2025, extubated. Remains confused and requiring soft restraints.  Palliative care discussed with patient and sister, now comfort measures.    Subjective:   Patient laying in bed, awake, confused, requiring soft restraints.  Assessment/Plan     Active Hospital Problems    Diagnosis     Bipolar 1 disorder (HCC) [F31.9]     Acute hypoxic on chronic hypercapnic respiratory failure (HCC) [J96.01, J96.12]     Palliative care encounter [Z51.5]     Goals of care, counseling/discussion [Z71.89]     Pleural effusion on left [J90]     Pulmonary atelectasis [J98.11]      Date/Time    CHOL 132 07/04/2025 05:07 AM    HDL 43 07/04/2025 05:07 AM    LDL 59 07/04/2025 05:07 AM    VLDL 30 07/04/2025 05:07 AM      Pancreas No results for input(s): \"LIPASE\" in the last 72 hours.,No results for input(s): \"AMYLASE\" in the last 72 hours.   Liver Enzymes No results for input(s): \"TP\" in the last 72 hours.    Invalid input(s): \"ALB\", \"TBIL\", \"AP\", \"SGOT\", \"GPT\", \"DBIL\"   Thyroid Studies Lab Results   Component Value Date/Time    TSH 0.698 07/09/2025 02:23 PM        Procedures/imaging: see electronic medical records for all procedures/Xrays and details which were not copied into this note but were reviewed prior to creation of Plan    TIME: E/M Time spent with patient and patient care issues: 55 mins.     This time also includes physician non-face-to-face service time visit on the date of service such as  Preparing to see the patient (eg, review of tests)  Obtaining and/or reviewing separately obtained history  Performing a medically necessary appropriate examination and/or evaluation  Counseling and educating the patient/family/caregiver  Ordering medications, tests, or procedures  Referring and communicating with other health care professionals as needed  Documenting clinical information in the electronic or other health record  Independently interpreting results (not reported separately) and communicating results to the patient/family/caregiver  Care coordination and discharge planning with Case Management.    Elements of old note were copied to maintain continuity and comprehensiveness of patient details. The assessment and plan has been reviewed and modified to reflect the most recent condition     Dear patient or family member or Caretaker, if you are reviewing this note and have a question regarding the medical terminology, please bring it with you to your next PCP visit.  Medical notes are meant to be a communication between medical professionals.  Additionally, portion of this note were

## 2025-07-17 NOTE — PROGRESS NOTES
[facility]  to do bedside eval this morning. If approved, auth to be started. SW to follow.

## 2025-07-17 NOTE — PROGRESS NOTES
[facility]  Phone: [phone]  Paging : (Moustapha) [phone] (Hospital) [phone]   (Bliss) [phone] (Hospital) [phone]      Hematology / Oncology Progress Note      Assessment:   Acute respiratory failure with hypoxemia  Pleural effusion-left  Pulmonary atelectasis  Obstructed larynx  Polysubstance abuse   Candidiasis of esophagus   Aspiration pneumonia   COPD/emphysema  EtOH use  Cocaine abuse  Hepatitis C  Cirrhosis of liver  Hyponatremia  Malnutrition  Aspiration pneumonia  Hypertension  Back pain  Depression  Bipolar  thrombocytopenia  Plan:     extubated 7/15  Now comfort care and a dNR  Platelets yest improved to 141.  No bleeding anc 3.94  Remains agitated,   Now comfort care    Admit Date: 2025    Subjective:     Moved to floor.  IS a DNR.  Comfort care   Objective:     Temp (24hrs), Av.8 °F (36.6 °C), Min:97.2 °F (36.2 °C), Max:98.2 °F (36.8 °C)     ROS - unobtainable      HEENT exam negative.  No jaundice is seen.  No bleeding  extubated  Heart regular rate and rhythm  Lungs decreased breath sounds , upper airway noises while breathing   Abdomen soft nontender positive bowel sounds  Extremities no edema erythema cyanosis or clubbing  Neurologic exam agitated will not follow commands   berry in place       Current Facility-Administered Medications   Medication Dose Route Frequency Provider Last Rate Last Admin    morphine (PF) injection 2 mg  2 mg IntraVENous Q2H PRN Ray Rousseau MD   2 mg at 25 0247    Or    morphine sulfate (PF) injection 4 mg  4 mg IntraVENous Q2H PRN Ray Rousseau MD        scopolamine (TRANSDERM-SCOP) transdermal patch 1 patch  1 patch TransDERmal Q72H Ray Rousseau MD   1 patch at 25 2357    diazePAM (VALIUM) injection 5 mg  5 mg IntraVENous Q1H PRN Ray Rousseau MD   5 mg at 25 2351    dornase alpha (PULMOZYME) nebulizer solution 2.5 mg  2.5 mg Inhalation BID RT Casey Car MD   2.5 mg  at 07/16/25 0809    lidocaine 4 % external patch 1 patch  1 patch TransDERmal Daily Ba Calderon MD   1 patch at 07/16/25 1120    ipratropium (ATROVENT) 0.02 % nebulizer solution 0.5 mg  0.5 mg Nebulization Q6H Bharat Law MD   0.5 mg at 07/16/25 2017    albuterol (PROVENTIL) (2.5 MG/3ML) 0.083% nebulizer solution 2.5 mg  2.5 mg Nebulization Q4H PRN Casey Car MD   2.5 mg at 07/02/25 1613    sodium chloride flush 0.9 % injection 5-40 mL  5-40 mL IntraVENous PRN Macho Tan,         ondansetron (ZOFRAN) injection 4 mg  4 mg IntraVENous Q6H PRN Macho Tna DO        arformoterol tartrate (BROVANA) nebulizer solution 15 mcg  15 mcg Nebulization BID RT Jocelyne Richmond MD   15 mcg at 07/16/25 2017    budesonide (PULMICORT) nebulizer suspension 500 mcg  0.5 mg Nebulization BID RT Jocelyne Richmond MD   500 mcg at 07/16/25 2017     Will sign off. No heroics.  Comfort only.     Christopher Acevedo MD   [facility]  Office [phone]

## 2025-07-17 NOTE — PROGRESS NOTES
PALLIATIVE MEDICINE    NO AMD ON FILE. There are no emergency contacts or decision makers.    CODE STATUS: DNR/DNI.     Seen today in Rm 330. Patient lying in bed sideways with feet hanging off bed. Head of bed raised.  Repositioned patient. Garbled speech. Agitated but improved after repositioning. At times patient is hard to understand and he becomes frustrated. AAOX4. Respirations even and unlabored on RA.  No complaints of pain.  Hospice eval today. Plan to discharge to SNF with hospice care. Patient placed on comfort care yesterday.     Palliative medicine will  continue to follow Beny Knapp  during his admission.       Maura Kimball, PUMA  Palliative Medicine  [phone]

## 2025-07-17 NOTE — PROGRESS NOTES
met with patient at bedside for a follow-up visit. When I called out his name from   outside he said come on in. He welcomed me and we started talking and some things I did   not understand. He told me he is getting better which is wonderful.       Patient thanked  for the visit and asked for prayer which I did and then he said let me   say something and it was the Lord's prayer he said with enthusiasm.      provided presence, support, prayer, and assurance of continued prayer.      Chaplains will provide follow-up care for patient  and family as needed.    Spiritual Health History and Assessment/Progress Note  [facility]    Spiritual/Emotional Needs, Palliative Care, Emotional distress, Follow up, Adjustment to illness, Life Adjustments,      Name: Beny Knapp MRN: [id number]    Age: 59 y.o.     Sex: male   Language: English   Yazdanism: Alevism   Acute respiratory failure with hypoxia (HCC)     Date: 7/17/2025            Total Time Calculated: 20 min              Spiritual Assessment began in [address] SURGICAL/ONCOLOGY        Referral/Consult From: Rounding   Encounter Overview/Reason: Spiritual/Emotional Needs, Palliative Care  Service Provided For: Patient    Love, Belief, Meaning:   Patient unable to assess at this time  Family/Friends No family/friends present      Importance and Influence:  Patient unable to assess at this time  Family/Friends No family/friends present    Community:  Patient expresses feelings of isolation: disconnected from family/friends  Family/Friends No family/friends present    Assessment and Plan of Care:     Patient Interventions include: Facilitated expression of thoughts and feelings  Family/Friends Interventions include: No family/friends present    Patient Plan of Care: No spiritual needs identified for follow-up  Family/Friends Plan of Care: No spiritual needs identified for follow-up    Electronically signed by Yamileth  Chaplain Vamshi on 7/17/2025 at 4:42 PM

## 2025-07-17 NOTE — PROGRESS NOTES
Bedside and Verbal shift change report given to Rubina RIGGS RN (oncoming nurse). Report included the following information Nurse Handoff Report, Index, Adult Overview, Intake/Output, MAR, and Recent Results.

## 2025-07-17 NOTE — PROGRESS NOTES
Cardiology Progress Note        Patient: Beny Knapp        Sex: male          DOA: 6/14/2025  YOB: 1965      Age:  59 y.o.        LOS:  LOS: 32 days     Patient seen and examined, chart reviewed.    Assessment/Plan     Patient Active Problem List   Diagnosis    HTN (hypertension)    Tobacco abuse    COPD (chronic obstructive pulmonary disease) (HCC)    Back pain    Depression    Chronic hepatitis C (HCC)    Thrombocytopenia    Hyponatremia    Stridor    Obstructed, larynx    Acute respiratory failure with hypoxia (HCC)    ETOH abuse    Polysubstance abuse (HCC)    Candidiasis of esophagus (HCC)    Aspiration pneumonia (HCC)    Cocaine abuse (HCC)    Moderate malnutrition    Pleural effusion on left    Pulmonary atelectasis    Palliative care encounter    Goals of care, counseling/discussion    Bipolar 1 disorder (HCC)    Acute hypoxic on chronic hypercapnic respiratory failure (HCC)         Abnormal EKG suggestive of ischemia  Abnormal troponin no clear evidence of acute coronary syndrome, could be demand ischemia   NSVT       Echocardiogram was done in June 2025 reported       Image quality is fair.    Left Ventricle: Normal left ventricular systolic function with a visually estimated EF of 55 - 60%. EF by 2D Simpsons Biplane is 57%. Left ventricle size is normal. Normal wall thickness. Normal wall motion. Normal diastolic function.    Mitral Valve: There is mild posterior annular calcification noted. Thickened leaflets. No stenosis noted.    Tricuspid Valve : Unable to assess RVSP due to inadequate or insignificant tricuspid regurgitation.    Pericardium : No pericardial effusion.    IVC/SVC : Patient is ventilated, cannot use IVC diameter to estimate right atrial pressure. IVC size is normal.    Echocardiogram revealed       Contrast used: Lumason. Technically difficult study due to patient's body habitus and patient's ability to tolerate test.    Left Ventricle:  Normal left ventricular systolic function with a visually estimated EF of 60 - 65%. Left ventricle size is normal. Normal wall thickness. Unable to assess wall motion. Indeterminate diastolic function. Technically difficult with poor endocadial visualization.    This study has very limited diagnostic value      extubated     Plan:      Continue supportive care                Subjective:    cc:    Not in any distress      REVIEW OF SYSTEMS:      Can not obtain due to patient's condition    Objective:      Visit Vitals  /80   Pulse 80   Temp 98.2 °F (36.8 °C) (Axillary)   Resp 18   Ht 1.829 m (6')   Wt 88.9 kg (195 lb 15.8 oz)   SpO2 95%   BMI 26.58 kg/m²     Body mass index is 26.58 kg/m².    Physical Exam:  General Appearance:  not in any distress.  HEENT: DIEGO.   HEAD: Atraumatic  NECK: No JVD, no thyroidomeglay. CAROTIDS: No bruit  LUNGS: Clear bilaterally.   HEART: S1+S2 audible, no murmur, no pericardial rub.     ABD: Non-tender, BS Audible    EXT: No edema, and no cyanosis.      medication:  Current Facility-Administered Medications   Medication Dose Route Frequency    diazePAM (VALIUM) injection 2.5 mg  2.5 mg IntraVENous Q8H PRN    dextrose 5 % and 0.9 % sodium chloride infusion   IntraVENous Continuous    albuterol (PROVENTIL) nebulizer solution 2.5 mg  2.5 mg Nebulization Q6H PRN    clonazePAM (KLONOPIN) tablet 0.5 mg  0.5 mg Oral Nightly    fentaNYL (SUBLIMAZE) injection 50 mcg  50 mcg IntraVENous Q4H PRN    dornase alpha (PULMOZYME) nebulizer solution 2.5 mg  2.5 mg Inhalation BID RT    cyclobenzaprine (FLEXERIL) tablet 10 mg  10 mg Oral TID PRN    lidocaine 4 % external patch 1 patch  1 patch TransDERmal Daily    [Held by provider] enoxaparin (LOVENOX) injection 40 mg  40 mg SubCUTAneous Daily    bisacodyl (DULCOLAX) suppository 10 mg  10 mg Rectal Daily PRN    ipratropium (ATROVENT) 0.02 % nebulizer solution 0.5 mg  0.5 mg Nebulization Q6H    hydrALAZINE (APRESOLINE) injection 10 mg  10 mg

## 2025-07-18 PROCEDURE — 6370000000 HC RX 637 (ALT 250 FOR IP): Performed by: HOSPITALIST

## 2025-07-18 PROCEDURE — 6360000002 HC RX W HCPCS: Performed by: INTERNAL MEDICINE

## 2025-07-18 PROCEDURE — 2700000000 HC OXYGEN THERAPY PER DAY

## 2025-07-18 PROCEDURE — 94640 AIRWAY INHALATION TREATMENT: CPT

## 2025-07-18 PROCEDURE — 1100000000 HC RM PRIVATE

## 2025-07-18 RX ADMIN — MORPHINE SULFATE 4 MG: 4 INJECTION, SOLUTION INTRAMUSCULAR; INTRAVENOUS at 18:59

## 2025-07-18 RX ADMIN — MORPHINE SULFATE 2 MG: 2 INJECTION, SOLUTION INTRAMUSCULAR; INTRAVENOUS at 02:24

## 2025-07-18 RX ADMIN — BUDESONIDE 500 MCG: 0.5 INHALANT RESPIRATORY (INHALATION) at 07:29

## 2025-07-18 RX ADMIN — ARFORMOTEROL TARTRATE 15 MCG: 15 SOLUTION RESPIRATORY (INHALATION) at 07:29

## 2025-07-18 RX ADMIN — IPRATROPIUM BROMIDE 0.5 MG: 0.5 SOLUTION RESPIRATORY (INHALATION) at 19:21

## 2025-07-18 RX ADMIN — IPRATROPIUM BROMIDE 0.5 MG: 0.5 SOLUTION RESPIRATORY (INHALATION) at 07:29

## 2025-07-18 RX ADMIN — ARFORMOTEROL TARTRATE 15 MCG: 15 SOLUTION RESPIRATORY (INHALATION) at 19:21

## 2025-07-18 RX ADMIN — IPRATROPIUM BROMIDE 0.5 MG: 0.5 SOLUTION RESPIRATORY (INHALATION) at 13:25

## 2025-07-18 RX ADMIN — DORNASE ALFA 2.5 MG: 1 SOLUTION RESPIRATORY (INHALATION) at 19:21

## 2025-07-18 RX ADMIN — DIAZEPAM 5 MG: 5 INJECTION, SOLUTION INTRAMUSCULAR; INTRAVENOUS at 00:03

## 2025-07-18 RX ADMIN — MORPHINE SULFATE 4 MG: 4 INJECTION, SOLUTION INTRAMUSCULAR; INTRAVENOUS at 10:20

## 2025-07-18 RX ADMIN — BUDESONIDE 500 MCG: 0.5 INHALANT RESPIRATORY (INHALATION) at 19:21

## 2025-07-18 RX ADMIN — DORNASE ALFA 2.5 MG: 1 SOLUTION RESPIRATORY (INHALATION) at 07:29

## 2025-07-18 RX ADMIN — MORPHINE SULFATE 4 MG: 4 INJECTION, SOLUTION INTRAMUSCULAR; INTRAVENOUS at 14:36

## 2025-07-18 ASSESSMENT — PAIN SCALES - WONG BAKER: WONGBAKER_NUMERICALRESPONSE: NO HURT

## 2025-07-18 ASSESSMENT — PAIN SCALES - GENERAL
PAINLEVEL_OUTOF10: 8
PAINLEVEL_OUTOF10: 9

## 2025-07-18 ASSESSMENT — PAIN DESCRIPTION - ORIENTATION: ORIENTATION: LEFT

## 2025-07-18 ASSESSMENT — PAIN DESCRIPTION - LOCATION: LOCATION: LEG;SACRUM

## 2025-07-18 NOTE — PROGRESS NOTES
Physician Progress Note      PATIENT:               VIOLET AYALA  CSN #:                  [id number]  :                       1965  ADMIT DATE:       2025 1:35 AM  DISCH DATE:  RESPONDING  PROVIDER #:        Ba Calderon MD          QUERY TEXT:    Moderate Malnutrition is documented in the medical record on  by   Pulmonologist, and now has Severe malnutrition documented per ASPEN criteria   by the Registered Dietician on 7/15.  Please specify the degree/type:    The clinical indicators include:  7/15 Registered Dietician Consultant  \"Malnutrition Status:  Severe malnutrition (07/15/25 6023)  Context:  Acute Illness  Findings of the 6 clinical characteristics of malnutrition:  Energy Intake:  50% or less of estimated energy requirements for 5 or more   days  Weight Loss:  Greater than 7.5% over 3 months  Body Fat Loss:  Moderate body fat loss Orbital, Triceps likely now w/severe   loss  Muscle Mass Loss:  Moderate muscle mass loss Calf (gastrocnemius), Thigh   (quadriceps) likely now w/severe loss  Fluid Accumulation:  Unable to assess   Strength:  Not Performed\"      Nutrition Recommendations/Treatment  - Consider increase KPhos suppl QID  - When able re-start TF with Vital 1.2  - Folate  - Thiamine  - Centrum  - I/O's  - Daily weights  - Electrolyte monitoring  Options provided:  -- Severe Malnutrition  -- Other - I will add my own diagnosis  -- Disagree - Not applicable / Not valid  -- Disagree - Clinically unable to determine / Unknown  -- Refer to Clinical Documentation Reviewer    PROVIDER RESPONSE TEXT:    This patient has severe malnutrition.    Query created by: Suzi Finch on 7/15/2025 4:40 PM      Electronically signed by:  Ba Calderon MD 2025 11:23 AM

## 2025-07-18 NOTE — CARE COORDINATION
07/18/25 1054   IMM Letter   IMM Letter given to Patient/Family/Significant other/Guardian/POA/by: Viv Rodriges RN Case Manager   IMM Letter date given: 07/18/25   IMM Letter time given: 0930      Viv, delivered the Important Message from Medicare to the patient. He made his adolfo and he acknowledged understanding. A copy was left at bedside and a copy was put into the chart, per the .

## 2025-07-18 NOTE — CARE COORDINATION
0910-Met w/the patient and Leslie, the liaison from [facility] to discuss DC planning. Patient is awake, alert and eating breakfast (order for comfort care and feeds for pleasure).  Patient able to discuss options for SNF placment under hospice and/or sub acute rehab and had appropriate questions.  Provided him w/a list of SNF and informed him that referrals were placed yesterday.  He expressed interest in a few and was agreeable to referrals.  He requested that Leslie revisit him in the afternoon to discuss hospice option and she will return.   RN/CM following for SNF placement under hospice vs sub-acute rehab.     1600-Per Leslie BARTHOLOMEW w/[facility], MD Law reports that the patient is not a canididate for GIP or in hospital comfort measures.  He need to be placed in SNF rehab and at that time, Hospice can re eval.    SNF referrals remain pending w/no accepting facilities as of today.

## 2025-07-18 NOTE — PROGRESS NOTES
Hospitalist Progress Note    Patient: eBny Knapp MRN: [id number]  CSN: [id number]    YOB: 1965  Age: 59 y.o.  Sex: male    DOA: 6/14/2025 LOS:  LOS: 34 days          Chief Complain :  Foreign body, shortness of breath.  59 y.o.  male w/ PMH of COPD, ETOH-use and polysubstance abuse who was BIBA and presents with subjective difficulty breathing and swallowing with throat pain. Brought in by EMS for possible forign body (food) with bolus within region of velecula. ENT saw pt and ED intubated for airway protection. ENT was consulted and performed scope on pt with findings of extensive esophageal candidiasis. Nephrology was consulted for severe hyponatremia with sodium of 117 recommending NS at 75cc/hr. Intensivist was contacted as well d/t pt being intubated, on vent for airway protection. Prolong intubation, extubated 07/05/2025. Has been uncooperative, did not cooperate with MBS and did not cooperate with placing NG tube.  07/10/2025 SNVT episode early morning, went into respiratory distress. Required intubation. Thick copious secretions from ET tube.  07/15/2025, extubated. Remains confused and requiring soft restraints.  Palliative care discussed with patient and sister, now comfort measures.    Subjective:   Patient laying in bed, awake, confused, requiring soft restraints.  Assessment/Plan     Active Hospital Problems    Diagnosis     Bipolar 1 disorder (HCC) [F31.9]     Acute hypoxic on chronic hypercapnic respiratory failure (HCC) [J96.01, J96.12]     Palliative care encounter [Z51.5]     Goals of care, counseling/discussion [Z71.89]     Pleural effusion on left [J90]     Pulmonary atelectasis [J98.11]

## 2025-07-18 NOTE — PROGRESS NOTES
Patient more alert and oriented, no agitation during the night.   Bedside and Verbal shift change report given to Юлия BERNAL RN (oncoming nurse). Report included the following information Nurse Handoff Report, Index, Adult Overview, Intake/Output, MAR, and Recent Results.

## 2025-07-18 NOTE — PLAN OF CARE
Problem: Discharge Planning  Goal: Discharge to home or other facility with appropriate resources  7/18/2025 1048 by Юлия Bates RN  Outcome: Progressing  7/18/2025 0351 by Milan Wilkinson RN  Outcome: Progressing  Flowsheets (Taken 7/18/2025 0351)  Discharge to home or other facility with appropriate resources:   Identify barriers to discharge with patient and caregiver   Identify discharge learning needs (meds, wound care, etc)   Arrange for needed discharge resources and transportation as appropriate     Problem: Pain  Goal: Verbalizes/displays adequate comfort level or baseline comfort level  7/18/2025 1048 by Юлия Bates RN  Outcome: Progressing  7/18/2025 0351 by Milan Wilkinson RN  Outcome: Progressing  Flowsheets (Taken 7/18/2025 0351)  Verbalizes/displays adequate comfort level or baseline comfort level:   Encourage patient to monitor pain and request assistance   Assess pain using appropriate pain scale   Administer analgesics based on type and severity of pain and evaluate response   Implement non-pharmacological measures as appropriate and evaluate response     Problem: Chronic Conditions and Co-morbidities  Goal: Patient's chronic conditions and co-morbidity symptoms are monitored and maintained or improved  7/18/2025 1048 by Юлия Bates RN  Outcome: Progressing  7/18/2025 0351 by Milan Wilkinson RN  Outcome: Progressing  Flowsheets (Taken 7/18/2025 0351)  Care Plan - Patient's Chronic Conditions and Co-Morbidity Symptoms are Monitored and Maintained or Improved:   Monitor and assess patient's chronic conditions and comorbid symptoms for stability, deterioration, or improvement   Collaborate with multidisciplinary team to address chronic and comorbid conditions and prevent exacerbation or deterioration   Update acute care plan with appropriate goals if chronic or comorbid symptoms are exacerbated and prevent overall improvement and discharge     Problem: Neurosensory -

## 2025-07-19 LAB
ACID FAST STN SPEC: NEGATIVE
BACTERIA SPEC CULT: ABNORMAL
GRAM STN SPEC: ABNORMAL
MYCOBACTERIUM SPEC QL CULT: NORMAL
P JIROVECII DNA # BAL NAA+PROBE: NEGATIVE COPIES/ML
SERVICE CMNT-IMP: ABNORMAL
SOURCE: NORMAL
SPECIMEN PREPARATION: NORMAL
SPECIMEN SOURCE: NORMAL
SPECIMEN SOURCE: NORMAL

## 2025-07-19 PROCEDURE — 94640 AIRWAY INHALATION TREATMENT: CPT

## 2025-07-19 PROCEDURE — 2700000000 HC OXYGEN THERAPY PER DAY

## 2025-07-19 PROCEDURE — 6360000002 HC RX W HCPCS: Performed by: INTERNAL MEDICINE

## 2025-07-19 PROCEDURE — 1100000000 HC RM PRIVATE

## 2025-07-19 PROCEDURE — 6370000000 HC RX 637 (ALT 250 FOR IP): Performed by: HOSPITALIST

## 2025-07-19 PROCEDURE — 94669 MECHANICAL CHEST WALL OSCILL: CPT

## 2025-07-19 PROCEDURE — 2500000003 HC RX 250 WO HCPCS: Performed by: INTERNAL MEDICINE

## 2025-07-19 RX ORDER — MORPHINE SULFATE 2 MG/ML
1 INJECTION, SOLUTION INTRAMUSCULAR; INTRAVENOUS
Status: DISCONTINUED | OUTPATIENT
Start: 2025-07-19 | End: 2025-07-20

## 2025-07-19 RX ORDER — DIAZEPAM 10 MG/2ML
2.5 INJECTION, SOLUTION INTRAMUSCULAR; INTRAVENOUS EVERY 4 HOURS PRN
Status: DISCONTINUED | OUTPATIENT
Start: 2025-07-19 | End: 2025-07-22 | Stop reason: HOSPADM

## 2025-07-19 RX ORDER — PANTOPRAZOLE SODIUM 40 MG/1
40 TABLET, DELAYED RELEASE ORAL
Status: DISCONTINUED | OUTPATIENT
Start: 2025-07-20 | End: 2025-07-20

## 2025-07-19 RX ADMIN — BUDESONIDE 500 MCG: 0.5 INHALANT RESPIRATORY (INHALATION) at 07:25

## 2025-07-19 RX ADMIN — IPRATROPIUM BROMIDE 0.5 MG: 0.5 SOLUTION RESPIRATORY (INHALATION) at 14:08

## 2025-07-19 RX ADMIN — ARFORMOTEROL TARTRATE 15 MCG: 15 SOLUTION RESPIRATORY (INHALATION) at 20:22

## 2025-07-19 RX ADMIN — IPRATROPIUM BROMIDE 0.5 MG: 0.5 SOLUTION RESPIRATORY (INHALATION) at 07:25

## 2025-07-19 RX ADMIN — MORPHINE SULFATE 4 MG: 4 INJECTION, SOLUTION INTRAMUSCULAR; INTRAVENOUS at 10:09

## 2025-07-19 RX ADMIN — DORNASE ALFA 2.5 MG: 1 SOLUTION RESPIRATORY (INHALATION) at 07:25

## 2025-07-19 RX ADMIN — ARFORMOTEROL TARTRATE 15 MCG: 15 SOLUTION RESPIRATORY (INHALATION) at 07:25

## 2025-07-19 RX ADMIN — SODIUM CHLORIDE, PRESERVATIVE FREE 10 ML: 5 INJECTION INTRAVENOUS at 08:03

## 2025-07-19 RX ADMIN — IPRATROPIUM BROMIDE 0.5 MG: 0.5 SOLUTION RESPIRATORY (INHALATION) at 20:23

## 2025-07-19 RX ADMIN — MORPHINE SULFATE 4 MG: 4 INJECTION, SOLUTION INTRAMUSCULAR; INTRAVENOUS at 00:37

## 2025-07-19 RX ADMIN — MORPHINE SULFATE 4 MG: 4 INJECTION, SOLUTION INTRAMUSCULAR; INTRAVENOUS at 02:59

## 2025-07-19 RX ADMIN — BUDESONIDE 500 MCG: 0.5 INHALANT RESPIRATORY (INHALATION) at 20:23

## 2025-07-19 RX ADMIN — MORPHINE SULFATE 1 MG: 2 INJECTION, SOLUTION INTRAMUSCULAR; INTRAVENOUS at 14:41

## 2025-07-19 RX ADMIN — DORNASE ALFA 2.5 MG: 1 SOLUTION RESPIRATORY (INHALATION) at 20:36

## 2025-07-19 RX ADMIN — DIAZEPAM 2.5 MG: 10 INJECTION, SOLUTION INTRAMUSCULAR; INTRAVENOUS at 22:19

## 2025-07-19 ASSESSMENT — PAIN SCALES - WONG BAKER
WONGBAKER_NUMERICALRESPONSE: NO HURT
WONGBAKER_NUMERICALRESPONSE: NO HURT

## 2025-07-19 ASSESSMENT — PAIN SCALES - GENERAL
PAINLEVEL_OUTOF10: 9
PAINLEVEL_OUTOF10: 9

## 2025-07-19 ASSESSMENT — PAIN DESCRIPTION - LOCATION
LOCATION: BACK
LOCATION: BACK
LOCATION: GENERALIZED

## 2025-07-19 ASSESSMENT — PAIN DESCRIPTION - DESCRIPTORS
DESCRIPTORS: DISCOMFORT
DESCRIPTORS: DISCOMFORT;ACHING

## 2025-07-19 NOTE — PLAN OF CARE
Problem: Safety - Medical Restraint  Goal: Remains free of injury from restraints (Restraint for Interference with Medical Device)  Description: INTERVENTIONS:  1. Determine that other, less restrictive measures have been tried or would not be effective before applying the restraint  2. Evaluate the patient's condition at the time of restraint application  3. Inform patient/family regarding the reason for restraint  4. Q2H: Monitor safety, psychosocial status, comfort, nutrition and hydration  Outcome: Progressing     Problem: Discharge Planning  Goal: Discharge to home or other facility with appropriate resources  Outcome: Progressing     Problem: Pain  Goal: Verbalizes/displays adequate comfort level or baseline comfort level  Outcome: Progressing     Problem: Chronic Conditions and Co-morbidities  Goal: Patient's chronic conditions and co-morbidity symptoms are monitored and maintained or improved  Outcome: Progressing     Problem: Neurosensory - Adult  Goal: Achieves stable or improved neurological status  Outcome: Progressing     Problem: Respiratory - Adult  Goal: Achieves optimal ventilation and oxygenation  Outcome: Progressing     Problem: Cardiovascular - Adult  Goal: Maintains optimal cardiac output and hemodynamic stability  Outcome: Progressing

## 2025-07-19 NOTE — PROGRESS NOTES
Hospitalist Progress Note    Patient: Beny Knapp MRN: [id number]  CSN: [id number]    YOB: 1965  Age: 59 y.o.  Sex: male    DOA: 6/14/2025 LOS:  LOS: 35 days          Chief Complain :  Foreign body, shortness of breath.  59 y.o.  male w/ PMH of COPD, ETOH-use and polysubstance abuse who was BIBA and presents with subjective difficulty breathing and swallowing with throat pain. Brought in by EMS for possible forign body (food) with bolus within region of velecula. ENT saw pt and ED intubated for airway protection. ENT was consulted and performed scope on pt with findings of extensive esophageal candidiasis. Nephrology was consulted for severe hyponatremia with sodium of 117 recommending NS at 75cc/hr. Intensivist was contacted as well d/t pt being intubated, on vent for airway protection. Prolong intubation, extubated 07/05/2025. Has been uncooperative, did not cooperate with MBS and did not cooperate with placing NG tube.  07/10/2025 SNVT episode early morning, went into respiratory distress. Required intubation. Thick copious secretions from ET tube.  07/15/2025, extubated. Remains confused and requiring soft restraints.  Palliative care discussed with patient and sister, now comfort measures.    Subjective:     Patient was seen twice today.  Patient has had back pain as well as some cough.  Denies any chest or abdominal pain currently.  He says he always spits up after he eats and that he is his old problem.  No nausea or vomiting.  He told me he has a son named Alexia who lives in [place] but he does not want to be involved in his care.  He does not want us to call his sister.  He gave me his brother's name

## 2025-07-19 NOTE — PLAN OF CARE
Problem: Discharge Planning  Goal: Discharge to home or other facility with appropriate resources  7/19/2025 1046 by Юлия Bates RN  Outcome: Progressing  7/19/2025 0116 by Cony Rose RN  Outcome: Progressing     Problem: Pain  Goal: Verbalizes/displays adequate comfort level or baseline comfort level  7/19/2025 1046 by Юлия Bates RN  Outcome: Progressing  7/19/2025 0116 by Cony Rose RN  Outcome: Progressing     Problem: Chronic Conditions and Co-morbidities  Goal: Patient's chronic conditions and co-morbidity symptoms are monitored and maintained or improved  7/19/2025 1046 by Юлия Bates RN  Outcome: Progressing  7/19/2025 0116 by Cony Rose RN  Outcome: Progressing     Problem: Neurosensory - Adult  Goal: Achieves stable or improved neurological status  7/19/2025 1046 by Юлия Bates RN  Outcome: Progressing  7/19/2025 0116 by Cony Rose RN  Outcome: Progressing     Problem: Respiratory - Adult  Goal: Achieves optimal ventilation and oxygenation  7/19/2025 1046 by Юлия Bates RN  Outcome: Progressing  7/19/2025 0116 by Cony Rose RN  Outcome: Progressing     Problem: Cardiovascular - Adult  Goal: Maintains optimal cardiac output and hemodynamic stability  7/19/2025 1046 by Юлия Bates RN  Outcome: Progressing  7/19/2025 0116 by Cony Rose RN  Outcome: Progressing     Problem: Skin/Tissue Integrity - Adult  Goal: Skin integrity remains intact  Description: 1.  Monitor for areas of redness and/or skin breakdown  2.  Assess vascular access sites hourly  3.  Every 4-6 hours minimum:  Change oxygen saturation probe site  4.  Every 4-6 hours:  If on nasal continuous positive airway pressure, respiratory therapy assess nares and determine need for appliance change or resting period  7/19/2025 1046 by Юлия Bates RN  Outcome: Progressing  7/19/2025 0116 by Cony Rose RN  Outcome: Progressing  Goal: Oral mucous membranes remain intact  7/19/2025 1046

## 2025-07-20 PROCEDURE — 6360000002 HC RX W HCPCS: Performed by: INTERNAL MEDICINE

## 2025-07-20 PROCEDURE — 94640 AIRWAY INHALATION TREATMENT: CPT

## 2025-07-20 PROCEDURE — 92610 EVALUATE SWALLOWING FUNCTION: CPT

## 2025-07-20 PROCEDURE — 6370000000 HC RX 637 (ALT 250 FOR IP): Performed by: INTERNAL MEDICINE

## 2025-07-20 PROCEDURE — 2700000000 HC OXYGEN THERAPY PER DAY

## 2025-07-20 PROCEDURE — 92526 ORAL FUNCTION THERAPY: CPT

## 2025-07-20 PROCEDURE — 6370000000 HC RX 637 (ALT 250 FOR IP): Performed by: HOSPITALIST

## 2025-07-20 PROCEDURE — 1100000000 HC RM PRIVATE

## 2025-07-20 RX ORDER — ATROPINE SULFATE 10 MG/ML
1 SOLUTION/ DROPS OPHTHALMIC 3 TIMES DAILY PRN
Status: DISCONTINUED | OUTPATIENT
Start: 2025-07-20 | End: 2025-07-22 | Stop reason: HOSPADM

## 2025-07-20 RX ORDER — OXYCODONE AND ACETAMINOPHEN 5; 325 MG/1; MG/1
1 TABLET ORAL EVERY 4 HOURS PRN
Refills: 0 | Status: DISCONTINUED | OUTPATIENT
Start: 2025-07-20 | End: 2025-07-22 | Stop reason: HOSPADM

## 2025-07-20 RX ORDER — PANTOPRAZOLE SODIUM 40 MG/1
40 TABLET, DELAYED RELEASE ORAL
Status: DISCONTINUED | OUTPATIENT
Start: 2025-07-20 | End: 2025-07-22 | Stop reason: HOSPADM

## 2025-07-20 RX ORDER — CYCLOBENZAPRINE HCL 10 MG
10 TABLET ORAL 3 TIMES DAILY PRN
Status: DISCONTINUED | OUTPATIENT
Start: 2025-07-20 | End: 2025-07-22 | Stop reason: HOSPADM

## 2025-07-20 RX ADMIN — DORNASE ALFA 2.5 MG: 1 SOLUTION RESPIRATORY (INHALATION) at 07:46

## 2025-07-20 RX ADMIN — BUDESONIDE 500 MCG: 0.5 INHALANT RESPIRATORY (INHALATION) at 07:46

## 2025-07-20 RX ADMIN — IPRATROPIUM BROMIDE 0.5 MG: 0.5 SOLUTION RESPIRATORY (INHALATION) at 07:46

## 2025-07-20 RX ADMIN — BUDESONIDE 500 MCG: 0.5 INHALANT RESPIRATORY (INHALATION) at 20:21

## 2025-07-20 RX ADMIN — DORNASE ALFA 2.5 MG: 1 SOLUTION RESPIRATORY (INHALATION) at 20:32

## 2025-07-20 RX ADMIN — ARFORMOTEROL TARTRATE 15 MCG: 15 SOLUTION RESPIRATORY (INHALATION) at 20:21

## 2025-07-20 RX ADMIN — PANTOPRAZOLE SODIUM 40 MG: 40 TABLET, DELAYED RELEASE ORAL at 15:44

## 2025-07-20 RX ADMIN — DIAZEPAM 2.5 MG: 10 INJECTION, SOLUTION INTRAMUSCULAR; INTRAVENOUS at 10:16

## 2025-07-20 RX ADMIN — DIAZEPAM 2.5 MG: 10 INJECTION, SOLUTION INTRAMUSCULAR; INTRAVENOUS at 15:36

## 2025-07-20 RX ADMIN — ARFORMOTEROL TARTRATE 15 MCG: 15 SOLUTION RESPIRATORY (INHALATION) at 07:46

## 2025-07-20 RX ADMIN — IPRATROPIUM BROMIDE 0.5 MG: 0.5 SOLUTION RESPIRATORY (INHALATION) at 13:34

## 2025-07-20 RX ADMIN — DIAZEPAM 2.5 MG: 10 INJECTION, SOLUTION INTRAMUSCULAR; INTRAVENOUS at 23:39

## 2025-07-20 RX ADMIN — IPRATROPIUM BROMIDE 0.5 MG: 0.5 SOLUTION RESPIRATORY (INHALATION) at 20:21

## 2025-07-20 RX ADMIN — MORPHINE SULFATE 1 MG: 2 INJECTION, SOLUTION INTRAMUSCULAR; INTRAVENOUS at 04:18

## 2025-07-20 NOTE — PLAN OF CARE
Problem: Discharge Planning  Goal: Discharge to home or other facility with appropriate resources  7/20/2025 1056 by Linda Grossman RN  Outcome: Progressing  7/20/2025 1056 by Linda Grossman RN  Outcome: Progressing  7/20/2025 1055 by Linda Grossman RN  Outcome: Progressing     Problem: Pain  Goal: Verbalizes/displays adequate comfort level or baseline comfort level  7/20/2025 1056 by Linda Grossman RN  Outcome: Progressing  7/20/2025 1055 by Linda Grossman RN  Outcome: Progressing     Problem: Chronic Conditions and Co-morbidities  Goal: Patient's chronic conditions and co-morbidity symptoms are monitored and maintained or improved  7/20/2025 1056 by Linda Grossman RN  Outcome: Progressing  7/20/2025 1055 by Linda Grossman RN  Outcome: Progressing     Problem: Neurosensory - Adult  Goal: Achieves stable or improved neurological status  7/20/2025 1056 by Linda Grossman RN  Outcome: Progressing  7/20/2025 1055 by Linda Grossman RN  Outcome: Progressing  Flowsheets (Taken 7/20/2025 0840)  Achieves stable or improved neurological status: Assess for and report changes in neurological status     Problem: Respiratory - Adult  Goal: Achieves optimal ventilation and oxygenation  7/20/2025 1056 by Linda Grossman RN  Outcome: Progressing  7/20/2025 1055 by Linda Grossman RN  Outcome: Progressing  Flowsheets (Taken 7/20/2025 0840)  Achieves optimal ventilation and oxygenation: Assess for changes in respiratory status     Problem: Cardiovascular - Adult  Goal: Maintains optimal cardiac output and hemodynamic stability  7/20/2025 1056 by Linda Grossman RN  Outcome: Progressing  7/20/2025 1055 by Linda Grossman RN  Outcome: Progressing     Problem: Skin/Tissue Integrity - Adult  Goal: Skin integrity remains intact  Description: 1.  Monitor for areas of redness and/or skin breakdown  2.  Assess vascular access sites hourly  3.  Every 4-6 hours minimum:  Change oxygen saturation probe site  4.  Every 4-6 hours:  If  belongings  3. Encourage verbalization of thoughts and concerns in a socially appropriate manner  4. Utilize positive, consistent limit setting strategies supporting safety of patient, staff and others  5. Encourage participation in the decision making process about the behavioral management agreement  6. If a visitor's behavior poses a threat to safety call refer to organization policy.  7. Initiate consult with , Psychosocial CNS, Spiritual Care as appropriate  7/20/2025 1056 by Linda Grossman RN  Outcome: Progressing  7/20/2025 1055 by Linda Grossman RN  Outcome: Progressing  Flowsheets (Taken 7/20/2025 0840)  Patient/family maintains appropriate behavior and adheres to behavioral management agreement, if implemented: Encourage verbalization of thoughts and concerns in a socially appropriate manner     Problem: Safety - Medical Restraint  Goal: Remains free of injury from restraints (Restraint for Interference with Medical Device)  Description: INTERVENTIONS:  1. Determine that other, less restrictive measures have been tried or would not be effective before applying the restraint  2. Evaluate the patient's condition at the time of restraint application  3. Inform patient/family regarding the reason for restraint  4. Q2H: Monitor safety, psychosocial status, comfort, nutrition and hydration  7/20/2025 1056 by Linda Grossman RN  Outcome: Progressing  7/20/2025 1056 by Linda Grossman RN  Outcome: Progressing  7/20/2025 1055 by Linda Grossman RN  Outcome: Progressing

## 2025-07-20 NOTE — CONSULTS
Psychiatry Consult Note  [facility]    Patient's Name: Beny Knapp  Medical Record #: [id number]  Date of Admission: 6/14/2025  Date of Evaluation: 7/20/2025    REASON FOR CONSULTATION: capacity eval    Chief Complaint: \" One of my lungs collapsed the other day  \"     HPI:   Beny Knapp is a 59 y.o. male with a PMH of COPD, bipolar I disorder, who was presented on 6/14/25 with difficulty breathing and swalloing and was found to have esophageal candidiasis, hyponatremia, and later had further lung issues. Patient states that he has continued to improve during his hospitalization and states he is aware that he will need a certain level of care upon discharge due to medical conditions. Discussed possible options and patient is agreeable to SNF. He reports he recognizes his limitations and how SNF would be appropriate. He states pros and cons of being at home versus a SNF. He consistently expressed his choice after understanding and reasoning through the options. He denies recent mood episodes or disturbances. He denies depression symptoms, manic symptoms, and psychotic symptoms.        Past Psychiatric History:   Patient reports being in psychiatric treatment for bipolar disorder and currently takes Celexa 20mg daily and Depakote 500mg BID. Patient denies clear history of manic episodes outside use of stimulants. Reports mostly having depressive episodes in the past. States his depression began when he was in senior care. Reports several other psychotropic medication trials over the years however cannot recall all the names at this time. Denies history of suicide attempts. Reports 4 psychiatric hospitalizations.    Substance History:   Reports extensive history of crack cocaine use starting at age 27 -reports he quit 3-4 years ago however UDS noted positive for cocaine, cannabinoids, fentanyl, and benzodiazepines. Denies regular alcohol use. Reports regular marijuana use. BAL negative.   Past

## 2025-07-20 NOTE — PLAN OF CARE
Problem: Discharge Planning  Goal: Discharge to home or other facility with appropriate resources  7/20/2025 1056 by Linda Grossman RN  Outcome: Progressing  7/20/2025 1055 by Linda Grossman RN  Outcome: Progressing     Problem: Pain  Goal: Verbalizes/displays adequate comfort level or baseline comfort level  Outcome: Progressing     Problem: Chronic Conditions and Co-morbidities  Goal: Patient's chronic conditions and co-morbidity symptoms are monitored and maintained or improved  Outcome: Progressing     Problem: Neurosensory - Adult  Goal: Achieves stable or improved neurological status  Outcome: Progressing  Flowsheets (Taken 7/20/2025 0840)  Achieves stable or improved neurological status: Assess for and report changes in neurological status     Problem: Respiratory - Adult  Goal: Achieves optimal ventilation and oxygenation  Outcome: Progressing  Flowsheets (Taken 7/20/2025 0840)  Achieves optimal ventilation and oxygenation: Assess for changes in respiratory status     Problem: Cardiovascular - Adult  Goal: Maintains optimal cardiac output and hemodynamic stability  Outcome: Progressing     Problem: Skin/Tissue Integrity - Adult  Goal: Skin integrity remains intact  Description: 1.  Monitor for areas of redness and/or skin breakdown  2.  Assess vascular access sites hourly  3.  Every 4-6 hours minimum:  Change oxygen saturation probe site  4.  Every 4-6 hours:  If on nasal continuous positive airway pressure, respiratory therapy assess nares and determine need for appliance change or resting period  Outcome: Progressing  Flowsheets (Taken 7/20/2025 1055)  Skin Integrity Remains Intact: Monitor for areas of redness and/or skin breakdown  Goal: Oral mucous membranes remain intact  Outcome: Progressing     Problem: Musculoskeletal - Adult  Goal: Return mobility to safest level of function  Outcome: Progressing     Problem: Gastrointestinal - Adult  Goal: Maintains or returns to baseline bowel

## 2025-07-20 NOTE — PROGRESS NOTES
RN Bladder scan patient. Patient complaining \" I have to go pee and I can't\" . 12ml of urine present on bladder on bladder scan. Patient had a large BM this morning .

## 2025-07-20 NOTE — PROGRESS NOTES
Hospitalist Progress Note    Patient: Beny Knapp MRN: [id number]  CSN: [id number]    YOB: 1965  Age: 59 y.o.  Sex: male    DOA: 6/14/2025 LOS:  LOS: 36 days          Chief Complain :  Foreign body, shortness of breath.  59 y.o.  male w/ PMH of COPD, ETOH-use and polysubstance abuse who was BIBA and presents with subjective difficulty breathing and swallowing with throat pain. Brought in by EMS for possible forign body (food) with bolus within region of velecula. ENT saw pt and ED intubated for airway protection. ENT was consulted and performed scope on pt with findings of extensive esophageal candidiasis. Nephrology was consulted for severe hyponatremia with sodium of 117 recommending NS at 75cc/hr. Intensivist was contacted as well d/t pt being intubated, on vent for airway protection. Prolong intubation, extubated 07/05/2025. Has been uncooperative, did not cooperate with MBS and did not cooperate with placing NG tube.  07/10/2025 SNVT episode early morning, went into respiratory distress. Required intubation. Thick copious secretions from ET tube.  07/15/2025, extubated. Remains confused and requiring soft restraints.  Palliative care discussed with patient and sister, now comfort measures.    Subjective:       7/20/25: Patient stated he needs to talk to hospice care also wants him to restart his Flexeril and Percocet.  He has had chronic back pain.  Off-and-on cough present.  Off-and-on heartburns present.  Nausea with vomiting present.  No headaches or dizziness.  Patient does not want to escalate current clinical care and understands the current treatment plan.  He wanted his phone to be charged and back.

## 2025-07-20 NOTE — PLAN OF CARE
Problem: Discharge Planning  Goal: Discharge to home or other facility with appropriate resources  Outcome: Progressing     Problem: Pain  Goal: Verbalizes/displays adequate comfort level or baseline comfort level  Outcome: Progressing     Problem: Chronic Conditions and Co-morbidities  Goal: Patient's chronic conditions and co-morbidity symptoms are monitored and maintained or improved  Outcome: Progressing     Problem: Neurosensory - Adult  Goal: Achieves stable or improved neurological status  Outcome: Progressing  Flowsheets (Taken 7/20/2025 0840)  Achieves stable or improved neurological status: Assess for and report changes in neurological status     Problem: Respiratory - Adult  Goal: Achieves optimal ventilation and oxygenation  Outcome: Progressing  Flowsheets (Taken 7/20/2025 0840)  Achieves optimal ventilation and oxygenation: Assess for changes in respiratory status     Problem: Cardiovascular - Adult  Goal: Maintains optimal cardiac output and hemodynamic stability  Outcome: Progressing     Problem: Skin/Tissue Integrity - Adult  Goal: Skin integrity remains intact  Description: 1.  Monitor for areas of redness and/or skin breakdown  2.  Assess vascular access sites hourly  3.  Every 4-6 hours minimum:  Change oxygen saturation probe site  4.  Every 4-6 hours:  If on nasal continuous positive airway pressure, respiratory therapy assess nares and determine need for appliance change or resting period  Outcome: Progressing  Flowsheets (Taken 7/20/2025 1055)  Skin Integrity Remains Intact: Monitor for areas of redness and/or skin breakdown  Goal: Oral mucous membranes remain intact  Outcome: Progressing     Problem: Musculoskeletal - Adult  Goal: Return mobility to safest level of function  Outcome: Progressing     Problem: Gastrointestinal - Adult  Goal: Maintains or returns to baseline bowel function  Outcome: Progressing  Flowsheets (Taken 7/20/2025 0840)  Maintains or returns to baseline bowel

## 2025-07-20 NOTE — PLAN OF CARE
Problem: SLP Adult - Impaired Swallowing  Goal: By Discharge: Advance to least restrictive diet without signs or symptoms of aspiration for planned discharge setting.  See evaluation for individualized goals.  Description: Patient will:  1. Tolerate PO trials with 0 s/s overt distress in 4/5 trials  2. Utilize compensatory swallow strategies/maneuvers (decrease bite/sip, size/rate, alt. liq/sol) with min cues in 4/5 trials  3. Perform oral-motor/laryngeal exercises to increase oropharyngeal swallow function with min cues  4. Complete an objective swallow study (i.e., MBSS) to assess swallow integrity, r/o aspiration, and determine of safest LRD, min A as indicated/ordered by MD     Rec:     NPO w/ consideration of short-term/long-term alternative means of nutrition/hydration vs comfort feeds  Strict aspiration precautions  HOB >45 during po intake, remain >30 for 30-45 minutes after po   Oral care TID w/ suction  Meds via IV/non orally  MBSS to further assess oropharyngeal swallow fxn once more stable  Outcome: Progressing   SPEECH LANGUAGE PATHOLOGY BEDSIDE SWALLOW EVALUATION/TREATMENT    Patient: Beny Knapp (59 y.o. male)  Date: 7/20/2025  Primary Diagnosis: Hyponatremia [E87.1]  Laryngeal edema [J38.4]  Candida laryngitis [B37.89]  Procedure(s) (LRB):  BRONCHOSCOPY (N/A) 6 Days Post-Op   Precautions: Aspiration  PLOF: As per H&P  ASSESSMENT:  Based on the objective data described below, the patient presents with severe oropharyngeal dysphagia. Pt seen for a bedside swallow re-evaluation after difficulty swallowing meds. Pt continues to demonstrate reduced oral motor strength, coordination, and rate of movement. Pt continues to consistently mouth breathe. Vocal quality is weak, breathy and with a reduced volume. Bolus manipulation was slow and uncoordinated. Hyolaryngeal elevation/excursion was reduced. Recommend continued NPO with consideration of short-term/long-term alternative means of

## 2025-07-21 LAB
ANION GAP SERPL CALC-SCNC: 13 MMOL/L (ref 3–18)
BACTERIA SPEC CULT: NORMAL
BUN SERPL-MCNC: 7 MG/DL (ref 6–23)
BUN/CREAT SERPL: 29 (ref 12–20)
CALCIUM SERPL-MCNC: 8.2 MG/DL (ref 8.5–10.1)
CHLORIDE SERPL-SCNC: 96 MMOL/L (ref 98–107)
CO2 SERPL-SCNC: 28 MMOL/L (ref 21–32)
CREAT SERPL-MCNC: 0.25 MG/DL (ref 0.6–1.3)
GLUCOSE SERPL-MCNC: 100 MG/DL (ref 74–108)
POTASSIUM SERPL-SCNC: 2.9 MMOL/L (ref 3.5–5.5)
SERVICE CMNT-IMP: NORMAL
SODIUM SERPL-SCNC: 137 MMOL/L (ref 136–145)

## 2025-07-21 PROCEDURE — 6360000002 HC RX W HCPCS: Performed by: INTERNAL MEDICINE

## 2025-07-21 PROCEDURE — 97168 OT RE-EVAL EST PLAN CARE: CPT

## 2025-07-21 PROCEDURE — 97530 THERAPEUTIC ACTIVITIES: CPT

## 2025-07-21 PROCEDURE — 94640 AIRWAY INHALATION TREATMENT: CPT

## 2025-07-21 PROCEDURE — 6370000000 HC RX 637 (ALT 250 FOR IP): Performed by: INTERNAL MEDICINE

## 2025-07-21 PROCEDURE — 36415 COLL VENOUS BLD VENIPUNCTURE: CPT

## 2025-07-21 PROCEDURE — 80048 BASIC METABOLIC PNL TOTAL CA: CPT

## 2025-07-21 PROCEDURE — 6370000000 HC RX 637 (ALT 250 FOR IP): Performed by: HOSPITALIST

## 2025-07-21 PROCEDURE — 1100000000 HC RM PRIVATE

## 2025-07-21 RX ADMIN — DORNASE ALFA 2.5 MG: 1 SOLUTION RESPIRATORY (INHALATION) at 20:48

## 2025-07-21 RX ADMIN — ARFORMOTEROL TARTRATE 15 MCG: 15 SOLUTION RESPIRATORY (INHALATION) at 20:36

## 2025-07-21 RX ADMIN — IPRATROPIUM BROMIDE 0.5 MG: 0.5 SOLUTION RESPIRATORY (INHALATION) at 20:36

## 2025-07-21 RX ADMIN — BUDESONIDE 500 MCG: 0.5 INHALANT RESPIRATORY (INHALATION) at 20:36

## 2025-07-21 RX ADMIN — DORNASE ALFA 2.5 MG: 1 SOLUTION RESPIRATORY (INHALATION) at 07:01

## 2025-07-21 RX ADMIN — PANTOPRAZOLE SODIUM 40 MG: 40 TABLET, DELAYED RELEASE ORAL at 17:44

## 2025-07-21 RX ADMIN — IPRATROPIUM BROMIDE 0.5 MG: 0.5 SOLUTION RESPIRATORY (INHALATION) at 07:01

## 2025-07-21 RX ADMIN — ARFORMOTEROL TARTRATE 15 MCG: 15 SOLUTION RESPIRATORY (INHALATION) at 07:01

## 2025-07-21 RX ADMIN — IPRATROPIUM BROMIDE 0.5 MG: 0.5 SOLUTION RESPIRATORY (INHALATION) at 14:15

## 2025-07-21 RX ADMIN — OXYCODONE AND ACETAMINOPHEN 1 TABLET: 5; 325 TABLET ORAL at 17:44

## 2025-07-21 RX ADMIN — BUDESONIDE 500 MCG: 0.5 INHALANT RESPIRATORY (INHALATION) at 07:01

## 2025-07-21 RX ADMIN — OXYCODONE AND ACETAMINOPHEN 1 TABLET: 5; 325 TABLET ORAL at 11:57

## 2025-07-21 ASSESSMENT — PAIN SCALES - WONG BAKER
WONGBAKER_NUMERICALRESPONSE: NO HURT
WONGBAKER_NUMERICALRESPONSE: NO HURT

## 2025-07-21 ASSESSMENT — PAIN DESCRIPTION - ORIENTATION: ORIENTATION: LOWER

## 2025-07-21 ASSESSMENT — PAIN DESCRIPTION - LOCATION
LOCATION: BACK
LOCATION: BACK

## 2025-07-21 ASSESSMENT — PAIN SCALES - GENERAL
PAINLEVEL_OUTOF10: 8
PAINLEVEL_OUTOF10: 5
PAINLEVEL_OUTOF10: 5
PAINLEVEL_OUTOF10: 9

## 2025-07-21 ASSESSMENT — PAIN DESCRIPTION - DESCRIPTORS
DESCRIPTORS: ACHING
DESCRIPTORS: ACHING

## 2025-07-21 NOTE — PROGRESS NOTES
Care Mgmt Assistant, assisting Care Mgr Lianne Glaser with Discharge Planning needs for patient.  Updates sent to [facility] (accepted - pending auth) for review.    Will continue to follow along for further discharge planning needs.

## 2025-07-21 NOTE — PROGRESS NOTES
Physical Therapy Goals:  Initiated 7/16/2025 to be met within 3 days.  Short Term Goals  Short Term Goal 1: Pt will be able to reposition self safely and roll L and R min A to prevent pressure areas.  Short Term Goal 2: Pt will be able to transfer supine<>sit min A to improve functional mobility.  Short Term Goal 3: Pt will be able to sit EOB unsupported w/ fair+ balance x10' to improve activity tolerance.  Short Term Goal 4: Pt will be able to transfer sit<>stand min A to improve independence.  Short Term Goal 5: Pt will be able to ambulate >50' w/ RW, min A to improve ability to negotiate environment.       PHYSICAL THERAPY TREATMENT    Patient: Beny Knapp (59 y.o. male)  Date: 7/21/2025  Diagnosis: Hyponatremia [E87.1]  Laryngeal edema [J38.4]  Candida laryngitis [B37.89] Acute respiratory failure with hypoxia (HCC)  Procedure(s) (LRB):  BRONCHOSCOPY (N/A) 7 Days Post-Op  Precautions: Fall Risk,  ,  ,  ,  ,  ,  ,        ASSESSMENT:  Introduced self to pt and pt agreeable to PT treatment.  Pt seen w/ OT for second pair of skilled hands.  Pt w/ bed alarm activated and cont to have bed alarm throughout session.  Pt supine in bed upon arrival and voiced many wants including phone , a fudge pop, to speak to hospice and a \"ticket out of here.\"  Pt able to participate in strengthening LE therapeutic exercises still requiring cues for technique.  Rolling R CGA w/ bed rail use appearing easier than in previous sessions, rolling L w/ bed rail use CGA but more challenging than R.  Attempted scooting up in bed but pt unable to use B UE and LE at same time.  Required min A w/ assistance of pad underneath and pt grabbing bed rails to pull.  Pt remains impulsive but appears more mobile making gains in bed mobility.  Pt left supine in bed w/ HOB raised to comfort and all needs within reach.    Progression toward goals:   []      Improving appropriately and progressing toward goals  [x]      Improving slowly and

## 2025-07-21 NOTE — PLAN OF CARE
Problem: Safety - Medical Restraint  Goal: Remains free of injury from restraints (Restraint for Interference with Medical Device)  Description: INTERVENTIONS:  1. Determine that other, less restrictive measures have been tried or would not be effective before applying the restraint  2. Evaluate the patient's condition at the time of restraint application  3. Inform patient/family regarding the reason for restraint  4. Q2H: Monitor safety, psychosocial status, comfort, nutrition and hydration  Outcome: Progressing  Flowsheets (Taken 7/17/2025 0619 by Milan Wilkinson RN)  Remains free of injury from restraints (restraint for interference with medical device):   Determine that other, less restrictive measures have been tried or would not be effective before applying the restraint   Evaluate the patient's condition at the time of restraint application   Every 2 hours: Monitor safety, psychosocial status, comfort, nutrition and hydration     Problem: Discharge Planning  Goal: Discharge to home or other facility with appropriate resources  Outcome: Progressing  Flowsheets (Taken 7/18/2025 0351 by Milan Wilkinson RN)  Discharge to home or other facility with appropriate resources:   Identify barriers to discharge with patient and caregiver   Identify discharge learning needs (meds, wound care, etc)   Arrange for needed discharge resources and transportation as appropriate     Problem: Pain  Goal: Verbalizes/displays adequate comfort level or baseline comfort level  Outcome: Progressing  Flowsheets (Taken 7/18/2025 0351 by Milan Wilkinson RN)  Verbalizes/displays adequate comfort level or baseline comfort level:   Encourage patient to monitor pain and request assistance   Assess pain using appropriate pain scale   Administer analgesics based on type and severity of pain and evaluate response   Implement non-pharmacological measures as appropriate and evaluate response     Problem: Chronic Conditions and  for search of the family and/or belongings  3. Encourage verbalization of thoughts and concerns in a socially appropriate manner  4. Utilize positive, consistent limit setting strategies supporting safety of patient, staff and others  5. Encourage participation in the decision making process about the behavioral management agreement  6. If a visitor's behavior poses a threat to safety call refer to organization policy.  7. Initiate consult with , Psychosocial CNS, Spiritual Care as appropriate  Outcome: Progressing

## 2025-07-21 NOTE — PROGRESS NOTES
PALLIATIVE MEDICINE    Patient frustrated with being confined to the bed. He understands he is on hospice care but wants to be able to get out of room. He states he does \"not want to go to facility.\"    Explained that he has to have around the clock care and a facility can offer the care needed. Patient verbalized understanding but remains frustrated.     Per CM note he will discharge to SNF possibly today.     Palliative Medicine will continue to follow Beny VALDES Ra   during his hospitalization and support him as he makes healthcare decisions and define goals of care.     Maura Kimball RN  Palliative Medicine  [phone]

## 2025-07-21 NOTE — PROGRESS NOTES
Hospitalist Progress Note    Patient: Beny Knapp MRN: [id number]  CSN: [id number]    YOB: 1965  Age: 59 y.o.  Sex: male    DOA: 6/14/2025 LOS:  LOS: 37 days          Chief Complain :  Foreign body, shortness of breath.  59 y.o.  male w/ PMH of COPD, ETOH-use and polysubstance abuse who was BIBA and presents with subjective difficulty breathing and swallowing with throat pain. Brought in by EMS for possible forign body (food) with bolus within region of velecula. ENT saw pt and ED intubated for airway protection. ENT was consulted and performed scope on pt with findings of extensive esophageal candidiasis. Nephrology was consulted for severe hyponatremia with sodium of 117 recommending NS at 75cc/hr. Intensivist was contacted as well d/t pt being intubated, on vent for airway protection. Prolong intubation, extubated 07/05/2025. Has been uncooperative, did not cooperate with MBS and did not cooperate with placing NG tube.  07/10/2025 SNVT episode early morning, went into respiratory distress. Required intubation. Thick copious secretions from ET tube.  07/15/2025, extubated. Remains confused and requiring soft restraints.  Palliative care discussed with patient and sister, now comfort measures.    Subjective:   Patient laying in bed, awake, confused, requiring soft restraints.  Assessment/Plan     Active Hospital Problems    Diagnosis     Bipolar 1 disorder (HCC) [F31.9]     Acute hypoxic on chronic hypercapnic respiratory failure (HCC) [J96.01, J96.12]     Palliative care encounter [Z51.5]     Goals of care, counseling/discussion [Z71.89]     Pleural effusion on left [J90]     Pulmonary atelectasis [J98.11]

## 2025-07-21 NOTE — PROGRESS NOTES
SNF Authorization Request  7/21/2025, 11:07 AM    Patient Name: Beny Knapp                   YOB: 1965    Patient has been provided with freedom of choice and has chosen to go to [facility]     SNF has confirmed that they can accept the patient and they have a bed Yes  SNF has confirmed that they are in network with the patient's insurance Yes    Please request authorization./Estimated date of discharge is 07/22/2025    Skilled Need    PT Yes   OT Yes   Speech therapy No   Wound Care No  IV MedicationsNo  Trach No   Peg No     If PT/ OT/ Speech is required, evaluations/ notes have been updated within the last 48 hours No   Made aware today of new plan      Additional information Patient needing short term rehab    Payor: Payor: [facility] MEDICARE / Plan: [employer] DUAL COMPLETE / Product Type: *No Product type* /   Attending physician: Ba Calderon*    Lianne Glaser  Case Management Department  Ph: [phone]

## 2025-07-21 NOTE — PROGRESS NOTES
PT/OT notes are in for SNF, Auth to be started If approved, patient can DC today to SNF per [facility]. SW to follow.

## 2025-07-21 NOTE — PROGRESS NOTES
Patient needing updated PT/OT notes. Therapy manager aware. Patient would require auth prior to DC. SW to reach out to SNF options to review updated clinical. SW to follow.     11:05am- [facility] has accepted patient pending updated PT/OT notes and auth approval. (Therapy team just now notified of updates needed due to new plan) Patients diet has been clarified with SNF. Awaiting updated therapy notes to submit for approval.

## 2025-07-21 NOTE — PROGRESS NOTES
Occupational Therapy Goals:  Initiated 7/21/2025 to be met within 7-10 days.  Short Term Goals  Time Frame for Short Term Goals: 7 days  Short Term Goal 1: Pt will complete upper body bathing and dressing with mod A.  Short Term Goal 2: Pt will complete lower body bathing and dressing with max A.  Short Term Goal 3: Pt will complete toileting with mod A.  Short Term Goal 4: Pt will complete ADL task seated EOB with mod A.    OCCUPATIONAL THERAPY RE-EVALUATION    Patient: Beny Knapp (59 y.o. male)  Date: 7/21/2025  Primary Diagnosis: Hyponatremia [E87.1]  Laryngeal edema [J38.4]  Candida laryngitis [B37.89]  Procedure(s) (LRB):  BRONCHOSCOPY (N/A) 7 Days Post-Op   Precautions: Fall Risk,  ,  ,  ,  ,  ,  ,    PLOF: Patient is poor historian; unable to obtain information.     ASSESSMENT :  Pt supine in bed upon entering, agreeable to OT session. Pt was oriented to person, place. Pt was attached to berry catheter, oxygen, bed alarm activated. Pt seen with PT. ROM and strength assessed while pt supine in bed. Pt washed face supine in bed with set up assistance. Pt was attached to berry catheter, oxygen. Pt declined further ADLs. Patient completed rolling in bed with CGA. Pt completed scooting to HOB with mod A.  Pt impulsive at times during session. Pt left supine in bed with needs in reach.    DEFICITS/IMPAIRMENTS:  Performance deficits / Impairments: Decreased functional mobility ;Decreased ADL status;Decreased ROM;Decreased strength;Decreased endurance;Decreased balance;Decreased coordination;Decreased posture;Decreased cognition;Decreased high-level IADLs;Decreased fine motor control;Decreased safe awareness    Patient will benefit from skilled intervention to address the above impairments.  Patient's rehabilitation potential is considered to be Prognosis: Fair.  Factors which may influence rehabilitation potential include:   []             None noted  [x]             Mental ability/status  [x]              Medical condition  [x]             Home/family situation and support systems  [x]             Safety awareness  [x]             Pain tolerance/management  []             Other:      PLAN :  Recommendations and Planned Interventions:      [x]               Self Care Training                  [x]      Therapeutic Activities  [x]               Functional Mobility Training   [x]      Cognitive Retraining  [x]               Therapeutic Exercises           [x]      Endurance Activities  [x]               Balance Training                    []      Neuromuscular Re-Education  []               Visual/Perceptual Training     [x]      Home Safety Training  [x]               Patient Education                   [x]      Family Training/Education  []               Other (comment):  Frequency/Duration: Patient will be followed by occupational therapy to address goals, 1-2 times per day/3-5 days per week to address goals.    Further Equipment Recommendations for Discharge:  ,      Discharge Recommendation:      AMPA: AM-PAC Inpatient Daily Activity Raw Score: 9/24     Current research shows that an AM-PAC score of 17 or less is not associated with a discharge to the patient's home setting.  Based on an AM-PAC score and their current ADL deficits; it is recommended that the patient have 3-5 sessions per week of Occupational Therapy at d/c to increase the patient's independence.      This AMPAC score should be considered in conjunction with interdisciplinary team recommendations to determine the most appropriate discharge setting. Patient's social support, diagnosis, medical stability, and prior level of function should also be taken into consideration.     SUBJECTIVE:   Patient stated “I am not having a baby.”    OBJECTIVE DATA SUMMARY:   Hospital course since last seen and reason for re-evaluation:Patient was discharged from OT caseload due to intubation/sedation during hospitalization. New orders received at this time .  No past

## 2025-07-21 NOTE — PROGRESS NOTES
Signed             met with patient at bedside for a follow-up visit. Patient told me that he wants to   go out of his room. He is tired of being confined in one room and he also wants to eat lasagna.      Patient thanked  for the visit.      provided presence, support, prayer, and assurance of continued prayer.      Chaplains will provide follow-up care for patient  and family as needed.    Spiritual Health History and Assessment/Progress Note  [facility]    Follow-up, Follow up, Emotional distress, Adjustment to illness, Life Adjustments,      Name: Beny Knapp MRN: [id number]    Age: 59 y.o.     Sex: male   Language: English   Jew: Druze   Acute respiratory failure with hypoxia (HCC)     Date: 7/21/2025            Total Time Calculated: 20 min              Spiritual Assessment continued in [address] SURGICAL/ONCOLOGY        Referral/Consult From: Rounding   Encounter Overview/Reason: Follow-up  Service Provided For: Patient    Love, Belief, Meaning:   Patient unable to assess at this time  Family/Friends No family/friends present      Importance and Influence:  Patient unable to assess at this time  Family/Friends No family/friends present    Community:  Patient expresses feelings of isolation: feeling no one understands  Family/Friends No family/friends present    Assessment and Plan of Care:     Patient Interventions include: Other: He does not know what he wants.  Family/Friends Interventions include: No family/friends present    Patient Plan of Care: No spiritual needs identified for follow-up  Family/Friends Plan of Care: No family/friends present    Electronically signed by Chaplain Gerber on 7/21/2025 at 2:08 PM

## 2025-07-21 NOTE — PROGRESS NOTES
Patient in bed awake, A/Ox3, speech clear, hearing intact, ambulatory, continent of urine and stool. On room air, clear to auscultation bilaterally  diminished breath sounds R base, L base  18, respirations unlabored. Skin is warm, dry, and intact. Dressing clean, dry, and intact. Radial and pedal pulses present bilaterally, capillary refill <3 seconds to fingers and toes. Abdomen soft, bowel sounds active. moderate hand  noted to bilateral upper extremities. Side rails x3 up, bed locked and in lowest position, bed alarm on, call bell and bedside table within reach, needs attended,     0230: patient called and informed the aide that he had had a fall and [facility]. The [facility] lady stated that that was not true. He had been fidgeting on the bed, pulling of his nasal canula but not a fall. There was no sign that he had come out of the bed. The reels where up and the side table was next to the bed where he supposedly had fallen.    05:30: critical labs results came in for potassium 2.9. The doctor gave an order for the patient to be given potassium supplements but the patient is on comfort care

## 2025-07-22 VITALS
HEART RATE: 78 BPM | WEIGHT: 195.99 LBS | OXYGEN SATURATION: 96 % | TEMPERATURE: 98.2 F | DIASTOLIC BLOOD PRESSURE: 81 MMHG | SYSTOLIC BLOOD PRESSURE: 125 MMHG | RESPIRATION RATE: 18 BRPM | BODY MASS INDEX: 26.55 KG/M2 | HEIGHT: 72 IN

## 2025-07-22 PROBLEM — E87.1 HYPONATREMIA: Status: RESOLVED | Noted: 2025-06-14 | Resolved: 2025-07-22

## 2025-07-22 PROBLEM — J96.01 ACUTE HYPOXIC ON CHRONIC HYPERCAPNIC RESPIRATORY FAILURE (HCC): Status: RESOLVED | Noted: 2025-07-16 | Resolved: 2025-07-22

## 2025-07-22 PROBLEM — J38.6: Status: RESOLVED | Noted: 2025-06-14 | Resolved: 2025-07-22

## 2025-07-22 PROBLEM — J96.12 ACUTE HYPOXIC ON CHRONIC HYPERCAPNIC RESPIRATORY FAILURE (HCC): Status: RESOLVED | Noted: 2025-07-16 | Resolved: 2025-07-22

## 2025-07-22 PROBLEM — R06.1 STRIDOR: Status: RESOLVED | Noted: 2025-06-14 | Resolved: 2025-07-22

## 2025-07-22 PROBLEM — J96.01 ACUTE RESPIRATORY FAILURE WITH HYPOXIA (HCC): Status: RESOLVED | Noted: 2025-06-14 | Resolved: 2025-07-22

## 2025-07-22 PROBLEM — B37.81 CANDIDIASIS OF ESOPHAGUS (HCC): Status: RESOLVED | Noted: 2025-06-14 | Resolved: 2025-07-22

## 2025-07-22 LAB
SRA .2 IU/ML UFH SER-ACNC: <1 % (ref 0–20)
SRA 100IU/ML UFH SER-ACNC: <1 % (ref 0–20)
SRA UFH SER-IMP: NORMAL

## 2025-07-22 PROCEDURE — 6360000002 HC RX W HCPCS: Performed by: INTERNAL MEDICINE

## 2025-07-22 PROCEDURE — 94640 AIRWAY INHALATION TREATMENT: CPT

## 2025-07-22 PROCEDURE — 99233 SBSQ HOSP IP/OBS HIGH 50: CPT

## 2025-07-22 PROCEDURE — 6370000000 HC RX 637 (ALT 250 FOR IP): Performed by: HOSPITALIST

## 2025-07-22 PROCEDURE — 6370000000 HC RX 637 (ALT 250 FOR IP): Performed by: INTERNAL MEDICINE

## 2025-07-22 RX ORDER — BUDESONIDE 0.5 MG/2ML
0.5 INHALANT ORAL
Qty: 60 EACH | Refills: 3 | Status: SHIPPED | OUTPATIENT
Start: 2025-07-22

## 2025-07-22 RX ORDER — PANTOPRAZOLE SODIUM 40 MG/1
40 TABLET, DELAYED RELEASE ORAL
Qty: 30 TABLET | Refills: 3 | Status: SHIPPED | OUTPATIENT
Start: 2025-07-22

## 2025-07-22 RX ORDER — ARFORMOTEROL TARTRATE 15 UG/2ML
15 SOLUTION RESPIRATORY (INHALATION)
Qty: 120 ML | Refills: 3 | Status: SHIPPED | OUTPATIENT
Start: 2025-07-22

## 2025-07-22 RX ORDER — ALBUTEROL SULFATE 0.83 MG/ML
2.5 SOLUTION RESPIRATORY (INHALATION) EVERY 4 HOURS PRN
Qty: 120 EACH | Refills: 3 | Status: SHIPPED | OUTPATIENT
Start: 2025-07-22

## 2025-07-22 RX ADMIN — CYCLOBENZAPRINE 10 MG: 10 TABLET, FILM COATED ORAL at 02:23

## 2025-07-22 RX ADMIN — OXYCODONE AND ACETAMINOPHEN 1 TABLET: 5; 325 TABLET ORAL at 02:20

## 2025-07-22 RX ADMIN — DORNASE ALFA 2.5 MG: 1 SOLUTION RESPIRATORY (INHALATION) at 07:24

## 2025-07-22 RX ADMIN — BUDESONIDE 500 MCG: 0.5 INHALANT RESPIRATORY (INHALATION) at 07:24

## 2025-07-22 RX ADMIN — IPRATROPIUM BROMIDE 0.5 MG: 0.5 SOLUTION RESPIRATORY (INHALATION) at 07:24

## 2025-07-22 RX ADMIN — ARFORMOTEROL TARTRATE 15 MCG: 15 SOLUTION RESPIRATORY (INHALATION) at 07:24

## 2025-07-22 RX ADMIN — PANTOPRAZOLE SODIUM 40 MG: 40 TABLET, DELAYED RELEASE ORAL at 06:13

## 2025-07-22 ASSESSMENT — PAIN SCALES - GENERAL
PAINLEVEL_OUTOF10: 0
PAINLEVEL_OUTOF10: 9

## 2025-07-22 NOTE — DISCHARGE SUMMARY
Hospitalist Discharge Summary    Patient: Beny Knapp MRN: [id number]  [facility]: [id number]    YOB: 1965  Age: 59 y.o.  Sex: male    DOA: 6/14/2025 LOS:  LOS: 38 days   Discharge Date:      Primary Care Provider:  Gaby Monroe MD    Admission Diagnoses: Hyponatremia [E87.1]  Laryngeal edema [J38.4]  Candida laryngitis [B37.89]    Discharge Diagnoses:    Active Hospital Problems    Diagnosis     Bipolar 1 disorder (HCC) [F31.9]     Palliative care encounter [Z51.5]     Goals of care, counseling/discussion [Z71.89]     Pleural effusion on left [J90]     Pulmonary atelectasis [J98.11]     Moderate malnutrition [E44.0]     Aspiration pneumonia (HCC) [J69.0]     Cocaine abuse (HCC) [F14.10]     ETOH abuse [F10.10]     Polysubstance abuse ([facility]) [F19.10]     Tobacco abuse [Z72.0]     HTN (hypertension) [I10]     COPD (chronic obstructive pulmonary disease) ([facility]) [J44.9]     Chronic hepatitis C (HCC) [B18.2]        Discharge Medications:        Medication List        START taking these medications      albuterol (2.5 MG/3ML) 0.083% nebulizer solution  Commonly known as: PROVENTIL  Take 3 mLs by nebulization every 4 hours as needed for Wheezing or Shortness of Breath     arformoterol tartrate 15 MCG/2ML Nebu  Commonly known as: BROVANA  Take 2 mLs by nebulization in the morning and 2 mLs in the evening.     budesonide 0.5 MG/2ML nebulizer suspension  Commonly known as: PULMICORT  Take 2 mLs by nebulization in the morning and 2 mLs in the evening.     pantoprazole 40 MG tablet  Commonly known as: PROTONIX  Take 1 tablet by mouth 2 times daily (before meals)               Where to Get Your Medications        These medications were sent to [facility] [place], [place] - [address] [facility] - P [phone] - F [phone] [address] [facility]  Direct comparison made to prior chest x-ray dated July 5, 2025. Cardiomediastinal silhouette is stable. There has been interval extubation and interval removal of the nasogastric tube. There is bibasilar patchy airspace disease and small bilateral pleural effusions.     Bibasilar patchy airspace disease and small bilateral pleural effusions. Electronically signed by Newton Stallings MD    XR CHEST PORTABLE  Result Date: 7/5/2025  EXAM: XR CHEST PORTABLE ACC#: [id number]  INDICATION: Ventilator  COMPARISON: 7/4/2025 TECHNIQUE: Portable AP semierect view of the chest. FINDINGS: Endotracheal tube and enteric tube are again visualized. Bibasilar atelectasis is again noted. There may be a small left-sided pleural effusion. The cardiomediastinal configuration is within normal limits. No acute bony abnormalities.     No significant change. Electronically signed by ASHLIE Morton    XR CHEST PORTABLE  Result Date: 7/4/2025  INDICATION: Ventilator COMPARISON: 7/3/2025 FINDINGS: Single AP portable view of the chest demonstrates no change in position of the lines and tubes. The cardiomediastinal silhouette is unchanged. Persistent bibasilar atelectasis.     No significant change. Electronically signed by Dami Solo    XR CHEST PORTABLE  Result Date: 7/3/2025  EXAM:  XR CHEST PORTABLE INDICATION: Ventilator COMPARISON: 7/2/2025 TECHNIQUE: 0607 hours portable chest AP view FINDINGS: The ET tube is 6.5 cm above the justine. NG tube courses into the stomach. Lungs demonstrate bibasilar atelectasis left greater than right unchanged. Small pleural effusions are suspected.     1. ET tube is 6.5 cm above the justine. This could be advanced 1.5 cm. Electronically signed by Jorge Cornelius    XR CHEST PORTABLE  Result Date: 7/2/2025  EXAM:  XR CHEST PORTABLE INDICATION: Ventilator COMPARISON: 7/1/2025 TECHNIQUE: 0550 hours portable chest AP view FINDINGS: ET tube is 7 cm above the justine. This could be advanced 2 cm. NG tube courses into the

## 2025-07-22 NOTE — PROGRESS NOTES
Palliative Medicine Progress Note  [facility]: [phone]     Patient Name: Beny Knapp  YOB: 1965    Date of Service: 2025  Provider: RADHA Lim  Admit Date: 2025  Referring Provider:  Jocelyne Richmond MD    Reason for Consult: goals of care       HISTORY OF PRESENT ILLNESS:     Beny Knapp is a 59 y.o. with a past history of COPD on chronic/frequent steroid therapy, HTN, HCV cirrhosis (infection treated and HCV quant negative), tobacco use disorder (1/3- PPD), bipolar 1 disorder, and alcohol and cocaine use disorder recently at [facility] in 2025 who was admitted on 2025 when he presented to ED with sensation of foreign body in the throat with stridor. Found to have severe candidiasis and required intubation. Patient was also found to have hyponatremia. He remains in ICU on ventilator with fluctuating oxygen requirements and challenging sedation. Today is vent day 17 and patient will require court order for tracheostomy and PEG.    Psychosocial: Patient has no emergency contacts on file. A relative search is underway with Case Management to identify surrogate decision-makers and verify personal history. Phone numbers for a mother, Rose Al, have not been working. Welfare check for a brother in [place], [place] determined he is . Previous medical records consistently state patient has never been . One medical record states he has one child, but lists no name or contact information.    Functional: Patient's baseline functional status was presumably independent with ADLs and IADLs, but with significant psychosocial morbidity. He is currently intubated/sedated and RASS +2 requiring escalating sedation protocol.    2025 Patient lying in bed, on 4L NC, A/O, c/o pain 9:10 in back, neck and left knee, denies shortness of breath, berry cath in place.     2025 Patient seen in ICU, extubated yesterday, on 4L NC,

## 2025-07-22 NOTE — PLAN OF CARE
Problem: Pain  Goal: Verbalizes/displays adequate comfort level or baseline comfort level  7/22/2025 0419 by Alana Enriquez RN  Outcome: Progressing  Flowsheets (Taken 7/22/2025 0419)  Verbalizes/displays adequate comfort level or baseline comfort level:   Encourage patient to monitor pain and request assistance   Assess pain using appropriate pain scale   Administer analgesics based on type and severity of pain and evaluate response     Problem: Chronic Conditions and Co-morbidities  Goal: Patient's chronic conditions and co-morbidity symptoms are monitored and maintained or improved  7/22/2025 0419 by Alana Enriquez RN  Outcome: Progressing  Flowsheets (Taken 7/22/2025 0419)  Care Plan - Patient's Chronic Conditions and Co-Morbidity Symptoms are Monitored and Maintained or Improved:   Monitor and assess patient's chronic conditions and comorbid symptoms for stability, deterioration, or improvement   Collaborate with multidisciplinary team to address chronic and comorbid conditions and prevent exacerbation or deterioration   Update acute care plan with appropriate goals if chronic or comorbid symptoms are exacerbated and prevent overall improvement and discharge     Problem: Neurosensory - Adult  Goal: Achieves stable or improved neurological status  7/22/2025 0419 by Alana Enriquez RN  Outcome: Progressing     Problem: Respiratory - Adult  Goal: Achieves optimal ventilation and oxygenation  7/22/2025 0419 by Alana Enriquez RN  Outcome: Progressing  Flowsheets (Taken 7/22/2025 0419)  Achieves optimal ventilation and oxygenation:   Assess for changes in respiratory status   Assess for changes in mentation and behavior   Position to facilitate oxygenation and minimize respiratory effort     Problem: Cardiovascular - Adult  Goal: Maintains optimal cardiac output and hemodynamic stability  7/22/2025 0419 by Alana Enriquez RN  Outcome: Progressing     Problem: Skin/Tissue Integrity - Adult  Goal: Skin

## 2025-07-22 NOTE — PLAN OF CARE
Problem: Pain  Goal: Verbalizes/displays adequate comfort level or baseline comfort level  Outcome: Progressing  Flowsheets (Taken 7/22/2025 0419)  Verbalizes/displays adequate comfort level or baseline comfort level:   Encourage patient to monitor pain and request assistance   Assess pain using appropriate pain scale   Administer analgesics based on type and severity of pain and evaluate response     Problem: Respiratory - Adult  Goal: Achieves optimal ventilation and oxygenation  Outcome: Progressing  Flowsheets (Taken 7/22/2025 0419)  Achieves optimal ventilation and oxygenation:   Assess for changes in respiratory status   Assess for changes in mentation and behavior   Position to facilitate oxygenation and minimize respiratory effort

## 2025-07-22 NOTE — PROGRESS NOTES
Patients Auth Approved.    [facility]         Address: [address] [place] [address]  Phone: [phone]        Patient to DC to SNF today between 11am- 2pm- DC summary to follow.   Medicaid transport arranged via stretcher [phone].  Trip Number # [id number] Transport and SNF aware of 02 need.

## 2025-07-22 NOTE — PROGRESS NOTES
PALLIATIVE MEDICINE    AMD STATUS: NONE ON FILE    CODE STATUS: DNR    Seen today in Rm 330. Patient lying in bed supine with head of bed raised.   AAOX4. Respirations even and unlabored on 4LO2 NC. Reports pain 9/10 in back and neck and L knee.  Beny expressed that he wants to be able to sit up in a chair or go outside in a wheelchair. He does not want to go to a facility. He stated, \"I want to go to apartment.\" Explained his need for round the clock care. He verbalized understanding but remains frustrated about his future.     Patient will be discharged today to [facility].     Palliative medicine will continue to follow Beny Knapp during his admission.     Maura Kimball RN  Palliative Medicine  [phone]

## 2025-07-28 LAB
BACTERIA SPEC CULT: NORMAL
SERVICE CMNT-IMP: NORMAL

## 2025-08-04 LAB
BACTERIA SPEC CULT: NORMAL
SERVICE CMNT-IMP: NORMAL

## 2025-08-11 LAB
BACTERIA SPEC CULT: NORMAL
SERVICE CMNT-IMP: NORMAL

## 2025-08-30 LAB
ACID FAST STN SPEC: NEGATIVE
MYCOBACTERIUM SPEC QL CULT: NEGATIVE
SPECIMEN PREPARATION: NORMAL
SPECIMEN SOURCE: NORMAL

## (undated) DEVICE — SYRINGE MED 5ML STD CLR PLAS LUERLOCK TIP N CTRL DISP

## (undated) DEVICE — CANNULA CUSH AD W/ 14FT TBG

## (undated) DEVICE — TOWEL,OR,DSP,ST,BLUE,STD,4/PK,20PK/CS: Brand: MEDLINE

## (undated) DEVICE — CATHETER PH SUCT 14FR

## (undated) DEVICE — BE 105-8 BRONCHOSCOPE SWIVEL - 15MM ID/22MM OD (PATIENT PORT) X15MM OD (EQUIPMENT PORT). REUSABLE.  FITS COMPONENTS OF ADULT VENTILATOR CIRCUITS.  MOLDED OF POLYETHERIMIDE. INCLUDES TWO SILICONE RUBBER CAPS; ONE CAP ALLOWS FOR THE USE OF A SUCTIONING CATHETER WHILE THE OTHER CAP ALLOWS FOR THE USE OF A FIBER-OPTIC BRONCHOSCOPE WITHOUT SIGNIFICANT LOSS OF PEEP.: Brand: BE 105-8 BRONCHOSCOPE SWIVEL

## (undated) DEVICE — CATHETER IV 22GA L1IN BLU POLYUR STR HUB RADPQ PROTCT +

## (undated) DEVICE — SYRINGE MED 10ML SLIP TIP BLNT FILL AND LUERLOCK DISP

## (undated) DEVICE — BITE BLOCK ENDOSCP UNIV AD 6 TO 9.4 MM

## (undated) DEVICE — TOURNIQUET PHLEB W1XL18IN BLU FLAT RL AND BND REUSE FOR IV

## (undated) DEVICE — BASIN EMSIS 16OZ GRAPHITE PLAS KID SHP MOLD GRAD FOR ORAL

## (undated) DEVICE — CATHETER SUCT TR FL TIP 14FR W/ O CTRL

## (undated) DEVICE — KENDALL RADIOLUCENT FOAM MONITORING ELECTRODE RECTANGULAR SHAPE: Brand: KENDALL

## (undated) DEVICE — SYRINGE MEDICAL 3ML CLEAR PLASTIC STANDARD NON CONTROL LUERLOCK TIP DISPOSABLE

## (undated) DEVICE — APPLICATOR COTTON TIP STRL 5/PK

## (undated) DEVICE — SLEEVE COMPR STD 12 IN FOR 165IN CALF COMFORT VENODYNE SYS

## (undated) DEVICE — GLOVE ORANGE PI 7 1/2   MSG9075

## (undated) DEVICE — BLUNTFILL: Brand: MONOJECT

## (undated) DEVICE — SINGLE USE BIOPSY VALVE MAJ-210: Brand: SINGLE USE BIOPSY VALVE (STERILE)

## (undated) DEVICE — GARMENT,MEDLINE,DVT,INT,CALF,MED, GEN2: Brand: MEDLINE

## (undated) DEVICE — 1860 HEALTH CARE N95 MASK, 20EACH/BOX  6 BX/C: Brand: 3M™

## (undated) DEVICE — MASK SURG REG ORNG LEV 3 SFTY SEAL 4 LAYR SFT INNR LINING

## (undated) DEVICE — WRISTBAND ID AD W2.5XL9.5CM RED VYN ADH CLSR UNI-PRINT 655-16-PDJ

## (undated) DEVICE — 1860S HEALTH CARE RESPIRATOR N95 120EA/C: Brand: 3M™

## (undated) DEVICE — TUBING, SUCTION, 1/4" X 12', STRAIGHT: Brand: MEDLINE

## (undated) DEVICE — SET ADMIN L104IN 20 GTT GRAV RLER CLMP SMRT SITE NDL FREE

## (undated) DEVICE — NEEDLE ASPIR INNR 21GA OUTER 19GA L3X15MM TRNSBRONCH

## (undated) DEVICE — YANKAUER,FLEXIBLE HANDLE,REGLR CAPACITY: Brand: MEDLINE INDUSTRIES, INC.

## (undated) DEVICE — SINGLE USE SUCTION VALVE MAJ-209: Brand: SINGLE USE SUCTION VALVE (STERILE)

## (undated) DEVICE — Device

## (undated) DEVICE — SYRINGE 50ML E/T

## (undated) DEVICE — SOLUTION IV 1000 ML 0.9 NACL INJ USP EXCEL PLAS CONTAINER